# Patient Record
Sex: MALE | Race: WHITE | Employment: FULL TIME | URBAN - METROPOLITAN AREA
[De-identification: names, ages, dates, MRNs, and addresses within clinical notes are randomized per-mention and may not be internally consistent; named-entity substitution may affect disease eponyms.]

---

## 2017-01-03 ENCOUNTER — HOSPITAL ENCOUNTER (EMERGENCY)
Age: 57
Discharge: HOME | End: 2017-01-03
Attending: EMERGENCY MEDICINE

## 2017-01-03 VITALS
BODY MASS INDEX: 28.14 KG/M2 | DIASTOLIC BLOOD PRESSURE: 85 MMHG | WEIGHT: 201 LBS | RESPIRATION RATE: 18 BRPM | OXYGEN SATURATION: 99 % | TEMPERATURE: 98.3 F | HEART RATE: 87 BPM | SYSTOLIC BLOOD PRESSURE: 134 MMHG | HEIGHT: 71 IN

## 2017-01-03 DIAGNOSIS — J20.9 ACUTE BRONCHITIS, UNSPECIFIED ORGANISM: Primary | ICD-10-CM

## 2017-01-03 RX ORDER — AZITHROMYCIN 250 MG/1
TABLET, FILM COATED ORAL
Qty: 6 TAB | Refills: 0 | Status: SHIPPED | OUTPATIENT
Start: 2017-01-03 | End: 2017-02-06 | Stop reason: ALTCHOICE

## 2017-01-03 RX ORDER — CODEINE PHOSPHATE AND GUAIFENESIN 10; 100 MG/5ML; MG/5ML
5 SOLUTION ORAL
Qty: 118 ML | Refills: 0 | Status: ON HOLD | OUTPATIENT
Start: 2017-01-03 | End: 2017-07-21 | Stop reason: CLARIF

## 2017-01-03 RX ORDER — PREDNISONE 20 MG/1
60 TABLET ORAL DAILY
Qty: 15 TAB | Refills: 0 | Status: SHIPPED | OUTPATIENT
Start: 2017-01-03 | End: 2017-01-08

## 2017-01-03 NOTE — DISCHARGE INSTRUCTIONS
Bronchitis: Care Instructions  Your Care Instructions    Bronchitis is inflammation of the bronchial tubes, which carry air to the lungs. The tubes swell and produce mucus, or phlegm. The mucus and inflamed bronchial tubes make you cough. You may have trouble breathing. Most cases of bronchitis are caused by viruses like those that cause colds. Antibiotics usually do not help and they may be harmful. Bronchitis usually develops rapidly and lasts about 2 to 3 weeks in otherwise healthy people. Follow-up care is a key part of your treatment and safety. Be sure to make and go to all appointments, and call your doctor if you are having problems. It's also a good idea to know your test results and keep a list of the medicines you take. How can you care for yourself at home? · Take all medicines exactly as prescribed. Call your doctor if you think you are having a problem with your medicine. · Get some extra rest.  · Take an over-the-counter pain medicine, such as acetaminophen (Tylenol), ibuprofen (Advil, Motrin), or naproxen (Aleve) to reduce fever and relieve body aches. Read and follow all instructions on the label. · Do not take two or more pain medicines at the same time unless the doctor told you to. Many pain medicines have acetaminophen, which is Tylenol. Too much acetaminophen (Tylenol) can be harmful. · Take an over-the-counter cough medicine that contains dextromethorphan to help quiet a dry, hacking cough so that you can sleep. Avoid cough medicines that have more than one active ingredient. Read and follow all instructions on the label. · Breathe moist air from a humidifier, hot shower, or sink filled with hot water. The heat and moisture will thin mucus so you can cough it out. · Do not smoke. Smoking can make bronchitis worse. If you need help quitting, talk to your doctor about stop-smoking programs and medicines. These can increase your chances of quitting for good.   When should you call for help? Call 911 anytime you think you may need emergency care. For example, call if:  · You have severe trouble breathing. Call your doctor now or seek immediate medical care if:  · You have new or worse trouble breathing. · You cough up dark brown or bloody mucus (sputum). · You have a new or higher fever. · You have a new rash. Watch closely for changes in your health, and be sure to contact your doctor if:  · You cough more deeply or more often, especially if you notice more mucus or a change in the color of your mucus. · You are not getting better as expected. Where can you learn more? Go to http://paris-ivon.info/. Enter H333 in the search box to learn more about \"Bronchitis: Care Instructions. \"  Current as of: May 23, 2016  Content Version: 11.1  © 2429-0234 [employer]. Care instructions adapted under license by [employer] (which disclaims liability or warranty for this information). If you have questions about a medical condition or this instruction, always ask your healthcare professional. [place] [address] any warranty or liability for your use of this information. Bronchitis: Care Instructions  Your Care Instructions    Bronchitis is inflammation of the bronchial tubes, which carry air to the lungs. The tubes swell and produce mucus, or phlegm. The mucus and inflamed bronchial tubes make you cough. You may have trouble breathing. Most cases of bronchitis are caused by viruses like those that cause colds. Antibiotics usually do not help and they may be harmful. Bronchitis usually develops rapidly and lasts about 2 to 3 weeks in otherwise healthy people. Follow-up care is a key part of your treatment and safety. Be sure to make and go to all appointments, and call your doctor if you are having problems. It's also a good idea to know your test results and keep a list of the medicines you take.   How can you care for yourself at home? · Take all medicines exactly as prescribed. Call your doctor if you think you are having a problem with your medicine. · Get some extra rest.  · Take an over-the-counter pain medicine, such as acetaminophen (Tylenol), ibuprofen (Advil, Motrin), or naproxen (Aleve) to reduce fever and relieve body aches. Read and follow all instructions on the label. · Do not take two or more pain medicines at the same time unless the doctor told you to. Many pain medicines have acetaminophen, which is Tylenol. Too much acetaminophen (Tylenol) can be harmful. · Take an over-the-counter cough medicine that contains dextromethorphan to help quiet a dry, hacking cough so that you can sleep. Avoid cough medicines that have more than one active ingredient. Read and follow all instructions on the label. · Breathe moist air from a humidifier, hot shower, or sink filled with hot water. The heat and moisture will thin mucus so you can cough it out. · Do not smoke. Smoking can make bronchitis worse. If you need help quitting, talk to your doctor about stop-smoking programs and medicines. These can increase your chances of quitting for good. When should you call for help? Call 911 anytime you think you may need emergency care. For example, call if:  · You have severe trouble breathing. Call your doctor now or seek immediate medical care if:  · You have new or worse trouble breathing. · You cough up dark brown or bloody mucus (sputum). · You have a new or higher fever. · You have a new rash. Watch closely for changes in your health, and be sure to contact your doctor if:  · You cough more deeply or more often, especially if you notice more mucus or a change in the color of your mucus. · You are not getting better as expected. Where can you learn more? Go to http://paris-ivon.info/. Enter H333 in the search box to learn more about \"Bronchitis: Care Instructions. \"  Current as of: May 23, 2016  Content Version: 11.1  © 8176-4170 [employer]. Care instructions adapted under license by [employer] (which disclaims liability or warranty for this information). If you have questions about a medical condition or this instruction, always ask your healthcare professional. [address] any warranty or liability for your use of this information.

## 2017-02-06 ENCOUNTER — OFFICE VISIT (OUTPATIENT)
Dept: INTERNAL MEDICINE CLINIC | Age: 57
End: 2017-02-06

## 2017-02-06 VITALS
HEIGHT: 71 IN | DIASTOLIC BLOOD PRESSURE: 84 MMHG | OXYGEN SATURATION: 97 % | BODY MASS INDEX: 27.44 KG/M2 | SYSTOLIC BLOOD PRESSURE: 135 MMHG | HEART RATE: 79 BPM | WEIGHT: 196 LBS | RESPIRATION RATE: 18 BRPM | TEMPERATURE: 98 F

## 2017-02-06 DIAGNOSIS — J20.9 ACUTE BRONCHITIS, UNSPECIFIED ORGANISM: ICD-10-CM

## 2017-02-06 DIAGNOSIS — K51.90 ULCERATIVE COLITIS WITHOUT COMPLICATIONS, UNSPECIFIED LOCATION (HCC): ICD-10-CM

## 2017-02-06 DIAGNOSIS — E78.5 HYPERLIPIDEMIA, UNSPECIFIED HYPERLIPIDEMIA TYPE: ICD-10-CM

## 2017-02-06 DIAGNOSIS — J45.901 ASTHMA WITH ACUTE EXACERBATION, UNSPECIFIED ASTHMA SEVERITY: Primary | ICD-10-CM

## 2017-02-06 DIAGNOSIS — Z79.60 LONG-TERM USE OF IMMUNOSUPPRESSANT MEDICATION: ICD-10-CM

## 2017-02-06 DIAGNOSIS — R73.02 IGT (IMPAIRED GLUCOSE TOLERANCE): ICD-10-CM

## 2017-02-06 DIAGNOSIS — M06.9 RHEUMATOID ARTHRITIS, INVOLVING UNSPECIFIED SITE, UNSPECIFIED RHEUMATOID FACTOR PRESENCE: ICD-10-CM

## 2017-02-06 RX ORDER — ALBUTEROL SULFATE 90 UG/1
2 AEROSOL, METERED RESPIRATORY (INHALATION)
Qty: 1 INHALER | Refills: 1 | Status: CANCELLED | OUTPATIENT
Start: 2017-02-06

## 2017-02-06 RX ORDER — FLUTICASONE PROPIONATE AND SALMETEROL 250; 50 UG/1; UG/1
1 POWDER RESPIRATORY (INHALATION) 2 TIMES DAILY
Qty: 1 INHALER | Refills: 5 | Status: SHIPPED | OUTPATIENT
Start: 2017-02-06 | End: 2017-02-16 | Stop reason: SDUPTHER

## 2017-02-06 RX ORDER — AZITHROMYCIN 250 MG/1
TABLET, FILM COATED ORAL
Qty: 6 TAB | Refills: 0 | Status: SHIPPED | OUTPATIENT
Start: 2017-02-06 | End: 2019-11-18 | Stop reason: SDUPTHER

## 2017-02-06 RX ORDER — PREDNISONE 20 MG/1
40 TABLET ORAL
Qty: 10 TAB | Refills: 0 | Status: SHIPPED | OUTPATIENT
Start: 2017-02-06 | End: 2017-06-22 | Stop reason: SDUPTHER

## 2017-02-06 RX ORDER — BENZOCAINE .13; .15; .5; 2 G/100G; G/100G; G/100G; G/100G
2 GEL ORAL DAILY
COMMUNITY

## 2017-02-06 NOTE — MR AVS SNAPSHOT
Visit Information Date & Time Provider Department Dept. Phone Encounter #  
 2/6/2017  1:30 PM Jose Chambers, [address] [place] [address], [facility] [phone] [id number] Follow-up Instructions Return in about 2 months (around 4/6/2017), or if symptoms worsen or fail to improve, for asthma, pre-diabetes, cholesterol. Upcoming Health Maintenance Date Due Hepatitis C Screening 1960 Pneumococcal 19-64 Medium Risk (1 of 1 - PPSV23) 8/15/1980 INFLUENZA AGE 9 TO ADULT 8/1/2016 COLONOSCOPY 1/7/2018 DTaP/Tdap/Td series (2 - Td) 5/12/2024 Allergies as of 2/6/2017  Review Complete On: 2/6/2017 By: Jose Chambers MD  
 No Known Allergies Current Immunizations  Reviewed on 5/31/2016 Name Date Hep A Vaccine (Adult) 5/31/2016, 5/12/2014 Influenza Vaccine 11/10/2016, 11/15/2015, 11/1/2014 Influenza Vaccine Whole 11/1/2010 Tdap 5/12/2014 Typhoid Vi Capsular Polysaccharide Vaccine 5/31/2016, 5/12/2014 Zoster Vaccine, Live 3/18/2014 Not reviewed this visit You Were Diagnosed With   
  
 Codes Comments Asthma with acute exacerbation, unspecified asthma severity    -  Primary ICD-10-CM: J45.901 ICD-9-CM: 093.73 Rheumatoid arthritis, involving unspecified site, unspecified rheumatoid factor presence ([facility]     ICD-10-CM: M06.9 ICD-9-CM: 714.0 Ulcerative colitis without complications, unspecified location [facility])     ICD-10-CM: K51.90 ICD-9-CM: 556.9 Long-term use of immunosuppressant medication     ICD-10-CM: Z79.899 ICD-9-CM: V58.69 IGT (impaired glucose tolerance)     ICD-10-CM: R73.02 
ICD-9-CM: 790.22 Hyperlipidemia, unspecified hyperlipidemia type     ICD-10-CM: E78.5 ICD-9-CM: 272.4 Acute bronchitis, unspecified organism     ICD-10-CM: J20.9 ICD-9-CM: 466.0 Vitals BP Pulse Temp Resp Height(growth percentile) Weight(growth percentile) 135/84 (BP 1 Location: Left arm, BP Patient Position: Sitting) 79 98 °F (36.7 °C) (Oral) 18 5' 11\" (1.803 m) 196 lb (88.9 kg) SpO2 BMI Smoking Status 97% 27.34 kg/m2 Never Smoker Vitals History BMI and BSA Data Body Mass Index Body Surface Area  
 27.34 kg/m 2 2.11 m 2 Preferred Pharmacy Pharmacy Name Phone [facility] [phone] Your Updated Medication List  
  
   
This list is accurate as of: 2/6/17  2:03 PM.  Always use your most recent med list.  
  
  
  
  
 albuterol 90 mcg/actuation inhaler Commonly known as:  PROVENTIL HFA, VENTOLIN HFA, PROAIR HFA Take 2 Puffs by inhalation every four (4) hours as needed for Wheezing. * APRISO 0.375 gram capsule Generic drug:  mesalamine ER Take 1.5 g by mouth daily. * LIALDA 1.2 gram delayed release tablet Generic drug:  mesalamine Take  by mouth Daily (before breakfast). azithromycin 250 mg tablet Commonly known as:  Luci Ax Take 2 tablets today, then take 1 tablet daily  
  
 budesonide 32 mcg/actuation nasal spray Commonly known as:  RHINOCORT AQUA  
  
 cetirizine 10 mg tablet Commonly known as:  ZYRTEC Take 1 Tab by mouth daily. coenzyme Q-10 200 mg capsule Commonly known as:  CO Q-10 Take 1 Cap by mouth daily. fluticasone-salmeterol 250-50 mcg/dose diskus inhaler Commonly known as:  ADVAIR Take 1 Puff by inhalation two (2) times a day. guaiFENesin-codeine 100-10 mg/5 mL solution Commonly known as:  ROBITUSSIN AC Take 5 mL by mouth three (3) times daily as needed for Cough. Max Daily Amount: 15 mL.  
  
 ketoconazole 2 % topical cream  
Commonly known as:  NIZORAL Apply  to affected area two (2) times a day. leucovorin calcium 5 mg tablet Commonly known as:  Catherne Chencho Take 5 mg by mouth. 2x per week Sat and Sunday  
  
 methotrexate 2.5 mg tablet Commonly known as:  Virgen Mejia Take 10 mg by mouth. Every Saturday and Sunday  Indications: RHEUMATOID ARTHRITIS  
  
 mometasone 0.1 % topical cream  
Commonly known as:  Armen Pluck Apply  to affected area daily. montelukast 10 mg tablet Commonly known as:  SINGULAIR  
TAKE 1 TABLET DAILY PLETAL 50 mg tablet Generic drug:  cilostazol Take  by mouth Before breakfast and dinner. [employer]  
  
 predniSONE 20 mg tablet Commonly known as:  Glen Flora Esters Take 2 Tabs by mouth daily (with breakfast). PriLOSEC 20 mg capsule Generic drug:  omeprazole Take 20 mg by mouth daily. REMICADE 100 mg injection Generic drug:  inFLIXimab  
5 mg/kg by IntraVENous route. simvastatin 20 mg tablet Commonly known as:  ZOCOR  
TAKE 1 TABLET NIGHTLY * Notice: This list has 2 medication(s) that are the same as other medications prescribed for you. Read the directions carefully, and ask your doctor or other care provider to review them with you. Prescriptions Sent to Pharmacy Refills  
 fluticasone-salmeterol (ADVAIR) 250-50 mcg/dose diskus inhaler 5 Sig: Take 1 Puff by inhalation two (2) times a day. Class: Normal  
 [facility] [place] [address] [address] [place] [address] Ph #: [phone] Route: Inhalation  
 azithromycin (ZITHROMAX) 250 mg tablet 0 Sig: Take 2 tablets today, then take 1 tablet daily Class: Normal  
 [facility] [place] [address] Ph #: [phone]  
 predniSONE (DELTASONE) 20 mg tablet 0 Sig: Take 2 Tabs by mouth daily (with breakfast). Class: Normal  
 Pharmacy: [facility] [place] [address] [address], [address] [place] [address] Ph #: [phone] Route: Oral  
  
Follow-up Instructions Return in about 2 months (around 4/6/2017), or if symptoms worsen or fail to improve, for asthma, pre-diabetes, cholesterol. Introducing [facility]! Dear Shannan Chahal: Thank you for requesting a tamyca account. Our records indicate that you already have an active tamyca account. You can access your account anytime at [email or web address]. [email or web address] Did you know that you can access your hospital and ER discharge instructions at any time in tamyca? You can also review all of your test results from your hospital stay or ER visit. Additional Information If you have questions, please visit the Frequently Asked Questions section of the tamyca website at [email or web address] Remember, tamyca is NOT to be used for urgent needs. For medical emergencies, dial 911. Now available from your iPhone and Android! Please provide this summary of care documentation to your next provider. Your primary care clinician is listed as [address] If you have any questions after today's visit, please call [phone].

## 2017-02-06 NOTE — PROGRESS NOTES
HISTORY OF PRESENT ILLNESS  Marcus Conway is a 55 y.o. male. HPI  Presents for f/u persistent cough    Seen in 11/2016 and 1/2017 at [facility]  Given abx each time    Sputum production improves some, yet cough never resolved. Using albuterol daily in the past week or so    Now, again having thickening of secretions, increased congestion. Pt has always had trouble with mold allergy historically    Past medical, Social, and Family history reviewed  Medications reviewed and updated. ROS  Complete ROS reviewed and negative or stable except as noted in HPI. Physical Exam   Constitutional: He is oriented to person, place, and time. He appears well-nourished. No distress. HENT:   Head: Normocephalic and atraumatic. Mouth/Throat: Oropharynx is clear and moist. No oropharyngeal exudate. Eyes: EOM are normal. Pupils are equal, round, and reactive to light. No scleral icterus. Neck: Normal range of motion. Neck supple. No JVD present. No thyromegaly present. Cardiovascular: Normal rate, regular rhythm and normal heart sounds. Exam reveals no gallop and no friction rub. No murmur heard. Pulmonary/Chest: Effort normal. No respiratory distress. He has wheezes (with forced exp). He has rales (exp with forced exp only c/w mobilizing secretions). Abdominal: Soft. Bowel sounds are normal. He exhibits no distension. There is no tenderness. Genitourinary: Prostate normal.   Musculoskeletal: Normal range of motion. He exhibits no edema. Lymphadenopathy:     He has no cervical adenopathy. Neurological: He is alert and oriented to person, place, and time. He exhibits normal muscle tone. Coordination normal.   Skin: Skin is warm. No rash noted. Psychiatric: He has a normal mood and affect. Nursing note and vitals reviewed. Prior labs reviewed. ASSESSMENT and PLAN  Favor asthma is uncontrolled and     ICD-10-CM ICD-9-CM    1. Asthma with acute exacerbation, unspecified asthma severity J45. [address] 493.92 fluticasone-salmeterol (ADVAIR) 250-50 mcg/dose diskus inhaler      predniSONE (DELTASONE) 20 mg tablet   2. Rheumatoid arthritis, involving unspecified site, unspecified rheumatoid factor presence ([facility] M06.9 714.0    3. Ulcerative colitis without complications, unspecified location ([facility]) K51.90 556.9    4. Long-term use of immunosuppressant medication Z79.899 V58.69    5. IGT (impaired glucose tolerance) R73.02 790.22    6. Hyperlipidemia, unspecified hyperlipidemia type E78.5 272.4    7. Acute bronchitis, unspecified organism J20.9 466.0 azithromycin (ZITHROMAX) 250 mg tablet     Follow-up Disposition:  Return in about 2 months (around 4/6/2017), or if symptoms worsen or fail to improve, for asthma, pre-diabetes, cholesterol.   results and schedule of future studies reviewed with patient  reviewed diet, exercise and weight    cardiovascular risk and specific lipid/LDL goals reviewed  reviewed medications and side effects in detail   Add ICS/LABA  pred burst - low dose  azithro   Continue other current medications

## 2017-02-06 NOTE — PROGRESS NOTES
Rm 13    Chief Complaint   Patient presents with    Cough     Patient states he was seen in 11/2016 & 1/2017 and was dx with bronchitis both times. Patient states he finishes the meds and then it comes back again  Patient states he has a burning & chest discomfort when he coughs   Patient denies fever, sore throat or nasal congestion    Patient states the inhaler has helped needs a refill  Health Maintenance Due   Topic Date Due    Hepatitis C Screening  1960    Pneumococcal 19-64 Medium Risk (1 of 1 - PPSV23) 08/15/1980    INFLUENZA AGE 9 TO ADULT  08/01/2016     1. Have you been to the ER, urgent care clinic since your last visit? Hospitalized since your last visit? yes/cough UC 1/3/2017    2. Have you seen or consulted any other health care providers outside of the [facility] since your last visit? Include any pap smears or colon screening.  no       Learning Assessment 10/28/2015   PRIMARY LEARNER Patient   HIGHEST LEVEL OF EDUCATION - PRIMARY LEARNER  > 4 YEARS OF COLLEGE   BARRIERS PRIMARY LEARNER NONE   CO-LEARNER CAREGIVER No   PRIMARY LANGUAGE ENGLISH   LEARNER PREFERENCE PRIMARY READING   ANSWERED BY patient   RELATIONSHIP SELF     PHQ 2 / 9, over the last two weeks 10/28/2015   Little interest or pleasure in doing things Not at all   Feeling down, depressed or hopeless Not at all   Total Score PHQ 2 0     Living medical will information given at previous visit

## 2017-02-14 ENCOUNTER — TELEPHONE (OUTPATIENT)
Dept: INTERNAL MEDICINE CLINIC | Age: 57
End: 2017-02-14

## 2017-02-14 NOTE — TELEPHONE ENCOUNTER
Pt seen last week - 2/6/17  Still has lung congestion and cough, \"feels like stuff in lungs\", finished antibiotic Fri 2/10/17, also finished prednisone. What does Dr Max Forth recommend? Please call back.  Best call back # for 2/14/17 is [phone] (pt's wk)

## 2017-02-16 ENCOUNTER — HOSPITAL ENCOUNTER (OUTPATIENT)
Dept: GENERAL RADIOLOGY | Age: 57
Discharge: HOME | End: 2017-02-16
Payer: COMMERCIAL

## 2017-02-16 ENCOUNTER — OFFICE VISIT (OUTPATIENT)
Dept: INTERNAL MEDICINE CLINIC | Age: 57
End: 2017-02-16

## 2017-02-16 VITALS
TEMPERATURE: 98 F | SYSTOLIC BLOOD PRESSURE: 122 MMHG | WEIGHT: 194 LBS | RESPIRATION RATE: 15 BRPM | OXYGEN SATURATION: 97 % | BODY MASS INDEX: 27.16 KG/M2 | DIASTOLIC BLOOD PRESSURE: 85 MMHG | HEIGHT: 71 IN

## 2017-02-16 DIAGNOSIS — J45.901 ASTHMA WITH ACUTE EXACERBATION, UNSPECIFIED ASTHMA SEVERITY: ICD-10-CM

## 2017-02-16 DIAGNOSIS — R06.09 DOE (DYSPNEA ON EXERTION): ICD-10-CM

## 2017-02-16 DIAGNOSIS — J20.9 BRONCHITIS WITH BRONCHOSPASM: ICD-10-CM

## 2017-02-16 DIAGNOSIS — J20.9 BRONCHITIS WITH BRONCHOSPASM: Primary | ICD-10-CM

## 2017-02-16 DIAGNOSIS — E78.5 HYPERLIPIDEMIA, UNSPECIFIED HYPERLIPIDEMIA TYPE: ICD-10-CM

## 2017-02-16 DIAGNOSIS — M06.9 RHEUMATOID ARTHRITIS, INVOLVING UNSPECIFIED SITE, UNSPECIFIED RHEUMATOID FACTOR PRESENCE: ICD-10-CM

## 2017-02-16 DIAGNOSIS — K51.90 ULCERATIVE COLITIS WITHOUT COMPLICATIONS, UNSPECIFIED LOCATION (HCC): ICD-10-CM

## 2017-02-16 DIAGNOSIS — Z12.5 PROSTATE CANCER SCREENING: ICD-10-CM

## 2017-02-16 DIAGNOSIS — R73.02 IGT (IMPAIRED GLUCOSE TOLERANCE): ICD-10-CM

## 2017-02-16 DIAGNOSIS — K21.9 GASTROESOPHAGEAL REFLUX DISEASE WITHOUT ESOPHAGITIS: ICD-10-CM

## 2017-02-16 DIAGNOSIS — Z79.60 LONG-TERM USE OF IMMUNOSUPPRESSANT MEDICATION: ICD-10-CM

## 2017-02-16 PROCEDURE — 71020 XR CHEST PA LAT: CPT

## 2017-02-16 RX ORDER — PREDNISONE 10 MG/1
TABLET ORAL
Qty: 21 TAB | Refills: 0 | Status: ON HOLD | OUTPATIENT
Start: 2017-02-16 | End: 2017-07-21

## 2017-02-16 RX ORDER — FLUTICASONE PROPIONATE AND SALMETEROL 250; 50 UG/1; UG/1
1 POWDER RESPIRATORY (INHALATION) 2 TIMES DAILY
Qty: 3 INHALER | Refills: 3 | Status: SHIPPED | OUTPATIENT
Start: 2017-02-16 | End: 2018-10-16 | Stop reason: DRUGHIGH

## 2017-02-16 RX ORDER — LEVOFLOXACIN 750 MG/1
750 TABLET ORAL DAILY
Qty: 10 TAB | Refills: 0 | Status: SHIPPED | OUTPATIENT
Start: 2017-02-16 | End: 2017-02-26

## 2017-02-16 NOTE — PROGRESS NOTES
Chief Complaint   Patient presents with    Cough     Seen last week, completed antiboitics. Cough is productive, yellow to green mucus. Sob with exertion. No recent fevers.  Labs     would like order for labwork to take with him. ROOM 13    1. Have you been to the ER, urgent care clinic since your last visit? Hospitalized since your last visit? No    2. Have you seen or consulted any other health care providers outside of the [address] [place] [address] since your last visit? Include any pap smears or colon screening. No     Health Maintenance Due   Topic Date Due    Hepatitis C Screening  1960    Pneumococcal 19-64 Medium Risk (1 of 1 - PPSV23) 08/15/1980     Patient does have a living will, he will bring in next time to scan into records.

## 2017-02-16 NOTE — PROGRESS NOTES
HISTORY OF PRESENT ILLNESS  Saúl Milton is a 55 y.o. male. HPI  Presents for f/u resp illness    +chest congestion  Increased AMIN    Less tightness and less albuterol use  But, still colored , thickened secretions  Persistent low grade temps, chills - controlled with antipyretics    Past medical, Social, and Family history reviewed  Medications reviewed and updated. ROS  Complete ROS reviewed and negative or stable except as noted in HPI. Physical Exam   Constitutional: He is oriented to person, place, and time. He appears well-nourished. No distress. HENT:   Head: Normocephalic and atraumatic. Mouth/Throat: Oropharynx is clear and moist. No oropharyngeal exudate. Eyes: EOM are normal. Pupils are equal, round, and reactive to light. No scleral icterus. Neck: Normal range of motion. Neck supple. No JVD present. No thyromegaly present. Cardiovascular: Normal rate, regular rhythm and normal heart sounds. Exam reveals no gallop and no friction rub. No murmur heard. Pulmonary/Chest: Effort normal. No respiratory distress. He has wheezes (end exp). He has no rales. Abdominal: Soft. Bowel sounds are normal. He exhibits no distension. There is no tenderness. Genitourinary: Prostate normal.   Musculoskeletal: Normal range of motion. He exhibits no edema. Lymphadenopathy:     He has no cervical adenopathy. Neurological: He is alert and oriented to person, place, and time. He exhibits normal muscle tone. Coordination normal.   Skin: Skin is warm. No rash noted. Psychiatric: He has a normal mood and affect. Nursing note and vitals reviewed. Prior labs reviewed. ASSESSMENT and PLAN    ICD-10-CM ICD-9-CM    1. Bronchitis with bronchospasm J20.9 490 XR CHEST PA LAT      predniSONE (DELTASONE) 10 mg tablet      levoFLOXacin (LEVAQUIN) 750 mg tablet      CBC WITH AUTOMATED DIFF   2. AMIN (dyspnea on exertion) R06.09 786.09 XR CHEST PA LAT   3.  Rheumatoid arthritis, involving unspecified site, unspecified rheumatoid factor presence ([facility] M06.9 714.0    4. Ulcerative colitis without complications, unspecified location ([facility]) K51.90 556.9    5. Long-term use of immunosuppressant medication Z79.899 V58.69    6. Asthma with acute exacerbation, unspecified asthma severity J45.901 493.92 fluticasone-salmeterol (ADVAIR) 250-50 mcg/dose diskus inhaler   7. Hyperlipidemia, unspecified hyperlipidemia type E78.5 272.4 CK      LIPID PANEL      METABOLIC PANEL, COMPREHENSIVE   8. Gastroesophageal reflux disease without esophagitis K21.9 530.81    9. IGT (impaired glucose tolerance) R73.02 790.22 HEMOGLOBIN A1C WITH EAG   10. Prostate cancer screening Z12.5 V76.44 PROSTATE SPECIFIC AG (PSA)     Follow-up Disposition:  Return in about 2 months (around 4/16/2017), or if symptoms worsen or fail to improve, for asthma, cholesterol.    results and schedule of future studies reviewed with patient  reviewed diet, exercise and weight    cardiovascular risk and specific lipid/LDL goals reviewed  reviewed medications and side effects in detail   CXR  levaquin  pred taper  Labs at [facility] prior to next visit

## 2017-02-16 NOTE — MR AVS SNAPSHOT
Visit Information Date & Time Provider Department Dept. Phone Encounter #  
 2/16/2017 10:30 AM Arian Correa MD [facility] Pediatrics and Internal Medicine [phone] [id number] Follow-up Instructions Return in about 2 months (around 4/16/2017), or if symptoms worsen or fail to improve, for asthma, cholesterol. Upcoming Health Maintenance Date Due Hepatitis C Screening 1960 Pneumococcal 19-64 Medium Risk (1 of 1 - PPSV23) 8/15/1980 COLONOSCOPY 1/7/2018 DTaP/Tdap/Td series (2 - Td) 5/12/2024 Allergies as of 2/16/2017  Review Complete On: 2/16/2017 By: Arian Correa MD  
 No Known Allergies Current Immunizations  Reviewed on 5/31/2016 Name Date Hep A Vaccine (Adult) 5/31/2016, 5/12/2014 Influenza Vaccine 11/10/2016, 11/15/2015, 11/1/2014 Influenza Vaccine Whole 11/1/2010 Tdap 5/12/2014 Typhoid Vi Capsular Polysaccharide Vaccine 5/31/2016, 5/12/2014 Zoster Vaccine, Live 3/18/2014 Not reviewed this visit You Were Diagnosed With   
  
 Codes Comments Bronchitis with bronchospasm    -  Primary ICD-10-CM: J20.9 ICD-9-CM: 273 AMIN (dyspnea on exertion)     ICD-10-CM: R06.09 
ICD-9-CM: 786.09 Rheumatoid arthritis, involving unspecified site, unspecified rheumatoid factor presence ([facility]     ICD-10-CM: M06.9 ICD-9-CM: 714.0 Ulcerative colitis without complications, unspecified location [facility])     ICD-10-CM: K51.90 ICD-9-CM: 556.9 Long-term use of immunosuppressant medication     ICD-10-CM: Z79.899 ICD-9-CM: V58.69 Asthma with acute exacerbation, unspecified asthma severity     ICD-10-CM: J45.901 ICD-9-CM: 464.24 Hyperlipidemia, unspecified hyperlipidemia type     ICD-10-CM: E78.5 ICD-9-CM: 272.4 Gastroesophageal reflux disease without esophagitis     ICD-10-CM: K21.9 ICD-9-CM: 530.81  IGT (impaired glucose tolerance)     ICD-10-CM: R73.02 
ICD-9-CM: 790.22   
 Prostate cancer screening     ICD-10-CM: Z12.5 ICD-9-CM: V76.44 Vitals BP Temp Resp Height(growth percentile) Weight(growth percentile) SpO2  
 122/85 (BP 1 Location: Right arm, BP Patient Position: Sitting) 98 °F (36.7 °C) (Oral) 15 5' 11\" (1.803 m) 194 lb (88 kg) 97% BMI Smoking Status 27.06 kg/m2 Never Smoker BMI and BSA Data Body Mass Index Body Surface Area  
 27.06 kg/m 2 2.1 m 2 Preferred Pharmacy Pharmacy Name Phone [facility], [address] [phone] Your Updated Medication List  
  
   
This list is accurate as of: 2/16/17 11:43 AM.  Always use your most recent med list.  
  
  
  
  
 albuterol 90 mcg/actuation inhaler Commonly known as:  PROVENTIL HFA, VENTOLIN HFA, PROAIR HFA Take 2 Puffs by inhalation every four (4) hours as needed for Wheezing. * APRISO 0.375 gram capsule Generic drug:  mesalamine ER Take 1.5 g by mouth daily. * LIALDA 1.2 gram delayed release tablet Generic drug:  mesalamine Take  by mouth Daily (before breakfast). budesonide 32 mcg/actuation nasal spray Commonly known as:  RHINOCORT AQUA  
  
 cetirizine 10 mg tablet Commonly known as:  ZYRTEC Take 1 Tab by mouth daily. coenzyme Q-10 200 mg capsule Commonly known as:  CO Q-10 Take 1 Cap by mouth daily. fluticasone-salmeterol 250-50 mcg/dose diskus inhaler Commonly known as:  ADVAIR Take 1 Puff by inhalation two (2) times a day. guaiFENesin-codeine 100-10 mg/5 mL solution Commonly known as:  ROBITUSSIN AC Take 5 mL by mouth three (3) times daily as needed for Cough. Max Daily Amount: 15 mL. leucovorin calcium 5 mg tablet Commonly known as:  Jordi Deleoneille Take 5 mg by mouth. 2x per week Sat and Sunday  
  
 levoFLOXacin 750 mg tablet Commonly known as:  Sera Palm Take 1 Tab by mouth daily for 10 days. methotrexate 2.5 mg tablet Commonly known as:  Catracho Anisha Take 10 mg by mouth. Every Saturday and Sunday  Indications: RHEUMATOID ARTHRITIS  
  
 mometasone 0.1 % topical cream  
Commonly known as:  Marlyne Lapaz Apply  to affected area daily. montelukast 10 mg tablet Commonly known as:  SINGULAIR  
TAKE 1 TABLET DAILY PLETAL 50 mg tablet Generic drug:  cilostazol Take  by mouth Before breakfast and dinner. Raynaud's * predniSONE 20 mg tablet Commonly known as:  Betty Brian Take 2 Tabs by mouth daily (with breakfast). * predniSONE 10 mg tablet Commonly known as:  Betty Brian Taper daily. 60mg x1, 50mg x 1, 40mg x 1, 30mg x 1, 20mg x 1, 10mg x 1 PriLOSEC 20 mg capsule Generic drug:  omeprazole Take 20 mg by mouth daily. REMICADE 100 mg injection Generic drug:  inFLIXimab  
5 mg/kg by IntraVENous route. simvastatin 20 mg tablet Commonly known as:  ZOCOR  
TAKE 1 TABLET NIGHTLY * Notice: This list has 4 medication(s) that are the same as other medications prescribed for you. Read the directions carefully, and ask your doctor or other care provider to review them with you. Prescriptions Sent to Pharmacy Refills  
 fluticasone-salmeterol (ADVAIR) 250-50 mcg/dose diskus inhaler 3 Sig: Take 1 Puff by inhalation two (2) times a day. Class: Normal  
 Pharmacy: [address], [address] Ph #: [phone] Route: Inhalation  
 predniSONE (DELTASONE) 10 mg tablet 0 Sig: Taper daily. 60mg x1, 50mg x 1, 40mg x 1, 30mg x 1, 20mg x 1, 10mg x 1 Class: Normal  
 [facility] [address], [address] Ph #: [phone]  
 levoFLOXacin (LEVAQUIN) 750 mg tablet 0 Sig: Take 1 Tab by mouth daily for 10 days. Class: Normal  
 Pharmacy: [facility] [address], [address] Ph #: [phone] Route: Oral  
  
We Performed the Following CBC WITH AUTOMATED DIFF [12027 CPT(R)] CK [id number] CPT(R)] HEMOGLOBIN A1C WITH EAG [39022 CPT(R)] LIPID PANEL [21179 CPT(R)] METABOLIC PANEL, COMPREHENSIVE [24721 CPT(R)] PROSTATE SPECIFIC AG (PSA) [id number] CPT(R)] Follow-up Instructions Return in about 2 months (around 4/16/2017), or if symptoms worsen or fail to improve, for asthma, cholesterol. To-Do List   
 02/17/2017 Imaging:  XR CHEST PA LAT [facility] SERVICES! Dear Vannessa Carr: Thank you for requesting a [employer] account. Our records indicate that you already have an active Kinsights account. You can access your account anytime at [email or web address] Did you know that you can access your hospital and ER discharge instructions at any time in Kinsights? You can also review all of your test results from your hospital stay or ER visit. Additional Information If you have questions, please visit the Frequently Asked Questions section of the Kinsights website at [email or web address] Remember, Kinsights is NOT to be used for urgent needs. For medical emergencies, dial 911. Now available from your iPhone and Android! Please provide this summary of care documentation to your next provider. Your primary care clinician is listed as [address] If you have any questions after today's visit, please call [phone].

## 2017-05-17 LAB
ALBUMIN SERPL-MCNC: 4.1 G/DL (ref 3.5–5.5)
ALBUMIN/GLOB SERPL: 1.6 {RATIO} (ref 1.2–2.2)
ALP SERPL-CCNC: 76 IU/L (ref 39–117)
ALT SERPL-CCNC: 35 IU/L (ref 0–44)
AST SERPL-CCNC: 25 IU/L (ref 0–40)
BASOPHILS # BLD AUTO: 0 X10E3/UL (ref 0–0.2)
BASOPHILS NFR BLD AUTO: 0 %
BILIRUB SERPL-MCNC: 0.4 MG/DL (ref 0–1.2)
BUN SERPL-MCNC: 15 MG/DL (ref 6–24)
BUN/CREAT SERPL: 15 (ref 9–20)
CALCIUM SERPL-MCNC: 9.1 MG/DL (ref 8.7–10.2)
CHLORIDE SERPL-SCNC: 102 MMOL/L (ref 96–106)
CHOLEST SERPL-MCNC: 147 MG/DL (ref 100–199)
CK SERPL-CCNC: 179 U/L (ref 24–204)
CO2 SERPL-SCNC: 23 MMOL/L (ref 18–29)
CREAT SERPL-MCNC: 0.99 MG/DL (ref 0.76–1.27)
EOSINOPHIL # BLD AUTO: 0.1 X10E3/UL (ref 0–0.4)
EOSINOPHIL NFR BLD AUTO: 1 %
ERYTHROCYTE [DISTWIDTH] IN BLOOD BY AUTOMATED COUNT: 14.3 % (ref 12.3–15.4)
EST. AVERAGE GLUCOSE BLD GHB EST-MCNC: 128 MG/DL
GLOBULIN SER CALC-MCNC: 2.6 G/DL (ref 1.5–4.5)
GLUCOSE SERPL-MCNC: 97 MG/DL (ref 65–99)
HBA1C MFR BLD: 6.1 % (ref 4.8–5.6)
HCT VFR BLD AUTO: 42.4 % (ref 37.5–51)
HDLC SERPL-MCNC: 35 MG/DL
HGB BLD-MCNC: 14.4 G/DL (ref 12.6–17.7)
IMM GRANULOCYTES # BLD: 0 X10E3/UL (ref 0–0.1)
IMM GRANULOCYTES NFR BLD: 0 %
LDLC SERPL CALC-MCNC: 84 MG/DL (ref 0–99)
LYMPHOCYTES # BLD AUTO: 1.7 X10E3/UL (ref 0.7–3.1)
LYMPHOCYTES NFR BLD AUTO: 23 %
MCH RBC QN AUTO: 34 PG (ref 26.6–33)
MCHC RBC AUTO-ENTMCNC: 34 G/DL (ref 31.5–35.7)
MCV RBC AUTO: 100 FL (ref 79–97)
MONOCYTES # BLD AUTO: 0.7 X10E3/UL (ref 0.1–0.9)
MONOCYTES NFR BLD AUTO: 10 %
NEUTROPHILS # BLD AUTO: 4.9 X10E3/UL (ref 1.4–7)
NEUTROPHILS NFR BLD AUTO: 66 %
PLATELET # BLD AUTO: 281 X10E3/UL (ref 150–379)
POTASSIUM SERPL-SCNC: 4.2 MMOL/L (ref 3.5–5.2)
PROT SERPL-MCNC: 6.7 G/DL (ref 6–8.5)
PSA SERPL-MCNC: 4 NG/ML (ref 0–4)
RBC # BLD AUTO: 4.24 X10E6/UL (ref 4.14–5.8)
SODIUM SERPL-SCNC: 141 MMOL/L (ref 134–144)
TRIGL SERPL-MCNC: 140 MG/DL (ref 0–149)
VLDLC SERPL CALC-MCNC: 28 MG/DL (ref 5–40)
WBC # BLD AUTO: 7.5 X10E3/UL (ref 3.4–10.8)

## 2017-06-04 RX ORDER — SIMVASTATIN 20 MG/1
TABLET, FILM COATED ORAL
Qty: 90 TAB | Refills: 1 | Status: SHIPPED | OUTPATIENT
Start: 2017-06-04 | End: 2017-12-13 | Stop reason: SDUPTHER

## 2017-06-04 RX ORDER — MONTELUKAST SODIUM 10 MG/1
TABLET ORAL
Qty: 90 TAB | Refills: 1 | Status: SHIPPED | OUTPATIENT
Start: 2017-06-04 | End: 2017-11-26 | Stop reason: SDUPTHER

## 2017-06-22 ENCOUNTER — OFFICE VISIT (OUTPATIENT)
Dept: INTERNAL MEDICINE CLINIC | Age: 57
End: 2017-06-22

## 2017-06-22 VITALS
SYSTOLIC BLOOD PRESSURE: 122 MMHG | HEIGHT: 71 IN | BODY MASS INDEX: 26.82 KG/M2 | DIASTOLIC BLOOD PRESSURE: 73 MMHG | OXYGEN SATURATION: 93 % | WEIGHT: 191.6 LBS | TEMPERATURE: 99.6 F | HEART RATE: 112 BPM | RESPIRATION RATE: 20 BRPM

## 2017-06-22 DIAGNOSIS — J10.1 INFLUENZA A: ICD-10-CM

## 2017-06-22 DIAGNOSIS — Z79.60 LONG-TERM USE OF IMMUNOSUPPRESSANT MEDICATION: ICD-10-CM

## 2017-06-22 DIAGNOSIS — R97.20 ELEVATED PSA: ICD-10-CM

## 2017-06-22 DIAGNOSIS — J45.901 ASTHMA WITH ACUTE EXACERBATION, UNSPECIFIED ASTHMA SEVERITY: Primary | ICD-10-CM

## 2017-06-22 DIAGNOSIS — R73.02 IGT (IMPAIRED GLUCOSE TOLERANCE): ICD-10-CM

## 2017-06-22 DIAGNOSIS — M06.9 RHEUMATOID ARTHRITIS, INVOLVING UNSPECIFIED SITE, UNSPECIFIED RHEUMATOID FACTOR PRESENCE: ICD-10-CM

## 2017-06-22 DIAGNOSIS — K21.9 GASTROESOPHAGEAL REFLUX DISEASE WITHOUT ESOPHAGITIS: ICD-10-CM

## 2017-06-22 DIAGNOSIS — E78.5 HYPERLIPIDEMIA, UNSPECIFIED HYPERLIPIDEMIA TYPE: ICD-10-CM

## 2017-06-22 DIAGNOSIS — K51.90 ULCERATIVE COLITIS WITHOUT COMPLICATIONS, UNSPECIFIED LOCATION (HCC): ICD-10-CM

## 2017-06-22 LAB
FLUAV+FLUBV AG NOSE QL IA.RAPID: NEGATIVE POS/NEG
FLUAV+FLUBV AG NOSE QL IA.RAPID: POSITIVE POS/NEG
VALID INTERNAL CONTROL?: YES

## 2017-06-22 RX ORDER — OSELTAMIVIR PHOSPHATE 75 MG/1
75 CAPSULE ORAL 2 TIMES DAILY
Qty: 10 CAP | Refills: 0 | Status: SHIPPED | OUTPATIENT
Start: 2017-06-22 | End: 2017-06-27

## 2017-06-22 RX ORDER — PREDNISONE 20 MG/1
40 TABLET ORAL
Qty: 10 TAB | Refills: 0 | Status: ON HOLD | OUTPATIENT
Start: 2017-06-22 | End: 2017-07-21

## 2017-06-22 NOTE — MR AVS SNAPSHOT
Visit Information Date & Time Provider Department Dept. Phone Encounter #  
 6/22/2017  1:45 PM Radha Juárez, [address] [place] [address] and Internal Medicine [phone] [id number] Follow-up Instructions Return in about 5 months (around 11/22/2017), or if symptoms worsen or fail to improve, for cholesterol, pre-diabetes, asthma. Upcoming Health Maintenance Date Due Hepatitis C Screening 1960 Pneumococcal 19-64 Medium Risk (1 of 1 - PPSV23) 8/15/1980 INFLUENZA AGE 9 TO ADULT 8/1/2017 COLONOSCOPY 1/7/2018 DTaP/Tdap/Td series (2 - Td) 5/12/2024 Allergies as of 6/22/2017  Review Complete On: 6/22/2017 By: Radha Juárez MD  
 No Known Allergies Current Immunizations  Reviewed on 6/22/2017 Name Date Hep A Vaccine (Adult) 5/31/2016, 5/12/2014 Influenza Vaccine 11/10/2016, 11/15/2015, 11/1/2014 Influenza Vaccine Whole 11/1/2010 Tdap 5/12/2014 Typhoid Vi Capsular Polysaccharide Vaccine 5/31/2016, 5/12/2014 Zoster Vaccine, Live 3/18/2014 Reviewed by Radha Juárez MD on 6/22/2017 at  2:33 PM  
You Were Diagnosed With   
  
 Codes Comments Asthma with acute exacerbation, unspecified asthma severity    -  Primary ICD-10-CM: J45.901 ICD-9-CM: 740.62 Elevated PSA     ICD-10-CM: R97.20 ICD-9-CM: 790.93 Rheumatoid arthritis, involving unspecified site, unspecified rheumatoid factor presence ([facility]     ICD-10-CM: M06.9 ICD-9-CM: 714.0 Ulcerative colitis without complications, unspecified location [facility])     ICD-10-CM: K51.90 ICD-9-CM: 556.9 Hyperlipidemia, unspecified hyperlipidemia type     ICD-10-CM: E78.5 ICD-9-CM: 272.4 Gastroesophageal reflux disease without esophagitis     ICD-10-CM: K21.9 ICD-9-CM: 530.81 Long-term use of immunosuppressant medication     ICD-10-CM: Z79.899 ICD-9-CM: V58.69 IGT (impaired glucose tolerance)     ICD-10-CM: R73.02 
ICD-9-CM: 790.22 Vitals BP Pulse Temp Resp Height(growth percentile) Weight(growth percentile) 122/73 (BP 1 Location: Left arm, BP Patient Position: Sitting) (!) 112 99.6 °F (37.6 °C) (Oral) 20 5' 11\" (1.803 m) 191 lb 9.6 oz (86.9 kg) SpO2 BMI Smoking Status 93% 26.72 kg/m2 Never Smoker BMI and BSA Data Body Mass Index Body Surface Area  
 26.72 kg/m 2 2.09 m 2 Preferred Pharmacy Pharmacy Name Phone [facility], [address] [phone] Your Updated Medication List  
  
   
This list is accurate as of: 6/22/17  2:35 PM.  Always use your most recent med list.  
  
  
  
  
 albuterol 90 mcg/actuation inhaler Commonly known as:  PROVENTIL HFA, VENTOLIN HFA, PROAIR HFA Take 2 Puffs by inhalation every four (4) hours as needed for Wheezing. * APRISO 0.375 gram capsule Generic drug:  mesalamine ER Take 1.5 g by mouth daily. * LIALDA 1.2 gram delayed release tablet Generic drug:  mesalamine Take  by mouth Daily (before breakfast). budesonide 32 mcg/actuation nasal spray Commonly known as:  RHINOCORT AQUA  
  
 cetirizine 10 mg tablet Commonly known as:  ZYRTEC Take 1 Tab by mouth daily. coenzyme Q-10 200 mg capsule Commonly known as:  CO Q-10 Take 1 Cap by mouth daily. fluticasone-salmeterol 250-50 mcg/dose diskus inhaler Commonly known as:  ADVAIR Take 1 Puff by inhalation two (2) times a day. guaiFENesin-codeine 100-10 mg/5 mL solution Commonly known as:  ROBITUSSIN AC Take 5 mL by mouth three (3) times daily as needed for Cough. Max Daily Amount: 15 mL. leucovorin calcium 5 mg tablet Commonly known as:  Delos Mcclain Take 5 mg by mouth. 2x per week Sat and Sunday  
  
 methotrexate 2.5 mg tablet Commonly known as:  Ruba Outlaw Take 10 mg by mouth.  Every Saturday and Sunday  Indications: RHEUMATOID ARTHRITIS  
  
 mometasone 0.1 % topical cream  
 Commonly known as:  Kin Coyer Apply  to affected area daily. montelukast 10 mg tablet Commonly known as:  SINGULAIR  
TAKE 1 TABLET DAILY PLETAL 50 mg tablet Generic drug:  cilostazol Take  by mouth Before breakfast and dinner. Raynaud's * predniSONE 10 mg tablet Commonly known as:  Lowella Clunes Taper daily. 60mg x1, 50mg x 1, 40mg x 1, 30mg x 1, 20mg x 1, 10mg x 1 * predniSONE 20 mg tablet Commonly known as:  Lowella Clunes Take 2 Tabs by mouth daily (with breakfast). PriLOSEC 20 mg capsule Generic drug:  omeprazole Take 20 mg by mouth daily. REMICADE 100 mg injection Generic drug:  inFLIXimab  
5 mg/kg by IntraVENous route. simvastatin 20 mg tablet Commonly known as:  ZOCOR  
TAKE 1 TABLET NIGHTLY * Notice: This list has 4 medication(s) that are the same as other medications prescribed for you. Read the directions carefully, and ask your doctor or other care provider to review them with you. Prescriptions Sent to Pharmacy Refills  
 predniSONE (DELTASONE) 20 mg tablet 0 Sig: Take 2 Tabs by mouth daily (with breakfast). Class: Normal  
 [facility]: [facility] [place] [address], [address] [place] [address] #: [phone] Route: Oral  
  
We Performed the Following REFERRAL TO UROLOGY [HKS855 Custom] Comments:  
 Please evaluate patient for elevated PSA. Follow-up Instructions Return in about 5 months (around 11/22/2017), or if symptoms worsen or fail to improve, for cholesterol, pre-diabetes, asthma. Referral Information Referral ID Referred By Referred To  
  
 [id number] Grisel[facility] Duarte Barahona [address]   
   [place], [address] Visits Status Start Date End Date 1 New Request 6/22/17 6/22/18 If your referral has a status of pending review or denied, additional information will be sent to support the outcome of this decision. Introducing [facility]! Dear Sasha Pierce: Thank you for requesting a Raise account. Our records indicate that you already have an active Raise account. You can access your account anytime at [email or web address] Did you know that you can access your hospital and ER discharge instructions at any time in Raise? You can also review all of your test results from your hospital stay or ER visit. Additional Information If you have questions, please visit the Frequently Asked Questions section of the [employer]ise website at [email or web address] Remember, Raise is NOT to be used for urgent needs. For medical emergencies, dial 911. Now available from your iPhone and Android! Please provide this summary of care documentation to your next provider. Your primary care clinician is listed as [address] If you have any questions after today's visit, please call [phone].

## 2017-06-22 NOTE — PROGRESS NOTES
HISTORY OF PRESENT ILLNESS  Olayinka Chawla is a 55 y.o. male. HPI  Presents for acute care    1 week hx achiness, malaise  Mild cough    Saw Rheum - Dr Radha Keenan - dx viral illness  Given IM steroid and azithro for use if illness progresses    Increased chest congestion, sputum production and fevers to 101-102  +tight and wheezy  Took azithro 2 pills yest    Using advair daily chronically as well as singulair and nasal steroid  Increased to bid this week  But, despite this, pt has need for albuterol    Past medical, Social, and Family history reviewed  Medications reviewed and updated. ROS  Complete ROS reviewed and negative or stable except as noted in HPI. Physical Exam   Constitutional: He is oriented to person, place, and time. He appears well-nourished. No distress. HENT:   Head: Normocephalic and atraumatic. Mouth/Throat: Oropharynx is clear and moist. No oropharyngeal exudate. Eyes: EOM are normal. Pupils are equal, round, and reactive to light. No scleral icterus. Neck: Normal range of motion. Neck supple. No JVD present. No thyromegaly present. Cardiovascular: Normal rate, regular rhythm and normal heart sounds. Exam reveals no gallop and no friction rub. No murmur heard. Pulmonary/Chest: Effort normal. No respiratory distress. He has wheezes (end exp with forced exp). He has no rales. Abdominal: Soft. Bowel sounds are normal. He exhibits no distension. There is no tenderness. Genitourinary: Prostate normal.   Musculoskeletal: Normal range of motion. He exhibits no edema. Lymphadenopathy:     He has no cervical adenopathy. Neurological: He is alert and oriented to person, place, and time. He exhibits normal muscle tone. Coordination normal.   Skin: Skin is warm. No rash noted. Psychiatric: He has a normal mood and affect. Nursing note and vitals reviewed. Prior labs reviewed.     ASSESSMENT and PLAN  Favor viral illness evolving into bacterial bronchitis/sinusitis and mild asthma exac  Elevated PSA reviewed    ICD-10-CM ICD-9-CM    1. Asthma with acute exacerbation, unspecified asthma severity J45.901 493.92 predniSONE (DELTASONE) 20 mg tablet      AMB POC CHAR INFLUENZA A/B TEST   2. Elevated PSA R97.20 790.93 REFERRAL TO UROLOGY   3. Rheumatoid arthritis, involving unspecified site, unspecified rheumatoid factor presence (HCC) M06.9 714.0    4. Ulcerative colitis without complications, unspecified location (HCC) K51.90 556.9    5. Hyperlipidemia, unspecified hyperlipidemia type E78.5 272.4    6. Gastroesophageal reflux disease without esophagitis K21.9 530.81    7. Long-term use of immunosuppressant medication Z79.899 V58.69    8. IGT (impaired glucose tolerance) R73.02 790.22    9. Influenza A J10.1 487.1 oseltamivir (TAMIFLU) 75 mg capsule     Follow-up Disposition:  Return in about 5 months (around 11/22/2017), or if symptoms worsen or fail to improve, for cholesterol, pre-diabetes, asthma. results and schedule of future studies reviewed with patient  reviewed diet, exercise and weight   cardiovascular risk and specific lipid/LDL goals reviewed  reviewed medications and side effects in detail   pred low dose burst   Increase advair to bid regularly fo rnow  Continue singulair  Complete azithro  Consider pneumovax - but pt would need to hold/delay a Remicade infusion to allow for effective vaccination response. Pt to review with Rheum.

## 2017-06-22 NOTE — PROGRESS NOTES
RM # 13      Chief Complaint   Patient presents with    Fever     Fever started last evening , temp 101.0, patient took Tylenol and start a Zpack about 10:00 p.m.  Nasal Congestion     and chest without production cough    Sinus Pain     1. Have you been to the ER, urgent care clinic since your last visit? Hospitalized since your last visit? Notest    2. Have you seen or consulted any other health care providers outside of the [address] [place] [address] since your last visit? Include any pap smears or colon screening. Yes When: Dr. Adrián Arellano last seen 6-    Patient was called and informed that he tested positive flu A. Informed patient that Tamaflu was sent to his pharmacy.

## 2017-07-17 DIAGNOSIS — R05.9 COUGH: Primary | ICD-10-CM

## 2017-07-17 DIAGNOSIS — Z79.60 LONG-TERM USE OF IMMUNOSUPPRESSANT MEDICATION: ICD-10-CM

## 2017-07-17 DIAGNOSIS — J45.901 ASTHMA WITH ACUTE EXACERBATION, UNSPECIFIED ASTHMA SEVERITY: ICD-10-CM

## 2017-07-19 ENCOUNTER — TELEPHONE (OUTPATIENT)
Dept: INTERNAL MEDICINE CLINIC | Age: 57
End: 2017-07-19

## 2017-07-19 NOTE — TELEPHONE ENCOUNTER
I spoke with Dr Paulo Mccollum has fractured his tibia and needs surgical intervention. Reviewed his resp status and recent referral to pulmonary due to chronic cough. His resp status does not necessarily make GA an absolute contraindication, but we agreed that spinal anesthesia would be more appropriate. No need for urgent pulmonary eval prior to surgery, though.

## 2017-07-20 ENCOUNTER — HOSPITAL ENCOUNTER (OUTPATIENT)
Dept: GENERAL RADIOLOGY | Age: 57
Discharge: HOME | End: 2017-07-20
Payer: COMMERCIAL

## 2017-07-20 ENCOUNTER — ANESTHESIA EVENT (OUTPATIENT)
Dept: SURGERY | Age: 57
End: 2017-07-20
Payer: COMMERCIAL

## 2017-07-20 DIAGNOSIS — R05.9 COUGH: ICD-10-CM

## 2017-07-20 PROCEDURE — 71020 XR CHEST PA LAT: CPT

## 2017-07-21 ENCOUNTER — APPOINTMENT (OUTPATIENT)
Dept: GENERAL RADIOLOGY | Age: 57
End: 2017-07-21
Attending: PHYSICIAN ASSISTANT
Payer: COMMERCIAL

## 2017-07-21 ENCOUNTER — APPOINTMENT (OUTPATIENT)
Dept: GENERAL RADIOLOGY | Age: 57
End: 2017-07-21
Attending: ORTHOPAEDIC SURGERY
Payer: COMMERCIAL

## 2017-07-21 ENCOUNTER — ANESTHESIA (OUTPATIENT)
Dept: SURGERY | Age: 57
End: 2017-07-21
Payer: COMMERCIAL

## 2017-07-21 ENCOUNTER — HOSPITAL ENCOUNTER (OUTPATIENT)
Age: 57
Setting detail: OBSERVATION
Discharge: HOME | End: 2017-07-22
Attending: ORTHOPAEDIC SURGERY | Admitting: ORTHOPAEDIC SURGERY
Payer: COMMERCIAL

## 2017-07-21 PROBLEM — S82.142A TIBIAL PLATEAU FRACTURE, LEFT: Status: ACTIVE | Noted: 2017-07-21

## 2017-07-21 LAB
GLUCOSE BLD STRIP.AUTO-MCNC: 85 MG/DL (ref 65–100)
SERVICE CMNT-IMP: NORMAL

## 2017-07-21 PROCEDURE — 74011250636 HC RX REV CODE- 250/636

## 2017-07-21 PROCEDURE — 76001 XR FLUOROSCOPY OVER 60 MINUTES: CPT

## 2017-07-21 PROCEDURE — 77030020143 HC AIRWY LARYN INTUB CGAS -A: Performed by: ANESTHESIOLOGY

## 2017-07-21 PROCEDURE — 74011250636 HC RX REV CODE- 250/636: Performed by: ANESTHESIOLOGY

## 2017-07-21 PROCEDURE — 99218 HC RM OBSERVATION: CPT

## 2017-07-21 PROCEDURE — 73560 X-RAY EXAM OF KNEE 1 OR 2: CPT

## 2017-07-21 PROCEDURE — 82962 GLUCOSE BLOOD TEST: CPT

## 2017-07-21 PROCEDURE — 77030037731 HC PTTY BN HEMOSRB ABRY -H: Performed by: ORTHOPAEDIC SURGERY

## 2017-07-21 PROCEDURE — 77030002933 HC SUT MCRYL J&J -A: Performed by: ORTHOPAEDIC SURGERY

## 2017-07-21 PROCEDURE — 64447 NJX AA&/STRD FEMORAL NRV IMG: CPT

## 2017-07-21 PROCEDURE — 74011250637 HC RX REV CODE- 250/637: Performed by: PHYSICIAN ASSISTANT

## 2017-07-21 PROCEDURE — 76060000066 HC AMB SURG ANES 3 TO 3.5 HR: Performed by: ORTHOPAEDIC SURGERY

## 2017-07-21 PROCEDURE — 77030011640 HC PAD GRND REM COVD -A: Performed by: ORTHOPAEDIC SURGERY

## 2017-07-21 PROCEDURE — C1769 GUIDE WIRE: HCPCS | Performed by: ORTHOPAEDIC SURGERY

## 2017-07-21 PROCEDURE — 77030028907 HC WRP KNEE WO BGS SOLM -B

## 2017-07-21 PROCEDURE — 74011250636 HC RX REV CODE- 250/636: Performed by: PHYSICIAN ASSISTANT

## 2017-07-21 PROCEDURE — 77030035608: Performed by: ORTHOPAEDIC SURGERY

## 2017-07-21 PROCEDURE — 77030002916 HC SUT ETHLN J&J -A: Performed by: ORTHOPAEDIC SURGERY

## 2017-07-21 PROCEDURE — 74011250636 HC RX REV CODE- 250/636: Performed by: ORTHOPAEDIC SURGERY

## 2017-07-21 PROCEDURE — 77030012935 HC DRSG AQUACEL BMS -B: Performed by: ORTHOPAEDIC SURGERY

## 2017-07-21 PROCEDURE — 77030012890

## 2017-07-21 PROCEDURE — 76030000023 HC AMB SURG 3 TO 3.5 HR INTENSV-TIER 1: Performed by: ORTHOPAEDIC SURGERY

## 2017-07-21 PROCEDURE — 77030026188 HC BN CANC CHP CRSH PR LIFV -E: Performed by: ORTHOPAEDIC SURGERY

## 2017-07-21 PROCEDURE — 77030020782 HC GWN BAIR PAWS FLX 3M -B

## 2017-07-21 PROCEDURE — 77030003601 HC NDL NRV BLK BBMI -A

## 2017-07-21 PROCEDURE — C1713 ANCHOR/SCREW BN/BN,TIS/BN: HCPCS | Performed by: ORTHOPAEDIC SURGERY

## 2017-07-21 PROCEDURE — 76210000035 HC AMBSU PH I REC 1 TO 1.5 HR: Performed by: ORTHOPAEDIC SURGERY

## 2017-07-21 PROCEDURE — 77030035307: Performed by: ORTHOPAEDIC SURGERY

## 2017-07-21 PROCEDURE — 77030013789 HC KT REP BIO TEND ARTH -C: Performed by: ORTHOPAEDIC SURGERY

## 2017-07-21 PROCEDURE — 74011250637 HC RX REV CODE- 250/637

## 2017-07-21 PROCEDURE — 77030028224 HC PDNG CST BSNM -A: Performed by: ORTHOPAEDIC SURGERY

## 2017-07-21 PROCEDURE — 64445 NJX AA&/STRD SCIATIC NRV IMG: CPT

## 2017-07-21 PROCEDURE — 77030008467 HC STPLR SKN COVD -B: Performed by: ORTHOPAEDIC SURGERY

## 2017-07-21 PROCEDURE — 77030020274 HC MISC IMPL ORTHOPEDIC: Performed by: ORTHOPAEDIC SURGERY

## 2017-07-21 PROCEDURE — 77030018836 HC SOL IRR NACL ICUM -A: Performed by: ORTHOPAEDIC SURGERY

## 2017-07-21 PROCEDURE — 77030031139 HC SUT VCRL2 J&J -A: Performed by: ORTHOPAEDIC SURGERY

## 2017-07-21 PROCEDURE — 77030018822 HC SLV COMPR FT COVD -B

## 2017-07-21 DEVICE — GRAFT BNE 30CC 1 4MM CANC CRUSH CHIP READIGRFT: Type: IMPLANTABLE DEVICE | Site: TIBIA | Status: FUNCTIONAL

## 2017-07-21 DEVICE — ANCHOR SUT L19.1MM DIA4.75MM BIOCOMPOSITE FULL THRD: Type: IMPLANTABLE DEVICE | Site: TIBIA | Status: FUNCTIONAL

## 2017-07-21 DEVICE — ANCHOR SUTURE BIOCOMP 4.75X19.1 MM SWIVELOCK C: Type: IMPLANTABLE DEVICE | Site: TIBIA | Status: FUNCTIONAL

## 2017-07-21 RX ORDER — SODIUM CHLORIDE 0.9 % (FLUSH) 0.9 %
5-10 SYRINGE (ML) INJECTION AS NEEDED
Status: DISCONTINUED | OUTPATIENT
Start: 2017-07-21 | End: 2017-07-21 | Stop reason: HOSPADM

## 2017-07-21 RX ORDER — MESALAMINE 1.2 G/1
2.4 TABLET, DELAYED RELEASE ORAL
Status: DISCONTINUED | OUTPATIENT
Start: 2017-07-22 | End: 2017-07-22 | Stop reason: HOSPADM

## 2017-07-21 RX ORDER — OXYCODONE HYDROCHLORIDE 5 MG/1
5 TABLET ORAL
Status: DISCONTINUED | OUTPATIENT
Start: 2017-07-21 | End: 2017-07-22 | Stop reason: HOSPADM

## 2017-07-21 RX ORDER — DIPHENHYDRAMINE HYDROCHLORIDE 50 MG/ML
12.5 INJECTION, SOLUTION INTRAMUSCULAR; INTRAVENOUS
Status: ACTIVE | OUTPATIENT
Start: 2017-07-21 | End: 2017-07-22

## 2017-07-21 RX ORDER — MIDAZOLAM HYDROCHLORIDE 1 MG/ML
INJECTION, SOLUTION INTRAMUSCULAR; INTRAVENOUS AS NEEDED
Status: DISCONTINUED | OUTPATIENT
Start: 2017-07-21 | End: 2017-07-21 | Stop reason: HOSPADM

## 2017-07-21 RX ORDER — SODIUM CHLORIDE 9 MG/ML
125 INJECTION, SOLUTION INTRAVENOUS CONTINUOUS
Status: DISPENSED | OUTPATIENT
Start: 2017-07-21 | End: 2017-07-22

## 2017-07-21 RX ORDER — LEUCOVORIN CALCIUM 5 MG/1
5 TABLET ORAL ONCE
Status: DISCONTINUED | OUTPATIENT
Start: 2017-07-22 | End: 2017-07-21 | Stop reason: SDUPTHER

## 2017-07-21 RX ORDER — POLYETHYLENE GLYCOL 3350 17 G/17G
17 POWDER, FOR SOLUTION ORAL DAILY
Status: DISCONTINUED | OUTPATIENT
Start: 2017-07-22 | End: 2017-07-22 | Stop reason: HOSPADM

## 2017-07-21 RX ORDER — MONTELUKAST SODIUM 10 MG/1
10 TABLET ORAL
Status: DISCONTINUED | OUTPATIENT
Start: 2017-07-21 | End: 2017-07-22 | Stop reason: HOSPADM

## 2017-07-21 RX ORDER — ROPIVACAINE HYDROCHLORIDE 2 MG/ML
INJECTION, SOLUTION EPIDURAL; INFILTRATION; PERINEURAL AS NEEDED
Status: DISCONTINUED | OUTPATIENT
Start: 2017-07-21 | End: 2017-07-21 | Stop reason: HOSPADM

## 2017-07-21 RX ORDER — CEFAZOLIN SODIUM IN 0.9 % NACL 2 G/50 ML
2 INTRAVENOUS SOLUTION, PIGGYBACK (ML) INTRAVENOUS ONCE
Status: COMPLETED | OUTPATIENT
Start: 2017-07-21 | End: 2017-07-21

## 2017-07-21 RX ORDER — METHOTREXATE 2.5 MG/1
10 TABLET ORAL
Status: DISCONTINUED | OUTPATIENT
Start: 2017-07-22 | End: 2017-07-21 | Stop reason: SDUPTHER

## 2017-07-21 RX ORDER — SODIUM CHLORIDE, SODIUM LACTATE, POTASSIUM CHLORIDE, CALCIUM CHLORIDE 600; 310; 30; 20 MG/100ML; MG/100ML; MG/100ML; MG/100ML
100 INJECTION, SOLUTION INTRAVENOUS CONTINUOUS
Status: DISCONTINUED | OUTPATIENT
Start: 2017-07-21 | End: 2017-07-21 | Stop reason: HOSPADM

## 2017-07-21 RX ORDER — ALBUTEROL SULFATE 90 UG/1
2 AEROSOL, METERED RESPIRATORY (INHALATION)
Status: DISCONTINUED | OUTPATIENT
Start: 2017-07-21 | End: 2017-07-22 | Stop reason: HOSPADM

## 2017-07-21 RX ORDER — PANTOPRAZOLE SODIUM 40 MG/1
40 TABLET, DELAYED RELEASE ORAL
Status: DISCONTINUED | OUTPATIENT
Start: 2017-07-22 | End: 2017-07-22 | Stop reason: HOSPADM

## 2017-07-21 RX ORDER — NALOXONE HYDROCHLORIDE 0.4 MG/ML
0.4 INJECTION, SOLUTION INTRAMUSCULAR; INTRAVENOUS; SUBCUTANEOUS AS NEEDED
Status: DISCONTINUED | OUTPATIENT
Start: 2017-07-21 | End: 2017-07-22 | Stop reason: HOSPADM

## 2017-07-21 RX ORDER — FLUTICASONE PROPIONATE 50 MCG
2 SPRAY, SUSPENSION (ML) NASAL
Status: DISCONTINUED | OUTPATIENT
Start: 2017-07-21 | End: 2017-07-22 | Stop reason: HOSPADM

## 2017-07-21 RX ORDER — LIDOCAINE HYDROCHLORIDE 10 MG/ML
0.1 INJECTION, SOLUTION EPIDURAL; INFILTRATION; INTRACAUDAL; PERINEURAL AS NEEDED
Status: DISCONTINUED | OUTPATIENT
Start: 2017-07-21 | End: 2017-07-21 | Stop reason: HOSPADM

## 2017-07-21 RX ORDER — METHOTREXATE 2.5 MG/1
10 TABLET ORAL
Status: DISCONTINUED | OUTPATIENT
Start: 2017-07-22 | End: 2017-07-22 | Stop reason: HOSPADM

## 2017-07-21 RX ORDER — CHOLECALCIFEROL (VITAMIN D3) 125 MCG
200 CAPSULE ORAL DAILY
Status: DISCONTINUED | OUTPATIENT
Start: 2017-07-22 | End: 2017-07-21

## 2017-07-21 RX ORDER — CEFAZOLIN SODIUM IN 0.9 % NACL 2 G/50 ML
2 INTRAVENOUS SOLUTION, PIGGYBACK (ML) INTRAVENOUS EVERY 8 HOURS
Status: COMPLETED | OUTPATIENT
Start: 2017-07-21 | End: 2017-07-22

## 2017-07-21 RX ORDER — HYDROMORPHONE HYDROCHLORIDE 2 MG/ML
INJECTION, SOLUTION INTRAMUSCULAR; INTRAVENOUS; SUBCUTANEOUS AS NEEDED
Status: DISCONTINUED | OUTPATIENT
Start: 2017-07-21 | End: 2017-07-21 | Stop reason: HOSPADM

## 2017-07-21 RX ORDER — ASPIRIN 325 MG
325 TABLET, DELAYED RELEASE (ENTERIC COATED) ORAL 2 TIMES DAILY
Status: DISCONTINUED | OUTPATIENT
Start: 2017-07-21 | End: 2017-07-22 | Stop reason: HOSPADM

## 2017-07-21 RX ORDER — CILOSTAZOL 100 MG/1
50 TABLET ORAL
Status: DISCONTINUED | OUTPATIENT
Start: 2017-07-21 | End: 2017-07-22 | Stop reason: HOSPADM

## 2017-07-21 RX ORDER — ONDANSETRON 2 MG/ML
INJECTION INTRAMUSCULAR; INTRAVENOUS AS NEEDED
Status: DISCONTINUED | OUTPATIENT
Start: 2017-07-21 | End: 2017-07-21 | Stop reason: HOSPADM

## 2017-07-21 RX ORDER — MORPHINE SULFATE 2 MG/ML
2 INJECTION, SOLUTION INTRAMUSCULAR; INTRAVENOUS
Status: ACTIVE | OUTPATIENT
Start: 2017-07-21 | End: 2017-07-22

## 2017-07-21 RX ORDER — ONDANSETRON 2 MG/ML
4 INJECTION INTRAMUSCULAR; INTRAVENOUS
Status: ACTIVE | OUTPATIENT
Start: 2017-07-21 | End: 2017-07-22

## 2017-07-21 RX ORDER — OXYCODONE HYDROCHLORIDE 5 MG/1
10 TABLET ORAL
Status: DISCONTINUED | OUTPATIENT
Start: 2017-07-21 | End: 2017-07-22 | Stop reason: HOSPADM

## 2017-07-21 RX ORDER — LEUCOVORIN CALCIUM 5 MG/1
5 TABLET ORAL
Status: DISCONTINUED | OUTPATIENT
Start: 2017-07-22 | End: 2017-07-22 | Stop reason: HOSPADM

## 2017-07-21 RX ORDER — SODIUM CHLORIDE 0.9 % (FLUSH) 0.9 %
5-10 SYRINGE (ML) INJECTION EVERY 8 HOURS
Status: DISCONTINUED | OUTPATIENT
Start: 2017-07-21 | End: 2017-07-21 | Stop reason: HOSPADM

## 2017-07-21 RX ORDER — CETIRIZINE HCL 10 MG
10 TABLET ORAL DAILY
Status: DISCONTINUED | OUTPATIENT
Start: 2017-07-22 | End: 2017-07-22 | Stop reason: HOSPADM

## 2017-07-21 RX ORDER — FACIAL-BODY WIPES
10 EACH TOPICAL DAILY PRN
Status: DISCONTINUED | OUTPATIENT
Start: 2017-07-23 | End: 2017-07-22 | Stop reason: HOSPADM

## 2017-07-21 RX ORDER — FLUTICASONE FUROATE AND VILANTEROL 100; 25 UG/1; UG/1
1 POWDER RESPIRATORY (INHALATION) DAILY
Status: DISCONTINUED | OUTPATIENT
Start: 2017-07-22 | End: 2017-07-22 | Stop reason: HOSPADM

## 2017-07-21 RX ORDER — AMOXICILLIN 250 MG
1 CAPSULE ORAL 2 TIMES DAILY
Status: DISCONTINUED | OUTPATIENT
Start: 2017-07-21 | End: 2017-07-22 | Stop reason: HOSPADM

## 2017-07-21 RX ORDER — ACETAMINOPHEN 325 MG/1
650 TABLET ORAL
Status: DISCONTINUED | OUTPATIENT
Start: 2017-07-21 | End: 2017-07-22 | Stop reason: HOSPADM

## 2017-07-21 RX ORDER — ALBUTEROL SULFATE 90 UG/1
AEROSOL, METERED RESPIRATORY (INHALATION) AS NEEDED
Status: DISCONTINUED | OUTPATIENT
Start: 2017-07-21 | End: 2017-07-21 | Stop reason: HOSPADM

## 2017-07-21 RX ORDER — SODIUM CHLORIDE 0.9 % (FLUSH) 0.9 %
5-10 SYRINGE (ML) INJECTION AS NEEDED
Status: DISCONTINUED | OUTPATIENT
Start: 2017-07-21 | End: 2017-07-22 | Stop reason: HOSPADM

## 2017-07-21 RX ORDER — SODIUM CHLORIDE 0.9 % (FLUSH) 0.9 %
5-10 SYRINGE (ML) INJECTION EVERY 8 HOURS
Status: DISCONTINUED | OUTPATIENT
Start: 2017-07-22 | End: 2017-07-22 | Stop reason: HOSPADM

## 2017-07-21 RX ORDER — SIMVASTATIN 20 MG/1
20 TABLET, FILM COATED ORAL
Status: DISCONTINUED | OUTPATIENT
Start: 2017-07-21 | End: 2017-07-22 | Stop reason: HOSPADM

## 2017-07-21 RX ORDER — PROPOFOL 10 MG/ML
INJECTION, EMULSION INTRAVENOUS AS NEEDED
Status: DISCONTINUED | OUTPATIENT
Start: 2017-07-21 | End: 2017-07-21 | Stop reason: HOSPADM

## 2017-07-21 RX ORDER — HYDROMORPHONE HYDROCHLORIDE 1 MG/ML
.25-1 INJECTION, SOLUTION INTRAMUSCULAR; INTRAVENOUS; SUBCUTANEOUS
Status: DISCONTINUED | OUTPATIENT
Start: 2017-07-21 | End: 2017-07-21 | Stop reason: HOSPADM

## 2017-07-21 RX ORDER — ROPIVACAINE HYDROCHLORIDE 5 MG/ML
INJECTION, SOLUTION EPIDURAL; INFILTRATION; PERINEURAL AS NEEDED
Status: DISCONTINUED | OUTPATIENT
Start: 2017-07-21 | End: 2017-07-21 | Stop reason: HOSPADM

## 2017-07-21 RX ORDER — CODEINE PHOSPHATE AND GUAIFENESIN 10; 100 MG/5ML; MG/5ML
5 SOLUTION ORAL
Status: DISCONTINUED | OUTPATIENT
Start: 2017-07-21 | End: 2017-07-21

## 2017-07-21 RX ORDER — FENTANYL CITRATE 50 UG/ML
INJECTION, SOLUTION INTRAMUSCULAR; INTRAVENOUS AS NEEDED
Status: DISCONTINUED | OUTPATIENT
Start: 2017-07-21 | End: 2017-07-21 | Stop reason: HOSPADM

## 2017-07-21 RX ADMIN — ALBUTEROL SULFATE 3 PUFF: 90 AEROSOL, METERED RESPIRATORY (INHALATION) at 11:40

## 2017-07-21 RX ADMIN — MIDAZOLAM HYDROCHLORIDE 2 MG: 1 INJECTION, SOLUTION INTRAMUSCULAR; INTRAVENOUS at 09:17

## 2017-07-21 RX ADMIN — SODIUM CHLORIDE 125 ML/HR: 900 INJECTION, SOLUTION INTRAVENOUS at 18:04

## 2017-07-21 RX ADMIN — ROPIVACAINE HYDROCHLORIDE 20 ML: 2 INJECTION, SOLUTION EPIDURAL; INFILTRATION; PERINEURAL at 09:31

## 2017-07-21 RX ADMIN — SODIUM CHLORIDE 125 ML/HR: 900 INJECTION, SOLUTION INTRAVENOUS at 13:24

## 2017-07-21 RX ADMIN — FENTANYL CITRATE 50 MCG: 50 INJECTION, SOLUTION INTRAMUSCULAR; INTRAVENOUS at 09:17

## 2017-07-21 RX ADMIN — CEFAZOLIN 2 G: 1 INJECTION, POWDER, FOR SOLUTION INTRAMUSCULAR; INTRAVENOUS; PARENTERAL at 23:53

## 2017-07-21 RX ADMIN — SODIUM CHLORIDE, POTASSIUM CHLORIDE, SODIUM LACTATE AND CALCIUM CHLORIDE: 600; 310; 30; 20 INJECTION, SOLUTION INTRAVENOUS at 09:30

## 2017-07-21 RX ADMIN — MONTELUKAST SODIUM 10 MG: 10 TABLET, FILM COATED ORAL at 20:56

## 2017-07-21 RX ADMIN — CEFAZOLIN 2 G: 1 INJECTION, POWDER, FOR SOLUTION INTRAMUSCULAR; INTRAVENOUS; PARENTERAL at 16:42

## 2017-07-21 RX ADMIN — MIDAZOLAM HYDROCHLORIDE 1 MG: 1 INJECTION, SOLUTION INTRAMUSCULAR; INTRAVENOUS at 09:18

## 2017-07-21 RX ADMIN — HYDROMORPHONE HYDROCHLORIDE 0.5 MG: 2 INJECTION, SOLUTION INTRAMUSCULAR; INTRAVENOUS; SUBCUTANEOUS at 10:15

## 2017-07-21 RX ADMIN — ASPIRIN 325 MG: 325 TABLET, DELAYED RELEASE ORAL at 17:58

## 2017-07-21 RX ADMIN — MIDAZOLAM HYDROCHLORIDE 2 MG: 1 INJECTION, SOLUTION INTRAMUSCULAR; INTRAVENOUS at 09:19

## 2017-07-21 RX ADMIN — SODIUM CHLORIDE 500 ML: 900 INJECTION, SOLUTION INTRAVENOUS at 16:44

## 2017-07-21 RX ADMIN — ROPIVACAINE HYDROCHLORIDE 30 ML: 5 INJECTION, SOLUTION EPIDURAL; INFILTRATION; PERINEURAL at 09:22

## 2017-07-21 RX ADMIN — SIMVASTATIN 20 MG: 20 TABLET, FILM COATED ORAL at 20:56

## 2017-07-21 RX ADMIN — ONDANSETRON 4 MG: 2 INJECTION INTRAMUSCULAR; INTRAVENOUS at 11:45

## 2017-07-21 RX ADMIN — HYDROMORPHONE HYDROCHLORIDE 0.5 MG: 2 INJECTION, SOLUTION INTRAMUSCULAR; INTRAVENOUS; SUBCUTANEOUS at 11:09

## 2017-07-21 RX ADMIN — OXYCODONE HYDROCHLORIDE 10 MG: 5 TABLET ORAL at 23:53

## 2017-07-21 RX ADMIN — FENTANYL CITRATE 50 MCG: 50 INJECTION, SOLUTION INTRAMUSCULAR; INTRAVENOUS at 09:26

## 2017-07-21 RX ADMIN — FLUTICASONE PROPIONATE 2 SPRAY: 50 SPRAY, METERED NASAL at 21:30

## 2017-07-21 RX ADMIN — CEFAZOLIN 2 G: 1 INJECTION, POWDER, FOR SOLUTION INTRAMUSCULAR; INTRAVENOUS; PARENTERAL at 09:36

## 2017-07-21 RX ADMIN — HYDROMORPHONE HYDROCHLORIDE 0.5 MG: 2 INJECTION, SOLUTION INTRAMUSCULAR; INTRAVENOUS; SUBCUTANEOUS at 11:44

## 2017-07-21 RX ADMIN — OXYCODONE HYDROCHLORIDE 10 MG: 5 TABLET ORAL at 18:06

## 2017-07-21 RX ADMIN — PROPOFOL 200 MG: 10 INJECTION, EMULSION INTRAVENOUS at 09:41

## 2017-07-21 RX ADMIN — Medication 50 MG: at 17:58

## 2017-07-21 RX ADMIN — HYDROMORPHONE HYDROCHLORIDE 0.5 MG: 2 INJECTION, SOLUTION INTRAMUSCULAR; INTRAVENOUS; SUBCUTANEOUS at 10:00

## 2017-07-21 RX ADMIN — SODIUM CHLORIDE, POTASSIUM CHLORIDE, SODIUM LACTATE AND CALCIUM CHLORIDE: 600; 310; 30; 20 INJECTION, SOLUTION INTRAVENOUS at 08:15

## 2017-07-21 RX ADMIN — OXYCODONE HYDROCHLORIDE 10 MG: 5 TABLET ORAL at 20:55

## 2017-07-21 RX ADMIN — DOCUSATE SODIUM -SENNOSIDES 1 TABLET: 50; 8.6 TABLET, COATED ORAL at 17:58

## 2017-07-21 NOTE — PROGRESS NOTES
Bedside shift change report given to [address] (oncoming nurse) by Andre Cameron (offgoing nurse). Report included the following information SBAR, Kardex, Intake/Output and MAR.

## 2017-07-21 NOTE — IP AVS SNAPSHOT
303 [facility], [place], operated by [facility] 104 70 [facility] 
[phone] Patient: Yesenia Reyes. MRN: [id number] :1960 You are allergic to the following No active allergies Recent Documentation Height Weight BMI Smoking Status 1.803 m 82.1 kg 25.24 kg/m2 Never Smoker Emergency Contacts Name Discharge Info Relation Home Work Mobile [address] CAREGIVER [3] Spouse [3] [phone] About your hospitalization You were admitted on:  2017 You last received care in the:  SF 4M POST SURG ORT 2 You were discharged on:  2017 Unit phone number:  [phone] Why you were hospitalized Your primary diagnosis was:  Not on File Your diagnoses also included:  Tibial Plateau Fracture, Left Providers Seen During Your Hospitalizations Provider Role Specialty Primary office phone Sadie Fu MD Attending Provider Orthopedic Surgery [phone] Your Primary Care Physician (PCP) Primary Care Physician Office Phone Office Fax Bossman Julien [phone] Follow-up Information Follow up With Details Comments Contact Info [place]   [address] [address] 
[phone] Current Discharge Medication List  
  
START taking these medications Dose & Instructions Dispensing Information Comments Morning Noon Evening Bedtime * oxyCODONE ER 10 mg ER tablet Commonly known as:  OxyCONTIN Your last dose was: Your next dose is:    
   
   
 Dose:  10 mg Take 1 Tab by mouth every twelve (12) hours. Max Daily Amount: 20 mg.  
 Quantity:  14 Tab Refills:  0  
     
   
   
   
  
 * oxyCODONE IR 10 mg Tab immediate release tablet Commonly known as:  Lynda Eemka Your last dose was: Your next dose is: Dose:  10 mg Take 1 Tab by mouth every three (3) hours as needed. Max Daily Amount: 80 mg.  
 Quantity:  80 Tab Refills:  0  
     
   
   
   
  
 senna-docusate 8.6-50 mg per tablet Commonly known as:  Margoth Stover Your last dose was: Your next dose is:    
   
   
 Dose:  1 Tab Take 1 Tab by mouth two (2) times a day. Quantity:  30 Tab Refills:  1  
     
   
   
   
  
 * Notice: This list has 2 medication(s) that are the same as other medications prescribed for you. Read the directions carefully, and ask your doctor or other care provider to review them with you. CONTINUE these medications which have NOT CHANGED Dose & Instructions Dispensing Information Comments Morning Noon Evening Bedtime  
 albuterol 90 mcg/actuation inhaler Commonly known as:  PROVENTIL HFA, VENTOLIN HFA, PROAIR HFA Your last dose was: Your next dose is:    
   
   
 Dose:  2 Puff Take 2 Puffs by inhalation every four (4) hours as needed for Wheezing. Quantity:  1 Inhaler Refills:  1  
     
   
   
   
  
 budesonide 32 mcg/actuation nasal spray Commonly known as:  RHINOCORT AQUA Your last dose was: Your next dose is:    
   
   
  Refills:  0  
     
   
   
   
  
 cetirizine 10 mg tablet Commonly known as:  ZYRTEC Your last dose was: Your next dose is:    
   
   
 Dose:  10 mg Take 1 Tab by mouth daily. Quantity:  30 Tab Refills:  5  
     
   
   
   
  
 fluticasone-salmeterol 250-50 mcg/dose diskus inhaler Commonly known as:  ADVAIR Your last dose was: Your next dose is:    
   
   
 Dose:  1 Puff Take 1 Puff by inhalation two (2) times a day. Quantity:  3 Inhaler Refills:  3  
     
   
   
   
  
 leucovorin calcium 5 mg tablet Commonly known as:  Nikia Evelia Your last dose was: Your next dose is:    
   
   
 Dose:  5 mg Take 5 mg by mouth. 2x per week Sat and Sunday Refills:  0 LIALDA 1.2 gram delayed release tablet Generic drug:  mesalamine Your last dose was: Your next dose is: Take  by mouth Daily (before breakfast). Refills:  0  
     
   
   
   
  
 methotrexate 2.5 mg tablet Commonly known as:  Susi Pena Your last dose was: Your next dose is:    
   
   
 Dose:  10 mg Take 10 mg by mouth. Every Saturday and Sunday  Indications: RHEUMATOID ARTHRITIS Refills:  0  
     
   
   
   
  
 montelukast 10 mg tablet Commonly known as:  SINGULAIR Your last dose was: Your next dose is: TAKE 1 TABLET DAILY Quantity:  90 Tab Refills:  1 PLETAL 50 mg tablet Generic drug:  cilostazol Your last dose was: Your next dose is: Take  by mouth Before breakfast and dinner. Raynaud's Refills:  0 PriLOSEC 20 mg capsule Generic drug:  omeprazole Your last dose was: Your next dose is:    
   
   
 Dose:  20 mg Take 20 mg by mouth daily. Refills:  0 REMICADE 100 mg injection Generic drug:  inFLIXimab Your last dose was: Your next dose is:    
   
   
 Dose:  5 mg/kg 5 mg/kg by IntraVENous route. Refills:  0  
     
   
   
   
  
 simvastatin 20 mg tablet Commonly known as:  ZOCOR Your last dose was: Your next dose is: TAKE 1 TABLET NIGHTLY Quantity:  90 Tab Refills:  1 Where to Get Your Medications These medications were sent to [facility], [address] [address] Phone:  [phone]  
  senna-docusate 8.6-50 mg per tablet Information on where to get these meds will be given to you by the nurse or doctor. ! Ask your nurse or doctor about these medications  
  oxyCODONE ER 10 mg ER tablet oxyCODONE IR 10 mg Tab immediate release tablet Discharge Instructions None Discharge Orders None Introducing [facility]! Dear Malik Luna: Thank you for requesting a [employer] account. Our records indicate that you already have an active [employer] account. You can access your account anytime at [email or web address] Did you know that you can access your hospital and ER discharge instructions at any time in [employer]? You can also review all of your test results from your hospital stay or ER visit. Additional Information If you have questions, please visit the Frequently Asked Questions section of the [employer] website at [email or web address] Remember, [employer] is NOT to be used for urgent needs. For medical emergencies, dial 911. Now available from your iPhone and Android! General Information Please provide this summary of care documentation to your next provider. Patient Signature:  ____________________________________________________________ Date:  ____________________________________________________________  
  
Pratik Cart Provider Signature:  ____________________________________________________________ Date:  ____________________________________________________________

## 2017-07-21 NOTE — ANESTHESIA POSTPROCEDURE EVALUATION
Post-Anesthesia Evaluation and Assessment    Patient: Cat Dominguez MRN: [id number]  SSN: [id number]    YOB: 1960  Age: 55 y.o. Sex: male       Cardiovascular Function/Vital Signs  Visit Vitals    /81    Pulse 89    Temp 36.6 °C (97.9 °F)    Resp 12    Ht 5' 11\" (1.803 m)    Wt 82.1 kg (181 lb)    SpO2 98%    BMI 25.24 kg/m2       Patient is status post general anesthesia for Procedure(s):  OPEN REDUCTION INTERNAL FIXATION LEFT TIBIAL PLATEAU FRACTURE . Nausea/Vomiting: None    Postoperative hydration reviewed and adequate. Pain:  Pain Scale 1: Numeric (0 - 10) (07/21/17 1342)  Pain Intensity 1: 0 (07/21/17 1342)   Managed    Neurological Status:   Neuro (WDL): Exceptions to WDL (07/21/17 1342)  Neuro  Neurologic State: Drowsy; Eyes open to stimulus (07/21/17 1342)  LUE Motor Response: Purposeful (07/21/17 1342)  LLE Motor Response: Pharmacologically paralyzed (07/21/17 1342)  RUE Motor Response: Purposeful (07/21/17 1342)  RLE Motor Response: Purposeful (07/21/17 1342)   At baseline    Mental Status and Level of Consciousness: Arousable    Pulmonary Status:   O2 Device: Nasal cannula (07/21/17 1244)   Adequate oxygenation and airway patent    Complications related to anesthesia: None    Post-anesthesia assessment completed.  No concerns    Signed By: Mildred Levine MD     July 21, 2017

## 2017-07-21 NOTE — IP AVS SNAPSHOT
[address] 
[phone] Patient: Talia Archer. MRN: [id number] :1960 Current Discharge Medication List  
  
START taking these medications Dose & Instructions Dispensing Information Comments Morning Noon Evening Bedtime * oxyCODONE ER 10 mg ER tablet Commonly known as:  OxyCONTIN Your last dose was: Your next dose is:    
   
   
 Dose:  10 mg Take 1 Tab by mouth every twelve (12) hours. Max Daily Amount: 20 mg.  
 Quantity:  14 Tab Refills:  0  
     
   
   
   
  
 * oxyCODONE IR 10 mg Tab immediate release tablet Commonly known as:  Jovanigideon Craft Your last dose was: Your next dose is:    
   
   
 Dose:  10 mg Take 1 Tab by mouth every three (3) hours as needed. Max Daily Amount: 80 mg.  
 Quantity:  80 Tab Refills:  0  
     
   
   
   
  
 senna-docusate 8.6-50 mg per tablet Commonly known as:  Sav Blander Your last dose was: Your next dose is:    
   
   
 Dose:  1 Tab Take 1 Tab by mouth two (2) times a day. Quantity:  30 Tab Refills:  1  
     
   
   
   
  
 * Notice: This list has 2 medication(s) that are the same as other medications prescribed for you. Read the directions carefully, and ask your doctor or other care provider to review them with you. CONTINUE these medications which have NOT CHANGED Dose & Instructions Dispensing Information Comments Morning Noon Evening Bedtime  
 albuterol 90 mcg/actuation inhaler Commonly known as:  PROVENTIL HFA, VENTOLIN HFA, PROAIR HFA Your last dose was: Your next dose is:    
   
   
 Dose:  2 Puff Take 2 Puffs by inhalation every four (4) hours as needed for Wheezing. Quantity:  1 Inhaler Refills:  1  
     
   
   
   
  
 budesonide 32 mcg/actuation nasal spray Commonly known as:  RHINOCORT AQUA Your last dose was: Your next dose is:    
   
   
  Refills:  0  
     
   
   
   
  
 cetirizine 10 mg tablet Commonly known as:  ZYRTEC Your last dose was: Your next dose is:    
   
   
 Dose:  10 mg Take 1 Tab by mouth daily. Quantity:  30 Tab Refills:  5  
     
   
   
   
  
 fluticasone-salmeterol 250-50 mcg/dose diskus inhaler Commonly known as:  ADVAIR Your last dose was: Your next dose is:    
   
   
 Dose:  1 Puff Take 1 Puff by inhalation two (2) times a day. Quantity:  3 Inhaler Refills:  3  
     
   
   
   
  
 leucovorin calcium 5 mg tablet Commonly known as:  Kimmy Escalante Your last dose was: Your next dose is:    
   
   
 Dose:  5 mg Take 5 mg by mouth. 2x per week Sat and Sunday Refills:  0 LIALDA 1.2 gram delayed release tablet Generic drug:  mesalamine Your last dose was: Your next dose is: Take  by mouth Daily (before breakfast). Refills:  0  
     
   
   
   
  
 methotrexate 2.5 mg tablet Commonly known as:  Wyatt Weaver Your last dose was: Your next dose is:    
   
   
 Dose:  10 mg Take 10 mg by mouth. Every Saturday and Sunday  Indications: RHEUMATOID ARTHRITIS Refills:  0  
     
   
   
   
  
 montelukast 10 mg tablet Commonly known as:  SINGULAIR Your last dose was: Your next dose is: TAKE 1 TABLET DAILY Quantity:  90 Tab Refills:  1 PLETAL 50 mg tablet Generic drug:  cilostazol Your last dose was: Your next dose is: Take  by mouth Before breakfast and dinner. Raynaud's Refills:  0 PriLOSEC 20 mg capsule Generic drug:  omeprazole Your last dose was: Your next dose is:    
   
   
 Dose:  20 mg Take 20 mg by mouth daily. Refills:  0 REMICADE 100 mg injection Generic drug:  inFLIXimab Your last dose was: Your next dose is:    
   
   
 Dose:  5 mg/kg 5 mg/kg by IntraVENous route. Refills:  0  
     
   
   
   
  
 simvastatin 20 mg tablet Commonly known as:  ZOCOR Your last dose was: Your next dose is: TAKE 1 TABLET NIGHTLY Quantity:  90 Tab Refills:  1 Where to Get Your Medications These medications were sent to [facility], [address] [address] Phone:  [phone]  
  senna-docusate 8.6-50 mg per tablet Information on where to get these meds will be given to you by the nurse or doctor. ! Ask your nurse or doctor about these medications  
  oxyCODONE ER 10 mg ER tablet  
 oxyCODONE IR 10 mg Tab immediate release tablet

## 2017-07-21 NOTE — BRIEF OP NOTE
BRIEF OPERATIVE NOTE    Date of Procedure: 7/21/2017   Preoperative Diagnosis: LEFT TIBIAL PLATEAU FRACTURE  Postoperative Diagnosis: LEFT TIBIAL PLATEAU FRACTURE AND MCL TEAR   Procedure(s):  OPEN REDUCTION INTERNAL FIXATION LEFT TIBIAL PLATEAU FRACTURE   Surgeon(s) and Role:     * Mady Villaseñor MD - Primary         Assistant Staff:       Surgical Staff:  Circ-1: Silvio Ramirez RN  Scrub Tech-1: Cathryn Oshea  Scrub RN-1: Varinder Smith RN  Event Time In   Incision Start 1011   Incision Close      Anesthesia: Spinal   Estimated Blood Loss: minimal  Specimens: * No specimens in log *   Findings: LEFT TIBIAL PLATEAU FRACTURE AND MCL TEAR   Complications: none  Implants:   Implant Name Type Inv.  Item Serial No.  Lot No. LRB No. Used Action   abyrx montage hemostatic bone putty Other  NA  [id number] Left 1 Implanted   abyrx montage hemostatic bone putty Other  NA  [id number] Left 1 Implanted   BONE CHIP CANC CRSH 1-4MM 30ML -- PRESERVON - SNA  BONE CHIP CANC CRSH 1-4MM 30ML -- PRESERVON NA [facility] TISS[employer] NA Left 1 Implanted   ANCHOR BIOCOMP 4.75X19.1MM -- SWIVELOCK C-VENT - SNA  ANCHOR BIOCOMP 4.75X19.1MM -- SWIVELOCK C-VENT NA [employer] [id number] Left 1 Implanted   ANCHOR BIOC SL FIBERTAPE 4.75 --  - SNA  ANCHOR BIOC SL FIBERTAPE 4.75 --  NA [employer] [id number] Left 1 Implanted   4 hold proximal tibial plate left Plate  NA  NA Left 1 Implanted   nixon [id number] cortex screw Screw  NA  NA Left 1 Implanted   nixon 3.5bzl14cg cortex screw Screw  NA  NA Left 1 Implanted   nixon 3.5mm36 cortex screw Screw  NA  NA Left 1 Implanted   nixon 3.4ucs54xm cortex screw Screw  NA  NA Left 1 Implanted   nixon [id number] locking screw Screw   NA   NA Left 3 Implanted

## 2017-07-21 NOTE — PROGRESS NOTES
Primary Nurse Zen Briseno and [facility], RN performed a dual skin assessment on this patient No impairment noted  Milton score is 23

## 2017-07-21 NOTE — PROGRESS NOTES
physical Therapy  PATIENT: Geovanny Atkinson (55 y.o. male)  DATE: 7/21/2017  LEFT TIBIAL PLATEAU FRACTURE  Tibial plateau fracture, left <principal problem not specified>  Procedure(s) (LRB):  OPEN REDUCTION INTERNAL FIXATION LEFT TIBIAL PLATEAU FRACTURE  (Left) Day of Surgery       CPM was applied to the Left knee. Range: 0° -  40°  Start Time:4:00 pm till over night per MD instructions. Patient was educated on the benefits of and the schedule for CPM application. Notified nurse who agreed to monitor patient. We will continue to follow up patient for eval tomorrow. Thank you.

## 2017-07-21 NOTE — ANESTHESIA PREPROCEDURE EVALUATION
Anesthetic History   No history of anesthetic complications            Review of Systems / Medical History  Patient summary reviewed, nursing notes reviewed and pertinent labs reviewed    Pulmonary            Asthma        Neuro/Psych   Within defined limits           Cardiovascular  Within defined limits                Exercise tolerance: >4 METS     GI/Hepatic/Renal  Within defined limits             Comments: Ulcerative colitis Endo/Other        Arthritis (rheumatoid)     Other Findings   Comments: History of lumbar HNP         Physical Exam    Airway  Mallampati: I  TM Distance: 4 - 6 cm  Neck ROM: normal range of motion   Mouth opening: Normal     Cardiovascular    Rhythm: regular  Rate: normal         Dental    Dentition: Lower dentition intact and Upper dentition intact     Pulmonary  Breath sounds clear to auscultation               Abdominal         Other Findings            Anesthetic Plan    ASA: 2  Anesthesia type: general      Post-op pain plan if not by surgeon: peripheral nerve block single    Induction: Intravenous  Anesthetic plan and risks discussed with: Patient      Patient prefers to avoid SAB due to previous low back problems.

## 2017-07-21 NOTE — ANESTHESIA PROCEDURE NOTES
Peripheral Block    Start time: 7/21/2017 9:16 AM  Performed by: Boyd Morales  Authorized by: Kelly Grant       Pre-procedure: Indications: at surgeon's request and post-op pain management    Preanesthetic Checklist: patient identified, risks and benefits discussed, site marked, timeout performed, anesthesia consent given and patient being monitored    Timeout Time: 09:16          Block Type:   Block Type:  Femoral single shot and sciatic single shot  Laterality:  Left  Monitoring:  Continuous pulse ox, frequent vital sign checks, heart rate, responsive to questions and oxygen  Injection Technique:  Single shot  Procedures: ultrasound guided and nerve stimulator    Patient Position: supine  Prep: chlorhexidine    Needle Type:  Stimuplex  Needle Gauge:  22 G  Needle Localization:  Nerve stimulator and ultrasound guidance  Medication Injected:  0.5%  ropivacaine  Volume (mL):  30    Assessment:  Number of attempts:  1  Injection Assessment:  Incremental injection every 5 mL, local visualized surrounding nerve on ultrasound, negative aspiration for blood, no paresthesia and no intravascular symptoms  Patient tolerance:  Patient tolerated the procedure well with no immediate complications  Sciatic block performed with nerve stimulation and landmark identification. Needle used was 4\" stimuplex 21g. 20cc 0.2% ropivacaine injected intermittently with negative aspiration.

## 2017-07-21 NOTE — PERIOP NOTES
1420  Pt eyes closed, resting comfortably. Easily awakens to name. Deniespain or nausea. Pt of fmonitors for transport to room 422. Spouse informed. All belongings sent. TRANSFER - OUT REPORT:    Verbal report given to Alfredo Zaldivar RN on [facility].  being transferred to [address] [place] [address] for routine post - op       Report consisted of patients Situation, Background, Assessment and   Recommendations(SBAR). Information from the following report(s) SBAR, OR Summary, Intake/Output and MAR was reviewed with the receiving nurse. Lines:   Peripheral IV 07/21/17 Left Hand (Active)   Site Assessment Clean, dry, & intact 7/21/2017  2:10 PM   Phlebitis Assessment 0 7/21/2017  2:10 PM   Infiltration Assessment 0 7/21/2017  2:10 PM   Dressing Status Clean, dry, & intact 7/21/2017  2:10 PM   Dressing Type Transparent 7/21/2017  2:10 PM   Hub Color/Line Status Pink; Infusing 7/21/2017  2:10 PM   Alcohol Cap Used Yes 7/21/2017  8:44 AM        Opportunity for questions and clarification was provided.       Patient transported with:   Registered Nurse

## 2017-07-22 VITALS
TEMPERATURE: 98.3 F | RESPIRATION RATE: 18 BRPM | HEIGHT: 71 IN | HEART RATE: 101 BPM | OXYGEN SATURATION: 94 % | DIASTOLIC BLOOD PRESSURE: 73 MMHG | SYSTOLIC BLOOD PRESSURE: 109 MMHG | WEIGHT: 181 LBS | BODY MASS INDEX: 25.34 KG/M2

## 2017-07-22 LAB
ANION GAP BLD CALC-SCNC: 9 MMOL/L (ref 5–15)
BUN SERPL-MCNC: 8 MG/DL (ref 6–20)
BUN/CREAT SERPL: 8 (ref 12–20)
CALCIUM SERPL-MCNC: 8.5 MG/DL (ref 8.5–10.1)
CHLORIDE SERPL-SCNC: 100 MMOL/L (ref 97–108)
CO2 SERPL-SCNC: 25 MMOL/L (ref 21–32)
CREAT SERPL-MCNC: 0.97 MG/DL (ref 0.7–1.3)
GLUCOSE SERPL-MCNC: 102 MG/DL (ref 65–100)
HGB BLD-MCNC: 12 G/DL (ref 12.1–17)
POTASSIUM SERPL-SCNC: 3.7 MMOL/L (ref 3.5–5.1)
SODIUM SERPL-SCNC: 134 MMOL/L (ref 136–145)

## 2017-07-22 PROCEDURE — 85018 HEMOGLOBIN: CPT | Performed by: PHYSICIAN ASSISTANT

## 2017-07-22 PROCEDURE — 99218 HC RM OBSERVATION: CPT

## 2017-07-22 PROCEDURE — G8978 MOBILITY CURRENT STATUS: HCPCS

## 2017-07-22 PROCEDURE — 97116 GAIT TRAINING THERAPY: CPT

## 2017-07-22 PROCEDURE — 74011250636 HC RX REV CODE- 250/636: Performed by: PHYSICIAN ASSISTANT

## 2017-07-22 PROCEDURE — 36415 COLL VENOUS BLD VENIPUNCTURE: CPT | Performed by: PHYSICIAN ASSISTANT

## 2017-07-22 PROCEDURE — 97161 PT EVAL LOW COMPLEX 20 MIN: CPT

## 2017-07-22 PROCEDURE — 74011250637 HC RX REV CODE- 250/637: Performed by: ORTHOPAEDIC SURGERY

## 2017-07-22 PROCEDURE — 74011250637 HC RX REV CODE- 250/637: Performed by: PHYSICIAN ASSISTANT

## 2017-07-22 PROCEDURE — G8979 MOBILITY GOAL STATUS: HCPCS

## 2017-07-22 PROCEDURE — 97530 THERAPEUTIC ACTIVITIES: CPT

## 2017-07-22 PROCEDURE — 80048 BASIC METABOLIC PNL TOTAL CA: CPT | Performed by: PHYSICIAN ASSISTANT

## 2017-07-22 RX ORDER — AMOXICILLIN 250 MG
1 CAPSULE ORAL 2 TIMES DAILY
Qty: 30 TAB | Refills: 1 | Status: ON HOLD | OUTPATIENT
Start: 2017-07-22 | End: 2017-11-07

## 2017-07-22 RX ORDER — OXYCODONE HCL 10 MG/1
10 TABLET, FILM COATED, EXTENDED RELEASE ORAL EVERY 12 HOURS
Status: DISCONTINUED | OUTPATIENT
Start: 2017-07-22 | End: 2017-07-22 | Stop reason: HOSPADM

## 2017-07-22 RX ORDER — OXYCODONE HYDROCHLORIDE 10 MG/1
10 TABLET ORAL
Qty: 80 TAB | Refills: 0 | Status: ON HOLD | OUTPATIENT
Start: 2017-07-22 | End: 2017-11-07

## 2017-07-22 RX ORDER — OXYCODONE HCL 10 MG/1
10 TABLET, FILM COATED, EXTENDED RELEASE ORAL EVERY 12 HOURS
Qty: 14 TAB | Refills: 0 | Status: ON HOLD | OUTPATIENT
Start: 2017-07-22 | End: 2017-11-07

## 2017-07-22 RX ADMIN — PANTOPRAZOLE SODIUM 40 MG: 40 TABLET, DELAYED RELEASE ORAL at 06:53

## 2017-07-22 RX ADMIN — OXYCODONE HYDROCHLORIDE 10 MG: 5 TABLET ORAL at 14:58

## 2017-07-22 RX ADMIN — LEUCOVORIN CALCIUM 5 MG: 5 TABLET ORAL at 08:16

## 2017-07-22 RX ADMIN — METHOTREXATE 10 MG: 2.5 TABLET ORAL at 08:12

## 2017-07-22 RX ADMIN — Medication 50 MG: at 06:53

## 2017-07-22 RX ADMIN — SODIUM CHLORIDE 125 ML/HR: 900 INJECTION, SOLUTION INTRAVENOUS at 10:16

## 2017-07-22 RX ADMIN — CEFAZOLIN 2 G: 1 INJECTION, POWDER, FOR SOLUTION INTRAMUSCULAR; INTRAVENOUS; PARENTERAL at 08:11

## 2017-07-22 RX ADMIN — OXYCODONE HYDROCHLORIDE 10 MG: 5 TABLET ORAL at 06:53

## 2017-07-22 RX ADMIN — POLYETHYLENE GLYCOL 3350 17 G: 17 POWDER, FOR SOLUTION ORAL at 08:11

## 2017-07-22 RX ADMIN — OXYCODONE HYDROCHLORIDE 10 MG: 5 TABLET ORAL at 03:45

## 2017-07-22 RX ADMIN — CETIRIZINE HYDROCHLORIDE 10 MG: 10 TABLET, FILM COATED ORAL at 08:11

## 2017-07-22 RX ADMIN — OXYCODONE HYDROCHLORIDE 10 MG: 10 TABLET, FILM COATED, EXTENDED RELEASE ORAL at 10:17

## 2017-07-22 RX ADMIN — SODIUM CHLORIDE 125 ML/HR: 900 INJECTION, SOLUTION INTRAVENOUS at 01:51

## 2017-07-22 RX ADMIN — ASPIRIN 325 MG: 325 TABLET, DELAYED RELEASE ORAL at 08:11

## 2017-07-22 RX ADMIN — FLUTICASONE FUROATE AND VILANTEROL TRIFENATATE 1 PUFF: 100; 25 POWDER RESPIRATORY (INHALATION) at 08:12

## 2017-07-22 RX ADMIN — DOCUSATE SODIUM -SENNOSIDES 1 TABLET: 50; 8.6 TABLET, COATED ORAL at 08:11

## 2017-07-22 NOTE — PROGRESS NOTES
POD #1    Better pain control this AM. C/O brace tight    Exam:  NCI LLE. Compartments soft    A./P:  S/p ORIF left tibial plateau and MCL repair doing well.      Brace adjusted and ACE removed  Showed him how to lock brace  PT today for NWB  Pain control  Complete ABX  D/c later if good pain control and ok with PT

## 2017-07-22 NOTE — PROGRESS NOTES
Bedside and Verbal shift change report given to Kacey Horvath RN (oncoming nurse) by Rao Hayden RN (offgoing nurse). Report included the following information SBAR, Kardex, Procedure Summary, Intake/Output and Recent Results.

## 2017-07-22 NOTE — PROGRESS NOTES
physical Therapy EVALUATION/DISCHARGE  Patient: Jitendra Anne. (55 y.o. male)  Date: 7/22/2017  Primary Diagnosis: LEFT TIBIAL PLATEAU FRACTURE  Tibial plateau fracture, left  Procedure(s) (LRB):  OPEN REDUCTION INTERNAL FIXATION LEFT TIBIAL PLATEAU FRACTURE  (Left) 1 Day Post-Op   Precautions: NWB L LE, fall     ASSESSMENT :  Based on the objective data described below, the patient presents with L knee pain 3/10 at rest, 6/10 after activity. Pt has hinged knee brace 0-40 deg flexion in supine, locked in full extension for ambulation. Pt ambulated NWB L LE about 40 feet with crutches and minimal assist initially and then contact guard assist. Discussed strategy to enter home with wife ( chair for rest, gait belt, watch for dizziness). Discussed fall prevention, ankle pumps and static quads. Pt is cleared for discharge home at this time. Skilled physical therapy is not indicated at this time. PLAN :  Discharge Recommendations: None  Further Equipment Recommendations for Discharge: none     SUBJECTIVE:   Patient stated I haven't done much for a bit.     OBJECTIVE DATA SUMMARY:   HISTORY:    Past Medical History:   Diagnosis Date    Allergic rhinitis     Anemia     Asthma     allergy/URI related    Colon polyp 11/13/13    Diverticulosis     Esophagitis, reflux     GERD (gastroesophageal reflux disease)     Hiatal hernia     Hypercholesterolemia     IGT (impaired glucose tolerance)     Kidney stone     Rheumatoid arthritis ([address]     RHEUMATOID ARTHRITIS    Spondylitis ([address]     UC (ulcerative colitis) ([address]     ULCERATIVE COLITIS IN REMISSION     Past Surgical History:   Procedure Laterality Date    ABDOMEN SURGERY PROC UNLISTED  2007    BILAT ING HERNIA REPAIR    ABDOMEN SURGERY PROC UNLISTED  1971    INGUINAL HERNIA REPAIR LEFT    HX COLONOSCOPY  2016    has one every 2 years    HX GI  2010, 2 OTHERS    COLONOSCOPY    HX LUMBAR DISKECTOMY  2012    \"herniated disc on lumbar area was repaire\"     Prior Level of Function/Home Situation: Pt was an independent community distance ambulator, was independent in ADL's, and has not fallen in the past year. Works as an assistant .  Personal factors and/or comorbidities impacting plan of care:     [address] [place] [address] Environment: Private residence (going to his mom's house initially)  # Steps to Enter: 0  One/Two Story Residence: One story  Living Alone: No  Support Systems: Parent, Spouse/Significant Other/Partner, Family member(s)  Patient Expects to be Discharged to[de-identified] Private residence  Current DME Used/Available at Home: Crutches  Tub or Shower Type: Shower    EXAMINATION/PRESENTATION/DECISION MAKING:   Critical Behavior:  Neurologic State: Alert  Orientation Level: Oriented X4  Cognition: Appropriate decision making, Appropriate for age attention/concentration, Appropriate safety awareness, Follows commands     Hearing:     Skin:  Dressing in place  Edema: mild, L knee, L LE  Range Of Motion:  AROM: Within functional limits           PROM: Within functional limits           Strength:    Strength: Within functional limits                    Tone & Sensation:   Tone: Normal              Sensation: Impaired (L LE dulled)               Coordination:  Coordination: Within functional limits  Vision:      Functional Mobility:  Bed Mobility:     Supine to Sit: Additional time;Assist x1;Minimum assistance  Sit to Supine: Assist x1;Additional time;Minimum assistance  Scooting: Supervision  Transfers:  Sit to Stand: Assist x1;Additional time;Minimum assistance  Stand to Sit: Additional time;Stand-by asssistance                       Balance:   Sitting: Intact  Standing: Intact; With support  Ambulation/Gait Training:  Distance (ft): 30 Feet (ft)  Assistive Device: Crutches;Gait belt  Ambulation - Level of Assistance: Contact guard assistance     Gait Description (WDL): Exceptions to WDL        Left Side Weight Bearing: Non-weight bearing Speed/Jocy: Pace decreased (<100 feet/min)                            Stairs: Therapeutic Exercises:   Instructed in ankle pumps and quad sets  and encouraged to exercise hourly    Functional Measure:  Barthel Index:    Bathin  Bladder: 10  Bowels: 10  Groomin  Dressin  Feeding: 10  Mobility: 0  Stairs: 0  Toilet Use: 5  Transfer (Bed to Chair and Back): 10  Total: 55       Barthel and G-code impairment scale:  Percentage of impairment CH  0% CI  1-19% CJ  20-39% CK  40-59% CL  60-79% CM  80-99% CN  100%   Barthel Score 0-100 100 99-80 79-60 59-40 20-39 1-19   0   Barthel Score 0-20 20 17-19 13-16 9-12 5-8 1-4 0      The Barthel ADL Index: Guidelines  1. The index should be used as a record of what a patient does, not as a record of what a patient could do. 2. The main aim is to establish degree of independence from any help, physical or verbal, however minor and for whatever reason. 3. The need for supervision renders the patient not independent. 4. A patient's performance should be established using the best available evidence. Asking the patient, friends/relatives and nurses are the usual sources, but direct observation and common sense are also important. However direct testing is not needed. 5. Usually the patient's performance over the preceding 24-48 hours is important, but occasionally longer periods will be relevant. 6. Middle categories imply that the patient supplies over 50 per cent of the effort. 7. Use of aids to be independent is allowed. Laurie Sánchez., Barthel, D.W. (7086). Functional evaluation: the Barthel Index. [facility] (14)2. Ambika Reaves angela DANIELLE Palomino, Jojo Mckeon., Jey Weber., Meaghan Smith, [address] [place], [place] [address]manny (). Measuring the change indisability after inpatient rehabilitation; comparison of the responsiveness of the Barthel Index and Functional McMullen Measure. Journal of Neurology, Neurosurgery, and Psychiatry, 66(4), 010-990.   ELIESER Kim Scholte op Nhi Ruiz M.A. (2004.) Assessment of post-stroke quality of life in cost-effectiveness studies: The usefulness of the Barthel Index and the EuroQoL-5D. Quality of Life Research, 13, 744-33       G codes: In compliance with CMSs Claims Based Outcome Reporting, the following G-code set was chosen for this patient based on their primary functional limitation being treated: The outcome measure chosen to determine the severity of the functional limitation was the Barthel Index with a score of 55/100 which was correlated with the impairment scale. ? Mobility - Walking and Moving Around:     - CURRENT STATUS: CK - 40%-59% impaired, limited or restricted    - GOAL STATUS: CJ - 20%-39% impaired, limited or restricted    - D/C STATUS:  CJ - 20%-39% impaired, limited or restricted          Physical Therapy Evaluation Charge Determination   History Examination Presentation Decision-Making   LOW Complexity : Zero comorbidities / personal factors that will impact the outcome / POC LOW Complexity : 1-2 Standardized tests and measures addressing body structure, function, activity limitation and / or participation in recreation  LOW Complexity : Stable, uncomplicated  Other outcome measures Barthel Index  MEDIUM      Based on the above components, the patient evaluation is determined to be of the following complexity level: LOW     Pain:Pain Scale 1: Numeric (0 - 10)  Pain Intensity 1: 6  Pain Location 1: Leg  Activity Tolerance:   fair  Please refer to the flowsheet for vital signs taken during this treatment.   After treatment:   []   Patient left in no apparent distress sitting up in chair  [x]   Patient left in no apparent distress in bed  [x]   Call bell left within reach  [x]   Nursing notified  [x]   Caregiver present  [x]   Bed alarm activated    COMMUNICATION/EDUCATION:   Communication/Collaboration:  [x]   Fall prevention education was provided and the patient/caregiver indicated understanding. [x]   Patient/family have participated as able and agree with findings and recommendations. []   Patient is unable to participate in plan of care at this time.   Findings and recommendations were discussed with: Registered Nurse    Thank you for this referral.  Ml Watters, PT   Time Calculation: 23 mins

## 2017-07-24 NOTE — OP NOTES
[facility]   201 [facility], [address]   OP NOTE       Name:  Primitivo Mcguire   MR#:  [id number]   :  1960   Account #:  [de-identified]    Surgery Date:  2017   Date of Adm:  2017       PREOPERATIVE DIAGNOSIS: Left tibial plateau depression fracture. POSTOPERATIVE DIAGNOSES:   1. Left tibial plateau depression fracture. 2. Left knee medial collateral ligament tear. PROCEDURES PERFORMED:   1. Left knee arthroscopic-assisted fixation of left lateral tibial plateau   fracture with grafting. 2. Left knee medial collateral ligament repair. SURGEON: Lorelee Dakins, MD.    MARY Peck.    ANESTHESIA: General plus regional block. COMPLICATIONS: None. ESTIMATED BLOOD LOSS: None. SPECIMENS: None. DRAINS: None. DESCRIPTION: The patient was brought to the operating room and   given general anesthesia in a supine position. Soft roll and tourniquet   were applied to the left thigh. He was prepped and draped in a sterile   fashion with ChloraPrep. The extremity was exsanguinated, tourniquet   inflated to 300 mmHg. The knee was examined. Lachman's was   negative. Posterior drawer was negative. There was 3+ opening with   valgus stress at 0 and 30 degrees of knee extension and no varus   instability. Negative dial test.      Under fluoroscopic guidance, the area of the fracture on the tibial   plateau was identified. A longitudinal incision was created over the   fracture site, carried down through the subcutaneous tissue sharply. The anterior muscular from the tibia was elevated off of the tibia with a   knife and an elevator. A window was created in the tibia with an   osteotome. A tamp was used to elevate the articular fragments. Standard anteromedial and anterolateral portals were created. Arthroscope was also inserted into the joint. The joint was thoroughly   irrigated.  The fracture was identified. It was elevated substantially with   minimal step-off. Next, the montage graft was mixed and inserted with   some bone chips. Cancellous allograft bone chips were used. After the   graft hardened, a Viola lateral tibial plateau splint was applied. Guide   pin for temporary fixation under fluoroscopic guidance, 3 proximal   screws were placed just under the chondral bone. These were   Locking screws. Three screws were then placed in the shaft. These were   bicortical nonlocking screws. There was satisfactory position of the   fracture bone graft and hardware under biplanar fluoroscopy. Next, a medial incision was created over the MCL both proximally and   distally. The MCL was torn off of the femur. It was mobilized. An   Arthrex 4.75 mm SwiveLock anchor was inserted in the origin of the   MCL with 2 utility sutures that were passed through the MCL in   horizontal mattress fashion and tied down. The FiberTape from the   anchor was used as an internal brace and was secured distally at the   insertion of the deep layer of the MCL using a second 4.75 mm Arthrex   biocomposite SwiveLock anchor. All fixation of the MCL was done with   the knee at 30 degrees and varus stress applied. The knee was taken   through a range of motion. There was excellent motion and stability of   the MCL. The wound was thoroughly irrigated. The lateral incision was   closed with subcutaneous 2-0 Vicryl and skin staples. The medial layer   was closed with subcutaneous 2-0 Vicryl and skin staples. Aquaphor   dressings were applied. The arthroscopy portals were closed with   nylon. A sterile dressing was applied. The tourniquet was deflated. The   patient was awakened and returned to recovery room in stable   condition. His family was notified of his condition.         MD CYNDIE Márquez / ZAC   D:  07/22/2017   10:19   T:  07/24/2017   10:22   Job #:  [id number]

## 2017-08-07 ENCOUNTER — HOSPITAL ENCOUNTER (OUTPATIENT)
Dept: CT IMAGING | Age: 57
Discharge: HOME | End: 2017-08-07
Attending: INTERNAL MEDICINE
Payer: COMMERCIAL

## 2017-08-07 DIAGNOSIS — R93.89 ABNORMAL CXR: ICD-10-CM

## 2017-08-07 PROCEDURE — 74011000258 HC RX REV CODE- 258: Performed by: INTERNAL MEDICINE

## 2017-08-07 PROCEDURE — 71275 CT ANGIOGRAPHY CHEST: CPT

## 2017-08-07 PROCEDURE — 74011636320 HC RX REV CODE- 636/320: Performed by: INTERNAL MEDICINE

## 2017-08-07 RX ORDER — SODIUM CHLORIDE 9 MG/ML
50 INJECTION, SOLUTION INTRAVENOUS
Status: COMPLETED | OUTPATIENT
Start: 2017-08-07 | End: 2017-08-07

## 2017-08-07 RX ADMIN — SODIUM CHLORIDE 50 ML/HR: 900 INJECTION, SOLUTION INTRAVENOUS at 13:42

## 2017-08-07 RX ADMIN — IOPAMIDOL 100 ML: 755 INJECTION, SOLUTION INTRAVENOUS at 13:42

## 2017-11-07 ENCOUNTER — ANESTHESIA EVENT (OUTPATIENT)
Dept: SURGERY | Age: 57
DRG: 486 | End: 2017-11-07
Payer: COMMERCIAL

## 2017-11-07 ENCOUNTER — HOSPITAL ENCOUNTER (INPATIENT)
Age: 57
LOS: 3 days | Discharge: HOME HEALTH | DRG: 486 | End: 2017-11-10
Attending: ORTHOPAEDIC SURGERY | Admitting: ORTHOPAEDIC SURGERY
Payer: COMMERCIAL

## 2017-11-07 ENCOUNTER — HOME HEALTH ADMISSION (OUTPATIENT)
Dept: HOME HEALTH SERVICES | Facility: HOME HEALTH | Age: 57
End: 2017-11-07
Payer: COMMERCIAL

## 2017-11-07 ENCOUNTER — ANESTHESIA (OUTPATIENT)
Dept: SURGERY | Age: 57
DRG: 486 | End: 2017-11-07
Payer: COMMERCIAL

## 2017-11-07 ENCOUNTER — APPOINTMENT (OUTPATIENT)
Dept: GENERAL RADIOLOGY | Age: 57
DRG: 486 | End: 2017-11-07
Attending: ORTHOPAEDIC SURGERY
Payer: COMMERCIAL

## 2017-11-07 PROBLEM — S89.92XA INJURY OF LEFT KNEE: Status: ACTIVE | Noted: 2017-11-07

## 2017-11-07 LAB — CREAT SERPL-MCNC: 1.21 MG/DL (ref 0.7–1.3)

## 2017-11-07 PROCEDURE — 87205 SMEAR GRAM STAIN: CPT | Performed by: ORTHOPAEDIC SURGERY

## 2017-11-07 PROCEDURE — 74011250636 HC RX REV CODE- 250/636: Performed by: ORTHOPAEDIC SURGERY

## 2017-11-07 PROCEDURE — 76210000036 HC AMBSU PH I REC 1.5 TO 2 HR: Performed by: ORTHOPAEDIC SURGERY

## 2017-11-07 PROCEDURE — 82565 ASSAY OF CREATININE: CPT | Performed by: ORTHOPAEDIC SURGERY

## 2017-11-07 PROCEDURE — 77030002986 HC SUT PROL J&J -A: Performed by: ORTHOPAEDIC SURGERY

## 2017-11-07 PROCEDURE — 77030008495 HC TBNG ARTHSC IRR CNMD -B: Performed by: ORTHOPAEDIC SURGERY

## 2017-11-07 PROCEDURE — 74011000250 HC RX REV CODE- 250: Performed by: ORTHOPAEDIC SURGERY

## 2017-11-07 PROCEDURE — 97530 THERAPEUTIC ACTIVITIES: CPT

## 2017-11-07 PROCEDURE — 74011250636 HC RX REV CODE- 250/636: Performed by: ANESTHESIOLOGY

## 2017-11-07 PROCEDURE — 74011000272 HC RX REV CODE- 272: Performed by: ORTHOPAEDIC SURGERY

## 2017-11-07 PROCEDURE — 77030002916 HC SUT ETHLN J&J -A: Performed by: ORTHOPAEDIC SURGERY

## 2017-11-07 PROCEDURE — 77030011640 HC PAD GRND REM COVD -A: Performed by: ORTHOPAEDIC SURGERY

## 2017-11-07 PROCEDURE — 65270000029 HC RM PRIVATE

## 2017-11-07 PROCEDURE — 76060000063 HC AMB SURG ANES 1.5 TO 2 HR: Performed by: ORTHOPAEDIC SURGERY

## 2017-11-07 PROCEDURE — 77030000032 HC CUF TRNQT ZIMM -B: Performed by: ORTHOPAEDIC SURGERY

## 2017-11-07 PROCEDURE — 77030004451 HC BUR SHV S&N -B: Performed by: ORTHOPAEDIC SURGERY

## 2017-11-07 PROCEDURE — 0SBD4ZZ EXCISION OF LEFT KNEE JOINT, PERCUTANEOUS ENDOSCOPIC APPROACH: ICD-10-PCS | Performed by: ORTHOPAEDIC SURGERY

## 2017-11-07 PROCEDURE — 74011250636 HC RX REV CODE- 250/636

## 2017-11-07 PROCEDURE — 87076 CULTURE ANAEROBE IDENT EACH: CPT | Performed by: ORTHOPAEDIC SURGERY

## 2017-11-07 PROCEDURE — 77030012406 HC DRN WND PENRS BARD -A: Performed by: ORTHOPAEDIC SURGERY

## 2017-11-07 PROCEDURE — 77030018836 HC SOL IRR NACL ICUM -A: Performed by: ORTHOPAEDIC SURGERY

## 2017-11-07 PROCEDURE — 77030012935 HC DRSG AQUACEL BMS -B: Performed by: ORTHOPAEDIC SURGERY

## 2017-11-07 PROCEDURE — 77030018835 HC SOL IRR LR ICUM -A: Performed by: ORTHOPAEDIC SURGERY

## 2017-11-07 PROCEDURE — 77030028907 HC WRP KNEE WO BGS SOLM -B

## 2017-11-07 PROCEDURE — 76030000003 HC AMB SURG OR TIME 1.5 TO 2: Performed by: ORTHOPAEDIC SURGERY

## 2017-11-07 PROCEDURE — 0QPH44Z REMOVAL OF INTERNAL FIXATION DEVICE FROM LEFT TIBIA, PERCUTANEOUS ENDOSCOPIC APPROACH: ICD-10-PCS | Performed by: ORTHOPAEDIC SURGERY

## 2017-11-07 PROCEDURE — 97116 GAIT TRAINING THERAPY: CPT

## 2017-11-07 PROCEDURE — 77030008467 HC STPLR SKN COVD -B: Performed by: ORTHOPAEDIC SURGERY

## 2017-11-07 PROCEDURE — 97161 PT EVAL LOW COMPLEX 20 MIN: CPT

## 2017-11-07 PROCEDURE — 36415 COLL VENOUS BLD VENIPUNCTURE: CPT | Performed by: ORTHOPAEDIC SURGERY

## 2017-11-07 PROCEDURE — 76000 FLUOROSCOPY <1 HR PHYS/QHP: CPT

## 2017-11-07 PROCEDURE — 89050 BODY FLUID CELL COUNT: CPT | Performed by: ORTHOPAEDIC SURGERY

## 2017-11-07 PROCEDURE — 77030020143 HC AIRWY LARYN INTUB CGAS -A: Performed by: NURSE ANESTHETIST, CERTIFIED REGISTERED

## 2017-11-07 PROCEDURE — 77030002966 HC SUT PDS J&J -A: Performed by: ORTHOPAEDIC SURGERY

## 2017-11-07 PROCEDURE — 77030031139 HC SUT VCRL2 J&J -A: Performed by: ORTHOPAEDIC SURGERY

## 2017-11-07 PROCEDURE — 74011250637 HC RX REV CODE- 250/637: Performed by: ORTHOPAEDIC SURGERY

## 2017-11-07 PROCEDURE — 77030013708 HC HNDPC SUC IRR PULS STRY –B: Performed by: ORTHOPAEDIC SURGERY

## 2017-11-07 PROCEDURE — 87075 CULTR BACTERIA EXCEPT BLOOD: CPT | Performed by: ORTHOPAEDIC SURGERY

## 2017-11-07 PROCEDURE — 77030027138 HC INCENT SPIROMETER -A

## 2017-11-07 PROCEDURE — 77030020782 HC GWN BAIR PAWS FLX 3M -B

## 2017-11-07 PROCEDURE — 74011000250 HC RX REV CODE- 250

## 2017-11-07 RX ORDER — ONDANSETRON 2 MG/ML
4 INJECTION INTRAMUSCULAR; INTRAVENOUS AS NEEDED
Status: DISCONTINUED | OUTPATIENT
Start: 2017-11-07 | End: 2017-11-07 | Stop reason: HOSPADM

## 2017-11-07 RX ORDER — FENTANYL CITRATE 50 UG/ML
INJECTION, SOLUTION INTRAMUSCULAR; INTRAVENOUS AS NEEDED
Status: DISCONTINUED | OUTPATIENT
Start: 2017-11-07 | End: 2017-11-07 | Stop reason: HOSPADM

## 2017-11-07 RX ORDER — ASPIRIN 325 MG
325 TABLET ORAL 2 TIMES DAILY
Status: DISCONTINUED | OUTPATIENT
Start: 2017-11-07 | End: 2017-11-10 | Stop reason: HOSPADM

## 2017-11-07 RX ORDER — LIDOCAINE HYDROCHLORIDE 10 MG/ML
0.1 INJECTION, SOLUTION EPIDURAL; INFILTRATION; INTRACAUDAL; PERINEURAL AS NEEDED
Status: DISCONTINUED | OUTPATIENT
Start: 2017-11-07 | End: 2017-11-07 | Stop reason: HOSPADM

## 2017-11-07 RX ORDER — SODIUM CHLORIDE 0.9 % (FLUSH) 0.9 %
5-10 SYRINGE (ML) INJECTION AS NEEDED
Status: DISCONTINUED | OUTPATIENT
Start: 2017-11-07 | End: 2017-11-10 | Stop reason: HOSPADM

## 2017-11-07 RX ORDER — ONDANSETRON 2 MG/ML
4 INJECTION INTRAMUSCULAR; INTRAVENOUS
Status: DISCONTINUED | OUTPATIENT
Start: 2017-11-07 | End: 2017-11-10 | Stop reason: HOSPADM

## 2017-11-07 RX ORDER — SODIUM CHLORIDE 0.9 % (FLUSH) 0.9 %
5-10 SYRINGE (ML) INJECTION AS NEEDED
Status: DISCONTINUED | OUTPATIENT
Start: 2017-11-07 | End: 2017-11-07 | Stop reason: HOSPADM

## 2017-11-07 RX ORDER — SODIUM CHLORIDE 0.9 % (FLUSH) 0.9 %
5-10 SYRINGE (ML) INJECTION EVERY 8 HOURS
Status: DISCONTINUED | OUTPATIENT
Start: 2017-11-07 | End: 2017-11-07 | Stop reason: HOSPADM

## 2017-11-07 RX ORDER — DEXAMETHASONE SODIUM PHOSPHATE 4 MG/ML
INJECTION, SOLUTION INTRA-ARTICULAR; INTRALESIONAL; INTRAMUSCULAR; INTRAVENOUS; SOFT TISSUE AS NEEDED
Status: DISCONTINUED | OUTPATIENT
Start: 2017-11-07 | End: 2017-11-07 | Stop reason: HOSPADM

## 2017-11-07 RX ORDER — LIDOCAINE HYDROCHLORIDE 20 MG/ML
INJECTION, SOLUTION EPIDURAL; INFILTRATION; INTRACAUDAL; PERINEURAL AS NEEDED
Status: DISCONTINUED | OUTPATIENT
Start: 2017-11-07 | End: 2017-11-07 | Stop reason: HOSPADM

## 2017-11-07 RX ORDER — SODIUM CHLORIDE, SODIUM LACTATE, POTASSIUM CHLORIDE, CALCIUM CHLORIDE 600; 310; 30; 20 MG/100ML; MG/100ML; MG/100ML; MG/100ML
125 INJECTION, SOLUTION INTRAVENOUS CONTINUOUS
Status: DISCONTINUED | OUTPATIENT
Start: 2017-11-07 | End: 2017-11-07 | Stop reason: HOSPADM

## 2017-11-07 RX ORDER — PROPOFOL 10 MG/ML
INJECTION, EMULSION INTRAVENOUS AS NEEDED
Status: DISCONTINUED | OUTPATIENT
Start: 2017-11-07 | End: 2017-11-07 | Stop reason: HOSPADM

## 2017-11-07 RX ORDER — SODIUM CHLORIDE 9 MG/ML
25 INJECTION, SOLUTION INTRAVENOUS CONTINUOUS
Status: DISCONTINUED | OUTPATIENT
Start: 2017-11-07 | End: 2017-11-10 | Stop reason: HOSPADM

## 2017-11-07 RX ORDER — ONDANSETRON 2 MG/ML
INJECTION INTRAMUSCULAR; INTRAVENOUS AS NEEDED
Status: DISCONTINUED | OUTPATIENT
Start: 2017-11-07 | End: 2017-11-07 | Stop reason: HOSPADM

## 2017-11-07 RX ORDER — SODIUM CHLORIDE 0.9 % (FLUSH) 0.9 %
5-10 SYRINGE (ML) INJECTION EVERY 8 HOURS
Status: DISCONTINUED | OUTPATIENT
Start: 2017-11-07 | End: 2017-11-10 | Stop reason: HOSPADM

## 2017-11-07 RX ORDER — HYDROMORPHONE HYDROCHLORIDE 2 MG/ML
INJECTION, SOLUTION INTRAMUSCULAR; INTRAVENOUS; SUBCUTANEOUS AS NEEDED
Status: DISCONTINUED | OUTPATIENT
Start: 2017-11-07 | End: 2017-11-07 | Stop reason: HOSPADM

## 2017-11-07 RX ORDER — OXYCODONE AND ACETAMINOPHEN 5; 325 MG/1; MG/1
1 TABLET ORAL
Status: DISCONTINUED | OUTPATIENT
Start: 2017-11-07 | End: 2017-11-10 | Stop reason: HOSPADM

## 2017-11-07 RX ORDER — HYDROMORPHONE HYDROCHLORIDE 1 MG/ML
.5-1 INJECTION, SOLUTION INTRAMUSCULAR; INTRAVENOUS; SUBCUTANEOUS
Status: DISCONTINUED | OUTPATIENT
Start: 2017-11-07 | End: 2017-11-07 | Stop reason: HOSPADM

## 2017-11-07 RX ORDER — NALOXONE HYDROCHLORIDE 0.4 MG/ML
0.4 INJECTION, SOLUTION INTRAMUSCULAR; INTRAVENOUS; SUBCUTANEOUS AS NEEDED
Status: DISCONTINUED | OUTPATIENT
Start: 2017-11-07 | End: 2017-11-10 | Stop reason: HOSPADM

## 2017-11-07 RX ORDER — ACETAMINOPHEN 325 MG/1
650 TABLET ORAL
Status: DISCONTINUED | OUTPATIENT
Start: 2017-11-07 | End: 2017-11-10 | Stop reason: HOSPADM

## 2017-11-07 RX ORDER — CEFAZOLIN SODIUM IN 0.9 % NACL 2 G/50 ML
2 INTRAVENOUS SOLUTION, PIGGYBACK (ML) INTRAVENOUS ONCE
Status: COMPLETED | OUTPATIENT
Start: 2017-11-07 | End: 2017-11-07

## 2017-11-07 RX ORDER — HYDROMORPHONE HYDROCHLORIDE 1 MG/ML
1 INJECTION, SOLUTION INTRAMUSCULAR; INTRAVENOUS; SUBCUTANEOUS
Status: DISCONTINUED | OUTPATIENT
Start: 2017-11-07 | End: 2017-11-10 | Stop reason: HOSPADM

## 2017-11-07 RX ORDER — MIDAZOLAM HYDROCHLORIDE 1 MG/ML
INJECTION, SOLUTION INTRAMUSCULAR; INTRAVENOUS AS NEEDED
Status: DISCONTINUED | OUTPATIENT
Start: 2017-11-07 | End: 2017-11-07 | Stop reason: HOSPADM

## 2017-11-07 RX ORDER — KETOROLAC TROMETHAMINE 30 MG/ML
INJECTION, SOLUTION INTRAMUSCULAR; INTRAVENOUS AS NEEDED
Status: DISCONTINUED | OUTPATIENT
Start: 2017-11-07 | End: 2017-11-07 | Stop reason: HOSPADM

## 2017-11-07 RX ADMIN — LIDOCAINE HYDROCHLORIDE 50 MG: 20 INJECTION, SOLUTION EPIDURAL; INFILTRATION; INTRACAUDAL; PERINEURAL at 07:34

## 2017-11-07 RX ADMIN — MIDAZOLAM HYDROCHLORIDE 3 MG: 1 INJECTION, SOLUTION INTRAMUSCULAR; INTRAVENOUS at 07:30

## 2017-11-07 RX ADMIN — ASPIRIN 325 MG: 325 TABLET, COATED ORAL at 17:52

## 2017-11-07 RX ADMIN — ONDANSETRON 4 MG: 2 INJECTION INTRAMUSCULAR; INTRAVENOUS at 07:41

## 2017-11-07 RX ADMIN — KETOROLAC TROMETHAMINE 30 MG: 30 INJECTION, SOLUTION INTRAMUSCULAR; INTRAVENOUS at 08:54

## 2017-11-07 RX ADMIN — HYDROMORPHONE HYDROCHLORIDE 0.5 MG: 1 INJECTION, SOLUTION INTRAMUSCULAR; INTRAVENOUS; SUBCUTANEOUS at 09:29

## 2017-11-07 RX ADMIN — FENTANYL CITRATE 50 MCG: 50 INJECTION, SOLUTION INTRAMUSCULAR; INTRAVENOUS at 08:26

## 2017-11-07 RX ADMIN — DEXAMETHASONE SODIUM PHOSPHATE 4 MG: 4 INJECTION, SOLUTION INTRA-ARTICULAR; INTRALESIONAL; INTRAMUSCULAR; INTRAVENOUS; SOFT TISSUE at 07:42

## 2017-11-07 RX ADMIN — SODIUM CHLORIDE, POTASSIUM CHLORIDE, SODIUM LACTATE AND CALCIUM CHLORIDE: 600; 310; 30; 20 INJECTION, SOLUTION INTRAVENOUS at 08:26

## 2017-11-07 RX ADMIN — FENTANYL CITRATE 50 MCG: 50 INJECTION, SOLUTION INTRAMUSCULAR; INTRAVENOUS at 07:34

## 2017-11-07 RX ADMIN — SODIUM CHLORIDE, POTASSIUM CHLORIDE, SODIUM LACTATE AND CALCIUM CHLORIDE 125 ML/HR: 600; 310; 30; 20 INJECTION, SOLUTION INTRAVENOUS at 07:09

## 2017-11-07 RX ADMIN — OXYCODONE HYDROCHLORIDE AND ACETAMINOPHEN 1 TABLET: 5; 325 TABLET ORAL at 15:25

## 2017-11-07 RX ADMIN — ASPIRIN 325 MG: 325 TABLET, COATED ORAL at 12:27

## 2017-11-07 RX ADMIN — VANCOMYCIN HYDROCHLORIDE 2000 MG: 10 INJECTION, POWDER, LYOPHILIZED, FOR SOLUTION INTRAVENOUS at 12:27

## 2017-11-07 RX ADMIN — HYDROMORPHONE HYDROCHLORIDE 1 MG: 2 INJECTION, SOLUTION INTRAMUSCULAR; INTRAVENOUS; SUBCUTANEOUS at 09:09

## 2017-11-07 RX ADMIN — SODIUM CHLORIDE 25 ML/HR: 900 INJECTION, SOLUTION INTRAVENOUS at 20:02

## 2017-11-07 RX ADMIN — CEFAZOLIN 2 G: 1 INJECTION, POWDER, FOR SOLUTION INTRAMUSCULAR; INTRAVENOUS; PARENTERAL at 07:39

## 2017-11-07 RX ADMIN — PROPOFOL 200 MG: 10 INJECTION, EMULSION INTRAVENOUS at 07:34

## 2017-11-07 RX ADMIN — FENTANYL CITRATE 50 MCG: 50 INJECTION, SOLUTION INTRAMUSCULAR; INTRAVENOUS at 07:47

## 2017-11-07 RX ADMIN — HYDROMORPHONE HYDROCHLORIDE 1 MG: 2 INJECTION, SOLUTION INTRAMUSCULAR; INTRAVENOUS; SUBCUTANEOUS at 08:10

## 2017-11-07 RX ADMIN — SODIUM CHLORIDE 25 ML/HR: 900 INJECTION, SOLUTION INTRAVENOUS at 10:00

## 2017-11-07 RX ADMIN — SODIUM CHLORIDE, POTASSIUM CHLORIDE, SODIUM LACTATE AND CALCIUM CHLORIDE 125 ML/HR: 600; 310; 30; 20 INJECTION, SOLUTION INTRAVENOUS at 10:15

## 2017-11-07 RX ADMIN — FENTANYL CITRATE 50 MCG: 50 INJECTION, SOLUTION INTRAMUSCULAR; INTRAVENOUS at 07:30

## 2017-11-07 NOTE — PROGRESS NOTES
4th floor notified    Onur Henao RN) of admission and to anticipate arrival in   30   minutes to room 401. Will call to alert floor we are transporting just prior to leaving PACU.

## 2017-11-07 NOTE — PROGRESS NOTES
Primary Nurse Thamas Blizzard, RN and Shay Carbajal RN performed a dual skin assessment on this patient. No impairment noted beyond surgical incision. Very slightly pink, blanchable area noted ton left heel. Heel elevated/floated on pillows. Milton score is 21.

## 2017-11-07 NOTE — CONSULTS
[facility]   [facility], [address] [place] [address]   [address] [place] [address]       Name:  Vitor Edge   MR#:  [id number]   :  1960   Account #:  [de-identified]    Date of Consultation:  2017   Date of Adm:  2017       REQUESTING PHYSICIAN: Dr. Max Wang: Left knee pain. HISTORY OF PRESENT ILLNESS: The patient is a 50-year-old male   with a history of rheumatoid arthritis and ulcerative colitis, who was   admitted to [facility] on 2017 with the   aforementioned complaint. The patient suffered a tibial plateau fracture   and a torn ligament in July of this year. He underwent arthroscopic-  assisted fixation of the fracture with hardware placement and repair of   the left knee MCL on 2017. The patient states he did well   postoperatively, but never regained full range of motion of the knee. He   was taken back to the OR on 10/10/2017 where he underwent another   arthroscopy. The patient states he immediately felt better following the   procedure; however, approximately a week later, he developed   swelling and pain involving the knee. He took an anti-inflammatory,   with complete resolution of symptoms. The knee flared up again,   however, approximately a week ago. He was seen by Orthopedic   Surgery who aspirated the knee in the clinic. This raises concern for   infection. He was admitted to [address] [place] [address] as above. Surgical cultures   are pending. He is currently on vancomycin. The infectious diseases   service has been asked to assist with antibiotic management. PAST MEDICAL HISTORY:   1. Rheumatoid arthritis. He is on Remicade and methotrexate for this in   the outpatient setting. 2. Ulcerative colitis. PAST SURGICAL HISTORY: Hernia repair, unspecified spinal surgery. SOCIAL HISTORY: Social alcohol use. No tobacco or illicit drug use. FAMILY HISTORY: Diabetes mellitus, hypertension.     ALLERGIES: NO KNOWN DRUG ALLERGIES. OUTPATIENT MEDICATIONS: Please see body of chart for details. He was not on antibiotics prior to admission. REVIEW OF SYSTEMS: As per the HPI. Remainder of 12 system   review of systems is unremarkable. LABORATORY DATA: Creatinine is 1.20. Synovial fluid was turbid,   greater than 100 red blood cells, 32,000 white cells with 97%   neutrophils. The surgical cultures are pending. PHYSICAL EXAMINATION   VITAL SIGNS: Temperature 98 degrees Fahrenheit, maximum   temperature is less than 100, heart rate 85 beats per minute, blood   pressure 120/70, oxygen saturation 99% on room air. GENERAL: Alert, in no acute distress. HEENT: Normocephalic, atraumatic. Pupils equal and reactive to light. No oropharyngeal lesions noted. CARDIOVASCULAR: Heart regular rate and rhythm. No murmurs,   gallops, or rubs. PULMONARY: Clear to auscultation bilaterally. No rales, rhonchi, or   wheezes. ABDOMEN: Soft, nontender, nondistended. EXTREMITIES: No clubbing, cyanosis or edema. The left knee is   dressed postoperatively. SKIN: No rashes. ASSESSMENT AND PLAN:   1. Septic in native left knee with history of hardware placement due to   fracture in July of this year. The patient is status post washout with   hardware removal on 11/07/2017. Surgical cultures are pending. Agree   with vancomycin pending further culture data. The patient will need a   6-week course of intravenous antibiotics and PIC line at the time of   discharge. This was discussed at length with the patient at bedside. 2. Immunosuppressive host. The patient is on Remicade and   methotrexate for rheumatoid arthritis. 3. History of rheumatoid arthritis. The patient is on treatment as above. 4. History of ulcerative colitis. Thank you for allowing me to participate in the care of this patient.         DO EUNICE Bruno / ASHLEY   D:  11/07/2017   14:46   T:  11/07/2017   15:08   Job #:  [id number]

## 2017-11-07 NOTE — PROGRESS NOTES
11/7/2017 2:24 PM Case management consult for long term abx received. Met with pt to discuss. Pt will have his wife at home who can assist if needed after discharge. Pt was independent with adls and driving prior to admission. Pt has rx coverage and fills his scripts at [facility]. Home health and infusion agency choice given, [facility] and [facility] selected, referrals sent via [facility] and [employer]. CM will follow for final abx orders. Pama Prader, BSW   Care Management Interventions  PCP Verified by CM: Yes Katie Daniel- nurse navigator notified of pt's admission. )  Mode of Transport at Discharge:  Other (see comment) (Pt's wife )  Transition of Care Consult (CM Consult): [address]: Yes  MyChart Signup: No  Discharge Durable Medical Equipment: No (Pt owns a cane )  Physical Therapy Consult: Yes  Occupational Therapy Consult: Yes  Speech Therapy Consult: No  Current Support Network: Lives with Spouse, Own Home

## 2017-11-07 NOTE — BRIEF OP NOTE
BRIEF OPERATIVE NOTE    Date of Procedure: 11/7/2017   Preoperative Diagnosis: LEFT KNEE WASHOUT  Postoperative Diagnosis: LEFT KNEE WASHOUT    Procedure(s):  ASPIRATION OF LEFT KNEE, INCISION AND DRAINAGE LEFT KNEE WITH TIBIAL PLATE REMOVAL, ARTHROSCOPY OF LEFT KNEE  Surgeon(s) and Role:     * Nancy Padgett DO - Primary         Assistant Staff:       Surgical Staff:  Circ-1: Susan Mulligan, RN  Scrub RN-1: Elba Earl RN  Surg Asst-1: Lionel iRvas RN  Float Staff: Ellen Elizalde RN  Radiology Technologist: Kacey Hernandez RT, R  Event Time In   Incision Start 6247   Incision Close 0906     Anesthesia: General   Estimated Blood Loss: 50  Specimens:   ID Type Source Tests Collected by Time Destination   1 : left knee fluid Joint Fluid Knee  CELL COUNT, BODY FLUID, CULTURE, ANAEROBIC, AEROBIC BACTERIAL [address] [place] [address] 11/7/2017 0740 Microbiology      Findings: infectious knee joint, retained hardware, post traumatic arthritis   Complications: none  Implants: * No implants in log *

## 2017-11-07 NOTE — PROGRESS NOTES
[facility] Dosing Services: Antimicrobial Stewardship Progress Note    Consult for antibiotic dosing of Vancomycin by Dr. Akira Joaquin. Pharmacist reviewed antibiotic appropriateness for 65year old , 80 kg, male  for indication of bone and joint infection. Day of Therapy 1    Plan:  Vancomycin therapy:  Start Vancomycin therapy, with loading dose of 2000 (mg). Maintenance dose to be determined. Creatinine daily per protocol. Trough goal 15-20 mcg/mL. ADD:CrCl ~70 ml/min  Ordered maintenance of 1250 mg every 12 hours  Plan level after third dose  Pharmacy to follow daily and will make changes to dose and/or frequency based on clinical status. Cultures     11/7 Fluid in process   Serum Creatinine     Lab Results   Component Value Date/Time    Creatinine 0.97 07/22/2017 06:52 AM       Creatinine Clearance Estimated Creatinine Clearance: 90.6 mL/min (based on Cr of 0.97).      Temp   97.8 °F (36.6 °C)    WBC   Lab Results   Component Value Date/Time    WBC 7.5 05/16/2017 08:10 AM       H/H   Lab Results   Component Value Date/Time    HGB 12.0 07/22/2017 06:52 AM        Platelets   Lab Results   Component Value Date/Time    PLATELET 543 35/52/7192 08:10 AM          Pharmacist: Immanuel Boyer Contact information: [phone]

## 2017-11-07 NOTE — ANESTHESIA POSTPROCEDURE EVALUATION
Post-Anesthesia Evaluation and Assessment    Patient: Smita Cummings MRN: [id number]  SSN: [id number]    YOB: 1960  Age: 65 y.o. Sex: male       Cardiovascular Function/Vital Signs  Visit Vitals    /72    Pulse 87    Temp 36.7 °C (98 °F)    Resp 11    Ht 5' 11\" (1.803 m)    Wt 83 kg (182 lb 15.7 oz)    SpO2 97%    BMI 25.52 kg/m2       Patient is status post general anesthesia for Procedure(s):  ASPIRATION OF LEFT KNEE, INCISION AND DRAINAGE LEFT KNEE WITH TIBIAL PLATE REMOVAL, ARTHROSCOPY OF LEFT KNEE. Nausea/Vomiting: None    Postoperative hydration reviewed and adequate. Pain:  Pain Scale 1: Numeric (0 - 10) (11/07/17 1013)  Pain Intensity 1: 2 (11/07/17 1013)   Managed    Neurological Status:   Neuro (WDL): Within Defined Limits (11/07/17 0941)  Neuro  Neurologic State: Drowsy (11/07/17 0941)   At baseline    Mental Status and Level of Consciousness: Arousable    Pulmonary Status:   O2 Device: Nasal cannula (11/07/17 0912)   Adequate oxygenation and airway patent    Complications related to anesthesia: None    Post-anesthesia assessment completed.  No concerns    Signed By: Renée Oconnor MD     November 7, 2017

## 2017-11-07 NOTE — IP AVS SNAPSHOT
[address] [phone] Patient: Barrett Paul. MRN: [id number] :[id number] About your hospitalization You were admitted on:  2017 You last received care in the:  [facility] 4M POST SURG ORT 1 You were discharged on:  November 10, 2017 Why you were hospitalized Your primary diagnosis was:  Not on File Your diagnoses also included:  Injury Of Left Knee Things You Need To Do (next 8 weeks) Follow up with [address] [facility] Phone:  [phone] Where:  [address] [facility], [address] [facility] [address] Follow up with HOME MEDICATION Where:  Please give bacn to patient his own Lialda. Thank you. Follow up with Jackeline Sanders MD  
  
Phone:  [phone] Where:  [address] [facility], [address], [address] [facility] [address] Discharge Orders None A check mariella indicates which time of day the medication should be taken. My Medications ASK your physician about these medications Instructions Each Dose to Equal  
 Morning Noon Evening Bedtime  
 albuterol 90 mcg/actuation inhaler Commonly known as:  PROVENTIL HFA, VENTOLIN HFA, PROAIR HFA Your last dose was: Your next dose is: Take 2 Puffs by inhalation every four (4) hours as needed for Wheezing. 2 Puff  
    
   
   
   
  
 budesonide 32 mcg/actuation nasal spray Commonly known as:  RHINOCORT AQUA Your last dose was: Your next dose is:    
   
   
      
   
   
   
  
 cetirizine 10 mg tablet Commonly known as:  ZYRTEC Your last dose was: Your next dose is: Take 1 Tab by mouth daily. 10 mg  
    
   
   
   
  
 fluticasone-salmeterol 250-50 mcg/dose diskus inhaler Commonly known as:  ADVAIR Your last dose was: Your next dose is: Take 1 Puff by inhalation two (2) times a day. 1 Puff  
    
   
   
   
  
 leucovorin calcium 5 mg tablet Commonly known as:  Salvador Mom Your last dose was: Your next dose is: Take 5 mg by mouth. 2x per week Sat and Sunday  
 5 mg LIALDA 1.2 gram delayed release tablet Generic drug:  mesalamine Your last dose was: Your next dose is: Take 2.4 g by mouth Daily (before breakfast). 2.4 g  
    
   
   
   
  
 methotrexate 2.5 mg tablet Commonly known as:  Tao Husbands Your last dose was: Your next dose is: Take 10 mg by mouth. Every Saturday and Sunday  Indications: RHEUMATOID ARTHRITIS 10 mg  
    
   
   
   
  
 montelukast 10 mg tablet Commonly known as:  SINGULAIR Your last dose was: Your next dose is: TAKE 1 TABLET DAILY PLETAL 50 mg tablet Generic drug:  cilostazol Your last dose was: Your next dose is: Take  by mouth Before breakfast and dinner. [facility]na[facility]  
     
   
   
   
  
 PriLOSEC 20 mg capsule Generic drug:  omeprazole Your last dose was: Your next dose is: Take 20 mg by mouth daily. 20 mg  
    
   
   
   
  
 REMICADE 100 mg injection Generic drug:  inFLIXimab Your last dose was: Your next dose is:    
   
   
 5 mg/kg by IntraVENous route. 5 mg/kg  
    
   
   
   
  
 simvastatin 20 mg tablet Commonly known as:  ZOCOR Your last dose was: Your next dose is: TAKE 1 TABLET NIGHTLY Discharge Instructions None Introducing [facility]! Dear Anisha Isaacs: Thank you for requesting a [employer] account. Our records indicate that you already have an active See[employer] account. You can access your account anytime at [email or web address] Did you know that you can access your hospital and ER discharge instructions at any time in [employer] ([employer])? You can also review all of your test results from your hospital stay or ER visit. Additional Information If you have questions, please visit the Frequently Asked Questions section of the [employer] ([employer]) website at [email or web address] Remember, [employer] is NOT to be used for urgent needs. For medical emergencies, dial 911. Now available from your iPhone and Android! Unresulted Labs-Please follow up with your PCP about these lab tests Order Current Status CULTURE, ANAEROBIC Preliminary result CULTURE, BODY FLUID W GRAM STAIN Preliminary result Providers Seen During Your Hospitalization Provider Specialty Primary office phone Aniya Garza DO Orthopedic Surgery [phone] Your Primary Care Physician (PCP) Primary Care Physician Office Phone Office Fax Jyothi Mcpherson [phone] You are allergic to the following No active allergies Recent Documentation Height Weight BMI Smoking Status 1.803 m 83 kg 25.52 kg/m2 Never Smoker Emergency Contacts Name Discharge Info Relation Home Work Mobile [address] CAREGIVER [3] Spouse [3] [phone] Patient Belongings The following personal items are in your possession at time of discharge: 
  Dental Appliances: None  Visual Aid: Glasses, With patient      Home Medications: None   Jewelry: None  Clothing: Shirt, Undergarments, Pants, Footwear    Other Valuables: None Please provide this summary of care documentation to your next provider. Signatures-by signing, you are acknowledging that this After Visit Summary has been reviewed with you and you have received a copy. Patient Signature:  ____________________________________________________________ Date:  ____________________________________________________________  
  
Ben Verdigre Provider Signature:  ____________________________________________________________ Date:  ____________________________________________________________

## 2017-11-07 NOTE — PROGRESS NOTES
Bedside and Verbal shift change report given to Bruce Daniel RN (oncoming nurse) by Nesha Velazco RN (offgoing nurse). Report included the following information SBAR, Kardex, Procedure Summary, Intake/Output, MAR and Recent Results.

## 2017-11-07 NOTE — PROGRESS NOTES
50% weight bearing orders noted for left lower extremity. No orders for PT/OT noted. Verbal report from PACU RN indicated patient would be weigh-bearing as tolerated. Message left with Cyndi Antony in Dr. Rossana العلي office requesting order clarification. Return call received - orders for PT/OT eval and treat and WBAT.

## 2017-11-07 NOTE — PROGRESS NOTES
POST ANESTHESIA CARE    DISCHARGE / TRANSFER NOTE    Bethany Llanes was   transfered   via   Bed   to    hospital room 401  . Patient was escorted by      nurse . Patient verbalized   appreciation and was very pleased with care received   throughout their stay. Patient was discharged in     pleasant mood      Pain at discharge/transfer was     2/10. Discharge, medication and follow-up instructions were verbalized as understood prior to discharge  (if applicable for same-day procedures being discharged.)    All personal belongings have been returned to patient, and patient/family verbally confirm receiving belongings as all present. TRANSFER - OUT REPORT:    Verbal report given to   [address]  receiving   Bethany Llanes   and being transferred   to      [facility]        for routine post - op  Following General for Procedure(s) (LRB):  ASPIRATION OF LEFT KNEE, INCISION AND DRAINAGE LEFT KNEE WITH TIBIAL PLATE REMOVAL, ARTHROSCOPY OF LEFT KNEE (Left) by  Zabrina Casey DO. Patient Situation, Background, Assessment and Recommendations (SBAR) was provided and included information from the following SBAR report(s) (SBAR, OR Summary and MAR) which were reviewed with the receiving nurse. Lines:   Peripheral IV 11/07/17 Right Arm (Active)   Site Assessment Clean, dry, & intact 11/7/2017  9:26 AM   Phlebitis Assessment 0 11/7/2017  9:26 AM   Infiltration Assessment 0 11/7/2017  9:26 AM   Dressing Status Clean, dry, & intact; Intact 11/7/2017  9:26 AM   Dressing Type Transparent;Tape 11/7/2017  9:26 AM   Hub Color/Line Status Pink; Infusing 11/7/2017  9:26 AM      Also Reviewed (checked if applicable):   [] NO ADDITIONAL REVIEWS  [] PCA Drug and Settings     [x] Cold Therapy with extra gels     [] On-Q @  ml/hr   [] Drains x       [] Significant Wound care/devices (e.g. Wound vac, etc.)     [] Holding pt in PACU awaiting bed availability - additional care provided and eMAR review  [] Vent Settings [] Critical Care Drips  Contact Precautions: There are currently no Active Isolations  Patient transported with:  Registered Nurse    Additional Information: Initial need for O2 resolved - pt NAD. Family at [facility]. After report, opportunity for questions and clarification was provided.     Signed: Grey VALLEN RN-BC

## 2017-11-07 NOTE — PROGRESS NOTES
Problem: Mobility Impaired (Adult and Pediatric)  Goal: *Acute Goals and Plan of Care (Insert Text)  Physical Therapy Goals  Initiated 11/7/2017    1. Patient will move from supine to sit and sit to supine  in bed with independence within 4 days. 2. Patient will perform sit to stand with modified independence within 4 days. 3. Patient will ambulate with modified independence for 250 feet with the least restrictive device within 4 days. 4. Patient will ascend/descend 12 stairs with single handrail(s) with supervision/set-up within 4 days. 5. Patient will perform LE therex home exercise program with independence within 4 days. 6. Patient will demonstrate AROM 0-90 degrees in operative joint within 4 days. physical Therapy EVALUATION  Patient: Jim Lawrence (61 y.o. male)  Date: 11/7/2017  Primary Diagnosis: LEFT KNEE WASHOUT  Injury of left knee, sequela  Procedure(s) (LRB):  ASPIRATION OF LEFT KNEE, INCISION AND DRAINAGE LEFT KNEE WITH TIBIAL PLATE REMOVAL, ARTHROSCOPY OF LEFT KNEE (Left) Day of Surgery   Precautions:   Fall, WBAT    ASSESSMENT :  Based on the objective data described below, the patient presents with decreased L knee ROM and strength, decreased functional mobility, impaired balance and gait s/p admission for L knee aspiration, I & D and tibial plate removal. Patient evaluated POD 0. Patient received supine in bed and agreeable to therapy. Patient reported being independent prior to admission up until a few days prior to surgery requiring use of a cane. 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 Patient able to complete supine<>sit x 2 trials modified independent, sit<>stand with RW with standby assist. During initial gait, pt reported \"stabbing pain\" within the knee joint.  Requested RN to come to room to assess knee and drain; drain was intact and RN cleared to continue to mobilize and would provide pain medicine since patient had not had any after surgery. Patient ambulated with RW with CGA via step through gait pattern, decreased weight bearing through LLE during stance phase during pain. Patient returned to supine position with all needs met. Patient will continue to benefit from PT to progress mobility, improve ROM and strength and reach highest level of independence. Patient owns crutches and would prefer to ambulate with those session going forward. Patient will benefit from HHPT versus outpatient PT pending progress. Patient will benefit from skilled intervention to address the above impairments. Patients rehabilitation potential is considered to be Good  Factors which may influence rehabilitation potential include:   [x]         None noted  []         Mental ability/status  []         Medical condition  []         Home/family situation and support systems  []         Safety awareness  []         Pain tolerance/management  []         Other:      PLAN :  Recommendations and Planned Interventions:  [x]           Bed Mobility Training             [x]    Neuromuscular Re-Education  [x]           Transfer Training                   []    Orthotic/Prosthetic Training  [x]           Gait Training                         []    Modalities  [x]           Therapeutic Exercises           []    Edema Management/Control  [x]           Therapeutic Activities            [x]    Patient and Family Training/Education  []           Other (comment):    Frequency/Duration: Patient will be followed by physical therapy  twice daily to address goals. Discharge Recommendations: Home Health versus Outpatient pending progress  Further Equipment Recommendations for Discharge: pt owns crutches     SUBJECTIVE:   Patient stated I am feeling great. My pain is gone.     OBJECTIVE DATA SUMMARY:   HISTORY:    Past Medical History:   Diagnosis Date    Allergic rhinitis     Anemia     Asthma     allergy/URI related    Colon polyp 11/13/13    Diverticulosis     Esophagitis, reflux     GERD (gastroesophageal reflux disease)     Hiatal hernia     Hypercholesterolemia     IGT (impaired glucose tolerance)     Kidney stone     Rheumatoid arthritis(714.0)     RHEUMATOID ARTHRITIS    Sp[facility]yl[facility] [address]     UC (ulcerative colitis) ([facility] [address]     ULCERATIVE COLITIS IN REMISSION     Past Surgical History:   Procedure Laterality Date    ABDOMEN SURGERY PROC UNLISTED  2007    BILAT ING HERNIA REPAIR    ABDOMEN SURGERY PROC UNLISTED  1971    INGUINAL HERNIA REPAIR LEFT    HX COLONOSCOPY  2016    has one every 2 years    HX GI  2010, 2 OTHERS    COLONOSCOPY    HX LUMBAR DISKECTOMY  2012    \"herniated disc on lumbar area was repaire\"    HX OPEN REDUCTION INTERNAL FIXATION Left 07/2017    Tibia fx     Prior Level of Function/Home Situation: independent, ambulatory. Employed as an assistant principle, drives. Spouse can assist upon discharge  Personal factors and/or comorbidities impacting plan of care:     Home Situation  Home Environment: Private residence  # Steps to Enter: 5  Rails to Enter: Yes  Hand Rails : Bilateral  Wheelchair Ramp: No  One/Two Story Residence: Two story  # of Interior Steps: 13  Living Alone: No  Support Systems: Spouse/Significant Other/Partner  Patient Expects to be Discharged to[de-identified] Private residence  Current DME Used/Available at Home: Cane, straight, Crutches    EXAMINATION/PRESENTATION/DECISION MAKING:   Critical Behavior:  Neurologic State: Alert  Orientation Level: Oriented X4  Cognition: Appropriate safety awareness, Follows commands     Hearing:     Skin:  Ace wrap to LLE intact; hemovac drain in place  Edema:   Range Of Motion:  AROM: Within functional limits (except L knee s/p sx)                       Strength:    Strength:  Within functional limits (except L knee s/p sx)                    Tone & Sensation:                  Sensation: Intact               Coordination:     Vision:      Functional Mobility:  Bed Mobility:     Supine to Sit: Modified independent  Sit to Supine: Modified independent     Transfers:  Sit to Stand: Stand-by asssistance  Stand to Sit: Stand-by asssistance                       Balance:   Sitting: Intact  Standing: Intact; With support  Ambulation/Gait Training:  Distance (ft): 80 Feet (ft)  Assistive Device: Gait belt;Walker, rolling  Ambulation - Level of Assistance: Contact guard assistance        Gait Abnormalities: Decreased step clearance; Step to gait (progressed to step through)     Left Side Weight Bearing: As tolerated  Base of Support: Narrowed  Stance: Left decreased  Speed/Jocy: Pace decreased (<100 feet/min)  Step Length: Right shortened;Left shortened                     Stairs: Therapeutic Exercises:       Functional Measure:  Tinetti test:    Sitting Balance: 1  Arises: 1  Attempts to Rise: 2  Immediate Standing Balance: 1  Standing Balance: 2  Nudged: 1  Eyes Closed: 0  Turn 360 Degrees - Continuous/Discontinuous: 0  Turn 360 Degrees - Steady/Unsteady: 1  Sitting Down: 1  Balance Score: 10  Indication of Gait: 1  R Step Length/Height: 1  L Step Length/Height: 1  R Foot Clearance: 1  L Foot Clearance: 1  Step Symmetry: 1  Step Continuity: 1  Path: 1  Trunk: 0  Walking Time: 1  Gait Score: 9  Total Score: 19       Tinetti Test and G-code impairment scale:  Percentage of Impairment CH    0%   CI    1-19% CJ    20-39% CK    40-59% CL    60-79% CM    80-99% CN     100%   Tinetti  Score 0-28 28 23-27 17-22 12-16 6-11 1-5 0       Tinetti Tool Score Risk of Falls  <19 = High Fall Risk  19-24 = Moderate Fall Risk  25-28 = Low Fall Risk  Tinetti ME. Performance-Oriented Assessment of Mobility Problems in Elderly Patients. Cohen 66; [id number].  (Scoring Description: PT Bulletin Feb. 10, 1993)    Older adults: Kacey Longo et al, 2009; n = [address] elderly evaluated with ABC, NHAN, ADL, and IADL)  · Mean NHAN score for males aged 69-68 years = 26.21(3.40)  · Mean NHAN score for females age 69-68 years = 25.16(4.30)  · Mean NHAN score for males over 80 years = 23.29(6.02)  · Mean NHAN score for females over 80 years = 17.20(8.32)         G codes: In compliance with CMSs Claims Based Outcome Reporting, the following G-code set was chosen for this patient based on their primary functional limitation being treated: The outcome measure chosen to determine the severity of the functional limitation was the Tinetti with a score of 19/28 which was correlated with the impairment scale. ? Mobility - Walking and Moving Around:     - CURRENT STATUS: CJ - 20%-39% impaired, limited or restricted    - GOAL STATUS: CI - 1%-19% impaired, limited or restricted    - D/C STATUS:  ---------------To be determined---------------       Based on the above components, the patient evaluation is determined to be of the following complexity level: LOW     Pain:  Pain Scale 1: Numeric (0 - 10)  Pain Intensity 1: 2  Pain Location 1: Knee  Pain Orientation 1: Left  Pain Description 1: Aching  Pain Intervention(s) 1: Declines  Activity Tolerance:   Good. VSS  Please refer to the flowsheet for vital signs taken during this treatment. After treatment:   []         Patient left in no apparent distress sitting up in chair  [x]         Patient left in no apparent distress in bed  [x]         Call bell left within reach  [x]         Nursing notified  []         Caregiver present  []         Bed alarm activated    COMMUNICATION/EDUCATION:   The patients plan of care was discussed with: Registered Nurse. [x]         Fall prevention education was provided and the patient/caregiver indicated understanding. [x]         Patient/family have participated as able in goal setting and plan of care. [x]         Patient/family agree to work toward stated goals and plan of care. []         Patient understands intent and goals of therapy, but is neutral about his/her participation.   []         Patient is unable to participate in goal setting and plan of care.    Thank you for this referral.  Giuseppe Alcaraz, PT , DPT   Time Calculation: 37 mins

## 2017-11-07 NOTE — PERIOP NOTES
PACU IN REPORT FROM ANESTHESIA    Verbal report received from   [address]  following General for Procedure(s) (LRB):  ASPIRATION OF LEFT KNEE, INCISION AND DRAINAGE LEFT KNEE WITH TIBIAL PLATE REMOVAL, ARTHROSCOPY OF LEFT KNEE (Left) by  Arya Plascencia DO. Note the anesthesia record for medications given intraoperatively. Brief Initial Visual Assessment:    Patient Age: [] Infant(1-12mo)   [] Toddler(1-3yr)     [] (3-5yr)                     [] School-Age(5-13yr) [] Adolescent(13-18yr)  [x] VPOVT(62-69HC)                         [] Geriatric Adult(>65yr). Patient    [x] Alert [x] Drowsy [] Sedated  Appearance: [] Unresponsive [] Anxious [] Calm &       Oriented x 2. Cooperative     Airway:     [x] Patent                          [] Near-obstructed                          [] Obstructed          [] Improved with head/airway repositioning. Airway Adjuncts Present: [] Oral Airway       [] Nasal Trumpet       [] ETT     Respiratory  [x] Even      [] Labored      [] Shallow  Pattern:    [x] Non-Labored  [] VENT and/or respiratory assistance         being provided. Skin:     [x] Pink [] Pale       [x] Warm [] Cool [] Cold   [x] Dry [] Moist [] Diaphoretic     Membranes:  [x] Pink [] Pale       [x] Moist [] Dry [] Crusty     Pain:   [] No Acute Discomfort. 7 /10 Scale [x] Verbal Numeric   [x] Moderate Discomfort.      [] V.A.S. [] Acute Discomfort.                [] A.N.V.    [] Chronic-Issue Related Discomfort.   [] F.L.A.C.C. Note E-MAR for medications administered. [] Scott Herrera/Lucita    Note assessments documented in flowsheets; any assessment variants to be found in comments or narrative perioperative nurse notes.        Post-anesthesia care now assumed by [facility] BSN, RN-BC

## 2017-11-08 ENCOUNTER — APPOINTMENT (OUTPATIENT)
Dept: GENERAL RADIOLOGY | Age: 57
DRG: 486 | End: 2017-11-08
Attending: ORTHOPAEDIC SURGERY
Payer: COMMERCIAL

## 2017-11-08 LAB — CREAT SERPL-MCNC: 0.88 MG/DL (ref 0.7–1.3)

## 2017-11-08 PROCEDURE — 97116 GAIT TRAINING THERAPY: CPT

## 2017-11-08 PROCEDURE — 36569 INSJ PICC 5 YR+ W/O IMAGING: CPT | Performed by: ORTHOPAEDIC SURGERY

## 2017-11-08 PROCEDURE — 97165 OT EVAL LOW COMPLEX 30 MIN: CPT

## 2017-11-08 PROCEDURE — 73560 X-RAY EXAM OF KNEE 1 OR 2: CPT

## 2017-11-08 PROCEDURE — 36415 COLL VENOUS BLD VENIPUNCTURE: CPT | Performed by: ORTHOPAEDIC SURGERY

## 2017-11-08 PROCEDURE — 65270000029 HC RM PRIVATE

## 2017-11-08 PROCEDURE — 74011250637 HC RX REV CODE- 250/637: Performed by: ORTHOPAEDIC SURGERY

## 2017-11-08 PROCEDURE — 80202 ASSAY OF VANCOMYCIN: CPT | Performed by: ORTHOPAEDIC SURGERY

## 2017-11-08 PROCEDURE — 82565 ASSAY OF CREATININE: CPT | Performed by: ORTHOPAEDIC SURGERY

## 2017-11-08 PROCEDURE — 74011250636 HC RX REV CODE- 250/636: Performed by: ORTHOPAEDIC SURGERY

## 2017-11-08 RX ADMIN — OXYCODONE HYDROCHLORIDE AND ACETAMINOPHEN 1 TABLET: 5; 325 TABLET ORAL at 06:10

## 2017-11-08 RX ADMIN — Medication 10 ML: at 13:08

## 2017-11-08 RX ADMIN — VANCOMYCIN HYDROCHLORIDE 1250 MG: 10 INJECTION, POWDER, LYOPHILIZED, FOR SOLUTION INTRAVENOUS at 13:08

## 2017-11-08 RX ADMIN — ASPIRIN 325 MG: 325 TABLET, COATED ORAL at 09:07

## 2017-11-08 RX ADMIN — VANCOMYCIN HYDROCHLORIDE 1250 MG: 10 INJECTION, POWDER, LYOPHILIZED, FOR SOLUTION INTRAVENOUS at 01:08

## 2017-11-08 RX ADMIN — ASPIRIN 325 MG: 325 TABLET, COATED ORAL at 17:06

## 2017-11-08 NOTE — PROGRESS NOTES
Bedside shift change report given to Lyric Frank  (oncoming nurse) by Rosina Duenas RN  (offgoing nurse). Report included the following information SBAR, Kardex, Intake/Output, MAR and Recent Results.    \

## 2017-11-08 NOTE — OP NOTES
Date of procedure: 11/7/17  Preoperative diagnosis: Left knee septic arthritis with retained  Hardware  Postoperative diagnosis: Left knee septic arthritis with retained hardware  Procedure performed: Arthroscopically assisted irrigation and debridement of left knee, hardware removal left knee, aspiration left knee joint, manipulation under anesthesia, left knee  Surgeon: Sri Crooks  Assistant: None  Estimated blood loss: 50 cc  Complications: None  Specimens: Synovial fluid and culture  Drains: Hemovac ×1  Implants: None    Operative indications: This is an otherwise healthy 16-year-old male who underwent an ORIF of a left tibial plateau fracture, this was complicated by arthrofibrosis which required an arthroscopic debridement. He did have increasing pain postoperatively, with restricted range of motion as well as pain and effusions. An outpatient workup did revealthe parameters concerning for a septic joint. Based on his clinical scenario, and irrigation and debridement with removal of all retained hardware was indicated. I did discuss with the patient the risks of surgery which include, but are not limited to, persistent infection, nerve or blood vessel damage, need for reoperation, instability of the knee, need for repeat fixation, DVT, PE, CVA, wound healing complications, persistent pain, swelling, stiffness, disability, prolonged time to recovery, prolonged antibiotic use, medical competitions and wound healing issues. The patient did consent for surgery. Description of procedure in detail: After the correct site and side were identified by myself in the holding area, the patient was transported to the surgical suite where after successful induction of LMA anesthesia, the left leg was prepped and draped. After an appropriate timeout, I did use the superolateral portal and a spinal needle to aspirate approximately 10 cc of bloody, clotted off, purulent appearing synovial fluid.   This was sent to the lab.  We then performed a formal prepping and draping. Once this was performed, I allowed antibiotics to infuse in the form of Ancef. Once this was performed, we are repeated the timeout, and the leg was allowed to gravity exsanguinated, and tourniquet taken to 250 mmHg. with this, I used the previous incision on the lateral aspect of the tibia to gain access to the deep fascia, all bleeders were coagulated with electrocautery. The deep fascia was incised, the plate was easily visible, I extended the fascial incision, and again maintained strict hemostasis. Once this was performed, a periosteal elevator was used to visualize the entirety of the plate, this was performed quite easily and without significant soft tissue dissection. I then used the Unii screwdrivers to remove the locking and nonlocking screws, these came out easily and in their entirety. The plate was then mobilized and removed from the surface of the bone. I used a rongeur to clear off the fibrinous tissue from the screw holes. Once this was performed, the wound was copiously irrigated with saline mixed with bacitracin. I did manually tested the stability of the fracture site, there was no gross mobility, as would be expected after over 3 months of healing. Once this was performed, the deep fascia was closed with an 0 PDS suture. Undyed 2-0 PDS was used to close skin, and a running 3-0 nylon was used to close the skin layer itself. A fluff and a sterile dressing was applied, to occlude off the hardware removal site from the remaining surgical field. With this, I used a standard anterolateral portal, using the previous scar, to gain access to the joint itself. Once this was performed, I used the blunt trocar to enter the superior patellar pouch. A camera was inserted and infusion began, immediately it was visible that there was clot and debris as well as fibrinous and exudative-appearing tissues.   Visualization waslimited in this regard, I therefore moved to the medial compartment with a camera, the knee was taken at the flexion and a medial portal was performed and the previous scar. I then inserted a shaver, and with extensive debridement was able to visualize the joint, the superior pouch, medial and lateral gutters, medial and lateral compartments as well as patellofemoral compartment and fat pad area were debrided. Once I had good visualization, pictures were taken which demonstrated moderate degenerative changes in the patellofemoral as well as medial compartment, however acceptable. The bone graft substitute was visualized in the lateral compartment, and a significant amount of chondrosis was identified. This was debrided and inspected, no other abdomens were noted except for a significant amount of synovitis. 15 total liters of normal saline with bacitracin were run through the joint itself. An antegrade fashion and under direct visualization, a superior lateral drain was placed atraumatically in the joint itself. Once this was performed, the knee was evacuated of all fluid, the tourniquet was released, camera was removed and skin was closed with 3-0 nylon sutures. Then, I used fluoroscopywith varus and valgus stress views to ensure that there was no gross motion at the fracture site, although the views did change somewhat, I was satisfied with the amount of stability, I will limit his weightbearing on these images, however 3 months or more should be enough for stability especially given the robust bone graft that was in place. Sterile dressing was applied. The patient was unsuccessfully awoken and taken to PACU in stable condition with no obvious intraoperative Complications. Postoperative plan: Patient will be weight bearing at 50%, I will get stress views in the radiology department that her weightbearing to ensure that there is good stability of the fracture itself, he will work with physical therapy.   He will obtain a PICC line, I will obtain an infectious disease consultation for recommendations on long-term antibiotics. He will have aspirin 325 twice a day for DVT prophylaxis, standard pain protocol, Colace for bowel maintenance. He is aware as is his wife that he will be in the hospital likely for 2-3 days, and hopefully we can discharge him for the weekend with home health care and long-term anabolic usage.

## 2017-11-08 NOTE — PROGRESS NOTES
[facility] Note:   Chart reviewed for PICC placement evaluation. Areas reviewed include, but are not limited to, patient's current and past H&P, Lab and Procedure Results, all notes, media records and medications. PICC order acknowledged and awaiting placement closer to discharge and final antibiotic orders entered per ID. #20 in right forearm PIV infiltrated and removed and #20 placed in left A/C with positive blood return. Twyla Montes notified of above and patient placed back in chair.

## 2017-11-08 NOTE — PROGRESS NOTES
Occupational Therapy Note:    Chart reviewed. Cleared by RN to see pt for therapy session, but pt off floor for xray at this time. Will continue to follow and attempt again today for OT evaluation. Thank you.     Lacy Wilhelm OTR/L

## 2017-11-08 NOTE — PROGRESS NOTES
Doing well, pain controlled, ID has seen. No complaints today. VSS  Dressing C/D/I  Sensorimotor exam unchanged  Scant fluid in drain    Cultures: Pending    A/P  POD 1 I+D left knee with hardware removal    Plan to obtain weight bearing films of knee  Awaiting final cultures and sensitivities  Pain control  PT - 50% weight bearing  PICC today  Plan for discharge once final home care can be established.

## 2017-11-08 NOTE — PROGRESS NOTES
Problem: Mobility Impaired (Adult and Pediatric)  Goal: *Acute Goals and Plan of Care (Insert Text)  Physical Therapy Goals  Initiated 11/7/2017    1. Patient will move from supine to sit and sit to supine  in bed with independence within 4 days. 2. Patient will perform sit to stand with modified independence within 4 days. 3. Patient will ambulate with modified independence for 250 feet with the least restrictive device within 4 days. 4. Patient will ascend/descend 12 stairs with single handrail(s) with supervision/set-up within 4 days. 5. Patient will perform LE therex home exercise program with independence within 4 days. 6. Patient will demonstrate AROM 0-90 degrees in operative joint within 4 days. physical Therapy TREATMENT  Patient: Erin Reynoso (61 y.o. male)  Date: 11/8/2017  Diagnosis: LEFT KNEE WASHOUT  Injury of left knee, sequela <principal problem not specified>  Procedure(s) (LRB):  ASPIRATION OF LEFT KNEE, INCISION AND DRAINAGE LEFT KNEE WITH TIBIAL PLATE REMOVAL, ARTHROSCOPY OF LEFT KNEE (Left) 1 Day Post-Op  Precautions: Fall, WBAT    ASSESSMENT:  Pt reports 0/10 pain at rest. Able to maintain PWB on L LE with SBA for functional transfers using RW for steadying. Performed  Supported standing L hip abd, extension to maintain strength. Will attempt gait training with axillary crutches this afternoon. Progression toward goals:  [x]    Improving appropriately and progressing toward goals  []    Improving slowly and progressing toward goals  []    Not making progress toward goals and plan of care will be adjusted     PLAN:  Patient continues to benefit from skilled intervention to address the above impairments. Continue treatment per established plan of care. Discharge Recommendations:  Home Health vs Outpatient  Further Equipment Recommendations for Discharge:  none     SUBJECTIVE:   Patient stated I am great.     OBJECTIVE DATA SUMMARY:   Critical Behavior:  Neurologic State: Alert  Orientation Level: Oriented X4  Cognition: Appropriate decision making, Appropriate for age attention/concentration, Appropriate safety awareness, Follows commands     Functional Mobility Training:  Bed Mobility:                    Transfers:  Sit to Stand: Stand-by asssistance  Stand to Sit: Stand-by asssistance                             Balance:  Sitting: Intact  Standing: Intact; With support  Ambulation/Gait Training:  Distance (ft): 100 Feet (ft)  Assistive Device: Walker, rolling;Gait belt  Ambulation - Level of Assistance: Contact guard assistance                 Base of Support: Narrowed                             Stairs:         Rail Use:  (SPCon L)  Neuro Re-Education:    Therapeutic Exercises:     Pain:  Pain Scale 1: Numeric (0 - 10)  Pain Intensity 1: 0              Activity Tolerance:   Good  Please refer to the flowsheet for vital signs taken during this treatment.   After treatment:   [x]    Patient left in no apparent distress sitting up in chair  []    Patient left in no apparent distress in bed  [x]    Call bell left within reach  [x]    Nursing notified  []    Caregiver present  []    Bed alarm activated    COMMUNICATION/COLLABORATION:   The patients plan of care was discussed with: Registered Nurse    Dat Pinzon   Time Calculation: 17 mins

## 2017-11-08 NOTE — PROGRESS NOTES
11/8/2017 2:43 PM Home health order requested and received. CM notified [facility] of order in chart. Awaiting final ID recs for iv abx orders. CM will follow.  TORI DorantesW

## 2017-11-08 NOTE — PROGRESS NOTES
Problem: Self Care Deficits Care Plan (Adult)  Goal: *Acute Goals and Plan of Care (Insert Text)  Occupational Therapy Goals  Initiated 11/8/2017  1. Patient will perform lower body ADLs with modified independence within 7 day(s). 2.  Patient will perform upper body ADLs standing 5 mins without fatigue or LOB with modified independence within 7 day(s). 3.  Patient will perform all aspects of toileting with modified independence within 7day(s). 4.  Patient will perform toilet transfer with modified independence within 7 days. 5.  Patient will utilize energy conservation techniques during functional activities without cues within 7 day(s). Occupational Therapy EVALUATION  Patient: Tony Tracy (61 y.o. male)  Date: 11/8/2017  Primary Diagnosis: LEFT KNEE WASHOUT  Injury of left knee, sequela  Procedure(s) (LRB):  ASPIRATION OF LEFT KNEE, INCISION AND DRAINAGE LEFT KNEE WITH TIBIAL PLATE REMOVAL, ARTHROSCOPY OF LEFT KNEE (Left) 1 Day Post-Op   Precautions:  Fall, PWB (50% on LLE)    ASSESSMENT :  Cleared by RN to see pt for therapy session. Based on the objective data described below, the patient presents with decreased balance, decreased endurance and decreased LLE ROM and strength following admission for I and D of L knee with tibial plate removal. 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 Pt received supine in bed, pleasant and agreeable to participate in session. Performed bed mobility with modified independence, demonstrated good sitting balance at EOB. Reviewed pt's partial WB status and he verbalized understanding. Pt performed functional transfers with CGA using RW, minimal cues for safety during transfers and for proper technique with RW to maintain PWB.   Stood to perform brief grooming ADL at sink, then pt returned to supine in bed as he was fatigued from test this AM. Pt required min A for LB dressing ADL, anticipate increased independence with removal of ace bandaging. Cold pack replaced on pt's left knee at end of session, call bell within reach and needs met. Pt reported he was NWB after original surgery following tibial fracture and that he managed well at home. He will benefit from continued OT to address the above deficits. Recommend HHOT vs. None upon discharge. Patient will benefit from skilled intervention to address the above impairments. Patients rehabilitation potential is considered to be Good  Factors which may influence rehabilitation potential include:   [x]             None noted  []             Mental ability/status  []             Medical condition  []             Home/family situation and support systems  []             Safety awareness  []             Pain tolerance/management  []             Other:      PLAN :  Recommendations and Planned Interventions:  [x]               Self Care Training                  []        Therapeutic Activities  [x]               Functional Mobility Training    []        Cognitive Retraining  [x]               Therapeutic Exercises           [x]        Endurance Activities  [x]               Balance Training                   []        Neuromuscular Re-Education  []               Visual/Perceptual Training     [x]   Home Safety Training  [x]               Patient Education                 [x]        Family Training/Education  []               Other (comment):    Frequency/Duration: Patient will be followed by occupational therapy 5 times a week to address goals. Discharge Recommendations: Home Health vs. none  Further Equipment Recommendations for Discharge: long handle sponge     SUBJECTIVE:   Patient stated I feel so much better now that I've had that done ( I & D).     OBJECTIVE DATA SUMMARY:   HISTORY:   Past Medical History:   Diagnosis Date    Allergic rhinitis     Anemia     Asthma     allergy/URI related    Colon polyp 11/13/13    Diverticulosis     Esophagitis, reflux     GERD (gastroesophageal reflux disease)     Hiatal hernia     Hypercholesterolemia     IGT (impaired glucose tolerance)     Kidney stone     Rheumatoid arthritis(714.0)     RHEUMATOID ARTHRITIS    Spondylitis (HCC)     UC (ulcerative colitis) ([facility] [address]     ULCERATIVE COLITIS IN REMISSION     Past Surgical History:   Procedure Laterality Date    ABDOMEN SURGERY PROC UNLISTED  2007    BILAT ING HERNIA REPAIR    ABDOMEN SURGERY PROC UNLISTED  1971    INGUINAL HERNIA REPAIR LEFT    HX COLONOSCOPY  2016    has one every 2 years    HX GI  2010, 2 OTHERS    COLONOSCOPY    HX LUMBAR DISKECTOMY  2012    \"herniated disc on lumbar area was repaire\"    HX OPEN REDUCTION INTERNAL FIXATION Left 07/2017    Tibia fx       Prior Level of Function/Environment/Context: Pt previously I with ADLs and functional mobility, 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  Home Situation  Home Environment: Private residence  # Steps to Enter: 5  Rails to Enter: Yes  Hand Rails : Bilateral  Wheelchair Ramp: No  One/Two Story Residence: Two story  # of Interior Steps: 15  Interior Rails: Left  Living Alone: No (lives with wife and child)  Support Systems: Spouse/Significant Other/Partner  Patient Expects to be Discharged to[de-identified] Private residence  Current DME Used/Available at Home: bekah Hines, [address] [facility], [address] [place] chair  Tub or Shower Type: Shower  [x]  Right hand dominant   []  Left hand dominant    EXAMINATION OF PERFORMANCE DEFICITS:  Cognitive/Behavioral Status:  Neurologic State: Alert  Orientation Level: Oriented X4  Cognition: Follows commands  Perception: Appears intact  Perseveration: No perseveration noted  Safety/Judgement: Awareness of environment;Good awareness of safety precautions; Fall prevention;Home safety    Hearing:   Auditory  Auditory Impairment: None    Vision/Perceptual:        Acuity: Able to read clock/calendar on wall without difficulty    Corrective Lenses: Glasses    Range of Motion:  AROM: Within functional limits  PROM: Within functional limits     Strength:  Strength: Within functional limits        Coordination:  Coordination: Within functional limits  Fine Motor Skills-Upper: Left Intact; Right Intact    Gross Motor Skills-Upper: Left Intact; Right Intact    Tone & Sensation:  Tone: Normal  Sensation: Intact        Balance:  Sitting: Intact  Standing: Intact; With support (RW)    Functional Mobility and Transfers for ADLs:  Bed Mobility:  Supine to Sit: Modified independent  Sit to Supine: Modified independent    Transfers:  Sit to Stand: Contact guard assistance  Stand to Sit: Contact guard assistance  Toilet Transfer : Contact guard assistance    ADL Assessment:  Feeding: Independent    Oral Facial Hygiene/Grooming: Independent    Bathing: Supervision;Minimum assistance    Upper Body Dressing: Independent    Lower Body Dressing: Minimum assistance    Toileting: Supervision        ADL Intervention and task modifications:   Verbal cues required to have pt push through UEs when taking step with R foot to avoid greater than 50% WB on LLE. Educated pt on importance of not twisting L knee during ADLs, and he verbalized understanding. Grooming  Grooming Assistance: Supervision/set up (in standing)  Washing Hands: Supervision/set-up         Lower Body Dressing Assistance  Socks: Minimum assistance         Cognitive Retraining  Safety/Judgement: Awareness of environment;Good awareness of safety precautions; Fall prevention;Home safety    Functional Measure:  Barthel Index:    Bathin  Bladder: 10  Bowels: 10  Groomin  Dressin  Feeding: 10  Mobility: 10  Stairs: 0  Toilet Use: 5  Transfer (Bed to Chair and Back): 10  Total: 65       Barthel and G-code impairment scale:  Percentage of impairment CH  0% CI  1-19% CJ  20-39% CK  40-59% CL  60-79% CM  80-99% CN  100%   Barthel Score 0-100 100 99-80 79-60 59-40 20-39 1-19   0   Barthel Score 0-20 20 17-19 13-16 9-12 5-8 1-4 0      The Barthel ADL Index: Guidelines  1. The index should be used as a record of what a patient does, not as a record of what a patient could do. 2. The main aim is to establish degree of independence from any help, physical or verbal, however minor and for whatever reason. 3. The need for supervision renders the patient not independent. 4. A patient's performance should be established using the best available evidence. Asking the patient, friends/relatives and nurses are the usual sources, but direct observation and common sense are also important. However direct testing is not needed. 5. Usually the patient's performance over the preceding 24-48 hours is important, but occasionally longer periods will be relevant. 6. Middle categories imply that the patient supplies over 50 per cent of the effort. 7. Use of aids to be independent is allowed. Magi Serrato., Barthel, D.W. (6557). Functional evaluation: the Barthel Index. [address] [facility] (14)2. DANIELLE Haines, Santo Carnes., Trey Mckay., [place], [address] [place] [address] (1999). Measuring the change indisability after inpatient rehabilitation; comparison of the responsiveness of the Barthel Index and Functional [place] Measure. Journal of Neurology, Neurosurgery, and Psychiatry, 66(4), 627-349. Porfirio Renteria, N.J.A, RANDY Moses, & Criselda Young, MEtseeA. (2004.) Assessment of post-stroke quality of life in cost-effectiveness studies: The usefulness of the Barthel Index and the EuroQoL-5D. Quality of Life Research, 13, 293-41       G codes: In compliance with CMSs Claims Based Outcome Reporting, the following G-code set was chosen for this patient based on their primary functional limitation being treated: The outcome measure chosen to determine the severity of the functional limitation was the Barthel Index with a score of 65/100 which was correlated with the impairment scale. ?  Self Care:     - CURRENT STATUS: CJ - 20%-39% impaired, limited or restricted    - GOAL STATUS: CH - 0% impaired, limited or restricted    - D/C STATUS:  ---------------To be determined---------------     Occupational Therapy Evaluation Charge Determination   History Examination Decision-Making   LOW Complexity : Brief history review  LOW Complexity : 1-3 performance deficits relating to physical, cognitive , or psychosocial skils that result in activity limitations and / or participation restrictions  LOW Complexity : No comorbidities that affect functional and no verbal or physical assistance needed to complete eval tasks       Based on the above components, the patient evaluation is determined to be of the following complexity level: LOW   Pain:  Pain Scale 1: Numeric (0 - 10)  Pain Intensity 1: 0        Activity Tolerance:   Good  Please refer to the flowsheet for vital signs taken during this treatment. After treatment:   [] Patient left in no apparent distress sitting up in chair  [x] Patient left in no apparent distress in bed  [x] Call bell left within reach  [x] Nursing notified  [] Caregiver present  [] Bed alarm activated    COMMUNICATION/EDUCATION:   The patients plan of care was discussed with: Registered Nurse. [x] Home safety education was provided and the patient/caregiver indicated understanding. [x] Patient/family have participated as able in goal setting and plan of care. [x] Patient/family agree to work toward stated goals and plan of care. [] Patient understands intent and goals of therapy, but is neutral about his/her participation. [] Patient is unable to participate in goal setting and plan of care. This patients plan of care is appropriate for delegation to [facility].     Thank you for this referral.  Millicent Barajas OT  Time Calculation: 17 mins

## 2017-11-09 ENCOUNTER — APPOINTMENT (OUTPATIENT)
Dept: GENERAL RADIOLOGY | Age: 57
DRG: 486 | End: 2017-11-09
Attending: ORTHOPAEDIC SURGERY
Payer: COMMERCIAL

## 2017-11-09 LAB
APPEARANCE FLD: ABNORMAL
COLOR FLD: ABNORMAL
CREAT SERPL-MCNC: 0.88 MG/DL (ref 0.7–1.3)
DATE LAST DOSE: ABNORMAL
LYMPHOCYTES NFR FLD: 1 %
MONOS+MACROS NFR FLD: 2 %
NEUTROPHILS NFR FLD: 97 %
NUC CELL # FLD: ABNORMAL /CU MM (ref 0–5)
RBC # FLD: >100 /CU MM
REPORTED DOSE,DOSE: ABNORMAL UNITS
REPORTED DOSE/TIME,TMG: 1300
SPECIMEN SOURCE FLD: ABNORMAL
VANCOMYCIN TROUGH SERPL-MCNC: 13.6 UG/ML (ref 5–10)

## 2017-11-09 PROCEDURE — 74011250637 HC RX REV CODE- 250/637: Performed by: ORTHOPAEDIC SURGERY

## 2017-11-09 PROCEDURE — 77030013140 HC MSK NEB VYRM -A

## 2017-11-09 PROCEDURE — 94640 AIRWAY INHALATION TREATMENT: CPT

## 2017-11-09 PROCEDURE — 76937 US GUIDE VASCULAR ACCESS: CPT

## 2017-11-09 PROCEDURE — 77030018786 HC NDL GD F/USND BARD -B

## 2017-11-09 PROCEDURE — 02HV33Z INSERTION OF INFUSION DEVICE INTO SUPERIOR VENA CAVA, PERCUTANEOUS APPROACH: ICD-10-PCS | Performed by: ORTHOPAEDIC SURGERY

## 2017-11-09 PROCEDURE — 74011250637 HC RX REV CODE- 250/637: Performed by: NURSE PRACTITIONER

## 2017-11-09 PROCEDURE — 74011000250 HC RX REV CODE- 250: Performed by: NURSE PRACTITIONER

## 2017-11-09 PROCEDURE — 97116 GAIT TRAINING THERAPY: CPT

## 2017-11-09 PROCEDURE — 97535 SELF CARE MNGMENT TRAINING: CPT

## 2017-11-09 PROCEDURE — C1751 CATH, INF, PER/CENT/MIDLINE: HCPCS

## 2017-11-09 PROCEDURE — 74011250636 HC RX REV CODE- 250/636: Performed by: ORTHOPAEDIC SURGERY

## 2017-11-09 PROCEDURE — 65270000029 HC RM PRIVATE

## 2017-11-09 PROCEDURE — 77030018719 HC DRSG PTCH ANTIMIC J&J -A

## 2017-11-09 RX ORDER — SODIUM CHLORIDE 0.9 % (FLUSH) 0.9 %
10-30 SYRINGE (ML) INJECTION AS NEEDED
Status: DISCONTINUED | OUTPATIENT
Start: 2017-11-09 | End: 2017-11-10 | Stop reason: HOSPADM

## 2017-11-09 RX ORDER — MESALAMINE 1.2 G/1
2.4 TABLET, DELAYED RELEASE ORAL
COMMUNITY
End: 2019-02-24

## 2017-11-09 RX ORDER — CETIRIZINE HCL 10 MG
10 TABLET ORAL DAILY
Status: DISCONTINUED | OUTPATIENT
Start: 2017-11-10 | End: 2017-11-10 | Stop reason: HOSPADM

## 2017-11-09 RX ORDER — FLUTICASONE PROPIONATE 50 MCG
2 SPRAY, SUSPENSION (ML) NASAL
Status: DISCONTINUED | OUTPATIENT
Start: 2017-11-09 | End: 2017-11-10 | Stop reason: HOSPADM

## 2017-11-09 RX ORDER — PANTOPRAZOLE SODIUM 40 MG/1
40 TABLET, DELAYED RELEASE ORAL
Status: DISCONTINUED | OUTPATIENT
Start: 2017-11-10 | End: 2017-11-10 | Stop reason: HOSPADM

## 2017-11-09 RX ORDER — MESALAMINE 1.2 G/1
1.2 TABLET, DELAYED RELEASE ORAL
Status: DISCONTINUED | OUTPATIENT
Start: 2017-11-09 | End: 2017-11-09

## 2017-11-09 RX ORDER — SODIUM CHLORIDE 0.9 % (FLUSH) 0.9 %
10-40 SYRINGE (ML) INJECTION EVERY 8 HOURS
Status: DISCONTINUED | OUTPATIENT
Start: 2017-11-09 | End: 2017-11-10 | Stop reason: HOSPADM

## 2017-11-09 RX ORDER — SODIUM CHLORIDE 0.9 % (FLUSH) 0.9 %
10 SYRINGE (ML) INJECTION AS NEEDED
Status: DISCONTINUED | OUTPATIENT
Start: 2017-11-09 | End: 2017-11-10 | Stop reason: HOSPADM

## 2017-11-09 RX ORDER — CILOSTAZOL 100 MG/1
50 TABLET ORAL
Status: DISCONTINUED | OUTPATIENT
Start: 2017-11-09 | End: 2017-11-10 | Stop reason: HOSPADM

## 2017-11-09 RX ORDER — VANCOMYCIN/0.9 % SOD CHLORIDE 1.5G/250ML
1500 PLASTIC BAG, INJECTION (ML) INTRAVENOUS EVERY 12 HOURS
Status: DISCONTINUED | OUTPATIENT
Start: 2017-11-09 | End: 2017-11-10

## 2017-11-09 RX ORDER — SODIUM CHLORIDE 0.9 % (FLUSH) 0.9 %
10 SYRINGE (ML) INJECTION EVERY 24 HOURS
Status: DISCONTINUED | OUTPATIENT
Start: 2017-11-09 | End: 2017-11-10 | Stop reason: HOSPADM

## 2017-11-09 RX ORDER — MONTELUKAST SODIUM 10 MG/1
10 TABLET ORAL
Status: DISCONTINUED | OUTPATIENT
Start: 2017-11-09 | End: 2017-11-10 | Stop reason: HOSPADM

## 2017-11-09 RX ORDER — SODIUM CHLORIDE 0.9 % (FLUSH) 0.9 %
20 SYRINGE (ML) INJECTION EVERY 24 HOURS
Status: DISCONTINUED | OUTPATIENT
Start: 2017-11-09 | End: 2017-11-10 | Stop reason: HOSPADM

## 2017-11-09 RX ORDER — ALBUTEROL SULFATE 0.83 MG/ML
2.5 SOLUTION RESPIRATORY (INHALATION)
Status: DISCONTINUED | OUTPATIENT
Start: 2017-11-09 | End: 2017-11-10 | Stop reason: HOSPADM

## 2017-11-09 RX ORDER — FLUTICASONE PROPIONATE AND SALMETEROL 250; 50 UG/1; UG/1
1 POWDER RESPIRATORY (INHALATION) 2 TIMES DAILY
Status: DISCONTINUED | OUTPATIENT
Start: 2017-11-09 | End: 2017-11-09 | Stop reason: CLARIF

## 2017-11-09 RX ORDER — BUDESONIDE 0.5 MG/2ML
500 INHALANT ORAL
Status: DISCONTINUED | OUTPATIENT
Start: 2017-11-09 | End: 2017-11-10 | Stop reason: HOSPADM

## 2017-11-09 RX ORDER — MESALAMINE 1.2 G/1
2.4 TABLET, DELAYED RELEASE ORAL
Status: DISCONTINUED | OUTPATIENT
Start: 2017-11-09 | End: 2017-11-10 | Stop reason: HOSPADM

## 2017-11-09 RX ORDER — ARFORMOTEROL TARTRATE 15 UG/2ML
15 SOLUTION RESPIRATORY (INHALATION)
Status: DISCONTINUED | OUTPATIENT
Start: 2017-11-09 | End: 2017-11-10 | Stop reason: HOSPADM

## 2017-11-09 RX ADMIN — Medication 10 ML: at 16:56

## 2017-11-09 RX ADMIN — Medication 10 ML: at 23:11

## 2017-11-09 RX ADMIN — FLUTICASONE PROPIONATE 2 SPRAY: 50 SPRAY, METERED NASAL at 21:29

## 2017-11-09 RX ADMIN — CILOSTAZOL 50 MG: 100 TABLET ORAL at 16:55

## 2017-11-09 RX ADMIN — ARFORMOTEROL TARTRATE 15 MCG: 15 SOLUTION RESPIRATORY (INHALATION) at 19:48

## 2017-11-09 RX ADMIN — BUDESONIDE 500 MCG: 0.5 INHALANT RESPIRATORY (INHALATION) at 19:48

## 2017-11-09 RX ADMIN — MESALAMINE 2400 MG: 1.2 TABLET, DELAYED RELEASE ORAL at 16:54

## 2017-11-09 RX ADMIN — MONTELUKAST SODIUM 10 MG: 10 TABLET, FILM COATED ORAL at 21:29

## 2017-11-09 RX ADMIN — ASPIRIN 325 MG: 325 TABLET, COATED ORAL at 16:55

## 2017-11-09 RX ADMIN — ASPIRIN 325 MG: 325 TABLET, COATED ORAL at 08:26

## 2017-11-09 RX ADMIN — VANCOMYCIN HYDROCHLORIDE 1500 MG: 10 INJECTION, POWDER, LYOPHILIZED, FOR SOLUTION INTRAVENOUS at 11:57

## 2017-11-09 RX ADMIN — Medication 10 ML: at 06:00

## 2017-11-09 RX ADMIN — VANCOMYCIN HYDROCHLORIDE 1500 MG: 10 INJECTION, POWDER, LYOPHILIZED, FOR SOLUTION INTRAVENOUS at 23:11

## 2017-11-09 RX ADMIN — SODIUM CHLORIDE 25 ML/HR: 900 INJECTION, SOLUTION INTRAVENOUS at 23:11

## 2017-11-09 RX ADMIN — VANCOMYCIN HYDROCHLORIDE 1250 MG: 10 INJECTION, POWDER, LYOPHILIZED, FOR SOLUTION INTRAVENOUS at 01:27

## 2017-11-09 NOTE — PROGRESS NOTES
Doing well, no significant complaints. Exam unchanged  Wound C/D/I    Cultures: negative so far  Xray: No definitive loss of reduction      A/P  50% weight bearing  Awaiting cultures, have sample at office, will communicate with ID any results as they become available  Continue vancomycin empirically  Pain control  Plan for final recs from ID, PICC, home health, and discharge once established. Plan is for Today or Tomorrow discharge.

## 2017-11-09 NOTE — PROGRESS NOTES
Bedside shift change report given to [employer], RN  (oncoming nurse) by Howard Polanco RN  (offgoing nurse). Report included the following information SBAR, Kardex, Intake/Output and MAR.

## 2017-11-09 NOTE — PROGRESS NOTES
Problem: Falls - Risk of  Goal: *Absence of Falls  Document Marnie Fall Risk and appropriate interventions in the flowsheet.    Outcome: Progressing Towards Goal  Fall Risk Interventions:  Mobility Interventions: Bed/chair exit alarm, Patient to call before getting OOB         Medication Interventions: Bed/chair exit alarm, Patient to call before getting OOB    Elimination Interventions: Call light in reach, Bed/chair exit alarm, Patient to call for help with toileting needs, Toilet paper/wipes in reach, Toileting schedule/hourly rounds, Urinal in reach

## 2017-11-09 NOTE — PROGRESS NOTES
11/9/2017 11:25 AM ID note reviewed, planning for final abx orders to be written tomorrow. Updates sent to [facility] via [employer]. Idalmis Petty and [facility] were notified to plan for discharge tomorrow. CM will follow.  Pama Prader, BSW

## 2017-11-09 NOTE — PROGRESS NOTES
Problem: Self Care Deficits Care Plan (Adult)  Goal: *Acute Goals and Plan of Care (Insert Text)  Occupational Therapy Goals  Initiated 11/8/2017  1. Patient will perform lower body ADLs with modified independence within 7 day(s). 2.  Patient will perform upper body ADLs standing 5 mins without fatigue or LOB with modified independence within 7 day(s). 3.  Patient will perform all aspects of toileting with modified independence within 7day(s). 4.  Patient will perform toilet transfer with modified independence within 7 days. 5.  Patient will utilize energy conservation techniques during functional activities without cues within 7 day(s). Occupational Therapy TREATMENT/discharge  Patient: Jatin Hooks (61 y.o. male)  Date: 11/9/2017  Diagnosis: LEFT KNEE WASHOUT  Injury of left knee, sequela <principal problem not specified>  Procedure(s) (LRB):  ASPIRATION OF LEFT KNEE, INCISION AND DRAINAGE LEFT KNEE WITH TIBIAL PLATE REMOVAL, ARTHROSCOPY OF LEFT KNEE (Left) 2 Days Post-Op  Precautions: Fall, PWB (50% on LLE)  Chart, occupational therapy assessment, plan of care, and goals were reviewed. ASSESSMENT:  Pt agreeable to OT. After ambulation into the bathroom pt transferred on/off commode with mod I, stood at the sink to wash his hands without LOB all with use of crutches. Once pt returned to bed he demonstrated doffing/donning socks long sitting in bed. Pt has achieved his goals for OT and is clear from an OT standpoint for home. He plans on resuming out patient PT once home. Progression toward goals:  [x]          Improving appropriately and progressing toward goals  []          Improving slowly and progressing toward goals  []          Not making progress toward goals and plan of care will be adjusted     PLAN:  Patient will be discharged from occupational therapy at this time.   Rationale for discharge:  [x] Goals Achieved  [x] Plateau Reached  [] Patient not participating in therapy  [] Other:  Discharge Recommendations: No further OT needed in acute care setting. Further Equipment Recommendations for Discharge: None for OT     SUBJECTIVE:   Patient stated I did not know I could do that .  and referring to doffing/donning socks. OBJECTIVE DATA SUMMARY:   Cognitive/Behavioral Status:  Neurologic State: Alert, Appropriate for age  Orientation Level: Oriented X4  Cognition: Appropriate decision making, Follows commands  Safety/Judgement: Awareness of environment, Good awareness of safety precautions, Fall prevention, Home safety  Functional Mobility and Transfers for ADLs:   Bed Mobility:     Supine to Sit: Modified independent  Sit to Supine: Modified independent        Transfers:  Sit to Stand: Stand-by asssistance   Functional Transfers  Toilet Transfer : Modified independent  Adaptive Equipment: Other (comment) (crutches)    Balance:  Sitting: Intact  Standing: Intact; With support  ADL Intervention:       Grooming  Grooming Assistance: Modified independent  Washing Hands: Modified independent                   Lower Body Dressing Assistance  Socks: Modified independent  Leg Crossed Method Used: Yes  Position Performed: Long sitting on bed            Pain:  Pain Scale 1: Numeric (0 - 10)  Pain Intensity 1: 0                Activity Tolerance:    No signs/symptoms of distress or discomfort during OT  Please refer to the flowsheet for vital signs taken during this treatment.   After treatment:   []  Patient left in no apparent distress sitting up in chair  [x]  Patient left in no apparent distress in bed  [x]  Call bell left within reach  [x]  Nursing notified  []  Caregiver present  []  Bed alarm activated    COMMUNICATION/COLLABORATION:   The patients plan of care was discussed with: Physical Therapy Assistant, Occupational Therapist and Registered Nurse    DEBORAH Sood  Time Calculation: 10 mins

## 2017-11-09 NOTE — PROGRESS NOTES
PICC Placement Note   5 FR Dual Luem POWER PICC    PRE-PROCEDURE VERIFICATION  Correct Procedure: yes  Correct Site:  yes  Temperature: Temp: 98.7 °F (37.1 °C), Temperature Source: Temp Source: Oral  Recent Labs      11/08/17   2350   CREA  0.88     Allergies: Review of patient's allergies indicates no known allergies. Education materials for PICC Care given: yes. See Patient Education activity for further details. PICC Booklet placed at bedside: yes    Closed Ended PICC Catheters:  Flush Lumens as Follows:  Intermittent Medication:   Flush before and after each medication with 10 ml NS. Unused Ports:  Flush every 8 hours with 10 ml NS.  TPN Ports:  Flush every 24 hours with 20 ml NS prior to hanging new bag. Blood Draws: Stop infusion, draw off and waste 10 ml of blood. Draw sample with 10cc syringe or greater. DO NOT USE VACUTAINER . Transfer with appropriate device to lab  tubes. Flush with 20 ml NS. Dressing Change:  Every 7 days, and PRN using sterile technique if integrity of dressing is compromised. Initial dressing change for central line 24-48 hours post insertion if gauze is used. Apply new dressing per policy. PROCEDURE DETAIL  Consent was obtained and all questions were answered related to risks and benefits. A double lumen PICC line was inserted, as a sterile procedure using ultrasound and modified Seldinger technique for antibiotic therapy. The following documentation is in addition to the PICC properties in the lines/airways flowsheet :  Lot #: [id number]  Lidocaine 1% administered intradermally :yes  Internal Catheter Total Length: 43 (cm)  44cm total/ 1 cm out  Vein Selection for PICC:right basilic  Central Line Bundle followed yes  Complication Related to Insertion:no    The placement was verified by EKG, MAX P WAVE @ 43 (cm). PER EKG PICC TIP @ C/A junction.       X-ray: no.     Line is okay to use: yes    Zoë West, RN

## 2017-11-09 NOTE — INTERDISCIPLINARY ROUNDS
[address]    Patient Information    Name: Tenisha Green. Age: 65 y.o. Admission Date: 11/7/2017  Surgery/Procedure: Procedure(s):  ASPIRATION OF LEFT KNEE, INCISION AND DRAINAGE LEFT KNEE WITH TIBIAL PLATE REMOVAL, ARTHROSCOPY OF LEFT KNEE  Attending Provider: Aniya Garza DO  Surgeon: Yamel Escobedo   Problem List: Active Problems:    Injury of left knee (11/7/2017)      During rounds the following quality care indicators and evidence based practices were addressed :       PT/OT: Patient mobility - Patient has been cleared for discharge by physical therapy. Bed Mobility Training  Supine to Sit: Modified independent  Sit to Supine: Modified independent  Transfer Training  Sit to Stand: Stand-by asssistance  Stand to Sit: Stand-by asssistance      Gait Training  Assistive Device: Crutches  Ambulation - Level of Assistance: Stand-by asssistance  Distance (ft): 120 Feet (ft)  Stairs - Level of Assistance: Stand-by asssistance  Number of Stairs Trained: 4  Rail Use: Right  (crtuches on L)   Weight Bearing Status  Left Side Weight Bearing: As tolerated      Pain Assessment  Pain Intensity 1: 0 (11/09/17 0831)  Pain Location 1: Knee  Pain Intervention(s) 1: Medication (see MAR)  Patient Stated Pain Goal: 0    Pain meds: percocet  Antibiotics: Vanc currently. Dr. Vasyl Navarro will finalize orders tomorrow  Anticoagulation:  ASA    SCDs: in place  Bowels:     RRAT Score:      Readmit Risk Tool  Support Systems: Spouse/Significant Other/Partner  Relationship with Primary Physician Group: Seen at least one time within the past 6 months    Discharge Management/Planning:    Readmit Risk Assessment Information:   Low Risk            11       Total Score        3 Has Seen PCP in Last 6 Months (Yes=3, No=0)    2 . Living with Significant Other. Assisted Living. LTAC. SNF.  or   Rehab    4 IP Visits Last 12 Months (1-3=4, 4=9, >4=11)    2 Charlson Comorbidity Score (Age + Comorbid Conditions) Criteria that do not apply:    Patient Length of Stay (>5 days = 3)    Pt. Coverage (Medicare=5 , Medicaid, or Self-Pay=4)         Readmit Risk Tool  Support Systems: Spouse/Significant Other/Partner  Relationship with Primary Physician Group: Seen at least one time within the past 6 months    [address]: [address]:     Anticipated Date of Discharge: tomorrow    Interdisciplinary team rounds were held with the following team members: Nurse Practitioner, Case Management, Nursing, Pharmacy, and Rehab. See clinical pathway and/or care plan for interventions and desired outcomes.

## 2017-11-09 NOTE — PROGRESS NOTES
Bedside and Verbal shift change report given to Aliya Longoria RN (oncoming nurse) by Shireen Zhang RN (offgoing nurse). Report included the following information SBAR, Kardex, Intake/Output, MAR, Accordion and Recent Results.

## 2017-11-09 NOTE — PROGRESS NOTES
[facility] Pharmacy Dosing Services: Antimicrobial Stewardship Progress Note  Consult for antibiotic dosing of Vancomycin by Dr. Wilman Gould. Pharmacist reviewed antibiotic appropriateness for 65year old male for indication of Septic left knee. Day of Therapy: 3 (6 weeks per ID)    Ht Readings from Last 1 Encounters:   11/07/17 180.3 cm (71\")        Wt Readings from Last 1 Encounters:   11/07/17 83 kg (182 lb 15.7 oz)      Vancomycin therapy:  Current maintenance dose: 1250(mg) every 12 hours (frequency). Dose calculated to approximate a therapeutic trough of 15-20 mcg/mL. Last trough level: 13.6 mcg/ml drawn 10.7 hrs post dose, extrapolates to a trough of ~12mcg/ml  Plan for level / Adjustment in Therapy: Will change Vancomycin to 1500 mg IV q12hr. Dose administration notes:   Doses given appropriately as scheduled    Other Antimicrobial   (not dosed by pharmacist) none   Cultures 11/7/2017: Left knee: pending  Anaerobes: no growth so far pending   Serum Creatinine Lab Results   Component Value Date/Time    Creatinine 0.88 11/08/2017 11:50 PM         Creatinine Clearance Estimated Creatinine Clearance: 99.8 mL/min (based on Cr of 0.88).      Temp Temp: 98.7 °F (37.1 °C)       WBC Lab Results   Component Value Date/Time    WBC 7.5 05/16/2017 08:10 AM        H/H Lab Results   Component Value Date/Time    HGB 12.0 07/22/2017 06:52 AM        Platelets    Lab Results   Component Value Date/Time    PLATELET 658 49/37/3679 08:10 AM          Pharmacist Sravani Castaneda Contact information: [phone]

## 2017-11-09 NOTE — PROGRESS NOTES
[facility] Infectious Diseases Progress Note  Steve Simmons MD,             Libia Morgan MD,          Heide Stratton DO     [address] [place] [address]    [address], [address]  [phone]  Fax    2017      Assessment & Plan:   1. Septic in native left knee with history of hardware placement due to fracture in July of this year. The patient is status post washout with hardware removal on 2017. Surgical cultures NGSF. Plan d/c tomorrow. If cultures remain negative plan vanco/ertapenam X 6 weeks. Will write orders tomorrow AM  2. Immunosuppressive host. The patient is on Remicade and methotrexate for rheumatoid arthritis. 3. History of rheumatoid arthritis. The patient is on treatment as above. 4. History of ulcerative colitis. Subjective:     No complaints    Objective:     Vitals:   Visit Vitals    /78 (BP 1 Location: Left arm, BP Patient Position: Head of bed elevated (Comment degrees))    Pulse 74    Temp 98.1 °F (36.7 °C)    Resp 18    Ht 5' 11\" (1.803 m)    Wt 83 kg (182 lb 15.7 oz)    SpO2 96%    BMI 25.52 kg/m2        Tmax:  Temp (24hrs), Av.4 °F (36.9 °C), Min:98.1 °F (36.7 °C), Max:98.7 °F (37.1 °C)      Exam:   Patient is intubated:  no    Physical Examination:   GENERAL ASSESSMENT: NAD  HEENT:Nontraumatic NCAT  CHEST:   HEART: no distress  ABDOMEN:   :Ferris: no  EXTREMITY: leg dressed  NEURO:Grossly non focal    Labs:        No lab exists for component: ITNL   No results for input(s): CPK, CKMB, TROIQ in the last 72 hours. Recent Labs      17   2350  17   0208  17   1210   CREA  0.88  0.88  1.21     No results for input(s): INR, PTP, APTT in the last 72 hours.     No lab exists for component: INREXT  Needs: urine analysis, urine sodium, protein and creatinine  No results found for: MILIND, CREAU      Cultures:     No results found for: SDES  Lab Results   Component Value Date/Time    Culture result: Culture performed on Fluid swab specimen 11/07/2017 07:38 AM    Culture result: No growth thus far, holding 14 days. 11/07/2017 07:38 AM    Culture result: No growth thus far, holding 14 days.  11/07/2017 07:38 AM       Radiology:     Medications       Current Facility-Administered Medications   Medication Dose Route Frequency Last Dose    sodium chloride (NS) flush 5-10 mL  5-10 mL IntraVENous Q8H 10 mL at 11/09/17 0600    sodium chloride (NS) flush 5-10 mL  5-10 mL IntraVENous PRN      acetaminophen (TYLENOL) tablet 650 mg  650 mg Oral Q4H PRN      oxyCODONE-acetaminophen (PERCOCET) 5-325 mg per tablet 1 Tab  1 Tab Oral Q4H PRN 1 Tab at 11/08/17 0610    HYDROmorphone (PF) (DILAUDID) injection 1 mg  1 mg IntraVENous Q4H PRN      naloxone (NARCAN) injection 0.4 mg  0.4 mg IntraVENous PRN      ondansetron (ZOFRAN) injection 4 mg  4 mg IntraVENous Q4H PRN      aspirin (ASPIRIN) tablet 325 mg  325 mg Oral  mg at 11/09/17 0826    0.9% sodium chloride infusion  25 mL/hr IntraVENous CONTINUOUS 25 mL/hr at 11/07/17 2002    vancomycin (VANCOCIN) 1,250 mg in 0.9% sodium chloride 250 mL IVPB  1,250 mg IntraVENous Q12H 1,250 mg at 11/09/17 0127           Case discussed with:      Lawrence Dunlap DO

## 2017-11-09 NOTE — PROGRESS NOTES
Problem: Mobility Impaired (Adult and Pediatric)  Goal: *Acute Goals and Plan of Care (Insert Text)  Physical Therapy Goals  Initiated 11/7/2017    1. Patient will move from supine to sit and sit to supine  in bed with independence within 4 days. 2. Patient will perform sit to stand with modified independence within 4 days. 3. Patient will ambulate with modified independence for 250 feet with the least restrictive device within 4 days. 4. Patient will ascend/descend 12 stairs with single handrail(s) with supervision/set-up within 4 days. 5. Patient will perform LE therex home exercise program with independence within 4 days. 6. Patient will demonstrate AROM 0-90 degrees in operative joint within 4 days. physical Therapy TREATMENT  Patient: Erin Reynoso (61 y.o. male)  Date: 11/9/2017  Diagnosis: LEFT KNEE WASHOUT  Injury of left knee, sequela <principal problem not specified>  Procedure(s) (LRB):  ASPIRATION OF LEFT KNEE, INCISION AND DRAINAGE LEFT KNEE WITH TIBIAL PLATE REMOVAL, ARTHROSCOPY OF LEFT KNEE (Left) 2 Days Post-Op  Precautions: Fall, PWB (50% on LLE)    ASSESSMENT:  Able to maintain PWB using axillary crutches for gait and stair training. Demonstrates good balance with sit<>stand transfers. Verbal cues for safe technique with use of B crutches. Pt reports 0/10 pain in LLE with activity. Pt is cleared for discharge from hospital from PT perspective once medically stable. Progression toward goals:  []    Improving appropriately and progressing toward goals  [x]    Improving slowly and progressing toward goals  []    Not making progress toward goals and plan of care will be adjusted     PLAN:  Patient continues to benefit from skilled intervention to address the above impairments. Continue treatment per established plan of care.   Discharge Recommendations:  Outpatient  Further Equipment Recommendations for Discharge:  none     SUBJECTIVE:   Patient stated I feel good, leaving tomorrow.     OBJECTIVE DATA SUMMARY:   Critical Behavior:  Neurologic State: Alert, Appropriate for age  Orientation Level: Oriented X4  Cognition: Appropriate for age attention/concentration  Safety/Judgement: Awareness of environment, Good awareness of safety precautions, Fall prevention, Home safety  Functional Mobility Training:  Bed Mobility:     Supine to Sit: Modified independent  Sit to Supine: Modified independent           Transfers:  Sit to Stand: Stand-by asssistance  Stand to Sit: Stand-by asssistance                             Balance:  Sitting: Intact  Standing: Intact; With support  Ambulation/Gait Training:  Distance (ft): 120 Feet (ft)  Assistive Device: Crutches  Ambulation - Level of Assistance: Stand-by asssistance        Gait Abnormalities: Decreased step clearance        Base of Support: Narrowed                             Stairs:  Number of Stairs Trained: 4  Stairs - Level of Assistance: Stand-by asssistance   Rail Use: Right  (crtuches on L)  Neuro Re-Education:    Therapeutic Exercises:     Pain:  Pain Scale 1: Numeric (0 - 10)  Pain Intensity 1: 0              Activity Tolerance:   Good  Please refer to the flowsheet for vital signs taken during this treatment.   After treatment:   [x]    Patient left in no apparent distress sitting up in chair  []    Patient left in no apparent distress in bed  [x]    Call bell left within reach  [x]    Nursing notified  []    Caregiver present  []    Bed alarm activated    COMMUNICATION/COLLABORATION:   The patients plan of care was discussed with: Registered Nurse    Neelima Rosa   Time Calculation: 13 mins

## 2017-11-09 NOTE — PROGRESS NOTES
Bedside and Verbal shift change report given to [facility] (oncoming nurse) by Ritika Arroyo RN (offgoing nurse). Report included the following information SBAR and Kardex.

## 2017-11-10 VITALS
BODY MASS INDEX: 25.62 KG/M2 | OXYGEN SATURATION: 96 % | HEIGHT: 71 IN | WEIGHT: 182.98 LBS | HEART RATE: 95 BPM | DIASTOLIC BLOOD PRESSURE: 76 MMHG | SYSTOLIC BLOOD PRESSURE: 127 MMHG | TEMPERATURE: 98 F | RESPIRATION RATE: 16 BRPM

## 2017-11-10 LAB — CREAT SERPL-MCNC: 1.02 MG/DL (ref 0.7–1.3)

## 2017-11-10 PROCEDURE — 94640 AIRWAY INHALATION TREATMENT: CPT

## 2017-11-10 PROCEDURE — 36415 COLL VENOUS BLD VENIPUNCTURE: CPT | Performed by: ORTHOPAEDIC SURGERY

## 2017-11-10 PROCEDURE — 74011000250 HC RX REV CODE- 250: Performed by: NURSE PRACTITIONER

## 2017-11-10 PROCEDURE — 74011250636 HC RX REV CODE- 250/636: Performed by: NURSE PRACTITIONER

## 2017-11-10 PROCEDURE — 74011000258 HC RX REV CODE- 258: Performed by: NURSE PRACTITIONER

## 2017-11-10 PROCEDURE — 74011000250 HC RX REV CODE- 250: Performed by: INTERNAL MEDICINE

## 2017-11-10 PROCEDURE — 74011250637 HC RX REV CODE- 250/637: Performed by: NURSE PRACTITIONER

## 2017-11-10 PROCEDURE — 74011250637 HC RX REV CODE- 250/637: Performed by: ORTHOPAEDIC SURGERY

## 2017-11-10 PROCEDURE — 74011250636 HC RX REV CODE- 250/636: Performed by: INTERNAL MEDICINE

## 2017-11-10 PROCEDURE — 36592 COLLECT BLOOD FROM PICC: CPT

## 2017-11-10 PROCEDURE — 82565 ASSAY OF CREATININE: CPT | Performed by: ORTHOPAEDIC SURGERY

## 2017-11-10 RX ADMIN — DAPTOMYCIN 500 MG: 500 INJECTION, POWDER, LYOPHILIZED, FOR SOLUTION INTRAVENOUS at 11:37

## 2017-11-10 RX ADMIN — MESALAMINE 2400 MG: 1.2 TABLET, DELAYED RELEASE ORAL at 09:10

## 2017-11-10 RX ADMIN — SODIUM CHLORIDE 1 G: 900 INJECTION, SOLUTION INTRAVENOUS at 15:36

## 2017-11-10 RX ADMIN — Medication 10 ML: at 11:37

## 2017-11-10 RX ADMIN — CILOSTAZOL 50 MG: 100 TABLET ORAL at 06:14

## 2017-11-10 RX ADMIN — ASPIRIN 325 MG: 325 TABLET, COATED ORAL at 09:11

## 2017-11-10 RX ADMIN — Medication 10 ML: at 06:15

## 2017-11-10 RX ADMIN — BUDESONIDE 500 MCG: 0.5 INHALANT RESPIRATORY (INHALATION) at 08:14

## 2017-11-10 RX ADMIN — ARFORMOTEROL TARTRATE 15 MCG: 15 SOLUTION RESPIRATORY (INHALATION) at 08:14

## 2017-11-10 RX ADMIN — PANTOPRAZOLE SODIUM 40 MG: 40 TABLET, DELAYED RELEASE ORAL at 06:14

## 2017-11-10 RX ADMIN — CETIRIZINE HYDROCHLORIDE 10 MG: 10 TABLET, FILM COATED ORAL at 09:11

## 2017-11-10 NOTE — PROGRESS NOTES
Physical therapy    855 Chart reviewed, spoke with RN. Pt reports 0/10 in L knee. PT understands and is able to maintain PWB with functional mobility and tranfers using B axillary crutches. Pt successfully performed stair training using crutches on 11/9/17. Pt had no other mobility questions for therapist. Pt is cleared for discharge from mobility perspective when medically stable. Chantell Ureña

## 2017-11-10 NOTE — PROGRESS NOTES
[facility] Infectious Diseases Outpatient  IV Antibiotic Orders    1. Diagnosis:  Septic knee  2. Routine PICC/ Konrad/ Portacath Care including PRN cath-flow/activase to declot   3. Antibiotic:  daptomycin 500mg IV daily through 12/19/17                           ertapenam 1gm IV daily through 12/19/17  4. Last labs  Lab Results   Component Value Date/Time    WBC 7.5 05/16/2017 08:10 AM    HGB 12.0 07/22/2017 06:52 AM    HCT 42.4 05/16/2017 08:10 AM    PLATELET 572 54/99/8795 08:10 AM     05/16/2017 08:10 AM     Lab Results   Component Value Date/Time    Sodium 134 07/22/2017 06:52 AM    Potassium 3.7 07/22/2017 06:52 AM    Chloride 100 07/22/2017 06:52 AM    CO2 25 07/22/2017 06:52 AM    Anion gap 9 07/22/2017 06:52 AM    Glucose 102 07/22/2017 06:52 AM    BUN 8 07/22/2017 06:52 AM    Creatinine 1.02 11/10/2017 03:34 AM    BUN/Creatinine ratio 8 07/22/2017 06:52 AM    GFR est AA >60 11/10/2017 03:34 AM    GFR est non-AA >60 11/10/2017 03:34 AM    Calcium 8.5 07/22/2017 06:52 AM    Bilirubin, total 0.4 05/16/2017 08:10 AM    AST (SGOT) 25 05/16/2017 08:10 AM    Alk. phosphatase 76 05/16/2017 08:10 AM    Protein, total 6.7 05/16/2017 08:10 AM    Albumin 4.1 05/16/2017 08:10 AM    Globulin 3.4 01/03/2011 09:17 AM    A-G Ratio 1.6 05/16/2017 08:10 AM    ALT (SGPT) 35 05/16/2017 08:10 AM       Last Vanc trough:     5. _x__ Weekly Labs:      ____ Bi Weekly Labs:  __________     Start date:  _____     _x__CBC/diff/platelets   _x__AST/ALT/Alk phos/   _x__CPK   _x__BUN/CRT   ___ESR   _x__CRP   ___Gentamicin level  ___gentamicin peak/trough   ___Trough Vancomycin level    ___Goal 15-20 ___Goal 10-15    6. Fax Final lab results and critical values to Helen @[phone]  7. Call urgent lab results to [phone]. Allergies:  No Known Allergies  9.  Pharmacy: [facility]          Home Health Nursing:  10. Pharmacy Consult for Vancomycin/Aminoglycoside Dosing  11. First Dose protocol as needed.    12. Please pull PIC line at end of therapy or send to IR for [facility] removal     Home Health: Call Angio at [address] [facility] to schedule Konrad Removal :  P:  [phone]    Fax: [phone] or [phone], [place]

## 2017-11-10 NOTE — ROUTINE PROCESS
Bedside and Verbal shift change report given to Alaina Alfred (oncoming nurse) by Anel MENDIOLA (offgoing nurse). Report included the following information SBAR, Kardex, Intake/Output and Recent Results.

## 2017-11-10 NOTE — PROGRESS NOTES
[phone]: I have reviewed discharge instructions with the patient and spouse. The patient and spouse verbalized understanding. Discharge medications reviewed with patient and spouse and appropriate educational materials and side effects teaching were provided- pt does not have d/c RX per MD Vyas. Pt provided w/ extra honeycomb dressing + tape. Pt also provided w/ d/c CareNote on knee arthroscopy. Signed copy of d/c instructions placed in pt's chart.

## 2017-11-10 NOTE — PROGRESS NOTES
Visit charted on 11/10/17  Spiritual Care Partner Volunteer visited patient in 4th floor on 11/9/17.   Documented by:  Gerald Solorio [phone] ([id number])

## 2017-11-10 NOTE — PROGRESS NOTES
Doing well, no complaints    Exam unchanged    Cultures still negative  PICC placed yesterday    Plan for D/C today on empiric antibiotics per ID  PT on discharge  No scripts necessary  Plan to follow up with me in 1 week  Dressing changes only PRN    Discussed with patient.

## 2017-11-10 NOTE — PROGRESS NOTES
Bedside shift change report given to [employer] RN (oncoming nurse) by Chun Delvalle RN (offgoing nurse). Report included the following information SBAR, Kardex, Procedure Summary, Intake/Output, MAR and Recent Results.

## 2017-11-10 NOTE — PROGRESS NOTES
[facility] Progress Note  Lesia Alfaro MD,             Donnell Hazel MD,          [facility]     [address] [place] [address]    [address], [address]  [phone]  Fax    11/10/2017      Assessment & Plan:   1. Septic native left knee with history of hardware placement due to fracture in July of this year. The patient is status post washout with hardware removal on 2017. Surgical cultures NGSF. Plan d/c on empiric dapto/ceftriaxone X 6 weeks. IV abx orders in chart. 2. Immunosuppressed host. The patient is on Remicade and methotrexate for rheumatoid arthritis. 3. History of rheumatoid arthritis. The patient is on treatment as above. 4. History of ulcerative colitis. Subjective:     No complaints    Objective:     Vitals:   Visit Vitals    /76 (BP 1 Location: Left arm, BP Patient Position: At rest)    Pulse 95    Temp 98 °F (36.7 °C)    Resp 16    Ht 5' 11\" (1.803 m)    Wt 83 kg (182 lb 15.7 oz)    SpO2 96%    BMI 25.52 kg/m2        Tmax:  Temp (24hrs), Av.2 °F (36.8 °C), Min:97.9 °F (36.6 °C), Max:98.7 °F (37.1 °C)      Exam:   Patient is intubated:  no    Physical Examination:   GENERAL ASSESSMENT: NAD  HEENT:Nontraumatic NCAT  CHEST:   HEART: no distress  ABDOMEN:   :Ferris: no  EXTREMITY: leg dressed  NEURO:Grossly non focal    Labs:        No lab exists for component: ITNL   No results for input(s): CPK, CKMB, TROIQ in the last 72 hours. Recent Labs      11/10/17   0334  17   2350  17   0208   CREA  1.02  0.88  0.88     No results for input(s): INR, PTP, APTT in the last 72 hours.     No lab exists for component: INREXT, INREXT  Needs: urine analysis, urine sodium, protein and creatinine  No results found for: MILIND, CREAU      Cultures:     No results found for: SDES  Lab Results   Component Value Date/Time    Culture result: Culture performed on Fluid swab specimen 2017 07:38 AM    Culture result: No growth thus far, holding 14 days. 11/07/2017 07:38 AM    Culture result: No growth thus far, holding 14 days.  11/07/2017 07:38 AM       Radiology:     Medications       Current Facility-Administered Medications   Medication Dose Route Frequency Last Dose    DAPTOmycin (CUBICIN) 500 mg in sterile water (preservative free) 10 mL IV syringe RF formulation  500 mg IntraVENous ONCE      fluticasone (FLONASE) 50 mcg/actuation nasal spray 2 Spray  2 Spray Both Nostrils QHS 2 [place] at 11/09/17 2129    cetirizine (ZYRTEC) tablet 10 mg  10 mg Oral DAILY 10 mg at 11/10/17 0911    albuterol (PROVENTIL VENTOLIN) nebulizer solution 2.5 mg  2.5 mg Nebulization Q4H PRN      cilostazol (PLETAL) tablet 50 mg  50 mg Oral ACB&D 50 mg at 11/10/17 0614    pantoprazole (PROTONIX) tablet 40 mg  40 mg Oral ACB 40 mg at 11/10/17 0614    montelukast (SINGULAIR) tablet 10 mg  10 mg Oral QHS 10 mg at 11/09/17 2129    arformoterol (BROVANA) neb solution 15 mcg  15 mcg Nebulization BID RT 15 mcg at 11/10/17 0814    budesonide (PULMICORT) 500 mcg/2 ml nebulizer suspension  500 mcg Nebulization BID  mcg at 11/10/17 0814    sodium chloride (NS) flush 10-30 mL  10-30 mL InterCATHeter PRN      sodium chloride (NS) flush 10 mL  10 mL InterCATHeter Q24H 10 mL at 11/09/17 1656    sodium chloride (NS) flush 10 mL  10 mL InterCATHeter PRN      sodium chloride (NS) flush 10-40 mL  10-40 mL InterCATHeter Q8H 10 mL at 11/10/17 0615    sodium chloride (NS) flush 20 mL  20 mL InterCATHeter Q24H      alteplase (CATHFLO) 1 mg in sterile water (preservative free) 1 mL injection  1 mg InterCATHeter PRN      mesalamine (LIALDA) delayed release tablet 2,400 mg (Patient Supplied)  2.4 g Oral DAILY WITH BREAKFAST 2,400 mg at 11/10/17 0910    sodium chloride (NS) flush 5-10 mL  5-10 mL IntraVENous Q8H 10 mL at 11/10/17 0615    sodium chloride (NS) flush 5-10 mL  5-10 mL IntraVENous PRN      acetaminophen (TYLENOL) tablet 650 mg  650 mg Oral Q4H PRN      oxyCODONE-acetaminophen (PERCOCET) 5-325 mg per tablet 1 Tab  1 Tab Oral Q4H PRN 1 Tab at 11/08/17 0610    HYDROmorphone (PF) (DILAUDID) injection 1 mg  1 mg IntraVENous Q4H PRN      naloxone (NARCAN) injection 0.4 mg  0.4 mg IntraVENous PRN      ondansetron (ZOFRAN) injection 4 mg  4 mg IntraVENous Q4H PRN      aspirin (ASPIRIN) tablet 325 mg  325 mg Oral  mg at 11/10/17 0911    0.9% sodium chloride infusion  25 mL/hr IntraVENous CONTINUOUS 25 mL/hr at 11/09/17 2311           Case discussed with:  [facility], patient      Krystal Waite DO

## 2017-11-10 NOTE — PROGRESS NOTES
11/10/2017 11:50 AM Octaviano Hoffman  has been notified of pt's discharge home today. 11/10/2017 11:06 AM Pt's iv abx order and picc report were sent to [facility] via [employer]. Spoke with [facility] liaison who will come to teach pt on his iv abx at 3PM today. Pt was notified. Pt will need to receive his daily doses of abx prior to discharge today. CM will follow.  ZAY Damian

## 2017-11-11 ENCOUNTER — HOME CARE VISIT (OUTPATIENT)
Dept: SCHEDULING | Facility: HOME HEALTH | Age: 57
End: 2017-11-11
Payer: COMMERCIAL

## 2017-11-11 PROCEDURE — G0299 HHS/HOSPICE OF RN EA 15 MIN: HCPCS

## 2017-11-13 VITALS
HEART RATE: 100 BPM | OXYGEN SATURATION: 97 % | HEIGHT: 71 IN | BODY MASS INDEX: 25.48 KG/M2 | DIASTOLIC BLOOD PRESSURE: 78 MMHG | RESPIRATION RATE: 20 BRPM | SYSTOLIC BLOOD PRESSURE: 122 MMHG | TEMPERATURE: 98.9 F | WEIGHT: 182 LBS

## 2017-11-15 LAB
BACTERIA SPEC CULT: ABNORMAL
BACTERIA SPEC CULT: ABNORMAL
GRAM STN SPEC: ABNORMAL
GRAM STN SPEC: ABNORMAL
SERVICE CMNT-IMP: ABNORMAL

## 2017-11-16 ENCOUNTER — HOME CARE VISIT (OUTPATIENT)
Dept: SCHEDULING | Facility: HOME HEALTH | Age: 57
End: 2017-11-16
Payer: COMMERCIAL

## 2017-11-16 VITALS
TEMPERATURE: 99.2 F | HEART RATE: 100 BPM | DIASTOLIC BLOOD PRESSURE: 68 MMHG | OXYGEN SATURATION: 95 % | RESPIRATION RATE: 18 BRPM | SYSTOLIC BLOOD PRESSURE: 130 MMHG

## 2017-11-16 PROCEDURE — G0299 HHS/HOSPICE OF RN EA 15 MIN: HCPCS

## 2017-11-20 NOTE — DISCHARGE SUMMARY
Date of Admission: 11/7/17  Date of Discharge:11/10/17  Attending on admission and discharge: Dr. Nik Ching    Admitting Diagnosis: Left knee septic arthritis  Discharge Diagnosis: Left knee septic arthritis  Procedure Performed: Left knee hardware removal, irrigation and debridement    Hospital Course and Treatment:     The patient was admitted through the operating room, with a same day admssion after an irrigation and debridement and hardware removal.  The patient tolerated the procedure well and was taken to the surgical floor for further observation and treatment. The patient was maintained on IV fluids until tolerating a PO diet. They were also maintained on sequential compression devices and Xarelto for DVT prophylaxis. The diet was advanced as tolerated. A hemoglobin checked on post operative day one was unremarkable, and the patient was mobilized with physical therapy. There were no complications during the course of the hospital stay, and the patient was deemed medically stable for discharge to home. ID consult and PICC were obtained. After consultation with physical therapy, the patient was cleared for discharge. Discharge instructions:  Patient is to be discharged to home, they will be 50% weight bearing. Showering is allowed while dressing is intact. The dressing should remain in place for two weeks, then can be removed. The patient should not be in a pool or tub, or otherwise immerse the wound in water. The patient has been counseled on the importance of mobilizing and moving at least every two hours to help prevent blood clots. The patient should call Dr. Tami Kumar office or go to an emergency department if they note a fever over 101.1 degrees fahrenheit, more or unrelieved pain, more or new swelling at the operative site, or any drainage from the wound.     Condition at Discharge: Stable    Discharge medications:  Resume regular home medications  Additional medications to be prescribed on discharge  Enterically coated aspirin 325 mg po BID for 1 month  Vancomycin 1g IV q 12 h    Follow up instructions: The patient should follow up in 2 weeks for a wound check and xrays with Dr. Eamon Bonds.

## 2017-11-22 ENCOUNTER — HOME CARE VISIT (OUTPATIENT)
Dept: SCHEDULING | Facility: HOME HEALTH | Age: 57
End: 2017-11-22
Payer: COMMERCIAL

## 2017-11-22 VITALS
SYSTOLIC BLOOD PRESSURE: 118 MMHG | OXYGEN SATURATION: 98 % | HEART RATE: 80 BPM | TEMPERATURE: 98.8 F | RESPIRATION RATE: 16 BRPM | DIASTOLIC BLOOD PRESSURE: 86 MMHG

## 2017-11-22 LAB
BACTERIA SPEC CULT: ABNORMAL
SERVICE CMNT-IMP: ABNORMAL

## 2017-11-22 PROCEDURE — G0300 HHS/HOSPICE OF LPN EA 15 MIN: HCPCS

## 2017-11-26 RX ORDER — MONTELUKAST SODIUM 10 MG/1
TABLET ORAL
Qty: 90 TAB | Refills: 1 | Status: SHIPPED | OUTPATIENT
Start: 2017-11-26 | End: 2019-06-04 | Stop reason: SDUPTHER

## 2017-11-26 RX ORDER — LEUCOVORIN CALCIUM 5 MG/1
TABLET ORAL
Qty: 24 TAB | Refills: 0 | Status: SHIPPED | OUTPATIENT
Start: 2017-11-26 | End: 2018-02-26 | Stop reason: SDUPTHER

## 2017-11-29 ENCOUNTER — HOME CARE VISIT (OUTPATIENT)
Dept: SCHEDULING | Facility: HOME HEALTH | Age: 57
End: 2017-11-29
Payer: COMMERCIAL

## 2017-11-29 VITALS
DIASTOLIC BLOOD PRESSURE: 80 MMHG | HEART RATE: 95 BPM | RESPIRATION RATE: 16 BRPM | SYSTOLIC BLOOD PRESSURE: 120 MMHG | OXYGEN SATURATION: 98 % | TEMPERATURE: 98.3 F

## 2017-11-29 PROCEDURE — G0300 HHS/HOSPICE OF LPN EA 15 MIN: HCPCS

## 2017-11-30 NOTE — PERIOP NOTES
[address] [facility]-Anesthesia Services Chart Audit - Patient Encounter Reviewed

## 2017-12-06 ENCOUNTER — HOME CARE VISIT (OUTPATIENT)
Dept: SCHEDULING | Facility: HOME HEALTH | Age: 57
End: 2017-12-06
Payer: COMMERCIAL

## 2017-12-06 VITALS
HEART RATE: 71 BPM | TEMPERATURE: 98.2 F | OXYGEN SATURATION: 98 % | SYSTOLIC BLOOD PRESSURE: 138 MMHG | RESPIRATION RATE: 16 BRPM | DIASTOLIC BLOOD PRESSURE: 90 MMHG

## 2017-12-06 LAB
ANAEROBE ID RESULT 1, RAND1T: ABNORMAL
ANAEROBE ID W/SUSCEPT., ANIDLT: ABNORMAL
SPECIMEN SOURCE: ABNORMAL

## 2017-12-06 PROCEDURE — G0300 HHS/HOSPICE OF LPN EA 15 MIN: HCPCS

## 2017-12-07 LAB — BACTERIA ISLT ANAEROBE CULT: ABNORMAL

## 2017-12-13 ENCOUNTER — HOME CARE VISIT (OUTPATIENT)
Dept: SCHEDULING | Facility: HOME HEALTH | Age: 57
End: 2017-12-13
Payer: COMMERCIAL

## 2017-12-13 VITALS
OXYGEN SATURATION: 98 % | HEART RATE: 88 BPM | SYSTOLIC BLOOD PRESSURE: 112 MMHG | TEMPERATURE: 98.2 F | RESPIRATION RATE: 16 BRPM | DIASTOLIC BLOOD PRESSURE: 84 MMHG

## 2017-12-13 PROCEDURE — G0300 HHS/HOSPICE OF LPN EA 15 MIN: HCPCS

## 2017-12-21 ENCOUNTER — HOME CARE VISIT (OUTPATIENT)
Dept: SCHEDULING | Facility: HOME HEALTH | Age: 57
End: 2017-12-21
Payer: COMMERCIAL

## 2017-12-21 VITALS — RESPIRATION RATE: 16 BRPM | TEMPERATURE: 98.7 F | SYSTOLIC BLOOD PRESSURE: 118 MMHG | DIASTOLIC BLOOD PRESSURE: 69 MMHG

## 2017-12-21 PROCEDURE — G0299 HHS/HOSPICE OF RN EA 15 MIN: HCPCS

## 2017-12-26 LAB
Lab: NORMAL
REFERENCE LAB,REFLB: NORMAL
TEST DESCRIPTION:,ATST: NORMAL

## 2017-12-27 ENCOUNTER — HOME CARE VISIT (OUTPATIENT)
Dept: SCHEDULING | Facility: HOME HEALTH | Age: 57
End: 2017-12-27
Payer: COMMERCIAL

## 2017-12-27 ENCOUNTER — HOSPITAL ENCOUNTER (OUTPATIENT)
Dept: GENERAL RADIOLOGY | Age: 57
Discharge: HOME | End: 2017-12-27
Payer: COMMERCIAL

## 2017-12-27 VITALS
OXYGEN SATURATION: 97 % | RESPIRATION RATE: 16 BRPM | SYSTOLIC BLOOD PRESSURE: 110 MMHG | DIASTOLIC BLOOD PRESSURE: 80 MMHG | HEART RATE: 82 BPM | TEMPERATURE: 98.2 F

## 2017-12-27 DIAGNOSIS — R93.89 ABNORMAL CXR: ICD-10-CM

## 2017-12-27 PROCEDURE — 71020 XR CHEST PA LAT: CPT

## 2017-12-27 PROCEDURE — G0300 HHS/HOSPICE OF LPN EA 15 MIN: HCPCS

## 2018-01-03 ENCOUNTER — HOME CARE VISIT (OUTPATIENT)
Dept: SCHEDULING | Facility: HOME HEALTH | Age: 58
End: 2018-01-03
Payer: COMMERCIAL

## 2018-01-03 VITALS
SYSTOLIC BLOOD PRESSURE: 118 MMHG | TEMPERATURE: 98.5 F | RESPIRATION RATE: 16 BRPM | HEART RATE: 76 BPM | DIASTOLIC BLOOD PRESSURE: 77 MMHG | OXYGEN SATURATION: 98 %

## 2018-01-03 PROCEDURE — G0299 HHS/HOSPICE OF RN EA 15 MIN: HCPCS

## 2018-02-05 ENCOUNTER — OFFICE VISIT (OUTPATIENT)
Dept: INTERNAL MEDICINE CLINIC | Age: 58
End: 2018-02-05

## 2018-02-05 VITALS
OXYGEN SATURATION: 97 % | HEART RATE: 84 BPM | WEIGHT: 193.2 LBS | RESPIRATION RATE: 16 BRPM | SYSTOLIC BLOOD PRESSURE: 117 MMHG | HEIGHT: 71 IN | TEMPERATURE: 98.5 F | BODY MASS INDEX: 27.05 KG/M2 | DIASTOLIC BLOOD PRESSURE: 88 MMHG

## 2018-02-05 DIAGNOSIS — E78.5 HYPERLIPIDEMIA, UNSPECIFIED HYPERLIPIDEMIA TYPE: ICD-10-CM

## 2018-02-05 DIAGNOSIS — Z79.60 LONG-TERM USE OF IMMUNOSUPPRESSANT MEDICATION: ICD-10-CM

## 2018-02-05 DIAGNOSIS — J20.9 ACUTE BRONCHITIS, UNSPECIFIED ORGANISM: Primary | ICD-10-CM

## 2018-02-05 DIAGNOSIS — K51.90 ULCERATIVE COLITIS WITHOUT COMPLICATIONS, UNSPECIFIED LOCATION (HCC): ICD-10-CM

## 2018-02-05 DIAGNOSIS — R73.02 IGT (IMPAIRED GLUCOSE TOLERANCE): ICD-10-CM

## 2018-02-05 DIAGNOSIS — M06.9 RHEUMATOID ARTHRITIS, INVOLVING UNSPECIFIED SITE, UNSPECIFIED RHEUMATOID FACTOR PRESENCE: ICD-10-CM

## 2018-02-05 RX ORDER — AMOXICILLIN 875 MG/1
TABLET, FILM COATED ORAL
Refills: 0 | COMMUNITY
Start: 2018-01-02 | End: 2018-09-20 | Stop reason: ALTCHOICE

## 2018-02-05 RX ORDER — AZITHROMYCIN 250 MG/1
TABLET, FILM COATED ORAL
Qty: 6 TAB | Refills: 0 | Status: SHIPPED | OUTPATIENT
Start: 2018-02-05 | End: 2018-02-10

## 2018-02-05 NOTE — PROGRESS NOTES
Rm 14    Chief Complaint   Patient presents with    Cough     productive, ongoing since thursday, lungs feel inflammed per pt     1. Have you been to the ER, urgent care clinic since your last visit? Hospitalized since your last visit? No    2. Have you seen or consulted any other health care providers outside of the [address] [place] [address] since your last visit? Include any pap smears or colon screening.  No    Health Maintenance Due   Topic Date Due    Hepatitis C Screening  1960    Pneumococcal 19-64 Medium Risk (1 of 1 - PPSV23) 08/15/1980    COLONOSCOPY  01/07/2018   flu vaccine done at publ, 10/01/17  PCV 13 given 12/28/17 at pulmonary [facility]    PHQ over the last two weeks 2/5/2018   Little interest or pleasure in doing things Not at all   Feeling down, depressed or hopeless Not at all   Total Score PHQ 2 0

## 2018-02-05 NOTE — MR AVS SNAPSHOT
[address] [place] [address] [address] [facility] [address] 
[phone] Patient: Italia Birmingham. MRN:  [id number] Visit Information Date & Time Provider Department Dept. Phone Encounter #  
 2018  1:45 PM Kye Mancilla, [address] [place] [address] and Internal Medicine [phone] [id number] Follow-up Instructions Return in about 2 months (around 2018), or if symptoms worsen or fail to improve, for cholesterol. Upcoming Health Maintenance Date Due Hepatitis C Screening 1960 Pneumococcal 19-64 Medium Risk (1 of 1 - PPSV23) 8/15/1980 COLONOSCOPY 2019 DTaP/Tdap/Td series (2 - Td) 2024 Allergies as of 2018  Review Complete On: 2018 By: Kye Mancilla MD  
 No Known Allergies Current Immunizations  Reviewed on 2018 Name Date Hep A Vaccine (Adult) 2016, 2014 Influenza Vaccine 10/1/2017, 11/10/2016, 11/15/2015, 2014 Influenza Vaccine Whole 2010 Pneumococcal Conjugate (PCV-13) 2017, 2017 Tdap 2014 Typhoid Vi Capsular Polysaccharide Vaccine 2016, 2014 Zoster Vaccine, Live 3/18/2014 Reviewed by Kye Mancilla MD on 2018 at  2:22 PM  
You Were Diagnosed With   
  
 Codes Comments Acute bronchitis, unspecified organism    -  Primary ICD-10-CM: J20.9 ICD-9-CM: 466.0 Rheumatoid arthritis, involving unspecified site, unspecified rheumatoid factor presence ([facility]     ICD-10-CM: M06.9 ICD-9-CM: 714.0 Long-term use of immunosuppressant medication     ICD-10-CM: Z79.899 ICD-9-CM: V58.69 IGT (impaired glucose tolerance)     ICD-10-CM: R73.02 
ICD-9-CM: 790.22 Hyperlipidemia, unspecified hyperlipidemia type     ICD-10-CM: E78.5 ICD-9-CM: 272.4 Ulcerative colitis without complications, unspecified location [facility])     ICD-10-CM: K51.90 ICD-9-CM: 223. 9 Vitals BP Pulse Temp Resp Height(growth percentile) Weight(growth percentile) 117/88 (BP 1 Location: Left arm, BP Patient Position: Sitting) 84 98.5 °F (36.9 °C) (Oral) 16 5' 11\" (1.803 m) 193 lb 3.2 oz (87.6 kg) SpO2 BMI Smoking Status 97% 26.95 kg/m2 Never Smoker Vitals History BMI and BSA Data Body Mass Index Body Surface Area  
 26.95 kg/m 2 2.09 m 2 Preferred Pharmacy Pharmacy Name Phone [facility], [address] [phone] Your Updated Medication List  
  
   
This list is accurate as of: 2/5/18  2:41 PM.  Always use your most recent med list.  
  
  
  
  
 albuterol 90 mcg/actuation inhaler Commonly known as:  PROVENTIL HFA, VENTOLIN HFA, PROAIR HFA Take 2 Puffs by inhalation every four (4) hours as needed for Wheezing. amoxicillin 875 mg tablet Commonly known as:  AMOXIL  
take 1 tablet by mouth every 12 hours  
  
 azithromycin 250 mg tablet Commonly known as:  Casandra Esteban Take 2 tablets today, then take 1 tablet daily  
  
 budesonide 32 mcg/actuation nasal spray Commonly known as:  RHINOCORT AQUA  
2 Sprays by Both Nostrils route daily. cetirizine 10 mg tablet Commonly known as:  ZYRTEC Take 1 Tab by mouth daily. DAPTOmycin 500 mg injection Commonly known as:  CUBICIN  
10 mL by IntraVENous route daily. Slow push over 2 mins refriderate 1-2 hrs before use  
  
 fluticasone-salmeterol 250-50 mcg/dose diskus inhaler Commonly known as:  ADVAIR Take 1 Puff by inhalation two (2) times a day. HEPARIN FLUSH IV  
3-5 mL by IntraVENous route daily as needed (Per [facility] protocol ). INVanz 1 gram injection Generic drug:  ertapenem 1 g by IntraVENous route daily. Via home pump[ med ball,  Invanz in 100ml NS, infuse over 30 min  refridgerate until 8  hrs before uses  
  
 leucovorin calcium 5 mg tablet Commonly known as:  Edgard Aguilar  
 TAKE 1 TABLET BY MOUTH TWICE A WEEK  
  
 LIALDA 1.2 gram delayed release tablet Generic drug:  mesalamine Take 2.4 g by mouth Daily (before breakfast). methotrexate 2.5 mg tablet Commonly known as:  Renzo Lame Take 10 mg by mouth. Every Saturday and Sunday  Indications: RHEUMATOID ARTHRITIS  
  
 montelukast 10 mg tablet Commonly known as:  SINGULAIR  
TAKE 1 TABLET DAILY NORMAL SALINE FLUSH INJECTION  
5-10 mL by IntraVENous route daily as needed (per [facility] protocol). PLETAL 50 mg tablet Generic drug:  cilostazol Take 50 mg by mouth Before breakfast and dinner. Raynaud&#39;s  
  
 PriLOSEC 20 mg capsule Generic drug:  omeprazole Take 20 mg by mouth daily. REMICADE 100 mg injection Generic drug:  inFLIXimab  
5 mg/kg by IntraVENous route. Given in Md office  
  
 simvastatin 20 mg tablet Commonly known as:  ZOCOR  
TAKE 1 TABLET NIGHTLY Prescriptions Sent to Pharmacy Refills  
 azithromycin (ZITHROMAX) 250 mg tablet 0 Sig: Take 2 tablets today, then take 1 tablet daily Class: Normal  
 Pharmacy: [address], [address] [place] [address] #: [phone] Follow-up Instructions Return in about 2 months (around 4/5/2018), or if symptoms worsen or fail to improve, for cholesterol. Introducing [facility]! Dear Jessie Spears: Thank you for requesting a [employer] account. Our records indicate that you already have an active [employer] account. You can access your account anytime at [email or web address]. [email or web address] Did you know that you can access your hospital and ER discharge instructions at any time in [employer]? You can also review all of your test results from your hospital stay or ER visit. Additional Information If you have questions, please visit the Frequently Asked Questions section of the [employer] website at [email or web address] [email or web address]/. Remember, MyChart is NOT to be used for urgent needs. For medical emergencies, dial 911. Now available from your iPhone and Android! Please provide this summary of care documentation to your next provider. Your primary care clinician is listed as [address] If you have any questions after today's visit, please call [phone].

## 2018-02-05 NOTE — PROGRESS NOTES
HISTORY OF PRESENT ILLNESS  Tiffany Daniel is a 65 y.o. male. HPI  Presents for acute care    Cough - productive of thickened and colored sputum x several days  Getting worse rather than better    Cough worse at night  Some burning sensation in chest, danish with cough     No fevers    advair seems to help when used in the AM  Has been dosing daily    Has hx knee infection - PICC line dc'd 3 weeks  Amoxicillin PO currently  Seeing ID - Dr. Frantz Back for 3 months of PO abx    Had been off Remicade until 2 weeks ago. Past medical, Social, and Family history reviewed  Medications reviewed and updated. ROS  Complete ROS reviewed and negative or stable except as noted in HPI. Physical Exam   Constitutional: He is oriented to person, place, and time. He appears well-nourished. No distress. HENT:   Head: Normocephalic and atraumatic. Mouth/Throat: Oropharynx is clear and moist. No oropharyngeal exudate. Eyes: EOM are normal. Pupils are equal, round, and reactive to light. No scleral icterus. Neck: Normal range of motion. Neck supple. No JVD present. No thyromegaly present. Cardiovascular: Normal rate, regular rhythm and normal heart sounds. Exam reveals no gallop and no friction rub. No murmur heard. Pulmonary/Chest: Effort normal and breath sounds normal. No respiratory distress. He has no wheezes. He has no rales. Abdominal: Soft. Bowel sounds are normal. He exhibits no distension. There is no tenderness. Genitourinary: Prostate normal.   Musculoskeletal: Normal range of motion. He exhibits no edema. Lymphadenopathy:     He has no cervical adenopathy. Neurological: He is alert and oriented to person, place, and time. He exhibits normal muscle tone. Coordination normal.   Skin: Skin is warm. No rash noted. Psychiatric: He has a normal mood and affect. Nursing note and vitals reviewed. Prior labs reviewed. ASSESSMENT and PLAN    ICD-10-CM ICD-9-CM    1.  Acute bronchitis, unspecified organism J20.9 466.0 azithromycin (ZITHROMAX) 250 mg tablet   2. Rheumatoid arthritis, involving unspecified site, unspecified rheumatoid factor presence ([facility] M06.9 714.0    3. Long-term use of immunosuppressant medication Z79.899 V58.69    4. IGT (impaired glucose tolerance) R73.02 790.22    5. Hyperlipidemia, unspecified hyperlipidemia type E78.5 272.4    6. Ulcerative colitis without complications, unspecified location [facility]) K51.90 556.9      Follow-up Disposition:  Return in about 2 months (around 4/5/2018), or if symptoms worsen or fail to improve, for cholesterol.   results and schedule of future studies reviewed with patient  reviewed diet, exercise and weight   cardiovascular risk and specific lipid/LDL goals reviewed  reviewed medications and side effects in detail   Add azithro  Defer pred for now  mucinex   Pt to review Remicade use with rheumatology

## 2018-02-27 RX ORDER — LEUCOVORIN CALCIUM 5 MG/1
TABLET ORAL
Qty: 24 TAB | Refills: 1 | Status: SHIPPED | OUTPATIENT
Start: 2018-02-27 | End: 2018-08-02 | Stop reason: SDUPTHER

## 2018-08-02 RX ORDER — LEUCOVORIN CALCIUM 5 MG/1
TABLET ORAL
Qty: 24 TAB | Refills: 1 | Status: ON HOLD | OUTPATIENT
Start: 2018-08-02 | End: 2018-11-19 | Stop reason: ALTCHOICE

## 2018-08-22 ENCOUNTER — OFFICE VISIT (OUTPATIENT)
Dept: FAMILY MEDICINE CLINIC | Age: 58
End: 2018-08-22

## 2018-08-22 VITALS
RESPIRATION RATE: 18 BRPM | TEMPERATURE: 97.9 F | WEIGHT: 205 LBS | SYSTOLIC BLOOD PRESSURE: 129 MMHG | HEIGHT: 71 IN | HEART RATE: 98 BPM | DIASTOLIC BLOOD PRESSURE: 89 MMHG | BODY MASS INDEX: 28.7 KG/M2

## 2018-08-22 DIAGNOSIS — T84.59XD INFECTION OF PROSTHETIC KNEE JOINT, SUBSEQUENT ENCOUNTER: Primary | ICD-10-CM

## 2018-08-22 DIAGNOSIS — Z96.659 INFECTION OF PROSTHETIC KNEE JOINT, SUBSEQUENT ENCOUNTER: Primary | ICD-10-CM

## 2018-08-22 NOTE — PROGRESS NOTES
[facility] Progress Note           Yas Huff DO    [phone] Office  [phone]  Fax    8/22/2018      Assessment & Plan:   1. Septic native left knee with history of hardware placement due to fracture in July of this year. The patient is status post washout with hardware removal on 11/07/2017. Surgical cultures growing propriobacterium acnes. Completed 6 weeks IV abx and has been on PO amoxicillin since. CRP and sed rate not reliable due to rheumatoid arthritis. As TKR planned okay to stop abx and follow in anticipation of future surgery. Patient to see ortho today. 2. Immunosuppressed host. The patient is on Remicade for rheumatoid arthritis. 3. History of ulcerative colitis. Subjective:     No complaints  Tolerating abx. Anxious to stop abx    Objective:     Vitals:   Visit Vitals    /89    Pulse 98    Temp 97.9 °F (36.6 °C) (Oral)    Resp 18    Ht 5' 11\" (1.803 m)    Wt 93 kg (205 lb)    BMI 28.59 kg/m2          Exam:     Physical Examination:   General:  Alert, cooperative, no distress   Head:  Normocephalic, atraumatic. Eyes:  Conjunctivae clear   Neck:        Lungs:   No distress. Chest wall:     Heart:     Abdomen:      Extremities: Moves all. Left knee edema   Skin:  No rash   Neurologic: CNII-XII intact. Normal strength     Labs:        No lab exists for component: ITNL   No results for input(s): CPK, CKMB, TROIQ in the last 72 hours. No results for input(s): NA, K, CL, CO2, BUN, CREA, GLU, PHOS, MG, ALB, WBC, HGB, HCT, PLT, HGBEXT, HCTEXT, PLTEXT in the last 72 hours. No lab exists for component:  CA  No results for input(s): INR, PTP, APTT in the last 72 hours.     No lab exists for component: INREXT  Needs: urine analysis, urine sodium, protein and creatinine  No results found for: MILIND, CREAU      Cultures:     No results found for: SDES  Lab Results   Component Value Date/Time    Culture result: Culture performed on Fluid swab specimen 11/07/2017 07:38 AM    Culture result: RARE PROPIONIBACTERIUM ACNES (A) 11/07/2017 07:38 AM    Culture result: RARE PROPIONIBACTERIUM ACNES (A) 11/07/2017 07:38 AM    Culture result: DR. Orin Aranda REQUESTING SENSITIVITIES 11.21.17 LAS 11/07/2017 07:38 AM    Culture result:  11/07/2017 07:38 AM     ...ISOLATE BEING SENT TO REFERENCE LAB FOR SUSCEPTIBILITIES PER DR. GUTIERREZ REQUEST  . .. PLEASE REFER TO [id number] FOR FINAL RESULTS         Radiology:     Medications       Current Outpatient Prescriptions   Medication Sig Dispense    leucovorin calcium (WELLCOVORIN) 5 mg tablet TAKE 1 TABLET TWICE WEEKLY 24 Tab    amoxicillin (AMOXIL) 875 mg tablet take 1 tablet by mouth every 12 hours     simvastatin (ZOCOR) 20 mg tablet TAKE 1 TABLET NIGHTLY 90 Tab    montelukast (SINGULAIR) 10 mg tablet TAKE 1 TABLET DAILY 90 Tab    mesalamine (LIALDA) 1.2 gram delayed release tablet Take 2.4 g by mouth Daily (before breakfast).  fluticasone-salmeterol (ADVAIR) 250-50 mcg/dose diskus inhaler Take 1 Puff by inhalation two (2) times a day. 3 Inhaler    budesonide (RHINOCORT AQUA) 32 mcg/actuation nasal spray 2 Sprays by Both Nostrils route daily.  cetirizine (ZYRTEC) 10 mg tablet Take 1 Tab by mouth daily. 30 Tab    infliximab (REMICADE) 100 mg injection 5 mg/kg by IntraVENous route. Given in Md office     methotrexate (RHEUMATREX) 2.5 mg tablet Take 10 mg by mouth. Every Saturday and Sunday  Indications: RHEUMATOID ARTHRITIS     omeprazole (PRILOSEC) 20 mg capsule Take 20 mg by mouth daily.  cilostazol (PLETAL) 50 mg tablet Take 50 mg by mouth Before breakfast and dinner. Raynaud&#39;s     HEPARIN SOD,PORCINE/0.9 % NACL (HEPARIN FLUSH IV) 3-5 mL by IntraVENous route daily as needed (Per \[facility]\"" protocol ).     0.9 % SODIUM CHLORIDE (NORMAL SALINE FLUSH INJECTION) 5-10 mL by IntraVENous route daily as needed (per \[facility]\"" protocol).  DAPTOmycin (CUBICIN) 500 mg injection 10 mL by IntraVENous route daily.  Slow push over 2 mins  refriderate 1-2 hrs before use     ertapenem (INVANZ) 1 gram injection 1 g by IntraVENous route daily. Via home pump[ med ball,  Invanz in 100ml NS, infuse over 30 min   refridgerate until 8  hrs before uses     albuterol (PROVENTIL HFA, VENTOLIN HFA, PROAIR HFA) 90 mcg/actuation inhaler Take 2 Puffs by inhalation every four (4) hours as needed for Wheezing. (Patient not taking: Reported on 11/12/2017) 1 Inhaler     No current facility-administered medications for this visit.             Case discussed with:      Farhan Christie DO

## 2018-08-22 NOTE — MR AVS SNAPSHOT
[address] [id number] Patient: Raquel Draper. MRN:  :[id number] Visit Information Date & Time Provider Department Dept. Phone Encounter #  
 2018  2:00 PM CHANDLER Simmons [address] [phone] [id number] Upcoming Health Maintenance Date Due Hepatitis C Screening 1960 Pneumococcal 19-64 Medium Risk (1 of 1 - PPSV23) 8/15/1980 Influenza Age 5 to Adult 2018 COLONOSCOPY 2019 DTaP/Tdap/Td series (2 - Td) 2024 Allergies as of 2018  Review Complete On: 2018 By: Romulo Torres LPN No Known Allergies Current Immunizations  Reviewed on 2018 Name Date Hep A Vaccine (Adult) 2016, 2014 Influenza Vaccine 10/1/2017, 11/10/2016, 11/15/2015, 2014 Influenza Vaccine Whole 2010 Pneumococcal Conjugate (PCV-13) 2017, 2017 Tdap 2014 Typhoid Vi Capsular Polysaccharide Vaccine 2016, 2014 Zoster Vaccine, Live 3/18/2014 Not reviewed this visit Vitals BP Pulse Temp Resp Height(growth percentile) Weight(growth percentile) 129/89 98 97.9 °F (36.6 °C) (Oral) 18 5' 11\" (1.803 m) 205 lb (93 kg) BMI Smoking Status 28.59 kg/m2 Never Smoker Vitals History BMI and BSA Data Body Mass Index Body Surface Area 28.59 kg/m 2 2.16 m 2 Preferred Pharmacy Pharmacy Name Phone [facility]am[facility], [facility] [phone] Your Updated Medication List  
  
   
This list is accurate as of 18  2:13 PM.  Always use your most recent med list.  
  
  
  
  
 albuterol 90 mcg/actuation inhaler Commonly known as:  PROVENTIL HFA, VENTOLIN HFA, PROAIR HFA Take 2 Puffs by inhalation every four (4) hours as needed for Wheezing. amoxicillin 875 mg tablet Commonly known as:  AMOXIL  
take 1 tablet by mouth every 12 hours  
  
 budesonide 32 mcg/actuation nasal spray Commonly known as:  RHINOCORT AQUA  
2 Sprays by Both Nostrils route daily. cetirizine 10 mg tablet Commonly known as:  ZYRTEC Take 1 Tab by mouth daily. DAPTOmycin 500 mg injection Commonly known as:  CUBICIN  
10 mL by IntraVENous route daily. Slow push over 2 mins refriderate 1-2 hrs before use  
  
 fluticasone-salmeterol 250-50 mcg/dose diskus inhaler Commonly known as:  ADVAIR Take 1 Puff by inhalation two (2) times a day. HEPARIN FLUSH IV  
3-5 mL by IntraVENous route daily as needed (Per [facility] protocol ). INVanz 1 gram injection Generic drug:  ertapenem 1 g by IntraVENous route daily. Via home pump[ med ball,  Invanz in 100ml NS, infuse over 30 min  refridgerate until 8  hrs before uses  
  
 leucovorin calcium 5 mg tablet Commonly known as:  WELLCOVORIN  
TAKE 1 TABLET TWICE WEEKLY  
  
 LIALDA 1.2 gram delayed release tablet Generic drug:  mesalamine Take 2.4 g by mouth Daily (before breakfast). methotrexate 2.5 mg tablet Commonly known as:  Willma Bellis Take 10 mg by mouth. Every Saturday and Sunday  Indications: RHEUMATOID ARTHRITIS  
  
 montelukast 10 mg tablet Commonly known as:  SINGULAIR  
TAKE 1 TABLET DAILY NORMAL SALINE FLUSH INJECTION  
5-10 mL by IntraVENous route daily as needed (per [facility] protocol). PLETAL 50 mg tablet Generic drug:  cilostazol Take 50 mg by mouth Before breakfast and dinner. Raynaud&#39;s  
  
 PriLOSEC 20 mg capsule Generic drug:  omeprazole Take 20 mg by mouth daily. REMICADE 100 mg injection Generic drug:  inFLIXimab  
5 mg/kg by IntraVENous route. Given in Md office  
  
 simvastatin 20 mg tablet Commonly known as:  ZOCOR  
TAKE 1 TABLET NIGHTLY Introducing [facility]! Dear Macarena Mojica: Thank you for requesting a CSRt account.   Our records indicate that you already have an active Sandbox account. You can access your account anytime at [email or web address] Did you know that you can access your hospital and ER discharge instructions at any time in Sandbox? You can also review all of your test results from your hospital stay or ER visit. Additional Information If you have questions, please visit the Frequently Asked Questions section of the Sandbox website at [email or web address] Remember, Sandbox is NOT to be used for urgent needs. For medical emergencies, dial 911. Now available from your iPhone and Android! Please provide this summary of care documentation to your next provider. Your primary care clinician is listed as [address] If you have any questions after today's visit, please call [phone].

## 2018-08-22 NOTE — PROGRESS NOTES
Chief Complaint   Patient presents with    Wound Infection     left knee 6 mo f/u     1. Have you been to the ER, urgent care clinic since your last visit? No Hospitalized since your last visit? No     2. Have you seen or consulted any other health care providers outside of the [facility] since your last visit? Include any pap smears or colon screening.  No

## 2018-09-20 ENCOUNTER — OFFICE VISIT (OUTPATIENT)
Dept: INTERNAL MEDICINE CLINIC | Age: 58
End: 2018-09-20

## 2018-09-20 VITALS — BODY MASS INDEX: 27.8 KG/M2 | HEIGHT: 71 IN | RESPIRATION RATE: 15 BRPM | WEIGHT: 198.6 LBS | TEMPERATURE: 98.3 F

## 2018-09-20 DIAGNOSIS — Z23 ENCOUNTER FOR IMMUNIZATION: ICD-10-CM

## 2018-09-20 DIAGNOSIS — J45.41 MODERATE PERSISTENT ASTHMA WITH ACUTE EXACERBATION: Primary | ICD-10-CM

## 2018-09-20 RX ORDER — PREDNISONE 10 MG/1
TABLET ORAL
Qty: 7 TAB | Refills: 0 | Status: SHIPPED | OUTPATIENT
Start: 2018-09-20 | End: 2018-10-01 | Stop reason: ALTCHOICE

## 2018-09-20 NOTE — PROGRESS NOTES
ACUTE VISIT     HPI:   Negrito Borja is a 63 y.o. male, he presents today for:     63year old patient with history of RA and ulcerative colitis. Cough ongoing for 2-3 weeks ago. Was coughing up green colored phlegym. Started with allergies then moved into bronchitis. - started taking mucinex and after 2-3 days felt like phlegm cleared up.    - still coughing and still using inhalers, continues to have a feeling of restriction in airway. - using advair continuously. Following with pulmonologist    - taking rhinocort every night. ROS: no fever, no chills, no rash, no N/V/D. No persistent chest pain (noted mild chest discomfort with deep breaths during choir practice. Medications used for acute illness: as noted above. Current Outpatient Prescriptions on File Prior to Visit   Medication Sig    leucovorin calcium (WELLCOVORIN) 5 mg tablet TAKE 1 TABLET TWICE WEEKLY    simvastatin (ZOCOR) 20 mg tablet TAKE 1 TABLET NIGHTLY    montelukast (SINGULAIR) 10 mg tablet TAKE 1 TABLET DAILY    ertapenem (INVANZ) 1 gram injection 1 g by IntraVENous route daily. Via home pump[ med ball,  Invanz in 100ml NS, infuse over 30 min   refridgerate until 8  hrs before uses    mesalamine (LIALDA) 1.2 gram delayed release tablet Take 2.4 g by mouth Daily (before breakfast).  fluticasone-salmeterol (ADVAIR) 250-50 mcg/dose diskus inhaler Take 1 Puff by inhalation two (2) times a day.  budesonide (RHINOCORT AQUA) 32 mcg/actuation nasal spray 2 Sprays by Both Nostrils route daily.  cetirizine (ZYRTEC) 10 mg tablet Take 1 Tab by mouth daily.  albuterol (PROVENTIL HFA, VENTOLIN HFA, PROAIR HFA) 90 mcg/actuation inhaler Take 2 Puffs by inhalation every four (4) hours as needed for Wheezing.  infliximab (REMICADE) 100 mg injection 5 mg/kg by IntraVENous route. Given in Md office    methotrexate (RHEUMATREX) 2.5 mg tablet Take 10 mg by mouth.  Every Saturday and Sunday  Indications: RHEUMATOID ARTHRITIS    omeprazole (PRILOSEC) 20 mg capsule Take 20 mg by mouth daily.  cilostazol (PLETAL) 50 mg tablet Take 50 mg by mouth Before breakfast and dinner. [facility]naud&#39;s     No current facility-administered medications on file prior to visit. No Known Allergies    PMH/PSH/FH: reviewed and updated    Sochx:   reports that he has never smoked. He has never used smokeless tobacco. He reports that he drinks about 0.6 oz of alcohol per week  He reports that he does not use illicit drugs. PE:  Temperature 98.3 °F (36.8 °C), temperature source Oral, resp. rate 15, height 5' 11\" (1.803 m), weight 198 lb 9.6 oz (90.1 kg). Body mass index is 27.7 kg/(m^2). Physical Exam   Constitutional: He is oriented to person, place, and time. He appears well-developed and well-nourished. No distress. HENT:   Head: Normocephalic. Mouth/Throat: Oropharynx is clear and moist. No oropharyngeal exudate. Eyes: Conjunctivae and EOM are normal. Pupils are equal, round, and reactive to light. Neck: Neck supple. Cardiovascular: Normal rate, regular rhythm, normal heart sounds and intact distal pulses. Pulmonary/Chest: Effort normal and breath sounds normal. No respiratory distress. He has no wheezes. He has no rales. Abdominal: Soft. Musculoskeletal: He exhibits no edema. Neurological: He is alert and oriented to person, place, and time. Nursing note and vitals reviewed. Labs:  No results found for any visits on 09/20/18. A/P  Jayy Saucedo. was seen today for had concerns including Immunization/Injection and Cough. .  The diagnosis and plan was discussed including:        ICD-10-CM ICD-9-CM    1. Moderate persistent asthma with acute exacerbation J45.41 493.92 DISCONTINUED: predniSONE (DELTASONE) 10 mg tablet   2.  Encounter for immunization Z23 V03.89 INFLUENZA VIRUS VAC QUAD,SPLIT,PRESV FREE SYRINGE IM     Moderate persistent asthma: patient seems to have recovered well from a bronchitis and continues to experience a mild dyspnea. Moderated dose steroid to help reduce inflammation, though no wheezing on exam today. Follow-up if not improving.       - I advised him to call back or return to office if symptoms worsen/change/persist.  - He was given AVS and expressed understanding with the diagnosis and plan as discussed. Follow-up Disposition:  Return if symptoms worsen or fail to improve.

## 2018-09-20 NOTE — MR AVS SNAPSHOT
[address] [facility] [place] [address] 
[phone] Patient: Jimmy Salas. MRN:  :[id number] Visit Information Date & Time Provider Department Dept. Phone Encounter #  
 2018  2:15 PM Elizabeth Cagle MD [address] [facility] [phone] [id number] Follow-up Instructions Return if symptoms worsen or fail to improve. Upcoming Health Maintenance Date Due Hepatitis C Screening 1960 Pneumococcal 19-64 Medium Risk (1 of 1 - PPSV23) 8/15/1980 Influenza Age 5 to Adult 2018 COLONOSCOPY 2019 DTaP/Tdap/Td series (2 - Td) 2024 Allergies as of 2018  Review Complete On: 2018 By: Elizabeth Cagle MD  
 No Known Allergies Current Immunizations  Reviewed on 2018 Name Date Hep A Vaccine (Adult) 2016, 2014 Influenza Vaccine 10/1/2017, 11/10/2016, 11/15/2015, 2014 Influenza Vaccine (Quad) PF  Incomplete Influenza Vaccine Whole 2010 Pneumococcal Conjugate (PCV-13) 2017, 2017 Tdap 2014 Typhoid Vi Capsular Polysaccharide Vaccine 2016, 2014 Zoster Vaccine, Live 3/18/2014 Not reviewed this visit You Were Diagnosed With   
  
 Codes Comments Moderate persistent asthma with acute exacerbation    -  Primary ICD-10-CM: J45.41 
ICD-9-CM: 493.92 Encounter for immunization     ICD-10-CM: Q70 ICD-9-CM: V03.89 Vitals Temp Resp Height(growth percentile) Weight(growth percentile) BMI Smoking Status 98.3 °F (36.8 °C) (Oral) 15 5' 11\" (1.803 m) 198 lb 9.6 oz (90.1 kg) 27.7 kg/m2 Never Smoker BMI and BSA Data Body Mass Index Body Surface Area  
 27.7 kg/m 2 2.12 m 2 Preferred Pharmacy Pharmacy Name Phone [facility] [phone] Your Updated Medication List  
 This list is accurate as of 9/20/18  2:56 PM.  Always use your most recent med list.  
  
  
  
  
 albuterol 90 mcg/actuation inhaler Commonly known as:  PROVENTIL HFA, VENTOLIN HFA, PROAIR HFA Take 2 Puffs by inhalation every four (4) hours as needed for Wheezing. budesonide 32 mcg/actuation nasal spray Commonly known as:  RHINOCORT AQUA  
2 Sprays by Both Nostrils route daily. cetirizine 10 mg tablet Commonly known as:  ZYRTEC Take 1 Tab by mouth daily. fluticasone-salmeterol 250-50 mcg/dose diskus inhaler Commonly known as:  ADVAIR Take 1 Puff by inhalation two (2) times a day. INVanz 1 gram injection Generic drug:  ertapenem 1 g by IntraVENous route daily. Via home pump[ med ball,  Invanz in 100ml NS, infuse over 30 min  refridgerate until 8  hrs before uses  
  
 leucovorin calcium 5 mg tablet Commonly known as:  WELLCOVORIN  
TAKE 1 TABLET TWICE WEEKLY  
  
 LIALDA 1.2 gram delayed release tablet Generic drug:  mesalamine Take 2.4 g by mouth Daily (before breakfast). methotrexate 2.5 mg tablet Commonly known as:  Ashley Gomez Take 10 mg by mouth. Every Saturday and Sunday  Indications: RHEUMATOID ARTHRITIS  
  
 montelukast 10 mg tablet Commonly known as:  SINGULAIR  
TAKE 1 TABLET DAILY PLETAL 50 mg tablet Generic drug:  cilostazol Take 50 mg by mouth Before breakfast and dinner. [facility]naud&#39;s  
  
 predniSONE 10 mg tablet Commonly known as:  Latrelle Talon Take 2 tabs by mouth daily for 2 days. Take 1 tab by mouth daily for 3 days. PriLOSEC 20 mg capsule Generic drug:  omeprazole Take 20 mg by mouth daily. REMICADE 100 mg injection Generic drug:  inFLIXimab  
5 mg/kg by IntraVENous route. Given in Md office  
  
 simvastatin 20 mg tablet Commonly known as:  ZOCOR  
TAKE 1 TABLET NIGHTLY Prescriptions Sent to Pharmacy  Refills  
 predniSONE (DELTASONE) 10 mg tablet 0  
 Sig: Take 2 tabs by mouth daily for 2 days. Take 1 tab by mouth daily for 3 days. Class: Normal  
 Pharmacy: [id number] [address] [place] [address] [address], [address] #: [phone] We Performed the Following INFLUENZA VIRUS VAC QUAD,SPLIT,PRESV FREE SYRINGE IM [id number] CPT(R)] Follow-up Instructions Return if symptoms worsen or fail to improve. Patient Instructions Continue current inhalers and medications. Learning About Asthma Triggers What are asthma triggers? When you have asthma, certain things can make your symptoms worse. These are called triggers. Learn what triggers an asthma attack for you, and avoid the triggers when you can. Common triggers include colds, smoke, air pollution, dust, pollen, pets, stress, and cold air. How do asthma triggers affect you? Triggers can make it harder for your lungs to work as they should. They can lead to sudden breathing problems and other symptoms. When you are around a trigger, an asthma attack is more likely. If your symptoms are severe, you may need emergency treatment or have to go to the hospital for treatment. What can you do to avoid triggers? The first thing is to know your triggers. When you are having symptoms, note the things around you that might be causing them. Then look for patterns that may be triggering your symptoms. Record your triggers on a piece of paper or in an asthma diary. When you have your list of possible triggers, work with your doctor to find ways to avoid them. Avoid colds and flu. Get a pneumococcal vaccine shot. If you have had one before, ask your doctor whether you need a second dose. Get a flu vaccine every year, as soon as it's available. If you must be around people with colds or the flu, wash your hands often. Here are some ways to avoid a few common triggers. · Do not smoke or allow others to smoke around you.  If you need help quitting, talk to your doctor about stop-smoking programs and medicines. These can increase your chances of quitting for good. · If there is a lot of pollution, pollen, or dust outside, stay at home and keep your windows closed. Use an air conditioner or air filter in your home. Check your local weather report or newspaper for air quality and pollen reports. What else should you know? · Take your controller medicine every day, not just when you have symptoms. It helps prevent problems before they occur. · Your doctor may suggest that you check how well your lungs are working by measuring your peak expiratory flow (PEF) throughout the day. Your PEF may drop when you are near things that trigger symptoms. Where can you learn more? Go to http://paris-ivon.info/. Enter I569 in the search box to learn more about \"Learning About Asthma Triggers. \" Current as of: December 6, 2017 Content Version: 11.7 © 0567-5835 [employer]. Care instructions adapted under license by [employer] (which disclaims liability or warranty for this information). If you have questions about a medical condition or this instruction, always ask your healthcare professional. [address] [address] any warranty or liability for your use of this information. Introducing [facility]! Dear Rex Doing: Thank you for requesting a [employer] account. Our records indicate that you already have an active [employer] account. You can access your account anytime at [email or web address]. [employer]/Ticket Surf International Did you know that you can access your hospital and ER discharge instructions at any time in [employer]? You can also review all of your test results from your hospital stay or ER visit. Additional Information If you have questions, please visit the Frequently Asked Questions section of the [employer] website at [email or web address] [employer]/T[employer]/. Remember, MyChart is NOT to be used for urgent needs. For medical emergencies, dial 911. Now available from your iPhone and Android! Please provide this summary of care documentation to your next provider. Your primary care clinician is listed as [address] If you have any questions after today's visit, please call [phone].

## 2018-09-20 NOTE — PROGRESS NOTES
Exam room [id number] [address] Radha Lindquist. is a 63 y.o. male  Visit Vitals    Temp 98.3 °F (36.8 °C) (Oral)    Resp 15    Ht 5' 11\" (1.803 m)    Wt 198 lb 9.6 oz (90.1 kg)    BMI 27.7 kg/m2       Chief Complaint   Patient presents with    Immunization/Injection    Cough     pt says two weeks ago, had productive cough with green/yellow. was taking musinex and felt better but is left with SOB and a feeling of tightiness in chest.       1. Have you been to the ER, urgent care clinic since your last visit? Hospitalized since your last visit? No    2. Have you seen or consulted any other health care providers outside of the [address] [place] [address] since your last visit? Include any pap smears or colon screening.  No    Health Maintenance Due   Topic Date Due    Hepatitis C Screening  1960    Pneumococcal 19-64 Medium Risk (1 of 1 - PPSV23) 08/15/1980    Influenza Age 9 to Adult  08/01/2018

## 2018-09-20 NOTE — PATIENT INSTRUCTIONS
Continue current inhalers and medications. Learning About Asthma Triggers  What are asthma triggers? When you have asthma, certain things can make your symptoms worse. These are called triggers. Learn what triggers an asthma attack for you, and avoid the triggers when you can. Common triggers include colds, smoke, air pollution, dust, pollen, pets, stress, and cold air. How do asthma triggers affect you? Triggers can make it harder for your lungs to work as they should. They can lead to sudden breathing problems and other symptoms. When you are around a trigger, an asthma attack is more likely. If your symptoms are severe, you may need emergency treatment or have to go to the hospital for treatment. What can you do to avoid triggers? The first thing is to know your triggers. When you are having symptoms, note the things around you that might be causing them. Then look for patterns that may be triggering your symptoms. Record your triggers on a piece of paper or in an asthma diary. When you have your list of possible triggers, work with your doctor to find ways to avoid them. Avoid colds and flu. Get a pneumococcal vaccine shot. If you have had one before, ask your doctor whether you need a second dose. Get a flu vaccine every year, as soon as it's available. If you must be around people with colds or the flu, wash your hands often. Here are some ways to avoid a few common triggers. · Do not smoke or allow others to smoke around you. If you need help quitting, talk to your doctor about stop-smoking programs and medicines. These can increase your chances of quitting for good. · If there is a lot of pollution, pollen, or dust outside, stay at home and keep your windows closed. Use an air conditioner or air filter in your home. Check your local weather report or newspaper for air quality and pollen reports. What else should you know?   · Take your controller medicine every day, not just when you have symptoms. It helps prevent problems before they occur. · Your doctor may suggest that you check how well your lungs are working by measuring your peak expiratory flow (PEF) throughout the day. Your PEF may drop when you are near things that trigger symptoms. Where can you learn more? Go to http://paris-ivon.info/. Enter N021 in the search box to learn more about \"Learning About Asthma Triggers. \"  Current as of: December 6, 2017  Content Version: 11.7  © 0739-3359 [employer]. Care instructions adapted under license by [employer] (which disclaims liability or warranty for this information). If you have questions about a medical condition or this instruction, always ask your healthcare professional. [address] any warranty or liability for your use of this information.

## 2018-09-28 ENCOUNTER — TELEPHONE (OUTPATIENT)
Dept: INTERNAL MEDICINE CLINIC | Age: 58
End: 2018-09-28

## 2018-09-28 NOTE — TELEPHONE ENCOUNTER
Pt seen 9/20/18, given prednisone. Pt took meds for 5 days, finished Mon 9/24/18. By Wed 9/26/18, pt started coughing again, has been getting worse since Wed. Pt feels the same as he did last Thursday when he was seen. What does Dr Merissa Sims or other provider recommend? Please call pt back at [phone]. Please call pt before the weekend.   Pt uses [employer] on file

## 2018-09-28 NOTE — TELEPHONE ENCOUNTER
Spoke with pt, after verifying pt . Let pt know he would need a follow up appt. to be reevaluated . Went over signs and symptoms that would warrant a trip to Urgent Care or Ed over the weekend . Answered any and all questions pt had . Pt verbalized understanding. Pt scheduled for a follow up Monday.

## 2018-10-01 ENCOUNTER — OFFICE VISIT (OUTPATIENT)
Dept: INTERNAL MEDICINE CLINIC | Age: 58
End: 2018-10-01

## 2018-10-01 ENCOUNTER — HOSPITAL ENCOUNTER (OUTPATIENT)
Dept: VASCULAR SURGERY | Age: 58
Discharge: HOME | End: 2018-10-01
Attending: INTERNAL MEDICINE
Payer: COMMERCIAL

## 2018-10-01 ENCOUNTER — TELEPHONE (OUTPATIENT)
Dept: INTERNAL MEDICINE CLINIC | Age: 58
End: 2018-10-01

## 2018-10-01 VITALS
OXYGEN SATURATION: 96 % | RESPIRATION RATE: 17 BRPM | DIASTOLIC BLOOD PRESSURE: 81 MMHG | BODY MASS INDEX: 27.75 KG/M2 | SYSTOLIC BLOOD PRESSURE: 136 MMHG | HEIGHT: 71 IN | HEART RATE: 90 BPM | TEMPERATURE: 98.5 F | WEIGHT: 198.2 LBS

## 2018-10-01 DIAGNOSIS — J45.41 MODERATE PERSISTENT ASTHMA WITH ACUTE EXACERBATION: ICD-10-CM

## 2018-10-01 DIAGNOSIS — M79.662 PAIN OF LEFT CALF: ICD-10-CM

## 2018-10-01 DIAGNOSIS — R60.0 LEG EDEMA, LEFT: ICD-10-CM

## 2018-10-01 DIAGNOSIS — M06.9 RHEUMATOID ARTHRITIS, INVOLVING UNSPECIFIED SITE, UNSPECIFIED RHEUMATOID FACTOR PRESENCE: ICD-10-CM

## 2018-10-01 DIAGNOSIS — M79.662 PAIN OF LEFT CALF: Primary | ICD-10-CM

## 2018-10-01 PROCEDURE — 93971 EXTREMITY STUDY: CPT

## 2018-10-01 RX ORDER — PREDNISONE 10 MG/1
TABLET ORAL
Qty: 15 TAB | Refills: 0 | Status: ON HOLD | OUTPATIENT
Start: 2018-10-01 | End: 2018-11-19

## 2018-10-01 RX ORDER — SIMVASTATIN 20 MG/1
TABLET, FILM COATED ORAL
Qty: 90 TAB | Refills: 1 | Status: SHIPPED | OUTPATIENT
Start: 2018-10-01 | End: 2018-11-13 | Stop reason: ALTCHOICE

## 2018-10-01 NOTE — PROGRESS NOTES
ACUTE VISIT     HPI:   Georgie Hollingsworth. is a 63 y.o. male, he presents today for:     Spasmotic cough: on rhinocort, ppi, cetrizine, adviar.      - reports that he was feeling a lot better after steroid   - stopped steroid on Monday, was feeling well. Now back to heavy cough. - minimal to no production. Following with pulmonologist every 6 months.     - Not around smoker nor smokers cough. Since Thursday. Notes that when he takes a deep breath or get winded has a hard time with cough. 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 Has a sensation of wanted to cough. Notes a similar feeling when up in front of a group talking. Similar feeling as when exercising. ROS: no N/V/D, no chest pain. Medications used for acute illness: prednisone course    Current Outpatient Prescriptions on File Prior to Visit   Medication Sig    leucovorin calcium (WELLCOVORIN) 5 mg tablet TAKE 1 TABLET TWICE WEEKLY    simvastatin (ZOCOR) 20 mg tablet TAKE 1 TABLET NIGHTLY    montelukast (SINGULAIR) 10 mg tablet TAKE 1 TABLET DAILY    ertapenem (INVANZ) 1 gram injection 1 g by IntraVENous route daily. Via home pump[ med ball,  Invanz in 100ml NS, infuse over 30 min   refridgerate until 8  hrs before uses    mesalamine (LIALDA) 1.2 gram delayed release tablet Take 2.4 g by mouth Daily (before breakfast).  fluticasone-salmeterol (ADVAIR) 250-50 mcg/dose diskus inhaler Take 1 Puff by inhalation two (2) times a day.  budesonide (RHINOCORT AQUA) 32 mcg/actuation nasal spray 2 Sprays by Both Nostrils route daily.  cetirizine (ZYRTEC) 10 mg tablet Take 1 Tab by mouth daily.  albuterol (PROVENTIL HFA, VENTOLIN HFA, PROAIR HFA) 90 mcg/actuation inhaler Take 2 Puffs by inhalation every four (4) hours as needed for Wheezing.  infliximab (REMICADE) 100 mg injection 5 mg/kg by IntraVENous route. Given in Md office    methotrexate (RHEUMATREX) 2.5 mg tablet Take 10 mg by mouth.  Every Saturday and Sunday Indications: RHEUMATOID ARTHRITIS    omeprazole (PRILOSEC) 20 mg capsule Take 20 mg by mouth daily.  cilostazol (PLETAL) 50 mg tablet Take 50 mg by mouth Before breakfast and dinner. Raynaud&#39;s     No current facility-administered medications on file prior to visit. No Known Allergies    PMH/PSH/FH: reviewed and updated    Sochx:   reports that he has never smoked. He has never used smokeless tobacco. He reports that he drinks about 0.6 oz of alcohol per week  He reports that he does not use illicit drugs. PE:  Blood pressure 136/81, pulse 90, temperature 98.5 °F (36.9 °C), temperature source Oral, resp. rate 17, height 5' 11\" (1.803 m), weight 198 lb 3.2 oz (89.9 kg), SpO2 96 %. Body mass index is 27.64 kg/(m^2). Physical Exam   Constitutional: He is oriented to person, place, and time. He appears well-developed and well-nourished. No distress. HENT:   Head: Normocephalic. Mouth/Throat: Oropharynx is clear and moist.   Eyes: Conjunctivae and EOM are normal. Pupils are equal, round, and reactive to light. Neck: Neck supple. Cardiovascular: Normal rate, regular rhythm, normal heart sounds and intact distal pulses. Pulmonary/Chest: Effort normal and breath sounds normal. No respiratory distress. He has no wheezes. Abdominal: Soft. Musculoskeletal: He exhibits edema (2+ pittin edema in left leg). Left leg calf circ ~ 10 cm below patella : 38 cm  Right leg calf circ ~ 1 cm below patella 36 cm. Neurological: He is alert and oriented to person, place, and time. Nursing note and vitals reviewed. Labs:  No results found for any visits on 10/01/18. A/P  Jayy Strange. was seen today for had concerns including Cough. .  The diagnosis and plan was discussed including:        ICD-10-CM ICD-9-CM    1. Pain of left calf M79.662 729.5 DUPLEX LOWER EXT VEIN MAPPING RIGHT   2. Leg edema, left R60.0 782.3 DUPLEX LOWER EXT VEIN MAPPING RIGHT   3.  Rheumatoid arthritis, involving unspecified site, unspecified rheumatoid factor presence ([facility] M06.9 714.0    4. Moderate persistent asthma with acute exacerbation J45.41 493.92 predniSONE (DELTASONE) 10 mg tablet     Left leg pain: gradual onset, pain centers to calf. With chronic left knee inflammation, but not normally edeam in claf. - ddx: calf muscle strain vs dvt. - with hisotyr of inflammatory diesease, will forego d-dimer and move forward with doppler ultrasound. Recurrent cough. Similar to last visit on 9/20. Had improved and now returned. Lung exam continues to be normal. Overall similar to prior episodes. Will follow-up with pulmonologist.   Onnie Fall to be PE given normal HR, improvement with steroid and occurrence prior to leg swelling. However if Doppler positive will need to get CTA of chest.     - I advised him to call back or return to office if symptoms worsen/change/persist.  - He was given AVS and expressed understanding with the diagnosis and plan as discussed. Follow-up Disposition:  Return if symptoms worsen or fail to improve.

## 2018-10-01 NOTE — PROCEDURES
[address]  *** FINAL REPORT ***    Name: Lavern Graham  MRN: [id number]    Outpatient  : 15 Aug 1960  HIS Order #: [id number]  [address] [facility] Visit #: [id number]  Date: 01 Oct 2018    TYPE OF TEST: Peripheral Venous Testing    REASON FOR TEST  Pain in limb, Limb swelling    Left Leg:-  Deep venous thrombosis:           No  Superficial venous thrombosis:    No  Deep venous insufficiency:        Not examined  Superficial venous insufficiency: Not examined      INTERPRETATION/FINDINGS  PROCEDURE:  Color duplex ultrasound imaging of lower extremity veins. FINDINGS:       Right: The common femoral vein is patent and without evidence of  thrombus. Phasic flow is observed. This extremity was not otherwise  evaluated. Left:   The common femoral, deep femoral, femoral, popliteal,  posterior tibial, peroneal, and great saphenous are patent and without   evidence of thrombus;  each is fully compressible and there is no  narrowing of the flow channel on color Doppler imaging. Phasic flow  is observed in the common femoral vein. IMPRESSION:  No evidence of left lower extremity vein thrombosis. ADDITIONAL COMMENTS    I have personally reviewed the data relevant to the interpretation of  this  study. TECHNOLOGIST: Pippa London.  Tushar Flight  Signed: 10/01/2018 03:43 PM    PHYSICIAN: Isa Arredondo MD  Signed: 10/02/2018 07:38 AM

## 2018-10-01 NOTE — TELEPHONE ENCOUNTER
Reviewed preliminary results of left LE doppler. Noted normal compressibility. Called patient . He had been told exam was consistent with a ruptured bakers cyst. This is consistent with prior left knee surgery/arhtritis and chronic rheumatoid arthritis. Continue with plan to ice and elevated. May have some benefit to prednisone restarted for bronchitis. Will call back if any change on final report.    Fanta Cooper MD

## 2018-10-01 NOTE — MR AVS SNAPSHOT
[address] [facility] [address] [address] 
[phone] Patient: Esperanza Jackson. MRN:  :[id number] Visit Information Date & Time Provider Department Dept. Phone Encounter #  
 10/1/2018  9:15 AM Edy Gordon MD [address] [facility] [phone] [id number] Follow-up Instructions Return if symptoms worsen or fail to improve. Upcoming Health Maintenance Date Due Hepatitis C Screening 1960 Pneumococcal 19-64 Medium Risk (1 of 1 - PPSV23) 8/15/1980 Shingrix Vaccine Age 50> (1 of 2) 8/15/2011 COLONOSCOPY 2019 DTaP/Tdap/Td series (2 - Td) 2024 Allergies as of 10/1/2018  Review Complete On: 10/1/2018 By: Edy Gordon MD  
 No Known Allergies Current Immunizations  Reviewed on 2018 Name Date Hep A Vaccine (Adult) 2016, 2014 Influenza Vaccine 10/1/2017, 11/10/2016, 11/15/2015, 2014 Influenza Vaccine (Quad) PF 2018 Influenza Vaccine Whole 2010 Pneumococcal Conjugate (PCV-13) 2017, 2017 Tdap 2014 Typhoid Vi Capsular Polysaccharide Vaccine 2016, 2014 Zoster Vaccine, Live 3/18/2014 Not reviewed this visit You Were Diagnosed With   
  
 Codes Comments Pain of left calf    -  Primary ICD-10-CM: D30.959 ICD-9-CM: 729.5 Leg edema, left     ICD-10-CM: R60.0 ICD-9-CM: 260. 3 Rheumatoid arthritis, involving unspecified site, unspecified rheumatoid factor presence ([facility]     ICD-10-CM: M06.9 ICD-9-CM: 714.0 Moderate persistent asthma with acute exacerbation     ICD-10-CM: J45.41 
ICD-9-CM: 493.92 Vitals BP Pulse Temp Resp Height(growth percentile) Weight(growth percentile) 136/81 (BP 1 Location: Left arm, BP Patient Position: Sitting) 90 98.5 °F (36.9 °C) (Oral) 17 5' 11\" (1.803 m) 198 lb 3.2 oz (89.9 kg) SpO2 BMI Smoking Status 96% 27.64 kg/m2 Never Smoker Vitals History BMI and BSA Data Body Mass Index Body Surface Area  
 27.64 kg/m 2 2.12 m 2 Preferred Pharmacy Pharmacy Name Phone [facility] [address] [phone] Your Updated Medication List  
  
   
This list is accurate as of 10/1/18 10:14 AM.  Always use your most recent med list.  
  
  
  
  
 albuterol 90 mcg/actuation inhaler Commonly known as:  PROVENTIL HFA, VENTOLIN HFA, PROAIR HFA Take 2 Puffs by inhalation every four (4) hours as needed for Wheezing. budesonide 32 mcg/actuation nasal spray Commonly known as:  RHINOCORT AQUA  
2 Sprays by Both Nostrils route daily. cetirizine 10 mg tablet Commonly known as:  ZYRTEC Take 1 Tab by mouth daily. fluticasone-salmeterol 250-50 mcg/dose diskus inhaler Commonly known as:  ADVAIR Take 1 Puff by inhalation two (2) times a day. INVanz 1 gram injection Generic drug:  ertapenem 1 g by IntraVENous route daily. Via home pump[ med ball,  Invanz in 100ml NS, infuse over 30 min  refridgerate until 8  hrs before uses  
  
 leucovorin calcium 5 mg tablet Commonly known as:  WELLCOVORIN  
TAKE 1 TABLET TWICE WEEKLY  
  
 LIALDA 1.2 gram delayed release tablet Generic drug:  mesalamine Take 2.4 g by mouth Daily (before breakfast). methotrexate 2.5 mg tablet Commonly known as:  Marty Peper Take 10 mg by mouth. Every Saturday and Sunday  Indications: RHEUMATOID ARTHRITIS  
  
 montelukast 10 mg tablet Commonly known as:  SINGULAIR  
TAKE 1 TABLET DAILY PLETAL 50 mg tablet Generic drug:  cilostazol Take 50 mg by mouth Before breakfast and dinner. [facility]na[facility]&#39;s  
  
 predniSONE 10 mg tablet Commonly known as:  Raghav Macleod Take 3 tabs daily for 2 days, then 2 tabs daily for 3 days, then 1 tab daily for 3 days then stop. PriLOSEC 20 mg capsule Generic drug:  omeprazole Take 20 mg by mouth daily. REMICADE 100 mg injection Generic drug:  inFLIXimab  
5 mg/kg by IntraVENous route. Given in Md office  
  
 simvastatin 20 mg tablet Commonly known as:  ZOCOR  
TAKE 1 TABLET NIGHTLY Prescriptions Sent to Pharmacy Refills  
 predniSONE (DELTASONE) 10 mg tablet 0 Sig: Take 3 tabs daily for 2 days, then 2 tabs daily for 3 days, then 1 tab daily for 3 days then stop. Class: Normal  
 Pharmacy: [id number] [address] [place] [address] [address], [address] [place] [address] #: [phone] Follow-up Instructions Return if symptoms worsen or fail to improve. To-Do List   
 Around 10/01/2018 Imaging:  DUPLEX LOWER EXT VEIN MAPPING RIGHT Patient Instructions Learning About Deep Vein Thrombosis What is deep vein thrombosis? A deep vein thrombosis (DVT) is a blood clot in certain veins of the legs, pelvis, or arms. The clot is usually in the legs. DVT may damage the vein and cause the area to ache, swell, and change color. DVT also can lead to sores. DVT in these veins needs to be treated because the clots can get bigger, break loose, and travel through the bloodstream to the lungs. A blood clot in a lung can cause death. Blood clots can form in the veins when you are not active for a long period of time. For example, they can form if you need to stay in bed because of a health problem or must sit for a long time on an airplane or in a car. Surgery or an injury can damage your blood vessels and cause a clot to form. Cancer also can cause DVT. And some people have blood that clots too easily, which is a problem that may run in families. A risk factor is something that makes you more likely to develop a disease. Here are some major risk factors for DVT: 
· You have surgery. · You have to stay in bed for more than 3 days (such as in the hospital).  
· Your blood is likely to clot because of an injury, cancer, or inherited condition. Here are some minor risk factors for DVT: 
· You take birth control hormones. · You are pregnant. · You are in a car or airplane for a long trip. What are the symptoms? Symptoms of DVT may include: · Swelling in the affected area. · Redness and warmth in the affected area. · Pain or tenderness. You may have pain only when you touch the affected area or when you stand or walk. If your doctor thinks you may have DVT, you will probably have an ultrasound test. You may have other tests as well. How can you prevent DVT? · Exercise your lower leg muscles to help blood flow in your legs. Point your toes up toward your head so the calves of your legs are stretched, then relax and repeat. This is a good exercise to do when you are sitting for long periods of time. · Get out of bed as soon as you can after an illness or surgery. If you need to stay in bed, do the leg exercise noted above every hour when you are awake. · Use special stockings called compression stockings. These stockings are tight at the feet with a gradually looser fit on the leg. Many doctors recommend that you wear compression stockings during a journey longer than 8 hours. · Take breaks when you are on long trips. Stop the car and walk around. On long airplane flights, walk up and down the aisle hourly, and flex and point your feet every 20 minutes while sitting. · Take blood-thinning medicines after some types of surgery if your doctor recommends it. Blood thinners also may be used if you are likely to develop clots. How is DVT treated? Treatment for DVT usually involves taking blood thinners. These medicines are given through a vein (intravenously, or IV) or as a pill. Talk with your doctor about which medicine is right for you. Your doctor also may suggest that you prop up or elevate your leg when possible, take walks, and wear compression stockings.  These measures may help reduce the pain and swelling that can happen with DVT. Follow-up care is a key part of your treatment and safety. Be sure to make and go to all appointments, and call your doctor if you are having problems. It's also a good idea to know your test results and keep a list of the medicines you take. Where can you learn more? Go to http://paris-ivon.info/. Enter J566 in the search box to learn more about \"Learning About Deep Vein Thrombosis. \" Current as of: November 21, 2017 Content Version: 11.7 © 4993-5598 [employer]. Care instructions adapted under license by [employer] (which disclaims liability or warranty for this information). If you have questions about a medical condition or this instruction, always ask your healthcare professional. [address] any warranty or liability for your use of this information. Introducing [facility]! Dear Kathi Boyd: Thank you for requesting a myTomorrows account. Our records indicate that you already have an active myTomorrows account. You can access your account anytime at [email or web address] Did you know that you can access your hospital and ER discharge instructions at any time in myTomorrows? You can also review all of your test results from your hospital stay or ER visit. Additional Information If you have questions, please visit the Frequently Asked Questions section of the myTomorrows website at [email or web address] Remember, myTomorrows is NOT to be used for urgent needs. For medical emergencies, dial 911. Now available from your iPhone and Android! Please provide this summary of care documentation to your next provider. Your primary care clinician is listed as [address] If you have any questions after today's visit, please call [phone].

## 2018-10-01 NOTE — PATIENT INSTRUCTIONS
Learning About Deep Vein Thrombosis  What is deep vein thrombosis? A deep vein thrombosis (DVT) is a blood clot in certain veins of the legs, pelvis, or arms. The clot is usually in the legs. DVT may damage the vein and cause the area to ache, swell, and change color. DVT also can lead to sores. DVT in these veins needs to be treated because the clots can get bigger, break loose, and travel through the bloodstream to the lungs. A blood clot in a lung can cause death. Blood clots can form in the veins when you are not active for a long period of time. For example, they can form if you need to stay in bed because of a health problem or must sit for a long time on an airplane or in a car. Surgery or an injury can damage your blood vessels and cause a clot to form. Cancer also can cause DVT. And some people have blood that clots too easily, which is a problem that may run in families. A risk factor is something that makes you more likely to develop a disease. Here are some major risk factors for DVT:  · You have surgery. · You have to stay in bed for more than 3 days (such as in the hospital). · Your blood is likely to clot because of an injury, cancer, or inherited condition. Here are some minor risk factors for DVT:  · You take birth control hormones. · You are pregnant. · You are in a car or airplane for a long trip. What are the symptoms? Symptoms of DVT may include:  · Swelling in the affected area. · Redness and warmth in the affected area. · Pain or tenderness. You may have pain only when you touch the affected area or when you stand or walk. If your doctor thinks you may have DVT, you will probably have an ultrasound test. You may have other tests as well. How can you prevent DVT? · Exercise your lower leg muscles to help blood flow in your legs. Point your toes up toward your head so the calves of your legs are stretched, then relax and repeat.  This is a good exercise to do when you are sitting for long periods of time. · Get out of bed as soon as you can after an illness or surgery. If you need to stay in bed, do the leg exercise noted above every hour when you are awake. · Use special stockings called compression stockings. These stockings are tight at the feet with a gradually looser fit on the leg. Many doctors recommend that you wear compression stockings during a journey longer than 8 hours. · Take breaks when you are on long trips. Stop the car and walk around. On long airplane flights, walk up and down the aisle hourly, and flex and point your feet every 20 minutes while sitting. · Take blood-thinning medicines after some types of surgery if your doctor recommends it. Blood thinners also may be used if you are likely to develop clots. How is DVT treated? Treatment for DVT usually involves taking blood thinners. These medicines are given through a vein (intravenously, or IV) or as a pill. Talk with your doctor about which medicine is right for you. Your doctor also may suggest that you prop up or elevate your leg when possible, take walks, and wear compression stockings. These measures may help reduce the pain and swelling that can happen with DVT. Follow-up care is a key part of your treatment and safety. Be sure to make and go to all appointments, and call your doctor if you are having problems. It's also a good idea to know your test results and keep a list of the medicines you take. Where can you learn more? Go to http://paris-ivon.info/. Enter C524 in the search box to learn more about \"Learning About Deep Vein Thrombosis. \"  Current as of: November 21, 2017  Content Version: 11.7  © 2117-0675 Whatever. Care instructions adapted under license by marshallindex (which disclaims liability or warranty for this information).  If you have questions about a medical condition or this instruction, always ask your healthcare professional. [employer] disclaims any warranty or liability for your use of this information.

## 2018-10-01 NOTE — PROGRESS NOTES
Exam room [id number] [facility] Karen Garcia is a 63 y.o. male    Visit Vitals    /81 (BP 1 Location: Left arm, BP Patient Position: Sitting)    Pulse 90    Temp 98.5 °F (36.9 °C) (Oral)    Resp 17    Ht 5' 11\" (1.803 m)    Wt 198 lb 3.2 oz (89.9 kg)    SpO2 96%    BMI 27.64 kg/m2       Chief Complaint   Patient presents with    Cough     pt states that it isnt that productive, was feeling better than it came back thursday of last week     Pt states BP might be slightly elevated due to lack of sleep, MIL passed away at midnight yesterday  1. Have you been to the ER, urgent care clinic since your last visit? Hospitalized since your last visit? No    2. Have you seen or consulted any other health care providers outside of the [address] [place] [address] since your last visit? Include any pap smears or colon screening.  No    Health Maintenance Due   Topic Date Due    Hepatitis C Screening  1960    Pneumococcal 19-64 Medium Risk (1 of 1 - PPSV23) 08/15/1980    Shingrix Vaccine Age 50> (1 of 2) 08/15/2011     Learning Assessment 10/28/2015   PRIMARY LEARNER Patient   HIGHEST LEVEL OF EDUCATION - PRIMARY LEARNER  > 4 YEARS OF COLLEGE   BARRIERS PRIMARY LEARNER NONE   CO-LEARNER CAREGIVER No   PRIMARY LANGUAGE ENGLISH   LEARNER PREFERENCE PRIMARY READING   ANSWERED BY patient   RELATIONSHIP SELF

## 2018-10-16 ENCOUNTER — OFFICE VISIT (OUTPATIENT)
Dept: INTERNAL MEDICINE CLINIC | Age: 58
End: 2018-10-16

## 2018-10-16 VITALS
RESPIRATION RATE: 18 BRPM | SYSTOLIC BLOOD PRESSURE: 124 MMHG | DIASTOLIC BLOOD PRESSURE: 86 MMHG | HEIGHT: 71 IN | WEIGHT: 202.2 LBS | TEMPERATURE: 98.3 F | OXYGEN SATURATION: 98 % | BODY MASS INDEX: 28.31 KG/M2 | HEART RATE: 93 BPM

## 2018-10-16 DIAGNOSIS — R73.02 IGT (IMPAIRED GLUCOSE TOLERANCE): ICD-10-CM

## 2018-10-16 DIAGNOSIS — R97.20 ELEVATED PSA: ICD-10-CM

## 2018-10-16 DIAGNOSIS — J45.41 MODERATE PERSISTENT ASTHMA WITH ACUTE EXACERBATION: ICD-10-CM

## 2018-10-16 DIAGNOSIS — K51.90 ULCERATIVE COLITIS WITHOUT COMPLICATIONS, UNSPECIFIED LOCATION (HCC): ICD-10-CM

## 2018-10-16 DIAGNOSIS — Z11.59 NEED FOR HEPATITIS C SCREENING TEST: ICD-10-CM

## 2018-10-16 DIAGNOSIS — Z79.60 LONG-TERM USE OF IMMUNOSUPPRESSANT MEDICATION: ICD-10-CM

## 2018-10-16 DIAGNOSIS — M06.9 RHEUMATOID ARTHRITIS, INVOLVING UNSPECIFIED SITE, UNSPECIFIED RHEUMATOID FACTOR PRESENCE: ICD-10-CM

## 2018-10-16 DIAGNOSIS — E78.5 HYPERLIPIDEMIA, UNSPECIFIED HYPERLIPIDEMIA TYPE: Primary | ICD-10-CM

## 2018-10-16 RX ORDER — MELOXICAM 7.5 MG/1
TABLET ORAL
Refills: 0 | Status: ON HOLD | COMMUNITY
Start: 2018-08-29 | End: 2018-11-19 | Stop reason: ALTCHOICE

## 2018-10-16 RX ORDER — FLUTICASONE PROPIONATE AND SALMETEROL 500; 50 UG/1; UG/1
1 POWDER RESPIRATORY (INHALATION) 2 TIMES DAILY
Qty: 3 INHALER | Refills: 3 | Status: SHIPPED | OUTPATIENT
Start: 2018-10-16 | End: 2019-10-29 | Stop reason: SDUPTHER

## 2018-10-16 NOTE — MR AVS SNAPSHOT
[address] [facility] [address] 
[phone] Patient: Dereck Mo. MRN:  :[id number] Visit Information Date & Time Provider Department Dept. Phone Encounter #  
 10/16/2018  8:45 AM Berry Fernando MD [facility] [place] and [facility] [phone] [id number] Follow-up Instructions Return in about 2 months (around 2018), or if symptoms worsen or fail to improve, for asthma. Upcoming Health Maintenance Date Due Hepatitis C Screening 1960 Pneumococcal 19-64 Medium Risk (1 of 1 - PPSV23) 8/15/1980 Shingrix Vaccine Age 50> (1 of 2) 8/15/2011 COLONOSCOPY 2019 DTaP/Tdap/Td series (2 - Td) 2024 Allergies as of 10/16/2018  Review Complete On: 10/16/2018 By: Berry Fernando MD  
 No Known Allergies Current Immunizations  Reviewed on 10/16/2018 Name Date Hep A Vaccine (Adult) 2016, 2014 Influenza Vaccine 10/1/2017, 11/10/2016, 11/15/2015, 2014 Influenza Vaccine (Quad) PF 2018 Influenza Vaccine Whole 2010 Pneumococcal Conjugate (PCV-13) 2017, 2017 Tdap 2014 Typhoid Vi Capsular Polysaccharide Vaccine 2016, 2014 Zoster Vaccine, Live 3/18/2014 Reviewed by Berry Fernando MD on 10/16/2018 at  9:57 AM  
You Were Diagnosed With   
  
 Codes Comments Hyperlipidemia, unspecified hyperlipidemia type    -  Primary ICD-10-CM: E78.5 ICD-9-CM: 272.4 Rheumatoid arthritis, involving unspecified site, unspecified rheumatoid factor presence ([facility]     ICD-10-CM: M06.9 ICD-9-CM: 714.0 IGT (impaired glucose tolerance)     ICD-10-CM: R73.02 
ICD-9-CM: 790.22 Moderate persistent asthma with acute exacerbation     ICD-10-CM: J45.41 
ICD-9-CM: 493.92 Ulcerative colitis without complications, unspecified location [facility])     ICD-10-CM: K51.90 ICD-9-CM: 556.9 Long-term use of immunosuppressant medication     ICD-10-CM: Z79.899 ICD-9-CM: V58.69 Elevated PSA     ICD-10-CM: R97.20 ICD-9-CM: 790.93 Need for hepatitis C screening test     ICD-10-CM: Z11.59 
ICD-9-CM: V73.89 Vitals BP Pulse Temp Resp Height(growth percentile) Weight(growth percentile) 124/86 93 98.3 °F (36.8 °C) (Oral) 18 5' 11\" (1.803 m) 202 lb 3.2 oz (91.7 kg) SpO2 BMI Smoking Status 98% 28.2 kg/m2 Never Smoker Vitals History BMI and BSA Data Body Mass Index Body Surface Area  
 28.2 kg/m 2 2.14 m 2 Preferred Pharmacy Pharmacy Name Phone [facility] [phone] Your Updated Medication List  
  
   
This list is accurate as of 10/16/18 10:11 AM.  Always use your most recent med list.  
  
  
  
  
 albuterol 90 mcg/actuation inhaler Commonly known as:  PROVENTIL HFA, VENTOLIN HFA, PROAIR HFA Take 2 Puffs by inhalation every four (4) hours as needed for Wheezing. budesonide 32 mcg/actuation nasal spray Commonly known as:  RHINOCORT AQUA  
2 Sprays by Both Nostrils route daily. cetirizine 10 mg tablet Commonly known as:  ZYRTEC Take 1 Tab by mouth daily. fluticasone-salmeterol 500-50 mcg/dose diskus inhaler Commonly known as:  ADVAIR Take 1 Puff by inhalation two (2) times a day. INVanz 1 gram injection Generic drug:  ertapenem 1 g by IntraVENous route daily. Via home pump[ med ball,  Invanz in 100ml NS, infuse over 30 min  refridgerate until 8  hrs before uses    Indications: Not taking  
  
 leucovorin calcium 5 mg tablet Commonly known as:  WELLCOVORIN  
TAKE 1 TABLET TWICE WEEKLY  
  
 LIALDA 1.2 gram delayed release tablet Generic drug:  mesalamine Take 2.4 g by mouth Daily (before breakfast). meloxicam 7.5 mg tablet Commonly known as:  MOBIC  
take 1 tablet by mouth twice a day  
  
 methotrexate 2.5 mg tablet Commonly known as:  Jesenia Zeinab Take 10 mg by mouth. Every Saturday and Sunday  Indications: RHEUMATOID ARTHRITIS  
  
 montelukast 10 mg tablet Commonly known as:  SINGULAIR  
TAKE 1 TABLET DAILY PLETAL 50 mg tablet Generic drug:  cilostazol Take 50 mg by mouth Before breakfast and dinner. Raynaud&#39;s  
  
 predniSONE 10 mg tablet Commonly known as:  Kayla Fisher Take 3 tabs daily for 2 days, then 2 tabs daily for 3 days, then 1 tab daily for 3 days then stop. PriLOSEC 20 mg capsule Generic drug:  omeprazole Take 20 mg by mouth daily. REMICADE 100 mg injection Generic drug:  inFLIXimab  
5 mg/kg by IntraVENous route. Given in Md office  
  
 simvastatin 20 mg tablet Commonly known as:  ZOCOR  
TAKE 1 TABLET NIGHTLY Prescriptions Sent to Pharmacy Refills  
 fluticasone-salmeterol (ADVAIR) 500-50 mcg/dose diskus inhaler 3 Sig: Take 1 Puff by inhalation two (2) times a day. Class: Normal  
 Pharmacy: [address] [place] [address], [address] [place] [address] #: [phone] Route: Inhalation We Performed the Following CBC WITH AUTOMATED DIFF [55962 CPT(R)] CK [id number] CPT(R)] HCV AB W/RFLX TO JUSTA [06480 CPT(R)] HEMOGLOBIN A1C WITH EAG [98358 CPT(R)] LIPID PANEL [35873 CPT(R)] METABOLIC PANEL, COMPREHENSIVE [29562 CPT(R)] PSA, DIAGNOSTIC (PROSTATE SPECIFIC AG) [id number] CPT(R)] Follow-up Instructions Return in about 2 months (around 12/16/2018), or if symptoms worsen or fail to improve, for asthma. Introducing [facility]! Dear Herminio Stephenson: Thank you for requesting a [employer] account. Our records indicate that you already have an active [employer] account. You can access your account anytime at [email or web address] Did you know that you can access your hospital and ER discharge instructions at any time in [employer]?   You can also review all of your test results from your hospital stay or ER visit. Additional Information If you have questions, please visit the Frequently Asked Questions section of the [employer] website at [email or web address] Remember, [employer] is NOT to be used for urgent needs. For medical emergencies, dial 911. Now available from your iPhone and Android! Please provide this summary of care documentation to your next provider. Your primary care clinician is listed as [address] If you have any questions after today's visit, please call [phone].

## 2018-10-16 NOTE — PROGRESS NOTES
HPI: 
Presents for f/u asthma, lipids, etc 
 
resp sx improved But, some mild sx have resumed Using Advair 250 and singulair at this point To see ortho re: left knee today Recent ruptured Baker's cyst 
 
 
Past medical, Social, and Family history reviewed Prior to Admission medications Medication Sig Start Date End Date Taking? Authorizing Provider  
meloxicam (MOBIC) 7.5 mg tablet take 1 tablet by mouth twice a day 8/29/18  Yes Historical Provider  
fluticasone-salmeterol (ADVAIR) 500-50 mcg/dose diskus inhaler Take 1 Puff by inhalation two (2) times a day. 10/16/18  Yes Claudia Peacock MD  
simvastatin (ZOCOR) 20 mg tablet TAKE 1 TABLET NIGHTLY 10/1/18  Yes Claudia Peacock MD  
leucovorin calcium (WELLCOVORIN) 5 mg tablet TAKE 1 TABLET TWICE WEEKLY 8/2/18  Yes Claudia Peacock MD  
montelukast (SINGULAIR) 10 mg tablet TAKE 1 TABLET DAILY 11/26/17  Yes Claudia Peacock MD  
mesalamine (LIALDA) 1.2 gram delayed release tablet Take 2.4 g by mouth Daily (before breakfast). Yes Historical Provider  
budesonide (RHINOCORT AQUA) 32 mcg/actuation nasal spray 2 Sprays by Both Nostrils route daily. Yes Historical Provider  
cetirizine (ZYRTEC) 10 mg tablet Take 1 Tab by mouth daily. 5/13/16  Yes Claudia Peacock MD  
albuterol (PROVENTIL HFA, VENTOLIN HFA, PROAIR HFA) 90 mcg/actuation inhaler Take 2 Puffs by inhalation every four (4) hours as needed for Wheezing. 10/28/15  Yes Claudia Peacock MD  
infliximab (REMICADE) 100 mg injection 5 mg/kg by IntraVENous route. Given in Md office   Yes Historical Provider  
methotrexate (RHEUMATREX) 2.5 mg tablet Take 10 mg by mouth. Every Saturday and Sunday  Indications: RHEUMATOID ARTHRITIS   Yes Historical Provider  
omeprazole (PRILOSEC) 20 mg capsule Take 20 mg by mouth daily. Yes Historical Provider  
cilostazol (PLETAL) 50 mg tablet Take 50 mg by mouth Before breakfast and dinner. Raynaud&#39;s   Yes Historical Provider predniSONE (DELTASONE) 10 mg tablet Take 3 tabs daily for 2 days, then 2 tabs daily for 3 days, then 1 tab daily for 3 days then stop. Patient not taking: Reported on 10/16/2018 10/1/18   Jordon Cuadra MD  
ertapenem [facility]) 1 gram injection 1 g by IntraVENous route daily. Via home pump[ med ball,  Invanz in 100ml NS, infuse over 30 min  
refridgerate until 8  hrs before uses Indications: Not taking    Historical Provider ROS Complete ROS reviewed and negative or stable except as noted in HPI. Physical Exam  
Constitutional: He is oriented to person, place, and time. He appears well-nourished. No distress. HENT:  
Head: Normocephalic and atraumatic. Mouth/Throat: Oropharynx is clear and moist. No oropharyngeal exudate. Eyes: EOM are normal. Pupils are equal, round, and reactive to light. No scleral icterus. Neck: Normal range of motion. Neck supple. No JVD present. No thyromegaly present. Cardiovascular: Normal rate, regular rhythm and normal heart sounds. Exam reveals no gallop and no friction rub. No murmur heard. Pulmonary/Chest: Effort normal and breath sounds normal. No respiratory distress. He has no wheezes. He has no rales. Prolonged exp phase Abdominal: Soft. Bowel sounds are normal. He exhibits no distension. There is no tenderness. Genitourinary: Prostate normal.  
Musculoskeletal: Normal range of motion. He exhibits no edema. Lymphadenopathy:  
  He has no cervical adenopathy. Neurological: He is alert and oriented to person, place, and time. He exhibits normal muscle tone. Coordination normal.  
Skin: Skin is warm. No rash noted. Psychiatric: He has a normal mood and affect. Nursing note and vitals reviewed. Prior labs reviewed. Reviewed prior imaging reports Assessment/Plan: ICD-10-CM ICD-9-CM 1.  Hyperlipidemia, unspecified hyperlipidemia type E78.5 272.4 CK  
   LIPID PANEL  
   METABOLIC PANEL, COMPREHENSIVE  
 2. Rheumatoid arthritis, involving unspecified site, unspecified rheumatoid factor presence (HCC) M06.9 714.0 CBC WITH AUTOMATED DIFF  
3. IGT (impaired glucose tolerance) R73.02 790.22 HEMOGLOBIN A1C WITH EAG 4. Moderate persistent asthma with acute exacerbation J45.41 493.92 CBC WITH AUTOMATED DIFF  
   fluticasone-salmeterol (ADVAIR) 500-50 mcg/dose diskus inhaler 5. Ulcerative colitis without complications, unspecified location (HCC) K51.90 556.9 6. Long-term use of immunosuppressant medication Z79.899 V58.69   
7. Elevated PSA R97.20 790.93 PSA, DIAGNOSTIC (PROSTATE SPECIFIC AG) 8. Need for hepatitis C screening test Z11.59 V73.89 HCV AB W/RFLX TO JUSTA Follow-up Disposition: 
Return in about 2 months (around 12/16/2018), or if symptoms worsen or fail to improve, for asthma. results and schedule of future studies reviewed with patient 
reviewed diet, exercise and weight  
cardiovascular risk and specific lipid/LDL goals reviewed 
reviewed medications and side effects in detail Pt to address the timing of shingrix with rheum and remicade dosing schedule Increase to advair at 500 dose Continue singulair See ortho

## 2018-10-16 NOTE — PROGRESS NOTES
Exam Room 13 Vincent Frias is a 63 y.o. male Non fasting Chief Complaint Patient presents with  Cholesterol Problem  
  follow up 1. Have you been to the ER, urgent care clinic since your last visit? Hospitalized since your last visit? No 
 
2. Have you seen or consulted any other health care providers outside of the [address] [place] [address] since your last visit? Include any pap smears or colon screening. Yes, 10/12/2018, Dr. Fab Alvarez, [facility] Due Topic Date Due  
 Hepatitis C Screening  1960  Pneumococcal 19-64 Medium Risk (1 of 1 - PPSV23) 08/15/1980  Shingrix Vaccine Age 50> (1 of 2) 08/15/2011

## 2018-11-02 LAB
ALBUMIN SERPL-MCNC: 4.1 G/DL (ref 3.5–5.5)
ALBUMIN/GLOB SERPL: 1.3 {RATIO} (ref 1.2–2.2)
ALP SERPL-CCNC: 93 IU/L (ref 39–117)
ALT SERPL-CCNC: 24 IU/L (ref 0–44)
AST SERPL-CCNC: 22 IU/L (ref 0–40)
BASOPHILS # BLD AUTO: 0.1 X10E3/UL (ref 0–0.2)
BASOPHILS NFR BLD AUTO: 1 %
BILIRUB SERPL-MCNC: 0.3 MG/DL (ref 0–1.2)
BUN SERPL-MCNC: 15 MG/DL (ref 6–24)
BUN/CREAT SERPL: 13 (ref 9–20)
CALCIUM SERPL-MCNC: 9.6 MG/DL (ref 8.7–10.2)
CHLORIDE SERPL-SCNC: 104 MMOL/L (ref 96–106)
CHOLEST SERPL-MCNC: 165 MG/DL (ref 100–199)
CK SERPL-CCNC: 159 U/L (ref 24–204)
CO2 SERPL-SCNC: 26 MMOL/L (ref 20–29)
CREAT SERPL-MCNC: 1.12 MG/DL (ref 0.76–1.27)
EOSINOPHIL # BLD AUTO: 0.1 X10E3/UL (ref 0–0.4)
EOSINOPHIL NFR BLD AUTO: 2 %
ERYTHROCYTE [DISTWIDTH] IN BLOOD BY AUTOMATED COUNT: 14.1 % (ref 12.3–15.4)
EST. AVERAGE GLUCOSE BLD GHB EST-MCNC: 126 MG/DL
GLOBULIN SER CALC-MCNC: 3.2 G/DL (ref 1.5–4.5)
GLUCOSE SERPL-MCNC: 93 MG/DL (ref 65–99)
HBA1C MFR BLD: 6 % (ref 4.8–5.6)
HCT VFR BLD AUTO: 39.2 % (ref 37.5–51)
HCV AB S/CO SERPL IA: <0.1 S/CO RATIO (ref 0–0.9)
HCV AB SERPL QL IA: NORMAL
HDLC SERPL-MCNC: 40 MG/DL
HGB BLD-MCNC: 13.2 G/DL (ref 13–17.7)
IMM GRANULOCYTES # BLD: 0 X10E3/UL (ref 0–0.1)
IMM GRANULOCYTES NFR BLD: 1 %
LDLC SERPL CALC-MCNC: 104 MG/DL (ref 0–99)
LYMPHOCYTES # BLD AUTO: 1.9 X10E3/UL (ref 0.7–3.1)
LYMPHOCYTES NFR BLD AUTO: 29 %
MCH RBC QN AUTO: 33 PG (ref 26.6–33)
MCHC RBC AUTO-ENTMCNC: 33.7 G/DL (ref 31.5–35.7)
MCV RBC AUTO: 98 FL (ref 79–97)
MONOCYTES # BLD AUTO: 0.7 X10E3/UL (ref 0.1–0.9)
MONOCYTES NFR BLD AUTO: 11 %
NEUTROPHILS # BLD AUTO: 3.6 X10E3/UL (ref 1.4–7)
NEUTROPHILS NFR BLD AUTO: 56 %
PLATELET # BLD AUTO: 422 X10E3/UL (ref 150–379)
POTASSIUM SERPL-SCNC: 5 MMOL/L (ref 3.5–5.2)
PROT SERPL-MCNC: 7.3 G/DL (ref 6–8.5)
PSA SERPL-MCNC: 3.4 NG/ML (ref 0–4)
RBC # BLD AUTO: 4 X10E6/UL (ref 4.14–5.8)
SODIUM SERPL-SCNC: 141 MMOL/L (ref 134–144)
TRIGL SERPL-MCNC: 107 MG/DL (ref 0–149)
VLDLC SERPL CALC-MCNC: 21 MG/DL (ref 5–40)
WBC # BLD AUTO: 6.4 X10E3/UL (ref 3.4–10.8)

## 2018-11-02 NOTE — PROGRESS NOTES
HgbA1C (average blood sugar) is stable at 6.0. But, LDL cholesterol has increased to 104 which is above the goal of at least < 100. We can either improve diet by reducing saturated fats or consider changing to generic crestor instead of zocor for your cholesterol medication. It works better and is generally better tolerated. Has been generic for a year or 2 now. Let me know what you think. Other labs are OK Continue your other current medications

## 2018-11-12 ENCOUNTER — HOSPITAL ENCOUNTER (OUTPATIENT)
Dept: PREADMISSION TESTING | Age: 58
Discharge: HOME | End: 2018-11-12
Payer: COMMERCIAL

## 2018-11-12 VITALS
HEIGHT: 71 IN | SYSTOLIC BLOOD PRESSURE: 157 MMHG | OXYGEN SATURATION: 95 % | RESPIRATION RATE: 20 BRPM | WEIGHT: 200.25 LBS | HEART RATE: 83 BPM | DIASTOLIC BLOOD PRESSURE: 80 MMHG | TEMPERATURE: 97.8 F | BODY MASS INDEX: 28.03 KG/M2

## 2018-11-12 LAB
ABO + RH BLD: NORMAL
ALBUMIN SERPL-MCNC: 3.7 G/DL (ref 3.5–5)
ALBUMIN/GLOB SERPL: 0.9 {RATIO} (ref 1.1–2.2)
ALP SERPL-CCNC: 100 U/L (ref 45–117)
ALT SERPL-CCNC: 41 U/L (ref 12–78)
ANION GAP SERPL CALC-SCNC: 8 MMOL/L (ref 5–15)
APPEARANCE UR: CLEAR
APTT PPP: 31 SEC (ref 22.1–32)
AST SERPL-CCNC: 32 U/L (ref 15–37)
ATRIAL RATE: 69 BPM
BACTERIA URNS QL MICRO: NEGATIVE /HPF
BASOPHILS # BLD: 0.1 K/UL (ref 0–0.1)
BASOPHILS NFR BLD: 1 % (ref 0–1)
BILIRUB SERPL-MCNC: 0.4 MG/DL (ref 0.2–1)
BILIRUB UR QL: NEGATIVE
BLOOD GROUP ANTIBODIES SERPL: NORMAL
BUN SERPL-MCNC: 17 MG/DL (ref 6–20)
BUN/CREAT SERPL: 15 (ref 12–20)
CALCIUM SERPL-MCNC: 9.1 MG/DL (ref 8.5–10.1)
CALCULATED P AXIS, ECG09: 33 DEGREES
CALCULATED R AXIS, ECG10: 17 DEGREES
CALCULATED T AXIS, ECG11: 33 DEGREES
CHLORIDE SERPL-SCNC: 104 MMOL/L (ref 97–108)
CO2 SERPL-SCNC: 31 MMOL/L (ref 21–32)
COLOR UR: NORMAL
CREAT SERPL-MCNC: 1.17 MG/DL (ref 0.7–1.3)
DIAGNOSIS, 93000: NORMAL
DIFFERENTIAL METHOD BLD: ABNORMAL
EOSINOPHIL # BLD: 0.1 K/UL (ref 0–0.4)
EOSINOPHIL NFR BLD: 2 % (ref 0–7)
EPITH CASTS URNS QL MICRO: NORMAL /LPF
ERYTHROCYTE [DISTWIDTH] IN BLOOD BY AUTOMATED COUNT: 13.4 % (ref 11.5–14.5)
EST. AVERAGE GLUCOSE BLD GHB EST-MCNC: 114 MG/DL
GLOBULIN SER CALC-MCNC: 4.1 G/DL (ref 2–4)
GLUCOSE SERPL-MCNC: 110 MG/DL (ref 65–100)
GLUCOSE UR STRIP.AUTO-MCNC: NEGATIVE MG/DL
HBA1C MFR BLD: 5.6 % (ref 4.2–6.3)
HCT VFR BLD AUTO: 39.6 % (ref 36.6–50.3)
HGB BLD-MCNC: 13 G/DL (ref 12.1–17)
HGB UR QL STRIP: NEGATIVE
HYALINE CASTS URNS QL MICRO: NORMAL /LPF (ref 0–5)
IMM GRANULOCYTES # BLD: 0 K/UL (ref 0–0.04)
IMM GRANULOCYTES NFR BLD AUTO: 0 % (ref 0–0.5)
INR PPP: 1 (ref 0.9–1.1)
KETONES UR QL STRIP.AUTO: NEGATIVE MG/DL
LEUKOCYTE ESTERASE UR QL STRIP.AUTO: NEGATIVE
LYMPHOCYTES # BLD: 1.9 K/UL (ref 0.8–3.5)
LYMPHOCYTES NFR BLD: 25 % (ref 12–49)
MCH RBC QN AUTO: 33.4 PG (ref 26–34)
MCHC RBC AUTO-ENTMCNC: 32.8 G/DL (ref 30–36.5)
MCV RBC AUTO: 101.8 FL (ref 80–99)
MONOCYTES # BLD: 0.7 K/UL (ref 0–1)
MONOCYTES NFR BLD: 8 % (ref 5–13)
NEUTS SEG # BLD: 5.1 K/UL (ref 1.8–8)
NEUTS SEG NFR BLD: 65 % (ref 32–75)
NITRITE UR QL STRIP.AUTO: NEGATIVE
NRBC # BLD: 0 K/UL (ref 0–0.01)
NRBC BLD-RTO: 0 PER 100 WBC
P-R INTERVAL, ECG05: 146 MS
PH UR STRIP: 6 [PH] (ref 5–8)
PLATELET # BLD AUTO: 334 K/UL (ref 150–400)
PMV BLD AUTO: 9 FL (ref 8.9–12.9)
POTASSIUM SERPL-SCNC: 4.6 MMOL/L (ref 3.5–5.1)
PROT SERPL-MCNC: 7.8 G/DL (ref 6.4–8.2)
PROT UR STRIP-MCNC: NEGATIVE MG/DL
PROTHROMBIN TIME: 10.4 SEC (ref 9–11.1)
Q-T INTERVAL, ECG07: 396 MS
QRS DURATION, ECG06: 86 MS
QTC CALCULATION (BEZET), ECG08: 424 MS
RBC # BLD AUTO: 3.89 M/UL (ref 4.1–5.7)
RBC #/AREA URNS HPF: NORMAL /HPF (ref 0–5)
SODIUM SERPL-SCNC: 143 MMOL/L (ref 136–145)
SP GR UR REFRACTOMETRY: 1.02 (ref 1–1.03)
SPECIMEN EXP DATE BLD: NORMAL
THERAPEUTIC RANGE,PTTT: NORMAL SECS (ref 58–77)
UA: UC IF INDICATED,UAUC: NORMAL
UROBILINOGEN UR QL STRIP.AUTO: 1 EU/DL (ref 0.2–1)
VENTRICULAR RATE, ECG03: 69 BPM
WBC # BLD AUTO: 7.9 K/UL (ref 4.1–11.1)
WBC URNS QL MICRO: NORMAL /HPF (ref 0–4)

## 2018-11-12 PROCEDURE — 86901 BLOOD TYPING SEROLOGIC RH(D): CPT

## 2018-11-12 PROCEDURE — 81001 URINALYSIS AUTO W/SCOPE: CPT

## 2018-11-12 PROCEDURE — 85025 COMPLETE CBC W/AUTO DIFF WBC: CPT

## 2018-11-12 PROCEDURE — 85610 PROTHROMBIN TIME: CPT

## 2018-11-12 PROCEDURE — 36415 COLL VENOUS BLD VENIPUNCTURE: CPT

## 2018-11-12 PROCEDURE — 80053 COMPREHEN METABOLIC PANEL: CPT

## 2018-11-12 PROCEDURE — 85730 THROMBOPLASTIN TIME PARTIAL: CPT

## 2018-11-12 PROCEDURE — 93005 ELECTROCARDIOGRAM TRACING: CPT

## 2018-11-12 PROCEDURE — 83036 HEMOGLOBIN GLYCOSYLATED A1C: CPT

## 2018-11-12 NOTE — H&P
PAT Pre-Op History & Physical 
 
Patient: Dereck Mo. MRN: [id number]          SSN: [id number] YOB: 1960          Age: 63 y.o. Sex: male Subjective:  
 
Patient is a 63 y.o.  male who presents with history of sustaining a left tibial fracture July 2017. He had an ORIF to repair the fracture. In October of 2017 he was having pain and a scope was performed. In November it was determined he had an infection and an IND was performed with removal of hardware. He was given a PICC line with IV ABX. In August he finished his ABX then intense pain came back in his left leg. He recently had an aspiration in Oct 2018 which determined he still has infection. 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 He states he has no pain when sitting still but movement of any kind will increase pain to 6/10. Any weight bearing of left leg will increase pain. He denies buckling. Notes his knee will get very swollen at times. IHe has failed NSAID, IV ABX, ice application, previous surgery. The patient was evaluated in the surgeon's office and it was determined that the most appropriate plan of care is to proceed with surgical intervention. Patient's PCP Sanjana Elizalde MD 
 
 
Past Medical History:  
Diagnosis Date  Allergic rhinitis  Asthma   
 allergy/URI related  Colon polyp 11/13/13  Diverticulosis  GERD (gastroesophageal reflux disease)  Hiatal hernia  Hypercholesterolemia  IGT (impaired glucose tolerance)  Kidney stone 2016  
 x2  Overweight (BMI 25.0-29.9) 11/12/2018  Rheumatoid arthritis(714.0)  S/P PICC central line placement 11/09/2017  
 has been removed  UC (ulcerative colitis) ([facility] In remission Past Surgical History:  
Procedure Laterality Date  ABDOMEN SURGERY PROC UNLISTED Bilateral 2007 Hernia Repair [address] UNLISTED Left 1971 Inquinal hernia  HX GI  2010 & 2016 COLONOSCOPY  
 HX KNEE ARTHROSCOPY Left 10/2017  HX LUMBAR DISKECTOMY N/A 2012  
 herniated disk  HX OPEN REDUCTION INTERNAL FIXATION Left 07/2017 Tibia fx  REMOVAL OF IMPLANT MATERIAL Left 11/2017 IND with plate removal of tibia Prior to Admission medications Medication Sig Start Date End Date Taking? Authorizing Provider  
meloxicam (MOBIC) 7.5 mg tablet take 1 tablet by mouth twice a day 8/29/18  Yes Provider, Historical  
fluticasone-salmeterol (ADVAIR) 500-50 mcg/dose diskus inhaler Take 1 Puff by inhalation two (2) times a day. 10/16/18  Yes Inés Quinones MD  
simvastatin (ZOCOR) 20 mg tablet TAKE 1 TABLET NIGHTLY 10/1/18  Yes Inés Quinones MD  
leucovorin calcium (WELLCOVORIN) 5 mg tablet TAKE 1 TABLET TWICE WEEKLY 8/2/18  Yes Inés Quinones MD  
montelukast (SINGULAIR) 10 mg tablet TAKE 1 TABLET DAILY 11/26/17  Yes Inés Quinones MD  
mesalamine (LIALDA) 1.2 gram delayed release tablet Take 2.4 g by mouth Daily (before breakfast). Yes Provider, Historical  
budesonide (RHINOCORT AQUA) 32 mcg/actuation nasal spray 2 Sprays by Both Nostrils route daily. Yes Provider, Historical  
albuterol (PROVENTIL HFA, VENTOLIN HFA, PROAIR HFA) 90 mcg/actuation inhaler Take 2 Puffs by inhalation every four (4) hours as needed for Wheezing. 10/28/15  Yes Inés Quinones MD  
infliximab (REMICADE) 100 mg injection 5 mg/kg by IntraVENous route. Given in Md office   Yes Provider, Historical  
methotrexate (RHEUMATREX) 2.5 mg tablet Take 10 mg by mouth. Every Saturday and Sunday  Indications: RHEUMATOID ARTHRITIS   Yes Provider, Historical  
omeprazole (PRILOSEC) 20 mg capsule Take 20 mg by mouth daily. Yes Provider, Historical  
cilostazol (PLETAL) 50 mg tablet Take 50 mg by mouth Before breakfast and dinner.  Raynaud's   Yes Provider, Historical  
predniSONE (DELTASONE) 10 mg tablet Take 3 tabs daily for 2 days, then 2 tabs daily for 3 days, then 1 tab daily for 3 days then stop. Patient not taking: Reported on 10/16/2018 10/1/18   Mimi Sprague MD  
[facility]) 1 gram injection 1 g by IntraVENous route daily. Via home pump[ med ball,  Invanz in 100ml NS, infuse over 30 min  
refridgerate until 8  hrs before uses Indications: Not taking    Provider, Historical  
cetirizine (ZYRTEC) 10 mg tablet Take 1 Tab by mouth daily. 5/13/16   Blanca Villeda MD  
 
Current Outpatient Medications Medication Sig  
 meloxicam (MOBIC) 7.5 mg tablet take 1 tablet by mouth twice a day  fluticasone-salmeterol (ADVAIR) 500-50 mcg/dose diskus inhaler Take 1 Puff by inhalation two (2) times a day.  simvastatin (ZOCOR) 20 mg tablet TAKE 1 TABLET NIGHTLY  leucovorin calcium (WELLCOVORIN) 5 mg tablet TAKE 1 TABLET TWICE WEEKLY  montelukast (SINGULAIR) 10 mg tablet TAKE 1 TABLET DAILY  mesalamine (LIALDA) 1.2 gram delayed release tablet Take 2.4 g by mouth Daily (before breakfast).  budesonide (RHINOCORT AQUA) 32 mcg/actuation nasal spray 2 Sprays by Both Nostrils route daily.  albuterol (PROVENTIL HFA, VENTOLIN HFA, PROAIR HFA) 90 mcg/actuation inhaler Take 2 Puffs by inhalation every four (4) hours as needed for Wheezing.  infliximab (REMICADE) 100 mg injection 5 mg/kg by IntraVENous route. Given in Md office  methotrexate (RHEUMATREX) 2.5 mg tablet Take 10 mg by mouth. Every Saturday and Sunday  Indications: RHEUMATOID ARTHRITIS  omeprazole (PRILOSEC) 20 mg capsule Take 20 mg by mouth daily.  cilostazol (PLETAL) 50 mg tablet Take 50 mg by mouth Before breakfast and dinner. Raynaud's  predniSONE (DELTASONE) 10 mg tablet Take 3 tabs daily for 2 days, then 2 tabs daily for 3 days, then 1 tab daily for 3 days then stop. (Patient not taking: Reported on 10/16/2018)  ertapenem [facility]) 1 gram injection 1 g by IntraVENous route daily.  Via home pump[ med ball,  Invanz in 100ml NS, infuse over 30 min  
refridgerate until 8  hrs before uses Indications: Not taking  cetirizine (ZYRTEC) 10 mg tablet Take 1 Tab by mouth daily. No current facility-administered medications for this encounter. No Known Allergies Social History Tobacco Use  Smoking status: Never Smoker  Smokeless tobacco: Never Used Substance Use Topics  Alcohol use: Yes Alcohol/week: 1.2 oz Types: 2 Shots of liquor per week Comment: occasional   
  
Social History Substance and Sexual Activity Drug Use No  
 
Family History Problem Relation Age of Onset  Diabetes Mother  Heart Attack Father SEVERAL MIs  Arrhythmia Father HAD PACEMAKER  Stroke Father  Other Father  OF MRSA  No Known Problems Sister Review of Systems Patient denies difficulty swallowing, mouth sores, or loose teeth. Patient denies any recent dental procedures or any planned prior to surgery. Patient denies chest pain, tightness, pain radiating down left arm, palpitations. Denies dizziness, visual disturbances, or lightheadedness. Patient denies shortness of breath, wheezing, cough, fever, or chills. Patient denies diarrhea, constipation, or abdominal pain. Patient denies urinary problems including dysuria, hesitancy, urgency, or incontinence. Denies skin breakdown, rashes, insect bites or open area. Objective:  
 
Patient Vitals for the past 24 hrs: 
 Temp Pulse Resp BP SpO2  
18 1438 97.8 °F (36.6 °C) 83 20 157/80 95 % Temp (24hrs), Av.8 °F (36.6 °C), Min:97.8 °F (36.6 °C), Max:97.8 °F (36.6 °C) Body mass index is 27.93 kg/m². Wt Readings from Last 1 Encounters:  
18 90.8 kg (200 lb 4 oz) Physical Exam: 
 
 General: Pleasant,  cooperative, no apparent distress, appears stated age. Eyes: Conjunctivae/corneas clear. EOMs intact.  
 Nose: Nares normal. 
 Mouth/Throat: Lips, mucosa, and tongue normal. Teeth and gums normal. 
 Lungs: Clear to auscultation bilaterally. Heart: Regular rate and rhythm, S1, S2 normal. No murmur, click, rub or gallop. Abdomen: Soft, non-tender. Bowel sounds normal. No distention. Musculoskeletal:  Gait antalgic. Extremities:  Extremities normal, atraumatic, no cyanosis or edema. Calves 
                               supple, non tender to palpation. Pulses: 2+ and symmetric bilateral upper extremities. Cap. refill <2 seconds Skin: Skin color, texture, turgor normal. No visible open areas, examined fully clothed Neurologic: CN II-XII grossly intact. Alert and oriented x3. Labs:  
Recent Results (from the past 72 hour(s)) CBC WITH AUTOMATED DIFF Collection Time: 11/12/18  3:42 PM  
Result Value Ref Range WBC 7.9 4.1 - 11.1 K/uL  
 RBC 3.89 (L) 4.10 - 5.70 M/uL  
 HGB 13.0 12.1 - 17.0 g/dL HCT 39.6 36.6 - 50.3 % .8 (H) 80.0 - 99.0 FL  
 MCH 33.4 26.0 - 34.0 PG  
 MCHC 32.8 30.0 - 36.5 g/dL  
 RDW 13.4 11.5 - 14.5 % PLATELET 788 646 - 026 K/uL MPV 9.0 8.9 - 12.9 FL  
 NRBC 0.0 0  WBC ABSOLUTE NRBC 0.00 0.00 - 0.01 K/uL NEUTROPHILS 65 32 - 75 % LYMPHOCYTES 25 12 - 49 % MONOCYTES 8 5 - 13 % EOSINOPHILS 2 0 - 7 % BASOPHILS 1 0 - 1 % IMMATURE GRANULOCYTES 0 0.0 - 0.5 % ABS. NEUTROPHILS 5.1 1.8 - 8.0 K/UL  
 ABS. LYMPHOCYTES 1.9 0.8 - 3.5 K/UL  
 ABS. MONOCYTES 0.7 0.0 - 1.0 K/UL  
 ABS. EOSINOPHILS 0.1 0.0 - 0.4 K/UL  
 ABS. BASOPHILS 0.1 0.0 - 0.1 K/UL  
 ABS. IMM. GRANS. 0.0 0.00 - 0.04 K/UL  
 DF AUTOMATED METABOLIC PANEL, COMPREHENSIVE Collection Time: 11/12/18  3:42 PM  
Result Value Ref Range Sodium 143 136 - 145 mmol/L Potassium 4.6 3.5 - 5.1 mmol/L Chloride 104 97 - 108 mmol/L  
 CO2 31 21 - 32 mmol/L Anion gap 8 5 - 15 mmol/L Glucose 110 (H) 65 - 100 mg/dL BUN 17 6 - 20 MG/DL  Creatinine 1.17 0.70 - 1.30 MG/DL  
 BUN/Creatinine ratio 15 12 - 20 GFR est AA >60 >60 ml/min/1.73m2 GFR est non-AA >60 >60 ml/min/1.73m2 Calcium 9.1 8.5 - 10.1 MG/DL Bilirubin, total 0.4 0.2 - 1.0 MG/DL  
 ALT (SGPT) 41 12 - 78 U/L  
 AST (SGOT) 32 15 - 37 U/L Alk. phosphatase 100 45 - 117 U/L Protein, total 7.8 6.4 - 8.2 g/dL Albumin 3.7 3.5 - 5.0 g/dL Globulin 4.1 (H) 2.0 - 4.0 g/dL A-G Ratio 0.9 (L) 1.1 - 2.2 HEMOGLOBIN A1C WITH EAG Collection Time: 11/12/18  3:42 PM  
Result Value Ref Range Hemoglobin A1c 5.6 4.2 - 6.3 % Est. average glucose 114 mg/dL PROTHROMBIN TIME + INR Collection Time: 11/12/18  3:42 PM  
Result Value Ref Range INR 1.0 0.9 - 1.1 Prothrombin time 10.4 9.0 - 11.1 sec PTT Collection Time: 11/12/18  3:42 PM  
Result Value Ref Range aPTT 31.0 22.1 - 32.0 sec  
 aPTT, therapeutic range     58.0 - 77.0 SECS  
URINALYSIS W/ REFLEX CULTURE Collection Time: 11/12/18  3:42 PM  
Result Value Ref Range Color YELLOW/STRAW Appearance CLEAR CLEAR Specific gravity 1.024 1.003 - 1.030    
 pH (UA) 6.0 5.0 - 8.0 Protein NEGATIVE  NEG mg/dL Glucose NEGATIVE  NEG mg/dL Ketone NEGATIVE  NEG mg/dL Bilirubin NEGATIVE  NEG Blood NEGATIVE  NEG Urobilinogen 1.0 0.2 - 1.0 EU/dL Nitrites NEGATIVE  NEG Leukocyte Esterase NEGATIVE  NEG    
 WBC 0-4 0 - 4 /hpf  
 RBC 0-5 0 - 5 /hpf Epithelial cells FEW FEW /lpf Bacteria NEGATIVE  NEG /hpf  
 UA:UC IF INDICATED CULTURE NOT INDICATED BY UA RESULT CNI Hyaline cast 0-2 0 - 5 /lpf  
TYPE & SCREEN Collection Time: 11/12/18  3:42 PM  
Result Value Ref Range Crossmatch Expiration 11/22/2018 ABO/Rh(D) A POSITIVE Antibody screen NEG   
EKG, 12 LEAD, INITIAL Collection Time: 11/12/18  4:03 PM  
Result Value Ref Range Ventricular Rate 69 BPM  
 Atrial Rate 69 BPM  
 P-R Interval 146 ms  
 QRS Duration 86 ms  
 Q-T Interval 396 ms QTC Calculation (Bezet) 424 ms Calculated P Axis 33 degrees Calculated R Axis 17 degrees Calculated T Axis 33 degrees Diagnosis Normal sinus rhythm Normal ECG Confirmed by Robert Velazquez M.D., Nunu Fernandez ([id number]) on 11/12/2018 4:52:56 PM 
  
 
 
Assessment: ARTHRITIS DUE TO OTHER BACTERIA Plan:  
 
Scheduled for IRRIGATION AND DEBRIDEMENT LEFT KNEE WITH ANTIBIOTIC SPACER PLACEMENT  (BLOCK AND SPINAL) All labs reviewed and unremarkable. EKG reviewed. MRSA pending Patient will be going to his PCP for medical clearance on 11-13-18.  Will obtain note once final.  
 
Dimitry Sheppard, YOHAN

## 2018-11-12 NOTE — PERIOP NOTES
[address]                  
[address] [facility], [id number] [place] [address] MAIN OR                                  [phone] MAIN PRE OP                          [phone]                                                                                AMBULATORY PRE OP          [phone] PRE-ADMISSION TESTING    21  Surgery Date:   11/19/18 Is surgery arrival time given by surgeon? YES If Dee Riser staff will call you between 3 and 7pm the day before your surgery with your arrival time. (If your surgery is on a Monday, we will call you the Friday before.) Call [phone] after 7pm Monday-Friday if you did not receive your arrival time. INSTRUCTIONS BEFORE YOUR SURGERY When You 
Arrive Arrive at the 2nd [address] [facility] on the day of your surgery Have your insurance card, photo ID, and any copayment (if needed) Food 
 and  
Drink NO food or drink after midnight the night before surgery This means NO water, gum, mints, coffee, juice, etc. 
No alcohol (beer, wine, liquor) 24 hours before and after surgery Medications to TAKE Morning of Surgery MEDICATIONS TO TAKE THE MORNING OF SURGERY WITH A SIP OF WATER:  
? Bring Albuterol inhaler,Advair inhaler ,Lialda,Singular,Omperazole Medications To 
STOP      7 days before surgery ? Non-Steroidal anti-inflammatory Drugs (NSAID's): for example, Ibuprofen (Advil, Motrin), Naproxen (Aleve) ? Aspirin, if taking for pain ? Herbal supplements, vitamins, and fish oil 
? Other:Ask  when to stop Pletal and Meloxicam) (Pain medications not listed above, including Tylenol may be taken) Blood Thinners ? If you take  Aspirin, Plavix, Coumadin, or any blood-thinning or anti-blood clot medicine, talk to the doctor who prescribed the medications for pre-operative instructions. Bathing Clothing Jewelry Valuables ?  If you shower the morning of surgery, please do not apply anything to your skin (lotions, powders, deodorant, or makeup, especially mascara) ? Follow all special bath instructions (for total joint replacement, spine and bowel surgeries) ? Do not shave or trim anywhere 24 hours before surgery ? Wear your hair loose or down; no pony-tails, buns, or metal hair clips ? Wear loose, comfortable, clean clothes ? Wear glasses instead of contacts ? Leave money, valuables, and jewelry, including body piercings, at home Going Home       or Spending the Night ? SAME-DAY SURGERY: You must have a responsible adult drive you home and stay with you 24 hours after surgery ? ADMITS: If your doctor is keeping you into the hospital after surgery, leave personal belongings/luggage in your car until you have a hospital room number. Hospital discharge time is 12 noon Drivers must be here before 12 noon unless you are told differently Special Instructions   Special Instructions: · Use Chlorhexidine Care Fusion wash and sponges 3 days prior to surgery as instructed. · Incentive spirometer given with instructions to practice at home and bring back to the hospital on the day of surgery. · [facility] will contact you if your Hemoglobin A1C is greater than 7.5. · Ensure/Glucerna  sample, nutritional information, and Ensure/Glucerna coupon given. · Pain pamphlet and Call Don't Fall reminder reviewed with patient. ·  parking is complimentary Monday - Friday 7 am - 5 pm 
· Bring PTA Medication list day of surgery with the last doses taken documented · Do not bring medication bottles the day of surgery Follow all instructions so your surgery wont be cancelled. Please, be on time. If a situation occurs and you are delayed the day of surgery, call [phone] or          [phone].  
 
If your physical condition changes (like a fever, cold, flu, etc.) call your surgeon. The patient was contacted  in person. The patient verbalizes understanding of all instructions and does not  need reinforcement.

## 2018-11-13 ENCOUNTER — OFFICE VISIT (OUTPATIENT)
Dept: INTERNAL MEDICINE CLINIC | Age: 58
End: 2018-11-13

## 2018-11-13 VITALS
TEMPERATURE: 98.3 F | HEART RATE: 78 BPM | RESPIRATION RATE: 16 BRPM | SYSTOLIC BLOOD PRESSURE: 121 MMHG | HEIGHT: 71 IN | OXYGEN SATURATION: 96 % | DIASTOLIC BLOOD PRESSURE: 85 MMHG | WEIGHT: 201 LBS | BODY MASS INDEX: 28.14 KG/M2

## 2018-11-13 DIAGNOSIS — Z01.818 PRE-OP EVALUATION: ICD-10-CM

## 2018-11-13 DIAGNOSIS — M06.9 RHEUMATOID ARTHRITIS, INVOLVING UNSPECIFIED SITE, UNSPECIFIED RHEUMATOID FACTOR PRESENCE: ICD-10-CM

## 2018-11-13 DIAGNOSIS — S80.252S: Primary | ICD-10-CM

## 2018-11-13 DIAGNOSIS — J45.41 MODERATE PERSISTENT ASTHMA WITH ACUTE EXACERBATION: ICD-10-CM

## 2018-11-13 DIAGNOSIS — K21.9 GASTROESOPHAGEAL REFLUX DISEASE WITHOUT ESOPHAGITIS: ICD-10-CM

## 2018-11-13 DIAGNOSIS — Z79.60 LONG-TERM USE OF IMMUNOSUPPRESSANT MEDICATION: ICD-10-CM

## 2018-11-13 DIAGNOSIS — L08.9: Primary | ICD-10-CM

## 2018-11-13 DIAGNOSIS — E78.5 HYPERLIPIDEMIA, UNSPECIFIED HYPERLIPIDEMIA TYPE: ICD-10-CM

## 2018-11-13 DIAGNOSIS — K51.90 ULCERATIVE COLITIS WITHOUT COMPLICATIONS, UNSPECIFIED LOCATION (HCC): ICD-10-CM

## 2018-11-13 LAB
BACTERIA SPEC CULT: ABNORMAL
BACTERIA SPEC CULT: ABNORMAL
SERVICE CMNT-IMP: ABNORMAL

## 2018-11-13 RX ORDER — ROSUVASTATIN CALCIUM 10 MG/1
10 TABLET, COATED ORAL
Qty: 90 TAB | Refills: 1 | Status: SHIPPED | OUTPATIENT
Start: 2018-11-13 | End: 2019-10-29 | Stop reason: SDUPTHER

## 2018-11-13 NOTE — LETTER
11/13/2018 4:29 PM 
 
Mr. Renate Gallardo [address]ade[address] [place]more [phone] Prior to Admission medications Medication Sig Start Date End Date Taking? Authorizing Provider  
meloxicam (MOBIC) 7.5 mg tablet take 1 tablet by mouth twice a day 8/29/18  Yes Provider, Historical  
fluticasone-salmeterol (ADVAIR) 500-50 mcg/dose diskus inhaler Take 1 Puff by inhalation two (2) times a day. 10/16/18  Yes Andrea Hernandez MD  
simvastatin (ZOCOR) 20 mg tablet TAKE 1 TABLET NIGHTLY 10/1/18  Yes Andrea Hernandez MD  
leucovorin calcium (WELLCOVORIN) 5 mg tablet TAKE 1 TABLET TWICE WEEKLY 8/2/18  Yes Andrea Hernandez MD  
montelukast (SINGULAIR) 10 mg tablet TAKE 1 TABLET DAILY 11/26/17  Yes Andrea Hernandez MD  
mesalamine (LIALDA) 1.2 gram delayed release tablet Take 2.4 g by mouth Daily (before breakfast). Yes Provider, Historical  
budesonide (RHINOCORT AQUA) 32 mcg/actuation nasal spray 2 Sprays by Both Nostrils route daily. Yes Provider, Historical  
cetirizine (ZYRTEC) 10 mg tablet Take 1 Tab by mouth daily. 5/13/16  Yes Andrea Hernandez MD  
albuterol (PROVENTIL HFA, VENTOLIN HFA, PROAIR HFA) 90 mcg/actuation inhaler Take 2 Puffs by inhalation every four (4) hours as needed for Wheezing. 10/28/15  Yes Andrea Hernandez MD  
infliximab (REMICADE) 100 mg injection 5 mg/kg by IntraVENous route. Given in Md office   Yes Provider, Historical  
methotrexate (RHEUMATREX) 2.5 mg tablet Take 10 mg by mouth. Every Saturday and Sunday  Indications: RHEUMATOID ARTHRITIS   Yes Provider, Historical  
omeprazole (PRILOSEC) 20 mg capsule Take 20 mg by mouth daily. Yes Provider, Historical  
cilostazol (PLETAL) 50 mg tablet Take 50 mg by mouth Before breakfast and dinner. Raynaud's   Yes Provider, Historical  
predniSONE (DELTASONE) 10 mg tablet Take 3 tabs daily for 2 days, then 2 tabs daily for 3 days, then 1 tab daily for 3 days then stop. Patient not taking: Reported on 10/16/2018 10/1/18   Yomaira Torres MD  
ertapenem [facility]) 1 gram injection 1 g by IntraVENous route daily. Via home pump[ med ball,  Invanz in 100ml NS, infuse over 30 min  
refridgerate until 8  hrs before uses Indications: Not taking    Provider, Historical  
 
 
 
 
 
 
Sincerely, Berna Granados MD

## 2018-11-13 NOTE — PROGRESS NOTES
HPI:  Presents for f/u pre-op    Left knee infection has persisted  Once off PO abx x a few weeks, then knee pain increased and swelling occurred   Aspirate has grown same organism as prior tx    Plan to revise surgery and remove graft  Ultimate plan for knee replacement if can get infection cleared. advair at increased dose has helped asthma. No active SOB or wheeze    No CP, neuro sx  No AMIN  Tolerates activity and exercise well      Past medical, Social, and Family history reviewed    Prior to Admission medications    Medication Sig Start Date End Date Taking? Authorizing Provider   rosuvastatin (CRESTOR) 10 mg tablet Take 1 Tab by mouth nightly. 11/13/18  Yes Inés Quinones MD   fluticasone-salmeterol (ADVAIR) 500-50 mcg/dose diskus inhaler Take 1 Puff by inhalation two (2) times a day. 10/16/18  Yes Inés Quinones MD   montelukast (SINGULAIR) 10 mg tablet TAKE 1 TABLET DAILY 11/26/17  Yes Inés Quinones MD   mesalamine (LIALDA) 1.2 gram delayed release tablet Take 2.4 g by mouth Daily (before breakfast). Yes Provider, Historical   budesonide (RHINOCORT AQUA) 32 mcg/actuation nasal spray 2 Sprays by Both Nostrils route daily. Yes Provider, Historical   albuterol (PROVENTIL HFA, VENTOLIN HFA, PROAIR HFA) 90 mcg/actuation inhaler Take 2 Puffs by inhalation every four (4) hours as needed for Wheezing. 10/28/15  Yes Inés Quinones MD   methotrexate (RHEUMATREX) 2.5 mg tablet Take 10 mg by mouth. Every Saturday and Sunday  Indications: RHEUMATOID ARTHRITIS   Yes Provider, Historical   omeprazole (PRILOSEC) 20 mg capsule Take 20 mg by mouth daily. Yes Provider, Historical   cilostazol (PLETAL) 50 mg tablet Take 50 mg by mouth Before breakfast and dinner. Raynaud's   Yes Provider, Historical   ertapenem 1 gram 0.5 g IVPB 0.5 g by IntraVENous route every twenty-four (24) hours.  11/21/18   Peewee Stagers, DO   ertapenem 1 gram 1 g, ADDaptor 1 Device IVPB 1 g by IntraVENous route every twenty-four (24) hours. 11/21/18   Connor Ramos DO   oxyCODONE IR (ROXICODONE) 5 mg immediate release tablet Take 1 Tab by mouth every three (3) hours as needed. Max Daily Amount: 40 mg. 11/21/18   MARY Wheat   aspirin delayed-release 325 mg tablet Take 1 Tab by mouth two (2) times a day. 11/21/18   MARY Wheat   docusate sodium (COLACE) 50 mg capsule Take 2 Caps by mouth two (2) times daily as needed for Constipation for up to 90 days. 11/21/18 2/19/19  AMRY Wheat   mupirocin calcium (BACTROBAN NASAL) 2 % nasal ointment by Both Nostrils route two (2) times a day. 11/12/18   Provider, Historical          ROS  Complete ROS reviewed and negative or stable except as noted in HPI. Physical Exam   Constitutional: He is oriented to person, place, and time. He appears well-nourished. No distress. HENT:   Head: Normocephalic and atraumatic. Mouth/Throat: Oropharynx is clear and moist. No oropharyngeal exudate. Eyes: EOM are normal. Pupils are equal, round, and reactive to light. No scleral icterus. Neck: Normal range of motion. Neck supple. No JVD present. No thyromegaly present. Cardiovascular: Normal rate, regular rhythm and normal heart sounds. Exam reveals no gallop and no friction rub. No murmur heard. Pulmonary/Chest: Effort normal and breath sounds normal. No respiratory distress. He has no wheezes. He has no rales. Abdominal: Soft. Bowel sounds are normal. He exhibits no distension. There is no tenderness. Genitourinary: Prostate normal.   Musculoskeletal: Normal range of motion. He exhibits no edema. Lymphadenopathy:     He has no cervical adenopathy. Neurological: He is alert and oriented to person, place, and time. He exhibits normal muscle tone. Coordination normal.   Skin: Skin is warm. No rash noted. Psychiatric: He has a normal mood and affect. Nursing note and vitals reviewed. Prior labs reviewed.   EKG - reviewed NL      Assessment/Plan:    ICD-10-CM ICD-9-CM    1. Foreign body of knee with infection, left, sequela S80.252S 906.2     L08.9     2. Rheumatoid arthritis, involving unspecified site, unspecified rheumatoid factor presence ([facility] M06.9 714.0    3. Ulcerative colitis without complications, unspecified location ([facility]) K51.90 556.9    4. Long-term use of immunosuppressant medication Z79.899 V58.69    5. Hyperlipidemia, unspecified hyperlipidemia type E78.5 272.4    6. Gastroesophageal reflux disease without esophagitis K21.9 530.81    7. Moderate persistent asthma with acute exacerbation J45.41 493.92    8. Pre-op evaluation Z01.818 V72.84      Follow-up Disposition:  Return in about 3 months (around 2/13/2019), or if symptoms worsen or fail to improve, for cholesterol, asthma.    results and schedule of future studies reviewed with patient  reviewed diet, exercise and weight  cardiovascular risk and specific lipid/LDL goals reviewed  reviewed medications and side effects in detail   Stable for procedure  Continue current medications   Change to crestor

## 2018-11-13 NOTE — PROGRESS NOTES
Rm 13    Chief Complaint   Patient presents with    Pre-op Exam     left knee surgery, 11/19/18   paperwork to be completed    1. Have you been to the ER, urgent care clinic since your last visit? Hospitalized since your last visit? No    2. Have you seen or consulted any other health care providers outside of the [address] [place] [address] since your last visit? Include any pap smears or colon screening.  No    Health Maintenance Due   Topic Date Due    Pneumococcal 19-64 Medium Risk (1 of 1 - PPSV23) 08/15/1980    Shingrix Vaccine Age 50> (1 of 2) 08/15/2011     PHQ over the last two weeks 2/5/2018   Little interest or pleasure in doing things Not at all   Feeling down, depressed, irritable, or hopeless Not at all   Total Score PHQ 2 0     Learning Assessment 11/13/2018   PRIMARY LEARNER Patient   HIGHEST LEVEL OF EDUCATION - PRIMARY LEARNER  > 4 YEARS OF COLLEGE   BARRIERS PRIMARY LEARNER NONE   CO-LEARNER CAREGIVER No   PRIMARY LANGUAGE ENGLISH   LEARNER PREFERENCE PRIMARY READING   ANSWERED BY patient   RELATIONSHIP SELF

## 2018-11-14 RX ORDER — MUPIROCIN 20 MG/G
OINTMENT TOPICAL 2 TIMES DAILY
Qty: 22 G | Refills: 0 | Status: SHIPPED | OUTPATIENT
Start: 2018-11-14 | End: 2018-11-21

## 2018-11-14 RX ORDER — VANCOMYCIN/0.9 % SOD CHLORIDE 1.5G/250ML
1500 PLASTIC BAG, INJECTION (ML) INTRAVENOUS ONCE
Status: CANCELLED | OUTPATIENT
Start: 2018-11-19 | End: 2018-11-19

## 2018-11-14 NOTE — PERIOP NOTES
Notification of Positive MRSA and Treatment Ordered by Preadmission Testing Nurse Practitioner Patient Name:  Carol Alfred MRN: [id number] : 1960 Surgeon: Dr. Lynn Farnsworth Date of surgery: 18 Procedure: Left IND of Knee with ABX spacer insert Allergies: No Known Allergies MRSA results: All Micro Results Procedure Component Value Units Date/Time MRSA CULTURE NARES [[id number]]  (Abnormal) Collected:  18 1542 Order Status:  Completed Specimen:  Nares Updated:  18 1500 Special Requests: NO SPECIAL REQUESTS Culture result: MRSA PRESENT Screening of patient nares for MRSA is for surveillance purposes and, if positive, to facilitate isolation considerations in high risk settings. It is not intended for automatic decolonization interventions per se as regimens are not sufficiently effective to warrant routine use. Treatment ordered: Bactroban ointment 2%- apply intranasal twice daily for 5 days Pre operative antibiotic will be changed to Vancomycin using weight based dosing (<80kg = 1gm, >80kg = 1.5gm) unless patient has allergy to Vancomycin. Date patient notified of results / treatment prescribed: 18 The patient was instructed to add medication and date they began treatment to their \"Prior to Admission Medication\" list given to them in Preadmission Testing Suad Ritchie NP

## 2018-11-16 ENCOUNTER — ANESTHESIA EVENT (OUTPATIENT)
Dept: SURGERY | Age: 58
DRG: 467 | End: 2018-11-16
Payer: COMMERCIAL

## 2018-11-16 NOTE — PERIOP NOTES
Patient had a +MRSA nasal culture during his PAT appointment. An order for contact isolation has been placed in [facility] under sign and held, multi-phase orders for the day of surgery. DOS: 11/19/2018

## 2018-11-19 ENCOUNTER — HOSPITAL ENCOUNTER (INPATIENT)
Age: 58
LOS: 2 days | Discharge: HOME HEALTH | DRG: 467 | End: 2018-11-21
Attending: ORTHOPAEDIC SURGERY | Admitting: ORTHOPAEDIC SURGERY
Payer: COMMERCIAL

## 2018-11-19 ENCOUNTER — ANESTHESIA (OUTPATIENT)
Dept: SURGERY | Age: 58
DRG: 467 | End: 2018-11-19
Payer: COMMERCIAL

## 2018-11-19 ENCOUNTER — APPOINTMENT (OUTPATIENT)
Dept: GENERAL RADIOLOGY | Age: 58
DRG: 467 | End: 2018-11-19
Attending: ORTHOPAEDIC SURGERY
Payer: COMMERCIAL

## 2018-11-19 DIAGNOSIS — S82.142A CLOSED FRACTURE OF LEFT TIBIAL PLATEAU, INITIAL ENCOUNTER: Primary | ICD-10-CM

## 2018-11-19 PROBLEM — T84.7XXS INFECTED ORTHOPEDIC IMPLANT, SEQUELA: Status: ACTIVE | Noted: 2018-11-19

## 2018-11-19 LAB
CRP SERPL-MCNC: 1.67 MG/DL
ERYTHROCYTE [SEDIMENTATION RATE] IN BLOOD: 24 MM/HR (ref 0–20)

## 2018-11-19 PROCEDURE — 77030002986 HC SUT PROL J&J -A: Performed by: ORTHOPAEDIC SURGERY

## 2018-11-19 PROCEDURE — 77030033067 HC SUT PDO STRATFX SPIR J&J -B: Performed by: ORTHOPAEDIC SURGERY

## 2018-11-19 PROCEDURE — 85652 RBC SED RATE AUTOMATED: CPT

## 2018-11-19 PROCEDURE — 74011000250 HC RX REV CODE- 250: Performed by: ORTHOPAEDIC SURGERY

## 2018-11-19 PROCEDURE — 77030000032 HC CUF TRNQT ZIMM -B: Performed by: ORTHOPAEDIC SURGERY

## 2018-11-19 PROCEDURE — 73560 X-RAY EXAM OF KNEE 1 OR 2: CPT

## 2018-11-19 PROCEDURE — 77030013708 HC HNDPC SUC IRR PULS STRY –B: Performed by: ORTHOPAEDIC SURGERY

## 2018-11-19 PROCEDURE — 74011250637 HC RX REV CODE- 250/637: Performed by: ANESTHESIOLOGY

## 2018-11-19 PROCEDURE — 74011250637 HC RX REV CODE- 250/637: Performed by: ORTHOPAEDIC SURGERY

## 2018-11-19 PROCEDURE — 77030020782 HC GWN BAIR PAWS FLX 3M -B

## 2018-11-19 PROCEDURE — 77030012406 HC DRN WND PENRS BARD -A: Performed by: ORTHOPAEDIC SURGERY

## 2018-11-19 PROCEDURE — 74011250636 HC RX REV CODE- 250/636: Performed by: ORTHOPAEDIC SURGERY

## 2018-11-19 PROCEDURE — 36415 COLL VENOUS BLD VENIPUNCTURE: CPT

## 2018-11-19 PROCEDURE — 77030011640 HC PAD GRND REM COVD -A: Performed by: ORTHOPAEDIC SURGERY

## 2018-11-19 PROCEDURE — 77030008467 HC STPLR SKN COVD -B: Performed by: ORTHOPAEDIC SURGERY

## 2018-11-19 PROCEDURE — C1776 JOINT DEVICE (IMPLANTABLE): HCPCS | Performed by: ORTHOPAEDIC SURGERY

## 2018-11-19 PROCEDURE — 76210000036 HC AMBSU PH I REC 1.5 TO 2 HR: Performed by: ORTHOPAEDIC SURGERY

## 2018-11-19 PROCEDURE — 97161 PT EVAL LOW COMPLEX 20 MIN: CPT

## 2018-11-19 PROCEDURE — 77030007866 HC KT SPN ANES BBMI -B

## 2018-11-19 PROCEDURE — C1713 ANCHOR/SCREW BN/BN,TIS/BN: HCPCS | Performed by: ORTHOPAEDIC SURGERY

## 2018-11-19 PROCEDURE — 74011250636 HC RX REV CODE- 250/636

## 2018-11-19 PROCEDURE — 77030012935 HC DRSG AQUACEL BMS -B

## 2018-11-19 PROCEDURE — 74011000250 HC RX REV CODE- 250

## 2018-11-19 PROCEDURE — 87075 CULTR BACTERIA EXCEPT BLOOD: CPT

## 2018-11-19 PROCEDURE — 0SRD0EZ REPLACEMENT OF LEFT KNEE JOINT WITH ARTICULATING SPACER, OPEN APPROACH: ICD-10-PCS | Performed by: ORTHOPAEDIC SURGERY

## 2018-11-19 PROCEDURE — 77030028907 HC WRP KNEE WO BGS SOLM -B

## 2018-11-19 PROCEDURE — 77030010785: Performed by: ORTHOPAEDIC SURGERY

## 2018-11-19 PROCEDURE — 65270000029 HC RM PRIVATE

## 2018-11-19 PROCEDURE — 76060000065 HC AMB SURG ANES 2.5 TO 3 HR: Performed by: ORTHOPAEDIC SURGERY

## 2018-11-19 PROCEDURE — 86140 C-REACTIVE PROTEIN: CPT

## 2018-11-19 PROCEDURE — 77030016547 HC BLD SAW SAG1 STRY -B: Performed by: ORTHOPAEDIC SURGERY

## 2018-11-19 PROCEDURE — 77030031139 HC SUT VCRL2 J&J -A: Performed by: ORTHOPAEDIC SURGERY

## 2018-11-19 PROCEDURE — 74011000272 HC RX REV CODE- 272: Performed by: ORTHOPAEDIC SURGERY

## 2018-11-19 PROCEDURE — 0SPW0JZ REMOVAL OF SYNTHETIC SUBSTITUTE FROM LEFT KNEE JOINT, TIBIAL SURFACE, OPEN APPROACH: ICD-10-PCS | Performed by: ORTHOPAEDIC SURGERY

## 2018-11-19 PROCEDURE — 97530 THERAPEUTIC ACTIVITIES: CPT

## 2018-11-19 PROCEDURE — 77030002916 HC SUT ETHLN J&J -A: Performed by: ORTHOPAEDIC SURGERY

## 2018-11-19 PROCEDURE — 76030000005 HC AMB SURG OR TIME 2.5 TO 3: Performed by: ORTHOPAEDIC SURGERY

## 2018-11-19 PROCEDURE — 74011250636 HC RX REV CODE- 250/636: Performed by: ANESTHESIOLOGY

## 2018-11-19 PROCEDURE — 77030006835 HC BLD SAW SAG STRY -B: Performed by: ORTHOPAEDIC SURGERY

## 2018-11-19 PROCEDURE — 77030018822 HC SLV COMPR FT COVD -B

## 2018-11-19 PROCEDURE — 74011250636 HC RX REV CODE- 250/636: Performed by: NURSE PRACTITIONER

## 2018-11-19 PROCEDURE — 77030018836 HC SOL IRR NACL ICUM -A: Performed by: ORTHOPAEDIC SURGERY

## 2018-11-19 PROCEDURE — 87205 SMEAR GRAM STAIN: CPT

## 2018-11-19 PROCEDURE — 77030003601 HC NDL NRV BLK BBMI -A

## 2018-11-19 PROCEDURE — 94640 AIRWAY INHALATION TREATMENT: CPT

## 2018-11-19 PROCEDURE — 64447 NJX AA&/STRD FEMORAL NRV IMG: CPT

## 2018-11-19 DEVICE — TEMPORARY KNEE SPACER WITH GENTAMICIN. INTERSPACE KNEE IS COMPOSED OF FULLY FORMED GENTAMICIN/POLYMETHYLMETHACRYLATE (PMMA) RADIOPAQUE BONE CEMENT.THE DEVICE IS STERILE AND SINGLE-USE.INTERSPACE KNEE IS INDICATED FOR TEMPORARY USE (MAXIMUM OF 180 DAYS) AS A TOTAL KNEE REPLACEMENT (TKR) IN SKELETALLY MATURE PATIENTS UNDERGOING A TWO-STAGE PROCEDURE DUE TO A SEPTIC PROCESS. INTERSPACE KNEE IS APPLIED ON THE FEMORAL CONDYLES AND ON THE TIBIAL PLATE FOLLOWING REMOVAL OF THE EXISTING IMPLANTAND RADICAL DEBRIDEMENT. THE DEVICE IS INTENDED FOR USE IN CONJUNCTION WITH SYSTEMIC ANTIMICROBIAL ANTIBIOTIC THERAPY (STANDARD TREATMENT APPROACH TO AN INFECTION). INTERSPACE KNEE IS NOT INTENDED FOR USE FOR MORE THAN 180 DAYS, AT WHICH TIME IT MUST BE EXPLANTED AND A PERMANENT DEVICE IMPLANTED OR ANOTHER APPROPRIATE TREATMENT PERFORMED (E.G., RESECTION ARTHROPLASTY, FUSION, ETC.).
Type: IMPLANTABLE DEVICE | Site: KNEE | Status: FUNCTIONAL
Brand: INTERSPACE KNEE

## 2018-11-19 DEVICE — SIMPLEX® HV IS A FAST-SETTING ACRYLIC RESIN FOR USE IN BONE SURGERY. MIXING THE TWO SEPARATE STERILE COMPONENTS PRODUCES A DUCTILE BONE CEMENT WHICH, AFTER HARDENING, FIXES THE IMPLANT AND TRANSFERS STRESSES PRODUCED DURING MOVEMENT EVENLY TO THE BONE. SIMPLEX® HV CEMENT POWDER ALSO CONTAINS INSOLUBLE ZIRCONIUM DIOXIDE AS AN X-RAY CONTRAST MEDIUM. SIMPLEX® HV DOES NOT EMIT A SIGNAL AND DOES NOT POSE A SAFETY RISK IN A MAGNETIC RESONANCE ENVIRONMENT.
Type: IMPLANTABLE DEVICE | Site: KNEE | Status: FUNCTIONAL
Brand: SIMPLEX HV

## 2018-11-19 DEVICE — SIMPLEX® HV WITH GENTAMICIN IS A FAST-SETTING ACRYLIC RESIN WITH ADDITION OF GENTAMICIN SULFATE FOR USE IN BONE SURGERY. MIXING THE TWO SEPARATE STERILE COMPONENTS PRODUCES A DUCTILE BONE CEMENT WHICH, AFTER HARDENING, FIXES THE IMPLANT AND TRANSFERS STRESSES PRODUCED DURING MOVEMENT EVENLY TO THE BONE. SIMPLEX® HV WITH GENTAMICINN CEMENT POWDER ALSO CONTAINS INSOLUBLE ZIRCONIUM DIOXIDE AS AN X-RAY CONTRAST MEDIUM. SIMPLEX® HV WITH GENTAMICIN DOES NOT EMIT A SIGNAL AND DOES NOT POSE A SAFETY RISK IN A MAGNETIC RESONANCE ENVIRONMENT.
Type: IMPLANTABLE DEVICE | Site: KNEE | Status: FUNCTIONAL
Brand: SIMPLEX HV WITH GENTAMICIN

## 2018-11-19 RX ORDER — FACIAL-BODY WIPES
10 EACH TOPICAL DAILY PRN
Status: DISCONTINUED | OUTPATIENT
Start: 2018-11-21 | End: 2018-11-21 | Stop reason: HOSPADM

## 2018-11-19 RX ORDER — ASPIRIN 325 MG
325 TABLET, DELAYED RELEASE (ENTERIC COATED) ORAL 2 TIMES DAILY
Status: DISCONTINUED | OUTPATIENT
Start: 2018-11-19 | End: 2018-11-21 | Stop reason: HOSPADM

## 2018-11-19 RX ORDER — SODIUM CHLORIDE 0.9 % (FLUSH) 0.9 %
5-10 SYRINGE (ML) INJECTION EVERY 8 HOURS
Status: DISCONTINUED | OUTPATIENT
Start: 2018-11-20 | End: 2018-11-21 | Stop reason: HOSPADM

## 2018-11-19 RX ORDER — CILOSTAZOL 100 MG/1
50 TABLET ORAL
Status: DISCONTINUED | OUTPATIENT
Start: 2018-11-19 | End: 2018-11-21 | Stop reason: HOSPADM

## 2018-11-19 RX ORDER — SODIUM CHLORIDE 0.9 % (FLUSH) 0.9 %
5-10 SYRINGE (ML) INJECTION AS NEEDED
Status: DISCONTINUED | OUTPATIENT
Start: 2018-11-19 | End: 2018-11-19 | Stop reason: HOSPADM

## 2018-11-19 RX ORDER — CEFAZOLIN SODIUM/WATER 2 G/20 ML
2 SYRINGE (ML) INTRAVENOUS EVERY 8 HOURS
Status: DISCONTINUED | OUTPATIENT
Start: 2018-11-19 | End: 2018-11-21

## 2018-11-19 RX ORDER — ONDANSETRON 2 MG/ML
4 INJECTION INTRAMUSCULAR; INTRAVENOUS
Status: ACTIVE | OUTPATIENT
Start: 2018-11-19 | End: 2018-11-20

## 2018-11-19 RX ORDER — NALOXONE HYDROCHLORIDE 0.4 MG/ML
0.04 INJECTION, SOLUTION INTRAMUSCULAR; INTRAVENOUS; SUBCUTANEOUS
Status: DISCONTINUED | OUTPATIENT
Start: 2018-11-19 | End: 2018-11-19 | Stop reason: HOSPADM

## 2018-11-19 RX ORDER — FLUTICASONE PROPIONATE 50 MCG
2 SPRAY, SUSPENSION (ML) NASAL
Status: DISCONTINUED | OUTPATIENT
Start: 2018-11-19 | End: 2018-11-21 | Stop reason: HOSPADM

## 2018-11-19 RX ORDER — PROPOFOL 10 MG/ML
INJECTION, EMULSION INTRAVENOUS
Status: DISCONTINUED | OUTPATIENT
Start: 2018-11-19 | End: 2018-11-19 | Stop reason: HOSPADM

## 2018-11-19 RX ORDER — HYDROMORPHONE HYDROCHLORIDE 1 MG/ML
.25-1 INJECTION, SOLUTION INTRAMUSCULAR; INTRAVENOUS; SUBCUTANEOUS
Status: DISCONTINUED | OUTPATIENT
Start: 2018-11-19 | End: 2018-11-19 | Stop reason: HOSPADM

## 2018-11-19 RX ORDER — SODIUM CHLORIDE, SODIUM LACTATE, POTASSIUM CHLORIDE, CALCIUM CHLORIDE 600; 310; 30; 20 MG/100ML; MG/100ML; MG/100ML; MG/100ML
125 INJECTION, SOLUTION INTRAVENOUS CONTINUOUS
Status: DISCONTINUED | OUTPATIENT
Start: 2018-11-19 | End: 2018-11-19 | Stop reason: HOSPADM

## 2018-11-19 RX ORDER — MESALAMINE 1.2 G/1
2.4 TABLET, DELAYED RELEASE ORAL
Status: DISCONTINUED | OUTPATIENT
Start: 2018-11-20 | End: 2018-11-20

## 2018-11-19 RX ORDER — ALBUTEROL SULFATE 0.83 MG/ML
2.5 SOLUTION RESPIRATORY (INHALATION)
Status: DISCONTINUED | OUTPATIENT
Start: 2018-11-19 | End: 2018-11-21 | Stop reason: HOSPADM

## 2018-11-19 RX ORDER — ACETAMINOPHEN 325 MG/1
650 TABLET ORAL
Status: DISCONTINUED | OUTPATIENT
Start: 2018-11-19 | End: 2018-11-21 | Stop reason: HOSPADM

## 2018-11-19 RX ORDER — ROPIVACAINE HYDROCHLORIDE 5 MG/ML
INJECTION, SOLUTION EPIDURAL; INFILTRATION; PERINEURAL AS NEEDED
Status: DISCONTINUED | OUTPATIENT
Start: 2018-11-19 | End: 2018-11-19 | Stop reason: HOSPADM

## 2018-11-19 RX ORDER — OXYCODONE HYDROCHLORIDE 5 MG/1
5 TABLET ORAL
Status: DISCONTINUED | OUTPATIENT
Start: 2018-11-19 | End: 2018-11-21 | Stop reason: HOSPADM

## 2018-11-19 RX ORDER — OXYCODONE HCL 10 MG/1
10 TABLET, FILM COATED, EXTENDED RELEASE ORAL EVERY 12 HOURS
Status: DISCONTINUED | OUTPATIENT
Start: 2018-11-19 | End: 2018-11-21 | Stop reason: HOSPADM

## 2018-11-19 RX ORDER — OXYCODONE HYDROCHLORIDE 5 MG/1
10 TABLET ORAL
Status: DISCONTINUED | OUTPATIENT
Start: 2018-11-19 | End: 2018-11-19 | Stop reason: HOSPADM

## 2018-11-19 RX ORDER — HYDROMORPHONE HYDROCHLORIDE 2 MG/ML
0.5 INJECTION, SOLUTION INTRAMUSCULAR; INTRAVENOUS; SUBCUTANEOUS
Status: ACTIVE | OUTPATIENT
Start: 2018-11-19 | End: 2018-11-20

## 2018-11-19 RX ORDER — ACETAMINOPHEN 325 MG/1
975 TABLET ORAL ONCE
Status: COMPLETED | OUTPATIENT
Start: 2018-11-19 | End: 2018-11-19

## 2018-11-19 RX ORDER — MIDAZOLAM HYDROCHLORIDE 1 MG/ML
INJECTION, SOLUTION INTRAMUSCULAR; INTRAVENOUS AS NEEDED
Status: DISCONTINUED | OUTPATIENT
Start: 2018-11-19 | End: 2018-11-19 | Stop reason: HOSPADM

## 2018-11-19 RX ORDER — DIPHENHYDRAMINE HYDROCHLORIDE 50 MG/ML
12.5 INJECTION, SOLUTION INTRAMUSCULAR; INTRAVENOUS AS NEEDED
Status: DISCONTINUED | OUTPATIENT
Start: 2018-11-19 | End: 2018-11-19 | Stop reason: HOSPADM

## 2018-11-19 RX ORDER — METHYLENE BLUE 10 MG/ML
INJECTION INTRAVENOUS AS NEEDED
Status: DISCONTINUED | OUTPATIENT
Start: 2018-11-19 | End: 2018-11-19 | Stop reason: HOSPADM

## 2018-11-19 RX ORDER — DIPHENHYDRAMINE HYDROCHLORIDE 50 MG/ML
12.5 INJECTION, SOLUTION INTRAMUSCULAR; INTRAVENOUS
Status: ACTIVE | OUTPATIENT
Start: 2018-11-19 | End: 2018-11-20

## 2018-11-19 RX ORDER — VANCOMYCIN/0.9 % SOD CHLORIDE 1.5G/250ML
1500 PLASTIC BAG, INJECTION (ML) INTRAVENOUS ONCE
Status: COMPLETED | OUTPATIENT
Start: 2018-11-19 | End: 2018-11-19

## 2018-11-19 RX ORDER — OXYCODONE HYDROCHLORIDE 5 MG/1
10 TABLET ORAL
Status: DISCONTINUED | OUTPATIENT
Start: 2018-11-19 | End: 2018-11-21 | Stop reason: HOSPADM

## 2018-11-19 RX ORDER — SODIUM CHLORIDE 0.9 % (FLUSH) 0.9 %
5-10 SYRINGE (ML) INJECTION AS NEEDED
Status: DISCONTINUED | OUTPATIENT
Start: 2018-11-19 | End: 2018-11-21 | Stop reason: HOSPADM

## 2018-11-19 RX ORDER — CEFAZOLIN SODIUM/WATER 2 G/20 ML
2 SYRINGE (ML) INTRAVENOUS ONCE
Status: COMPLETED | OUTPATIENT
Start: 2018-11-19 | End: 2018-11-19

## 2018-11-19 RX ORDER — NALOXONE HYDROCHLORIDE 0.4 MG/ML
0.4 INJECTION, SOLUTION INTRAMUSCULAR; INTRAVENOUS; SUBCUTANEOUS AS NEEDED
Status: DISCONTINUED | OUTPATIENT
Start: 2018-11-19 | End: 2018-11-21 | Stop reason: HOSPADM

## 2018-11-19 RX ORDER — PROPOFOL 10 MG/ML
INJECTION, EMULSION INTRAVENOUS AS NEEDED
Status: DISCONTINUED | OUTPATIENT
Start: 2018-11-19 | End: 2018-11-19 | Stop reason: HOSPADM

## 2018-11-19 RX ORDER — AMOXICILLIN 250 MG
1 CAPSULE ORAL 2 TIMES DAILY
Status: DISCONTINUED | OUTPATIENT
Start: 2018-11-19 | End: 2018-11-21 | Stop reason: HOSPADM

## 2018-11-19 RX ORDER — FENTANYL CITRATE 50 UG/ML
25 INJECTION, SOLUTION INTRAMUSCULAR; INTRAVENOUS
Status: DISCONTINUED | OUTPATIENT
Start: 2018-11-19 | End: 2018-11-19 | Stop reason: HOSPADM

## 2018-11-19 RX ORDER — LIDOCAINE HYDROCHLORIDE 10 MG/ML
0.1 INJECTION, SOLUTION EPIDURAL; INFILTRATION; INTRACAUDAL; PERINEURAL AS NEEDED
Status: DISCONTINUED | OUTPATIENT
Start: 2018-11-19 | End: 2018-11-19 | Stop reason: HOSPADM

## 2018-11-19 RX ORDER — SODIUM CHLORIDE 0.9 % (FLUSH) 0.9 %
5-10 SYRINGE (ML) INJECTION EVERY 8 HOURS
Status: DISCONTINUED | OUTPATIENT
Start: 2018-11-19 | End: 2018-11-19 | Stop reason: HOSPADM

## 2018-11-19 RX ORDER — FENTANYL CITRATE 50 UG/ML
INJECTION, SOLUTION INTRAMUSCULAR; INTRAVENOUS AS NEEDED
Status: DISCONTINUED | OUTPATIENT
Start: 2018-11-19 | End: 2018-11-19 | Stop reason: HOSPADM

## 2018-11-19 RX ORDER — POLYETHYLENE GLYCOL 3350 17 G/17G
17 POWDER, FOR SOLUTION ORAL DAILY
Status: DISCONTINUED | OUTPATIENT
Start: 2018-11-20 | End: 2018-11-21 | Stop reason: HOSPADM

## 2018-11-19 RX ORDER — VANCOMYCIN/0.9 % SOD CHLORIDE 1.5G/250ML
1500 PLASTIC BAG, INJECTION (ML) INTRAVENOUS EVERY 12 HOURS
Status: DISCONTINUED | OUTPATIENT
Start: 2018-11-19 | End: 2018-11-21

## 2018-11-19 RX ORDER — BENZOCAINE .13; .15; .5; 2 G/100G; G/100G; G/100G; G/100G
2 GEL ORAL DAILY
Status: DISCONTINUED | OUTPATIENT
Start: 2018-11-19 | End: 2018-11-19 | Stop reason: CLARIF

## 2018-11-19 RX ORDER — SODIUM CHLORIDE 9 MG/ML
125 INJECTION, SOLUTION INTRAVENOUS CONTINUOUS
Status: DISPENSED | OUTPATIENT
Start: 2018-11-19 | End: 2018-11-20

## 2018-11-19 RX ORDER — ARFORMOTEROL TARTRATE 15 UG/2ML
15 SOLUTION RESPIRATORY (INHALATION)
Status: DISCONTINUED | OUTPATIENT
Start: 2018-11-19 | End: 2018-11-21 | Stop reason: HOSPADM

## 2018-11-19 RX ORDER — BUDESONIDE 0.5 MG/2ML
500 INHALANT ORAL
Status: DISCONTINUED | OUTPATIENT
Start: 2018-11-19 | End: 2018-11-21 | Stop reason: HOSPADM

## 2018-11-19 RX ORDER — OXYCODONE HCL 20 MG/1
20 TABLET, FILM COATED, EXTENDED RELEASE ORAL ONCE
Status: COMPLETED | OUTPATIENT
Start: 2018-11-19 | End: 2018-11-19

## 2018-11-19 RX ORDER — FLUMAZENIL 0.1 MG/ML
0.2 INJECTION INTRAVENOUS
Status: DISCONTINUED | OUTPATIENT
Start: 2018-11-19 | End: 2018-11-19 | Stop reason: HOSPADM

## 2018-11-19 RX ORDER — KETOROLAC TROMETHAMINE 30 MG/ML
30 INJECTION, SOLUTION INTRAMUSCULAR; INTRAVENOUS EVERY 6 HOURS
Status: COMPLETED | OUTPATIENT
Start: 2018-11-19 | End: 2018-11-20

## 2018-11-19 RX ORDER — BUPIVACAINE HYDROCHLORIDE 7.5 MG/ML
INJECTION, SOLUTION EPIDURAL; RETROBULBAR AS NEEDED
Status: DISCONTINUED | OUTPATIENT
Start: 2018-11-19 | End: 2018-11-19 | Stop reason: HOSPADM

## 2018-11-19 RX ORDER — BUPIVACAINE HYDROCHLORIDE 7.5 MG/ML
INJECTION, SOLUTION EPIDURAL; RETROBULBAR AS NEEDED
Status: DISCONTINUED | OUTPATIENT
Start: 2018-11-19 | End: 2018-11-19

## 2018-11-19 RX ORDER — ROPIVACAINE HYDROCHLORIDE 2 MG/ML
INJECTION, SOLUTION EPIDURAL; INFILTRATION; PERINEURAL AS NEEDED
Status: DISCONTINUED | OUTPATIENT
Start: 2018-11-19 | End: 2018-11-19 | Stop reason: HOSPADM

## 2018-11-19 RX ADMIN — VANCOMYCIN HYDROCHLORIDE 1500 MG: 10 INJECTION, POWDER, LYOPHILIZED, FOR SOLUTION INTRAVENOUS at 16:57

## 2018-11-19 RX ADMIN — SODIUM CHLORIDE, POTASSIUM CHLORIDE, SODIUM LACTATE AND CALCIUM CHLORIDE: 600; 310; 30; 20 INJECTION, SOLUTION INTRAVENOUS at 11:19

## 2018-11-19 RX ADMIN — ROPIVACAINE HYDROCHLORIDE 30 ML: 5 INJECTION, SOLUTION EPIDURAL; INFILTRATION; PERINEURAL at 08:44

## 2018-11-19 RX ADMIN — Medication 2 G: at 16:55

## 2018-11-19 RX ADMIN — FLUTICASONE PROPIONATE 2 SPRAY: 50 SPRAY, METERED NASAL at 22:00

## 2018-11-19 RX ADMIN — SODIUM CHLORIDE, POTASSIUM CHLORIDE, SODIUM LACTATE AND CALCIUM CHLORIDE: 600; 310; 30; 20 INJECTION, SOLUTION INTRAVENOUS at 07:45

## 2018-11-19 RX ADMIN — KETOROLAC TROMETHAMINE 30 MG: 30 INJECTION, SOLUTION INTRAMUSCULAR at 16:59

## 2018-11-19 RX ADMIN — MIDAZOLAM HYDROCHLORIDE 1 MG: 1 INJECTION, SOLUTION INTRAMUSCULAR; INTRAVENOUS at 08:43

## 2018-11-19 RX ADMIN — FENTANYL CITRATE 50 MCG: 50 INJECTION, SOLUTION INTRAMUSCULAR; INTRAVENOUS at 08:41

## 2018-11-19 RX ADMIN — PROPOFOL 100 MCG/KG/MIN: 10 INJECTION, EMULSION INTRAVENOUS at 09:59

## 2018-11-19 RX ADMIN — FENTANYL CITRATE 25 MCG: 50 INJECTION, SOLUTION INTRAMUSCULAR; INTRAVENOUS at 09:13

## 2018-11-19 RX ADMIN — SODIUM CHLORIDE 125 ML/HR: 900 INJECTION, SOLUTION INTRAVENOUS at 14:11

## 2018-11-19 RX ADMIN — BUPIVACAINE HYDROCHLORIDE 2.5 ML: 7.5 INJECTION, SOLUTION EPIDURAL; RETROBULBAR at 09:17

## 2018-11-19 RX ADMIN — BUDESONIDE 500 MCG: 0.5 INHALANT RESPIRATORY (INHALATION) at 19:56

## 2018-11-19 RX ADMIN — VANCOMYCIN HYDROCHLORIDE 1500 MG: 10 INJECTION, POWDER, LYOPHILIZED, FOR SOLUTION INTRAVENOUS at 08:52

## 2018-11-19 RX ADMIN — MIDAZOLAM HYDROCHLORIDE 2 MG: 1 INJECTION, SOLUTION INTRAMUSCULAR; INTRAVENOUS at 08:41

## 2018-11-19 RX ADMIN — ARFORMOTEROL TARTRATE 15 MCG: 15 SOLUTION RESPIRATORY (INHALATION) at 19:56

## 2018-11-19 RX ADMIN — CILOSTAZOL 50 MG: 100 TABLET ORAL at 16:55

## 2018-11-19 RX ADMIN — MIDAZOLAM HYDROCHLORIDE 1 MG: 1 INJECTION, SOLUTION INTRAMUSCULAR; INTRAVENOUS at 09:55

## 2018-11-19 RX ADMIN — MIDAZOLAM HYDROCHLORIDE 1 MG: 1 INJECTION, SOLUTION INTRAMUSCULAR; INTRAVENOUS at 09:10

## 2018-11-19 RX ADMIN — ASPIRIN 325 MG: 325 TABLET, DELAYED RELEASE ORAL at 16:58

## 2018-11-19 RX ADMIN — DOCUSATE SODIUM AND SENNOSIDES 1 TABLET: 8.6; 5 TABLET, FILM COATED ORAL at 16:57

## 2018-11-19 RX ADMIN — OXYCODONE HYDROCHLORIDE 10 MG: 5 TABLET ORAL at 18:38

## 2018-11-19 RX ADMIN — OXYCODONE HYDROCHLORIDE 10 MG: 10 TABLET, FILM COATED, EXTENDED RELEASE ORAL at 21:19

## 2018-11-19 RX ADMIN — OXYCODONE HYDROCHLORIDE 10 MG: 5 TABLET ORAL at 21:19

## 2018-11-19 RX ADMIN — OXYCODONE HYDROCHLORIDE 20 MG: 20 TABLET, FILM COATED, EXTENDED RELEASE ORAL at 09:10

## 2018-11-19 RX ADMIN — PROPOFOL 40 MG: 10 INJECTION, EMULSION INTRAVENOUS at 10:01

## 2018-11-19 RX ADMIN — ACETAMINOPHEN 975 MG: 325 TABLET ORAL at 09:10

## 2018-11-19 RX ADMIN — FENTANYL CITRATE 25 MCG: 50 INJECTION, SOLUTION INTRAMUSCULAR; INTRAVENOUS at 09:10

## 2018-11-19 RX ADMIN — Medication 2 G: at 10:03

## 2018-11-19 RX ADMIN — SODIUM CHLORIDE, POTASSIUM CHLORIDE, SODIUM LACTATE AND CALCIUM CHLORIDE: 600; 310; 30; 20 INJECTION, SOLUTION INTRAVENOUS at 10:23

## 2018-11-19 NOTE — PROGRESS NOTES
Orders received and spoke to nursing. Patient having increased drainage from operative wound site and nursing awaiting call back from MD.  PT will defer at this time and proceed when appropriate.

## 2018-11-19 NOTE — ANESTHESIA PREPROCEDURE EVALUATION
Anesthetic History No history of anesthetic complications Review of Systems / Medical History Patient summary reviewed, nursing notes reviewed and pertinent labs reviewed Pulmonary Asthma (allergy related) : well controlled Neuro/Psych Within defined limits Cardiovascular Hyperlipidemia Exercise tolerance: >4 METS 
  
GI/Hepatic/Renal 
  
GERD (no symptoms this am): well controlled Hiatal hernia Comments: UC Endo/Other Morbid obesity and arthritis (RA) Other Findings Comments: Recent steroid use Physical Exam 
 
Airway Mallampati: II 
TM Distance: > 6 cm Neck ROM: normal range of motion Mouth opening: Normal 
 
 Cardiovascular Regular rate and rhythm,  S1 and S2 normal,  no murmur, click, rub, or gallop Rhythm: regular Rate: normal 
 
 
 
 Dental 
No notable dental hx Pulmonary Breath sounds clear to auscultation Abdominal 
GI exam deferred Other Findings Anesthetic Plan ASA: 2 Anesthesia type: regional and spinal 
 
 
 
 
Induction: Intravenous Anesthetic plan and risks discussed with: Patient

## 2018-11-19 NOTE — ANESTHESIA PROCEDURE NOTES
Peripheral Block Start time: 11/19/2018 8:40 AM 
End time: 11/19/2018 8:46 AM 
Performed by: Adina Cristina MD 
Authorized by: Adina Cristina MD  
 
 
Pre-procedure: Indications: at surgeon's request and post-op pain management Preanesthetic Checklist: patient identified, risks and benefits discussed, site marked, timeout performed, anesthesia consent given and patient being monitored Timeout Time: 08:40 Block Type:  
Block Type: Adductor canal 
Laterality:  Left Monitoring:  Continuous pulse ox, frequent vital sign checks, heart rate and responsive to questions Injection Technique:  Single shot Procedures: ultrasound guided Patient Position: supine Prep: chlorhexidine Location:  Mid thigh Needle Type:  Stimuplex Needle Gauge:  21 G Needle Localization:  Ultrasound guidance Assessment: 
Number of attempts:  1 Injection Assessment:  Incremental injection every 5 mL, local visualized surrounding nerve on ultrasound, negative aspiration for blood, no paresthesia and no intravascular symptoms Patient tolerance:  Patient tolerated the procedure well with no immediate complications

## 2018-11-19 NOTE — PROGRESS NOTES
VAT Note: 
Chart reviewed for PICC placement evaluation. Areas reviewed include, but are not limited to, patient's current and past H&P, Lab and Procedure Results, all notes, media records and medications. 1400 PICC order acknowledged and plan on placing line prior to discharge. Patient with working PIV left a/c.

## 2018-11-19 NOTE — BRIEF OP NOTE
BRIEF OPERATIVE NOTE Date of Procedure: 11/19/2018 Preoperative Diagnosis: ARTHRITIS DUE TO OTHER BACTERIA Postoperative Diagnosis: ARTHRITIS DUE TO OTHER BACTERIA Procedure(s): IRRIGATION AND DEBRIDEMENT LEFT KNEE WITH ANTIBIOTIC SPACER PLACEMENT Surgeon(s) and Role: DO Allison Armendariz Surgical Assistant: none Surgical Staff: 
Circ-1: Ramirez Mess Circ-Relief: Frank Leo, RN Scrub Tech-1: Deandre Roslyn Surg Asst-1: Monica El Event Time In Time Out Incision Start 1012 Incision Close 1233 Anesthesia: Spinal  
Estimated Blood Loss: 100cc Specimens:  
ID Type Source Tests Collected by Time Destination 1 : Left knee synovium #1 Tissue Knee, left AEROBIC/ANAEROBIC CULTURE Ceil Teodora, DO 11/19/2018 1018 Microbiology 2 : Left knee synovium #2 Tissue Knee, left AEROBIC/ANAEROBIC CULTURE Ceil Teodora, DO 11/19/2018 1019 Microbiology 3 : Left knee synovium #3 Tissue Knee, left AEROBIC/ANAEROBIC CULTURE Ceil Teodora, DO 11/19/2018 1019 Microbiology Findings: Retained orthopedic implants, synovitis Complications: none Implants:  
Implant Name Type Inv. Item Serial No.  Lot No. LRB No. Used Action CEMENT BNE SIMPLEX W/GENT -- 10/PK - SNA  CEMENT BNE SIMPLEX W/GENT -- 10/PK NA [employer] HOW [id number] Left 2 Implanted SPACER KNE IMPREGN HI-REL MED -- INTERSPACE - SNA  SPACER KNE IMPREGN HI-REL MED -- INTERSPACE NA [id number] Left 1 Implanted CEMENT BNE SIMPLEX W/O GENT -- PK/10 ONLY - SNA  CEMENT BNE SIMPLEX W/O GENT -- PK/10 ONLY NA [employer] HOW [id number] Left 1 Implanted

## 2018-11-19 NOTE — PROGRESS NOTES
Patient aquacel and bed noted to be saturated with blood. New dressing replaced. Area to lower end of surgical site noted with continuous bleeding. Francisco J Ledbetter NP in to assess, area dermabonded, aquacel reapplied. Patient tolerated well. Brace on and aligned and ice replaced. Will continue to monitor.

## 2018-11-19 NOTE — PROGRESS NOTES
Patients dressing noted to be saturated status post PT. MD Banks in to assess, ABD pads and ace wrap placed. Continue to monitor and reapply dressing as needed. Notify MD Banks if bleeding continues.

## 2018-11-19 NOTE — ROUTINE PROCESS
TRANSFER - OUT REPORT: 
 
Verbal report given to Bess Jose RN(name) on [id number].  being transferred to [facility](unit) for routine progression of care Report consisted of patients Situation, Background, Assessment and  
Recommendations(SBAR). Information from the following report(s) SBAR and Kardex was reviewed with the receiving nurse. Lines:  
Peripheral IV 11/19/18 Left Antecubital (Active) Site Assessment Clean, dry, & intact 11/19/2018  2:06 PM  
Phlebitis Assessment 0 11/19/2018  2:06 PM  
Infiltration Assessment 0 11/19/2018  2:06 PM  
Dressing Status Intact 11/19/2018  2:06 PM  
Dressing Type Transparent 11/19/2018  2:06 PM  
Hub Color/Line Status Pink; Infusing 11/19/2018  2:06 PM  
  
 
Opportunity for questions and clarification was provided. Patient transported with: 
 Registered Nurse

## 2018-11-19 NOTE — ANESTHESIA PROCEDURE NOTES
Spinal Block Start time: 11/19/2018 9:10 AM 
End time: 11/19/2018 9:18 AM 
Performed by: Lucio Rodríguez MD 
Authorized by: Lucio Rodríguez MD  
 
Pre-procedure: Indications: at surgeon's request and primary anesthetic  Preanesthetic Checklist: patient identified, risks and benefits discussed, anesthesia consent, site marked, patient being monitored and timeout performed Timeout Time: 09:10 Spinal Block:  
Patient Position:  Seated Prep Region:  Lumbar Prep: DuraPrep Location:  L3-4 Technique:  Single shot Needle:  
Needle Type:  Pencan Needle Gauge:  25 G Attempts:  1 Events: CSF confirmed, no blood with aspiration and no paresthesia Assessment: 
Insertion:  Uncomplicated Patient tolerance:  Patient tolerated the procedure well with no immediate complications

## 2018-11-19 NOTE — ANESTHESIA POSTPROCEDURE EVALUATION
Procedure(s): IRRIGATION AND DEBRIDEMENT LEFT KNEE WITH ANTIBIOTIC SPACER PLACEMENT. Anesthesia Post Evaluation Multimodal analgesia: multimodal analgesia used between 6 hours prior to anesthesia start to PACU discharge Patient participation: complete - patient cannot participate Level of consciousness: awake and alert Pain score: 0 Pain management: adequate Airway patency: patent Anesthetic complications: no 
Cardiovascular status: acceptable and hemodynamically stable Respiratory status: acceptable and spontaneous ventilation Hydration status: acceptable Post anesthesia nausea and vomiting:  none Visit Vitals /82 Pulse 82 Temp 36.7 °C (98 °F) Resp 15 Ht 5' 11\" (1.803 m) Wt 89.4 kg (197 lb 1.5 oz) SpO2 95% BMI 27.49 kg/m²

## 2018-11-19 NOTE — PROGRESS NOTES
Received patient from PACU. Patient alert and oriented and in stable condition. Patient oriented to room and call bell system. Family at bedside. Primary Nurse Webster Phoenix, MAURY and Vida MENDIOLA, RN performed a dual skin assessment on this patient Impairment noted- see wound doc flow sheet Milton score is 20

## 2018-11-19 NOTE — PROGRESS NOTES
Problem: Mobility Impaired (Adult and Pediatric) Goal: *Acute Goals and Plan of Care (Insert Text) Physical Therapy Goals Initiated 11/19/2018 1. Patient will move from supine to sit and sit to supine  in bed with independence within 4 days. 2. Patient will perform sit to stand with modified independence within 4 days. 3. Patient will ambulate with supervision/set-up for 50 feet with the least restrictive device within 4 days. 4. Patient will ascend/descend 4 stairs with 1 handrail(s) with minimal assistance/contact guard assist within 4 days. physical Therapy knee EVALUATION Patient: Vincent Frias (59 y.o. male) Date: 11/19/2018 Primary Diagnosis: ARTHRITIS DUE TO OTHER BACTERIA Infected orthopedic implant, sequela Procedure(s) (LRB): 
IRRIGATION AND DEBRIDEMENT LEFT KNEE WITH ANTIBIOTIC SPACER PLACEMENT (Left) Day of Surgery Precautions:   Contact, Fall, TTWB(brace locked in extension; no ROM for 2 weeks) ASSESSMENT : 
Based on the objective data described below, the patient presents with decreased ROM and strength to LLE, decreased bed mobility, transfers and gait following admission for I & D to Left knee with antibiotic spacer placement due to chronic infection. Patient received in bed alert and willing to work with PT. T-scope in place. Dressing in tact and with some bleeding to distal aspect. Nursing aware. PT educated patient regarding TTWB status and he expressed understanding. Patient stood and took 5 side steps up to head of bed with rolling walker +2 min assist. Vitals stable. Some increased drainage noted so gait limited and nursing notified. Patient is planned for PICC line. Patient lives with his wife in a two story home. He has used crutches in the past but prefers the walker. PT will place order. He will likely need HHPT. . 
 
Patient will benefit from skilled intervention to address the above impairments. Patients rehabilitation potential is considered to be Good Factors which may influence rehabilitation potential include:  
[]         None noted 
[]         Mental ability/status [x]         Medical condition 
[]         Home/family situation and support systems 
[]         Safety awareness 
[]         Pain tolerance/management 
[]         Other: PLAN : 
Recommendations and Planned Interventions: 
[x]           Bed Mobility Training             []    Neuromuscular Re-Education 
[x]           Transfer Training                   []    Orthotic/Prosthetic Training 
[x]           Gait Training                         []    Modalities [x]           Therapeutic Exercises           []    Edema Management/Control 
[]           Therapeutic Activities            []    Patient and Family Training/Education 
[]           Other (comment): Frequency/Duration: Patient will be followed by physical therapy daily to address goals. Discharge Recommendations: Home Health Further Equipment Recommendations for Discharge: rolling walker SUBJECTIVE:  
Patient stated I am doing ok. Meri Allis 
 
OBJECTIVE DATA SUMMARY:  
HISTORY:   
Past Medical History:  
Diagnosis Date  Allergic rhinitis  Asthma   
 allergy/URI related  Colon polyp 11/13/13  Diverticulosis  GERD (gastroesophageal reflux disease)  Hiatal hernia  Hypercholesterolemia  IGT (impaired glucose tolerance)  Kidney stone 2016  
 x2  Overweight (BMI 25.0-29.9) 11/12/2018  Rheumatoid arthritis(714.0)  S/P PICC central line placement 11/09/2017  
 has been removed  UC (ulcerative colitis) ([facility] [address] In remission Past Surgical History:  
Procedure Laterality Date  ABDOMEN SURGERY PROC UNLISTED Bilateral 2007 Hernia Repair [address] UNLISTED Left 1971 Inquinal hernia  HX GI  2010 & 2016 COLONOSCOPY  
 HX KNEE ARTHROSCOPY Left 10/2017  HX LUMBAR DISKECTOMY N/A 2012  
 herniated disk  HX OPEN REDUCTION INTERNAL FIXATION Left 07/2017 Tibia fx  REMOVAL OF IMPLANT MATERIAL Left 2017 IND with plate removal of tibia Prior Level of Function/Home Situation: independent Personal factors and/or comorbidities impacting plan of care: medical history Home Situation Home Environment: Private residence # Steps to Enter: 4 Rails to Enter: Yes One/Two Story Residence: Two story Living Alone: No 
Support Systems: Spouse/Significant Other/Partner Patient Expects to be Discharged to[de-identified] Private residence Current DME Used/Available at Home: Crutches EXAMINATION/PRESENTATION/DECISION MAKING: Critical Behavior: 
Neurologic State: Alert, Appropriate for age Orientation Level: Appropriate for age, Oriented X4 Cognition: Appropriate decision making, Appropriate safety awareness, Appropriate for age attention/concentration, Follows commands Safety/Judgement: Good awareness of safety precautions Hearing: [email or web address] [facility] Skin:  Not fully observed Range Of Motion: 
AROM: Within functional limits(LLE locked in extension in brace) Strength:   
Strength: Within functional limits Tone & Sensation:  
Tone: Normal 
  
  
  
  
Sensation: Intact Functional Mobility: 
Bed Mobility: 
  
Supine to Sit: Minimum assistance Sit to Supine: Minimum assistance Transfers: 
Sit to Stand: Minimum assistance;Assist x2 Stand to Sit: Minimum assistance Balance:  
Sitting: Intact Standing: Intact; With supportAmbulation/Gait Training:Distance (ft): (5 side steps up to head of bed) Assistive Device: Brace/Splint;Gait belt;Walker, rolling Ambulation - Level of Assistance: Minimal assistance Left Side Weight Bearing: Toe touch Functional Measure: 
Barthel Index: 
 
Bathin Bladder: 10 Bowels: 10 
Groomin Dressin Feeding: 10 Mobility: 0 Stairs: 0 Toilet Use: 0 Transfer (Bed to Chair and Back): 0 Total: 40 
 
 
 Barthel and G-code impairment scale: 
Percentage of impairment CH 
0% CI 
1-19% CJ 
20-39% CK 
40-59% CL 
60-79% CM 
80-99% CN 
100% Barthel Score 0-100 100 99-80 79-60 59-40 20-39 1-19 
 0 Barthel Score 0-20 20 17-19 13-16 9-12 5-8 1-4 0 The Barthel ADL Index: Guidelines 1. The index should be used as a record of what a patient does, not as a record of what a patient could do. 2. The main aim is to establish degree of independence from any help, physical or verbal, however minor and for whatever reason. 3. The need for supervision renders the patient not independent. 4. A patient's performance should be established using the best available evidence. Asking the patient, friends/relatives and nurses are the usual sources, but direct observation and common sense are also important. However direct testing is not needed. 5. Usually the patient's performance over the preceding 24-48 hours is important, but occasionally longer periods will be relevant. 6. Middle categories imply that the patient supplies over 50 per cent of the effort. 7. Use of aids to be independent is allowed. Tuan Mantilla., Barthel, DEsteeW. ([id number]). Functional evaluation: the Barthel Index. [facility] (14)2. DANIELLE Harris, Kianna Nino., [facility], [address] [place] [address] (1999). Measuring the change indisability after inpatient rehabilitation; comparison of the responsiveness of the Barthel Index and Functional [place] Measure. Journal of Neurology, Neurosurgery, and Psychiatry, 66(4), 299-798. Marielos Trotter, N.[place].A, RANDY Moses, & Eduardo Fitzgerald, MEsteeA. (2004.) Assessment of post-stroke quality of life in cost-effectiveness studies: The usefulness of the Barthel Index and the EuroQoL-5D. [facility], 13, 530-14 Physical Therapy Evaluation Charge Determination History Examination Presentation Decision-Making MEDIUM  Complexity : 1-2 comorbidities / personal factors will impact the outcome/ POC  LOW Complexity : 1-2 Standardized tests and measures addressing body structure, function, activity limitation and / or participation in recreation  MEDIUM Complexity : Evolving with changing characteristics  Other outcome measures Barthel  MEDIUM Based on the above components, the patient evaluation is determined to be of the following complexity level: LOW Pain: 
Pain Scale 1: Numeric (0 - 10) Pain Intensity 1: 0 Activity Tolerance:  
Limited due to increased drainage when up. Please refer to the flowsheet for vital signs taken during this treatment. After treatment:  
[]         Patient left in no apparent distress sitting up in chair 
[x]         Patient left in no apparent distress in bed 
[x]         Call bell left within reach [x]         Nursing notified 
[]         Caregiver present [x]         Bed alarm activated COMMUNICATION/EDUCATION:  
The patients plan of care was discussed with: Registered Nurse. [x]         Fall prevention education was provided and the patient/caregiver indicated understanding. []         Patient/family have participated as able in goal setting and plan of care. [x]         Patient/family agree to work toward stated goals and plan of care. []         Patient understands intent and goals of therapy, but is neutral about his/her participation. []         Patient is unable to participate in goal setting and plan of care. Thank you for this referral. 
Damion Fry, PT Time Calculation: 30 mins

## 2018-11-19 NOTE — PROGRESS NOTES
[address] Dosing Services: Antimicrobial Stewardship Progress Note Automatic antibiotic dosing of Vancomycin by Dr. Mamie Kaur Pharmacist reviewed antibiotic appropriateness for 63year old , 80 kg, male  for indication of bone/joint infection. Day of Therapy 1 Plan: 
Vancomycin therapy: 
Vancomycin 1500 mg given x 1. Continue current order: 1500 mg every 12 hours Trough goal 15-20 mcg/mL. Plan for level: after third dose Pharmacy to follow daily and will make changes to dose and/or frequency based on clinical status. Other Antimicrobial 
(not dosed by pharmacist) Cefazolin 2 gm every 8 hours Serum Creatinine Lab Results Component Value Date/Time Creatinine 1.17 11/12/2018 03:42 PM  
   
Creatinine Clearance Estimated Creatinine Clearance: 74.2 mL/min (based on SCr of 1.17 mg/dL). Temp  
97.8 °F (36.6 °C) WBC Lab Results Component Value Date/Time WBC 7.9 11/12/2018 03:42 PM  
   
H/H Lab Results Component Value Date/Time HGB 13.0 11/12/2018 03:42 PM  
  
 
Platelets Lab Results Component Value Date/Time PLATELET 240 63/51/5361 03:42 PM  
  
 
 
Pharmacist: Signed Yesica Boyer Contact information: [phone]

## 2018-11-19 NOTE — OP NOTES
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 and bone graft substitute  Postoperative diagnosis: Same  Procedure performed: Irrigation and debridement with hardware removal and bone graft substitute excision, increased complexity case  Surgeon: Mariah Carlos DO  Assistant: none  Estimated blood loss: 845 cc  Complications: None  Specimens: Cultures ×3  Drains: None  Implants: ExacTech medium antibiotic spacer, femoral and tibial components with antibiotic laden cement    Operative indications: This is a 46-year-old male who does have a chronically infected knee with Propionibacterium acnes as well as with bone graft substitute which had not resorbed since his tibial plateau fracture over a year ago, he also had retained hardware from an MCL reconstruction in the past.  He did have intermittent flares of his infection, antibiotic suppression and failed and was not indicated in this young of a patient and therefore we did agree that we should proceed with essentially a two-stage exchange of the knee to ensure that when we do reconstruct his knee, which did have posterior medical arthritis, that he would have a sterile field. We did discuss the risks of surgery which include, but are not limited to: Infection, nerve or blood vessel damage, need for reoperation, death, disability, DVT, PE, postoperative pain swelling and stiffness, injury postoperative fracture, prolonged recovery, stiffness of the leg, wound healing complications. The patient did freely consent for surgery. Description of procedure in detail: After the correct site and side were identified by myself in the holding area, patient was transported to a surgical suite after spinal block as well as abductor canal block, he was then transported to a surgical table where the left leg was prepped and draped in usual sterile orthopedic fashion.   After an appropriate timeout, and confirmation that 2 g of Ancef as well as vancomycin had been infused prior to any incision, midline incision was made over the knee. A standard median parapatellar arthrotomy was performed, there was a moderate amount of joint fluid which did not appear infected, however and extensive synovitis was identified. He also had a significant amount of scar tissue medially and laterally. His MCL is extremely scarred in which did limit visualization. After an extensive synovectomy which did take over 45 minutes of mobilization and debridement, the knee was taken into flexion and the trans-epicondylar axis was marked and a standard distal femoral cut was performed on the jig based off of an intramedullary drill, this did give us a good flat distal femoral cut, attention was then turned to the tibial plateau, the knee was taken into flexion and an extra medullary guide was then used as he was quite stiff and I did not have good visualization of his plateau as well as the fact that the bone graft was getting in the way of any intramedullary drill. With that, the tibial plateau was cut at a 0° slope as well as approximately 9 mm cut. This was found to be in a neutral coronal plane balance and therefore the wound was identified and inspected again, there was the remainder of a push lock anchor on the medial side with FiberWire which was then excised, no remaining hardware or FiberWire was left in place. Attention was then turned to the lateral plateau where the montage bone graft was identified, it was relatively soft and therefore I did use a series of rongeurs as well as pituitary rongeur to remove this. Once it did get to the harder portion of it, I did use a high-speed bur and care was taken to avoid any injury to the lateral tibial plateau. This did leave a contained defect of approximately 4 cm x 3 cm x 4 cm in depth. Once this was performed, did perform the standard meniscectomies medially and laterally.   Once this was performed, the wound was copiously irrigated with normal saline mixed with bacitracin, 9 L were run through the joint. Especially did demonstrate that the bone graft substitute had been completely removed and there was no remaining foreign bodies. I then performed a basic cut of the patella, excising only the cartilaginous surface and leaving the remaining bone as bone stock. With that, I did size the ExacTech antibiotic spacers, they did have slight undercoverage a size medium, however the joint space was overstuffed as it was and I did not want to make any further cuts to preserve bone stock and therefore chose the size medium, we then used anabolic cement to fill the contained defect, this was allowed to harden and then the tibial surface was then cemented in place and allowed to fully cure. We then performed a basic cementation technique for the femoral component which was then allowed to fully cure. It was noted that there was a small crack in the tibial plateau on the lateral side which I did patch with acrylic cement. Overall, coronal plane balance was excellent and the patella did track centrally, range of motion was from a proximal by 10° to 90° without any instability. The wound was then irrigated again, the capsule was then closed with a strata fix suture, undyed 2-0 PDS were then used to close skin and staples. A sterile dressing was applied and he was placed into a range of motion brace locked in extension. He was then taken to PACU in stable condition with no intraoperative complications. Postoperative plan: Patient will be toe-touch weightbearing, he will have no range motion for the first 2 weeks to allow for wound healing, then he will proceed with range of motion exercises. Infectious disease will be consult and he will have a PICC line. We are do know that this is P acnes, and we have the sensitivities. We will cover him empirically and he will mobilize to tolerance.   Of Ecotrin for DVT prophylaxis. He is to hold off of his Remicade for at least 2 weeks.

## 2018-11-20 LAB
ANION GAP SERPL CALC-SCNC: 4 MMOL/L (ref 5–15)
BUN SERPL-MCNC: 18 MG/DL (ref 6–20)
BUN/CREAT SERPL: 14 (ref 12–20)
CALCIUM SERPL-MCNC: 7.6 MG/DL (ref 8.5–10.1)
CHLORIDE SERPL-SCNC: 107 MMOL/L (ref 97–108)
CO2 SERPL-SCNC: 30 MMOL/L (ref 21–32)
CREAT SERPL-MCNC: 1.27 MG/DL (ref 0.7–1.3)
GLUCOSE SERPL-MCNC: 119 MG/DL (ref 65–100)
HGB BLD-MCNC: 10.4 G/DL (ref 12.1–17)
POTASSIUM SERPL-SCNC: 3.8 MMOL/L (ref 3.5–5.1)
SODIUM SERPL-SCNC: 141 MMOL/L (ref 136–145)

## 2018-11-20 PROCEDURE — 85018 HEMOGLOBIN: CPT

## 2018-11-20 PROCEDURE — 94640 AIRWAY INHALATION TREATMENT: CPT

## 2018-11-20 PROCEDURE — 74011250637 HC RX REV CODE- 250/637: Performed by: NURSE PRACTITIONER

## 2018-11-20 PROCEDURE — 65270000029 HC RM PRIVATE

## 2018-11-20 PROCEDURE — 74011250636 HC RX REV CODE- 250/636: Performed by: ORTHOPAEDIC SURGERY

## 2018-11-20 PROCEDURE — 97530 THERAPEUTIC ACTIVITIES: CPT

## 2018-11-20 PROCEDURE — 80048 BASIC METABOLIC PNL TOTAL CA: CPT

## 2018-11-20 PROCEDURE — 97116 GAIT TRAINING THERAPY: CPT

## 2018-11-20 PROCEDURE — 74011250637 HC RX REV CODE- 250/637: Performed by: ORTHOPAEDIC SURGERY

## 2018-11-20 PROCEDURE — 74011000250 HC RX REV CODE- 250: Performed by: ORTHOPAEDIC SURGERY

## 2018-11-20 PROCEDURE — 36415 COLL VENOUS BLD VENIPUNCTURE: CPT

## 2018-11-20 RX ORDER — MESALAMINE 400 MG/1
800 CAPSULE, DELAYED RELEASE ORAL 3 TIMES DAILY
Status: DISCONTINUED | OUTPATIENT
Start: 2018-11-20 | End: 2018-11-21 | Stop reason: HOSPADM

## 2018-11-20 RX ADMIN — Medication 5 ML: at 14:00

## 2018-11-20 RX ADMIN — OXYCODONE HYDROCHLORIDE 10 MG: 5 TABLET ORAL at 00:41

## 2018-11-20 RX ADMIN — ASPIRIN 325 MG: 325 TABLET, DELAYED RELEASE ORAL at 17:26

## 2018-11-20 RX ADMIN — OXYCODONE HYDROCHLORIDE 10 MG: 5 TABLET ORAL at 23:30

## 2018-11-20 RX ADMIN — KETOROLAC TROMETHAMINE 30 MG: 30 INJECTION, SOLUTION INTRAMUSCULAR at 00:42

## 2018-11-20 RX ADMIN — Medication 2 G: at 00:41

## 2018-11-20 RX ADMIN — BUDESONIDE 500 MCG: 0.5 INHALANT RESPIRATORY (INHALATION) at 07:10

## 2018-11-20 RX ADMIN — OXYCODONE HYDROCHLORIDE 10 MG: 5 TABLET ORAL at 04:01

## 2018-11-20 RX ADMIN — DOCUSATE SODIUM AND SENNOSIDES 1 TABLET: 8.6; 5 TABLET, FILM COATED ORAL at 17:25

## 2018-11-20 RX ADMIN — SODIUM CHLORIDE 125 ML/HR: 900 INJECTION, SOLUTION INTRAVENOUS at 00:51

## 2018-11-20 RX ADMIN — OXYCODONE HYDROCHLORIDE 10 MG: 10 TABLET, FILM COATED, EXTENDED RELEASE ORAL at 20:29

## 2018-11-20 RX ADMIN — Medication 2 G: at 09:03

## 2018-11-20 RX ADMIN — BUDESONIDE 500 MCG: 0.5 INHALANT RESPIRATORY (INHALATION) at 19:24

## 2018-11-20 RX ADMIN — MESALAMINE 800 MG: 400 CAPSULE, DELAYED RELEASE ORAL at 22:24

## 2018-11-20 RX ADMIN — POLYETHYLENE GLYCOL 3350 17 G: 17 POWDER, FOR SOLUTION ORAL at 08:19

## 2018-11-20 RX ADMIN — OXYCODONE HYDROCHLORIDE 10 MG: 10 TABLET, FILM COATED, EXTENDED RELEASE ORAL at 08:18

## 2018-11-20 RX ADMIN — DOCUSATE SODIUM AND SENNOSIDES 1 TABLET: 8.6; 5 TABLET, FILM COATED ORAL at 08:18

## 2018-11-20 RX ADMIN — OXYCODONE HYDROCHLORIDE 10 MG: 5 TABLET ORAL at 15:26

## 2018-11-20 RX ADMIN — Medication 10 ML: at 06:13

## 2018-11-20 RX ADMIN — CILOSTAZOL 50 MG: 100 TABLET ORAL at 06:15

## 2018-11-20 RX ADMIN — VANCOMYCIN HYDROCHLORIDE 1500 MG: 10 INJECTION, POWDER, LYOPHILIZED, FOR SOLUTION INTRAVENOUS at 17:37

## 2018-11-20 RX ADMIN — MESALAMINE 800 MG: 400 CAPSULE, DELAYED RELEASE ORAL at 18:15

## 2018-11-20 RX ADMIN — VANCOMYCIN HYDROCHLORIDE 1500 MG: 10 INJECTION, POWDER, LYOPHILIZED, FOR SOLUTION INTRAVENOUS at 06:12

## 2018-11-20 RX ADMIN — CILOSTAZOL 50 MG: 100 TABLET ORAL at 17:25

## 2018-11-20 RX ADMIN — OXYCODONE HYDROCHLORIDE 10 MG: 5 TABLET ORAL at 19:20

## 2018-11-20 RX ADMIN — Medication 2 G: at 17:30

## 2018-11-20 RX ADMIN — KETOROLAC TROMETHAMINE 30 MG: 30 INJECTION, SOLUTION INTRAMUSCULAR at 06:12

## 2018-11-20 RX ADMIN — ARFORMOTEROL TARTRATE 15 MCG: 15 SOLUTION RESPIRATORY (INHALATION) at 07:10

## 2018-11-20 RX ADMIN — ARFORMOTEROL TARTRATE 15 MCG: 15 SOLUTION RESPIRATORY (INHALATION) at 19:24

## 2018-11-20 RX ADMIN — ASPIRIN 325 MG: 325 TABLET, DELAYED RELEASE ORAL at 08:18

## 2018-11-20 RX ADMIN — KETOROLAC TROMETHAMINE 30 MG: 30 INJECTION, SOLUTION INTRAMUSCULAR at 11:52

## 2018-11-20 NOTE — PROGRESS NOTES
Problem: Mobility Impaired (Adult and Pediatric) Goal: *Acute Goals and Plan of Care (Insert Text) Physical Therapy Goals Initiated 11/19/2018 1. Patient will move from supine to sit and sit to supine  in bed with independence within 4 days. 2. Patient will perform sit to stand with modified independence within 4 days. 3. Patient will ambulate with supervision/set-up for 50 feet with the least restrictive device within 4 days. 4. Patient will ascend/descend 4 stairs with 1 handrail(s) with minimal assistance/contact guard assist within 4 days. physical Therapy TREATMENT Patient: Negrito Borja (59 y.o. male) Date: 11/20/2018 Diagnosis: ARTHRITIS DUE TO OTHER BACTERIA Infected orthopedic implant, sequela <principal problem not specified> Procedure(s) (LRB): 
IRRIGATION AND DEBRIDEMENT LEFT KNEE WITH ANTIBIOTIC SPACER PLACEMENT (Left) 1 Day Post-Op Precautions: Contact, Fall, TTWB(brace locked in extension; no ROM for 2 weeks) Chart, physical therapy assessment, plan of care and goals were reviewed. ASSESSMENT: 
Patient cleared for PT. Received in bed alert and willing to work with PT. Brace in place. Patient stood and ambulated with rolling walker and  CGA about 10 feet to wheelchair. In gym, patient went up and down 4 steps using 2 rails and CGA. He maintains TTWB status well. Patient reported dizziness after steps and upon return to room, he vomited a large amount. Notified nursing. Assisted patient with changing his gown. Vitals stable. Patient is cleared from a PT standpoint. He is waiting for PICC placement. Recommend HHPT and rolling walker. Progression toward goals: 
[x]    Improving appropriately and progressing toward goals 
[]    Improving slowly and progressing toward goals 
[]    Not making progress toward goals and plan of care will be adjusted PLAN: 
Patient continues to benefit from skilled intervention to address the above impairments. Continue treatment per established plan of care. Discharge Recommendations:  Home Health Further Equipment Recommendations for Discharge:  rolling walker SUBJECTIVE:  
Patient stated Yazan Coughlin had a good night.  OBJECTIVE DATA SUMMARY:  
Critical Behavior: 
Neurologic State: Alert, Appropriate for age Orientation Level: Appropriate for age, Oriented X4 Cognition: Appropriate decision making, Appropriate for age attention/concentration, Appropriate safety awareness Safety/Judgement: Good awareness of safety precautions Functional Mobility Training: 
Bed Mobility: 
  
Supine to Sit: Modified independent; Additional time Sit to Supine: Modified independent; Additional time Transfers: 
Sit to Stand: Contact guard assistance Stand to Sit: Contact guard assistance Balance: 
Sitting: Intact Standing: Intact; With supportAmbulation/Gait Training: 
Distance (ft): 10 Feet (ft)(x4) Assistive Device: Walker, rolling;Gait belt;Brace/Splint Ambulation - Level of Assistance: Contact guard assistance Left Side Weight Bearing: Toe touch Stairs: 
Number of Stairs Trained: 4 Stairs - Level of Assistance: Contact guard assistance; Additional time Rail Use: Both Therapeutic Exercises: Ankle pumps Pain: 
Pain Scale 1: Numeric (0 - 10) Pain Intensity 1: 3 Pain Location 1: Knee Pain Orientation 1: Anterior Activity Tolerance:  
Vomited after doing steps Please refer to the flowsheet for vital signs taken during this treatment. After treatment:  
[]    Patient left in no apparent distress sitting up in chair 
[x]    Patient left in no apparent distress in bed 
[x]    Call bell left within reach [x]    Nursing notified 
[]    Caregiver present [x]    Bed alarm activated COMMUNICATION/COLLABORATION:  
The patients plan of care was discussed with: Registered Nurse and  Hortencia Cassidy, PT 
 Time Calculation: 42 mins

## 2018-11-20 NOTE — PROGRESS NOTES
Bedside and Verbal shift change report given to [address] [place] [address] S (oncoming nurse) by Saba Leach RN (offgoing nurse). Report included the following information SBAR, Kardex, OR Summary, Procedure Summary, Intake/Output, MAR and Recent Results.

## 2018-11-20 NOTE — PROGRESS NOTES
Problem: Falls - Risk of 
Goal: *Absence of Falls Document Ravi Graham Fall Risk and appropriate interventions in the flowsheet. Outcome: Progressing Towards Goal 
Fall Risk Interventions: 
Mobility Interventions: Bed/chair exit alarm, OT consult for ADLs, Patient to call before getting OOB, PT Consult for mobility concerns, PT Consult for assist device competence, Utilize walker, cane, or other assistive device, Utilize gait belt for transfers/ambulation Medication Interventions: Bed/chair exit alarm, Patient to call before getting OOB, Teach patient to arise slowly, Utilize gait belt for transfers/ambulation

## 2018-11-20 NOTE — PROGRESS NOTES
11/20/2018 
2:46 PM 
 
CM met w/ pt to begin d/c planning, charted demographics verified, pt lives w/ wife Jennifer Pierre (DAMI) [phone] and 2 children 16 and 26 yo in 3 story home w/ 4 entry steps and 13 steps to 2nd floor bed/bath. PTA pt reports to be ambulatory, independent and drives. PCP is Ashok Lesches, MD, no NN;  Pt confirmed Latrelle Lizette, covers Rx uses [facility] or [facility]. DME: shower chair , crutches. Pt has has previous [facility] agency unknown. Reason for Admission:   Infected Orthopedic Implant Lt Leg RRAT Score:      7 Plan for utilizing home health:      [facility] Likelihood of Readmission:  Moderate? Yellow due to previous Fx and infection Transition of Care Plan:   Hospital admission for surgical I& D,   PT/OT evals for dispo planning, d/c to home when stable likely w/HH and family assitance. Care Management Interventions PCP Verified by CM: Zen Manning MD, no NN;) Last Visit to PCP: 11/13/18 Palliative Care Criteria Met (RRAT>21 & CHF Dx)?: No 
Mode of Transport at Discharge: Self(wife Jennifer Pierre (K) [phone] will transport) Transition of Care Consult (CM Consult): Other(Home Infusion/IV ABx) Discharge Durable Medical Equipment: Yes(RW) Current Support Network: Lives with Spouse, Own Home(pt lives w/ wife Jennifer Pierre (DAMI) [phone]) Confirm Follow Up Transport: Family(wife) Plan discussed with Pt/Family/Caregiver: Yes Discharge Location Discharge Placement: Home with infusion therapy(and Home Health) CM discussed order for RW, pt prefers [facility], referral sent in [facility], additionally discussed home infusion pt previously used HCP, referral sent in [facility], awaiting order and PICC placement. Will follow and assist. 
Elizabet Childers

## 2018-11-20 NOTE — PROGRESS NOTES
0930 - Pt vomited during physical therapy session. Ancef was administered 5 min. Prior. Patient currently feels fine. Sipping on ginger ale and eating crackers. Will continue to monitor nausea. 1030- pt tolerated ginger ale and crackers well. Resting in bed. Call light in place. No further needs at this time. 1400 - Had to remove IV site due to infiltration and leakage. Waiting for PICC. May need to obtaion

## 2018-11-20 NOTE — PROGRESS NOTES
11/20/2018 4:14 PM 
HCP can accept pt., he is covered at 100%, awaiting PICC placement and ABx orders. Will follow. Park Zimmerman  3:27 PM 
Pt approved for RW through [employer], CM delivered to bedside, discussed HH w/ pt, he prefers [facility] and gave permission for referral; referral sent in [place]. Will follow. Park See

## 2018-11-20 NOTE — PROGRESS NOTES
Bedside, Verbal and Written shift change report given to Adilson Strickland (oncoming nurse) by Faye Shetty RN (offgoing nurse).  Report included the following information SBAR, Kardex, ED Summary, OR Summary, Procedure Summary, Intake/Output, MAR, Accordion, Alarm Parameters  and Pre Procedure Checklist.

## 2018-11-21 ENCOUNTER — APPOINTMENT (OUTPATIENT)
Dept: GENERAL RADIOLOGY | Age: 58
DRG: 467 | End: 2018-11-21
Attending: ORTHOPAEDIC SURGERY
Payer: COMMERCIAL

## 2018-11-21 VITALS
TEMPERATURE: 99.4 F | HEIGHT: 71 IN | RESPIRATION RATE: 18 BRPM | OXYGEN SATURATION: 95 % | DIASTOLIC BLOOD PRESSURE: 75 MMHG | HEART RATE: 100 BPM | SYSTOLIC BLOOD PRESSURE: 132 MMHG | WEIGHT: 197.09 LBS | BODY MASS INDEX: 27.59 KG/M2

## 2018-11-21 LAB
CREAT SERPL-MCNC: 1.09 MG/DL (ref 0.7–1.3)
VANCOMYCIN SERPL-MCNC: 11 UG/ML

## 2018-11-21 PROCEDURE — 94760 N-INVAS EAR/PLS OXIMETRY 1: CPT

## 2018-11-21 PROCEDURE — 77030018786 HC NDL GD F/USND BARD -B

## 2018-11-21 PROCEDURE — 74011250636 HC RX REV CODE- 250/636: Performed by: ORTHOPAEDIC SURGERY

## 2018-11-21 PROCEDURE — 77030018719 HC DRSG PTCH ANTIMIC J&J -A

## 2018-11-21 PROCEDURE — 74011250637 HC RX REV CODE- 250/637: Performed by: NURSE PRACTITIONER

## 2018-11-21 PROCEDURE — 77030019952 HC CANSTR VAC ASST KCON -B

## 2018-11-21 PROCEDURE — 74011000258 HC RX REV CODE- 258: Performed by: ORTHOPAEDIC SURGERY

## 2018-11-21 PROCEDURE — 80202 ASSAY OF VANCOMYCIN: CPT

## 2018-11-21 PROCEDURE — 82565 ASSAY OF CREATININE: CPT

## 2018-11-21 PROCEDURE — 76937 US GUIDE VASCULAR ACCESS: CPT

## 2018-11-21 PROCEDURE — 94640 AIRWAY INHALATION TREATMENT: CPT

## 2018-11-21 PROCEDURE — C1751 CATH, INF, PER/CENT/MIDLINE: HCPCS

## 2018-11-21 PROCEDURE — 74011000250 HC RX REV CODE- 250: Performed by: ORTHOPAEDIC SURGERY

## 2018-11-21 PROCEDURE — 36569 INSJ PICC 5 YR+ W/O IMAGING: CPT | Performed by: ORTHOPAEDIC SURGERY

## 2018-11-21 PROCEDURE — 36415 COLL VENOUS BLD VENIPUNCTURE: CPT

## 2018-11-21 PROCEDURE — 77030025162 HC DRSG VAC ASST 1 KCON -B

## 2018-11-21 PROCEDURE — 74011250637 HC RX REV CODE- 250/637: Performed by: ORTHOPAEDIC SURGERY

## 2018-11-21 RX ORDER — SODIUM CHLORIDE 0.9 % (FLUSH) 0.9 %
20 SYRINGE (ML) INJECTION EVERY 24 HOURS
Status: DISCONTINUED | OUTPATIENT
Start: 2018-11-21 | End: 2018-11-21 | Stop reason: HOSPADM

## 2018-11-21 RX ORDER — SODIUM CHLORIDE 0.9 % (FLUSH) 0.9 %
10-40 SYRINGE (ML) INJECTION EVERY 8 HOURS
Status: DISCONTINUED | OUTPATIENT
Start: 2018-11-21 | End: 2018-11-21 | Stop reason: HOSPADM

## 2018-11-21 RX ORDER — SODIUM CHLORIDE 0.9 % (FLUSH) 0.9 %
10 SYRINGE (ML) INJECTION EVERY 24 HOURS
Status: DISCONTINUED | OUTPATIENT
Start: 2018-11-21 | End: 2018-11-21 | Stop reason: HOSPADM

## 2018-11-21 RX ORDER — SODIUM CHLORIDE 0.9 % (FLUSH) 0.9 %
10 SYRINGE (ML) INJECTION AS NEEDED
Status: DISCONTINUED | OUTPATIENT
Start: 2018-11-21 | End: 2018-11-21 | Stop reason: HOSPADM

## 2018-11-21 RX ORDER — ASPIRIN 325 MG
325 TABLET, DELAYED RELEASE (ENTERIC COATED) ORAL 2 TIMES DAILY
Qty: 60 TAB | Refills: 0 | Status: SHIPPED
Start: 2018-11-21 | End: 2019-02-24

## 2018-11-21 RX ORDER — MESALAMINE 400 MG/1
800 CAPSULE, DELAYED RELEASE ORAL 3 TIMES DAILY
Status: DISCONTINUED | OUTPATIENT
Start: 2018-11-21 | End: 2018-11-21

## 2018-11-21 RX ORDER — OXYCODONE HYDROCHLORIDE 5 MG/1
5 TABLET ORAL
Qty: 40 TAB | Refills: 0 | Status: SHIPPED
Start: 2018-11-21 | End: 2019-02-24

## 2018-11-21 RX ORDER — SODIUM CHLORIDE 0.9 % (FLUSH) 0.9 %
10-30 SYRINGE (ML) INJECTION AS NEEDED
Status: DISCONTINUED | OUTPATIENT
Start: 2018-11-21 | End: 2018-11-21 | Stop reason: HOSPADM

## 2018-11-21 RX ADMIN — ACETAMINOPHEN 650 MG: 325 TABLET, FILM COATED ORAL at 00:33

## 2018-11-21 RX ADMIN — ACETAMINOPHEN 650 MG: 325 TABLET, FILM COATED ORAL at 16:37

## 2018-11-21 RX ADMIN — Medication 10 ML: at 00:23

## 2018-11-21 RX ADMIN — CILOSTAZOL 50 MG: 100 TABLET ORAL at 16:38

## 2018-11-21 RX ADMIN — VANCOMYCIN HYDROCHLORIDE 1500 MG: 10 INJECTION, POWDER, LYOPHILIZED, FOR SOLUTION INTRAVENOUS at 06:26

## 2018-11-21 RX ADMIN — OXYCODONE HYDROCHLORIDE 10 MG: 10 TABLET, FILM COATED, EXTENDED RELEASE ORAL at 09:08

## 2018-11-21 RX ADMIN — CILOSTAZOL 50 MG: 100 TABLET ORAL at 07:07

## 2018-11-21 RX ADMIN — ARFORMOTEROL TARTRATE 15 MCG: 15 SOLUTION RESPIRATORY (INHALATION) at 07:10

## 2018-11-21 RX ADMIN — BUDESONIDE 500 MCG: 0.5 INHALANT RESPIRATORY (INHALATION) at 07:10

## 2018-11-21 RX ADMIN — ERTAPENEM SODIUM 1 G: 1 INJECTION, POWDER, LYOPHILIZED, FOR SOLUTION INTRAMUSCULAR; INTRAVENOUS at 09:10

## 2018-11-21 RX ADMIN — MESALAMINE 800 MG: 400 CAPSULE, DELAYED RELEASE ORAL at 09:45

## 2018-11-21 RX ADMIN — ASPIRIN 325 MG: 325 TABLET, DELAYED RELEASE ORAL at 09:09

## 2018-11-21 RX ADMIN — OXYCODONE HYDROCHLORIDE 10 MG: 5 TABLET ORAL at 12:38

## 2018-11-21 RX ADMIN — Medication 2 G: at 00:23

## 2018-11-21 NOTE — PROGRESS NOTES
11/21/2018 
9:07 AM 
Pt discharge noted for today. PICC to be placed today. [facility] has accepted pt for IV abx, and [facility] will provide [facility] services. Pt will need to be taught by [facility], and receive a dose of his home IV abx prior to DC. Pt's wife will provide transport upon discharge. NN notified.

## 2018-11-21 NOTE — ROUTINE PROCESS
..Bedside shift change report given to MAURY Fontenot (oncoming nurse) by Pratik Whitfield RN (offgoing nurse). Report included the following information SBAR, Kardex, MAR, Accordion and Quality Measures.

## 2018-11-21 NOTE — PROGRESS NOTES
Doing well. Pain controlled Dressing clean, dry, intact Toes flex and extend Capillary refill less than 2 seconds in toes Sensation intact to light touch in toes Cultures pending S/P antibiotic spacer left knee Await cultures Pick to be placed Continue antibiotics Ttwb Plan for discharge when antibiotics established

## 2018-11-21 NOTE — PROGRESS NOTES
Bedside and Verbal shift change report given to Mo Santos (oncoming nurse) by Danielle Stevenson (offgoing nurse). Report included the following information SBAR, Kardex, Procedure Summary, MAR and Recent Results.

## 2018-11-21 NOTE — PROGRESS NOTES
Expected dates of antibiotic therapy: 11/21/18 until 1/3/19 Labs to be drawn weekly: ESR 
CRP 
CBC with differential 
BMP

## 2018-11-21 NOTE — PROGRESS NOTES
Dr. Eugenie Sanchez aware of temp increased. Noted atelectasis, patient encouraged incentive spirometer. Patient verbalized understanding. Patient in no acute distress and expresses no concerns.

## 2018-11-21 NOTE — PROGRESS NOTES
S: No complaints, no acute events. O:  
Visit Vitals /65 (BP 1 Location: Right arm, BP Patient Position: Head of bed elevated (Comment degrees)) Pulse 91 Temp 99.1 °F (37.3 °C) Resp 18 Ht 5' 11\" (1.803 m) Wt 89.4 kg (197 lb 1.5 oz) SpO2 92% BMI 27.49 kg/m² Dressing C/D/I Sensation intact L1-S1 
TA/GCS/EHL: 5/5 Capillary refill less than 2 seconds. A: pod2 antibiotic spacer Change antibiotics to ertapenem P: 
PT,ttwb Pain control Ertapenem for home care x 6 weeks DVT prophylaxis - ecotrin D/C planning - PICC then ertapenem x 6 weeks

## 2018-11-21 NOTE — DISCHARGE SUMMARY
Date of Admission: 11/19/18  Date of Discharge: 11/21/18  Attending on admission and discharge: Dr. Sherin Cotto    Admitting Diagnosis: Septic left knee with retained hardware  Discharge Diagnosis: same  Procedure Performed: irrigation and debridement with hardware removal and antibiotic spacer placement left knee    Hospital Course and Treatment:     The patient was admitted through the asu. The patient tolerated the procedure well and was taken to the surgical floor for further observation and treatment. The patient was maintained on IV fluids until tolerating a PO diet. They were also maintained on sequential compression devices and DVT prophylaxis. The diet was advanced as tolerated. The patient was mobilized with physical therapy. There were no complications during the course of the hospital stay, and the patient was deemed medically stable for discharge to home. After consultation with physical therapy, the patient was cleared for discharge. Discharge instructions: The patient should not be in a pool or tub, or otherwise immerse the wound in water. The patient has been counseled on the importance of mobilizing and moving at least every two hours to help prevent blood clots. The patient should call Dr. Dylon Nguyen office or go to an emergency department if they note a fever over 101.1 degrees fahrenheit, more or unrelieved pain, more or new swelling at the operative site, or any drainage from the wound. Condition at Discharge: Stable    Discharge medications:  Resume regular home medications  Additional medications to be prescribed on discharge  Enterically coated aspirin 325 mg po BID for 1 month  Ertapenem 500mg q24 h via picc      Follow up instructions: The patient should follow up in 2  weeks for a wound check and xrays with Dr. Tre Suazo.

## 2018-11-21 NOTE — PROGRESS NOTES
Physical Therapy: 
Called by nursing re:patient with therapy questions. Patient required reinforcement of brace usage- instructed that brace to be on at all time with upright mobility and locked in extension. He is allowed to remove straps while in bed and not moving, to allow for pressure relief and prevention of skin breakdown. He verbalized understanding. Patient was cleared by therapy yesterday, offered mobility for patient today, he declined. Still cleared from a physical therapy standpoint.  
Jay Cue PT,DPT,NCS

## 2018-11-21 NOTE — PROGRESS NOTES
PICC Placement Note PRE-PROCEDURE VERIFICATION Correct Procedure: yes Correct Site:  yes Temperature: Temp: 98.7 °F (37.1 °C), Temperature Source: Temp Source: Oral 
Recent Labs  
  11/21/18 
0608 11/20/18 
8804 BUN  --  18  
CREA 1.09 1.27 Allergies: Patient has no known allergies. Education materials for PICC Care given: yes. See Patient Education activity for further details. PICC Booklet placed at bedside: yes Closed Ended PICC Catheters: 
Flush Lumens as Follows: 
Intermittent Medication:   Flush before and after each medication with 10 ml NS. Unused Ports:  Flush every 8 hours with 10 ml NS. 
TPN Ports:  Flush every 24 hours with 20 ml NS prior to hanging new bag. Blood Draws: Stop infusion, draw off and waste 10 ml of blood. Draw sample with 10cc syringe or greater. DO NOT USE VACUTAINER . Transfer with appropriate device to lab  tubes. Flush with 20 ml NS. Dressing Change:  Every 7 days, and PRN using sterile technique if integrity of dressing is compromised. Initial dressing change for central line 24-48 hours post insertion if gauze is used. Apply new dressing per policy. PROCEDURE DETAIL Consent was obtained and all questions were answered related to risks and benefits. A double lumen PICC line was inserted, as a sterile procedure using ultrasound and modified Seldinger technique for antibiotic therapy. The following documentation is in addition to the PICC properties in the lines/airways flowsheet : 
Lot #: [id number] Lidocaine 1% administered intradermally :yes Internal Catheter Total Length: 43 cm 2 cm out (cm) Vein Selection for PICC:right basilic Central Line Bundle followed yes Complication Related to Insertion:no The placement was verified by EKG, MAX P WAVE @ 43 cm 2 cm out (cm). PER EKG PICC TIP @ C/A junction. X-ray: no.  
 
Line is okay to use: yes Mathieu Chapin RN

## 2018-11-21 NOTE — DISCHARGE INSTRUCTIONS
Discharge instructions: The patient should not be in a pool or tub, or otherwise immerse the wound in water. The patient has been counseled on the importance of mobilizing and moving at least every two hours to help prevent blood clots. The patient should call Dr. Marce Urias office or go to an emergency department if they note a fever over 101.1 degrees fahrenheit, more or unrelieved pain, more or new swelling at the operative site, or any drainage from the wound. Condition at Discharge: Stable    Discharge medications:  Resume regular home medications  Additional medications to be prescribed on discharge  Enterically coated aspirin 325 mg po BID for 1 month  Oxycodone 5 mg PO q4h prn pain    Ertapenem 500 mg IV q24 h via picc    Remaining instructions on pre-printed sheet    Follow up instructions: The patient should follow up in 2  weeks for a wound check and xrays with Dr. Kennedy Cristina.

## 2018-11-29 ENCOUNTER — TELEPHONE (OUTPATIENT)
Dept: INTERNAL MEDICINE CLINIC | Age: 58
End: 2018-11-29

## 2018-12-04 ENCOUNTER — DOCUMENTATION ONLY (OUTPATIENT)
Dept: INTERNAL MEDICINE CLINIC | Age: 58
End: 2018-12-04

## 2018-12-04 NOTE — PROGRESS NOTES
Received [facility] and [facility] Physician Order Form on 12/4/18,Scanned into CC. Placed in [facility].

## 2018-12-10 LAB
BACTERIA SPEC CULT: NORMAL
GRAM STN SPEC: NORMAL
SERVICE CMNT-IMP: NORMAL

## 2019-02-24 ENCOUNTER — OFFICE VISIT (OUTPATIENT)
Dept: URGENT CARE | Age: 59
End: 2019-02-24

## 2019-02-24 VITALS
BODY MASS INDEX: 27.72 KG/M2 | HEIGHT: 71 IN | TEMPERATURE: 99.1 F | HEART RATE: 123 BPM | SYSTOLIC BLOOD PRESSURE: 124 MMHG | WEIGHT: 198 LBS | OXYGEN SATURATION: 96 % | RESPIRATION RATE: 16 BRPM | DIASTOLIC BLOOD PRESSURE: 73 MMHG

## 2019-02-24 DIAGNOSIS — J06.9 VIRAL URI WITH COUGH: Primary | ICD-10-CM

## 2019-02-24 LAB
FLUAV+FLUBV AG NOSE QL IA.RAPID: NEGATIVE POS/NEG
FLUAV+FLUBV AG NOSE QL IA.RAPID: NEGATIVE POS/NEG
VALID INTERNAL CONTROL?: YES

## 2019-02-24 RX ORDER — PREDNISONE 5 MG/1
TABLET ORAL
Qty: 21 TAB | Refills: 0 | Status: SHIPPED | OUTPATIENT
Start: 2019-02-24 | End: 2019-03-21

## 2019-02-24 RX ORDER — BENZONATATE 200 MG/1
200 CAPSULE ORAL
Qty: 21 CAP | Refills: 0 | Status: SHIPPED | OUTPATIENT
Start: 2019-02-24 | End: 2019-03-03

## 2019-02-24 RX ORDER — PROMETHAZINE HYDROCHLORIDE AND DEXTROMETHORPHAN HYDROBROMIDE 6.25; 15 MG/5ML; MG/5ML
5 SYRUP ORAL
Qty: 60 ML | Refills: 0 | Status: SHIPPED | OUTPATIENT
Start: 2019-02-24 | End: 2019-03-21

## 2019-02-24 NOTE — PROGRESS NOTES
Cold Symptoms   The history is provided by the patient. This is a new problem. The current episode started more than 2 days ago. The problem occurs constantly. The cough is non-productive. There has been no fever. Associated symptoms include chest pain (burning on coughing ), rhinorrhea and sore throat. Pertinent negatives include no shortness of breath and no wheezing. He has tried nothing for the symptoms. He is not a smoker. His past medical history does not include pneumonia or asthma (h/o RAD).         Past Medical History:   Diagnosis Date    Allergic rhinitis     Asthma     allergy/URI related    Colon polyp 13    Diverticulosis     GERD (gastroesophageal reflux disease)     Hiatal hernia     History of vascular access device 2018    5 Fr double PICC 43 cm 2 cm out, long term abx, R basilic    Hypercholesterolemia     IGT (impaired glucose tolerance)     Kidney stone 2016    x2    Overweight (BMI 25.0-29.9) 2018    Rheumatoid arthritis(714.0)     S/P PICC central line placement 2017    has been removed    UC (ulcerative colitis) ([facility] [address]     In remission        Past Surgical History:   Procedure Laterality Date    ABDOMEN SURGERY PROC UNLISTED Bilateral     Hernia Repair    ABDOMEN SURGERY PROC UNLISTED Left     Inquinal hernia    HX GI   &     COLONOSCOPY    HX KNEE ARTHROSCOPY Left 10/2017    HX LUMBAR DISKECTOMY N/A     herniated disk    HX OPEN REDUCTION INTERNAL FIXATION Left 2017    Tibia fx    REMOVAL OF IMPLANT MATERIAL Left 2017    IND with plate removal of tibia         Family History   Problem Relation Age of Onset    Diabetes Mother     Heart Attack Father         SEVERAL MIs    Arrhythmia Father         HAD PACEMAKER    Stroke Father     Other Father          OF MRSA    No Known Problems Sister         Social History     Socioeconomic History    Marital status:      Spouse name: Not on file    Number of children: Not on file    Years of education: Not on file    Highest education level: Not on file   Social Needs    Financial resource strain: Not on file    Food insecurity - worry: Not on file    Food insecurity - inability: Not on file    Transportation needs - medical: Not on file   [employer] needs - non-medical: Not on file   Occupational History    Not on file   Tobacco Use    Smoking status: Never Smoker    Smokeless tobacco: Never Used   Substance and Sexual Activity    Alcohol use: Yes     Alcohol/week: 1.2 oz     Types: 2 Shots of liquor per week     Comment: occasional     Drug use: No    Sexual activity: Yes     Partners: Female     Birth control/protection: None   Other Topics Concern     Service Not Asked    Blood Transfusions Not Asked    Caffeine Concern Not Asked    Occupational Exposure Not Asked    Hobby Hazards Not Asked    Sleep Concern Not Asked    Stress Concern Not Asked    Weight Concern Not Asked    Special Diet Not Asked    Back Care Not Asked    Exercise Not Asked    Bike Helmet Not Asked   [address] Not Asked    Self-Exams Not Asked   Social History Narrative    Not on file                ALLERGIES: Patient has no known allergies. Review of Systems   HENT: Positive for rhinorrhea and sore throat. Respiratory: Negative for shortness of breath and wheezing. Cardiovascular: Positive for chest pain (burning on coughing ). All other systems reviewed and are negative. Vitals:    02/24/19 1444   BP: 124/73   Pulse: (!) 123   Resp: 16   Temp: 99.1 °F (37.3 °C)   SpO2: 96%   Weight: 198 lb (89.8 kg)   Height: 5' 11\" (1.803 m)       Physical Exam   Constitutional: No distress. HENT:   Right Ear: Tympanic membrane and ear canal normal.   Left Ear: Tympanic membrane and ear canal normal.   Nose: Nose normal.   Mouth/Throat: No oropharyngeal exudate, posterior oropharyngeal edema or posterior oropharyngeal erythema.    Eyes: Conjunctivae are normal. Right eye exhibits no discharge. Left eye exhibits no discharge. Neck: Neck supple. Pulmonary/Chest: Effort normal and breath sounds normal. No respiratory distress. He has no wheezes. He has no rales. Lymphadenopathy:     He has no cervical adenopathy. Skin: No rash noted. Nursing note and vitals reviewed. MDM    Procedures      ICD-10-CM ICD-9-CM    1. Viral URI with cough J06.9 465.9 AMB POC INFLUENZA A  AND B REAL-TIME RT-PCR    B97.89      rapid flu- negative     Medications Ordered Today   Medications    benzonatate (TESSALON) 200 mg capsule     Sig: Take 1 Cap by mouth three (3) times daily as needed for Cough for up to 7 days. Dispense:  21 Cap     Refill:  0    promethazine-dextromethorphan (PROMETHAZINE-DM) 6.25-15 mg/5 mL syrup     Sig: Take 5 mL by mouth nightly as needed for Cough. Dispense:  60 mL     Refill:  0    predniSONE (STERAPRED) 5 mg dose pack     Sig: See administration instruction per 5mg dose pack     Dispense:  21 Tab     Refill:  0     Results for orders placed or performed in visit on 02/24/19   AMB POC INFLUENZA A  AND B REAL-TIME RT-PCR   Result Value Ref Range    VALID INTERNAL CONTROL POC Yes     Influenza A Ag POC Negative Negative Pos/Neg    Influenza B Ag POC Negative Negative Pos/Neg     The patients condition was discussed with the patient and they understand. The patient is to follow up with primary care doctor. If signs and symptoms become worse the pt is to go to the ER. The patient is to take medications as prescribed.

## 2019-02-24 NOTE — PATIENT INSTRUCTIONS
Viral Respiratory Infection: Care Instructions  Your Care Instructions    Viruses are very small organisms. They grow in number after they enter your body. There are many types that cause different illnesses, such as colds and the mumps. The symptoms of a viral respiratory infection often start quickly. They include a fever, sore throat, and runny nose. You may also just not feel well. Or you may not want to eat much. Most viral respiratory infections are not serious. They usually get better with time and self-care. Antibiotics are not used to treat a viral infection. That's because antibiotics will not help cure a viral illness. In some cases, antiviral medicine can help your body fight a serious viral infection. Follow-up care is a key part of your treatment and safety. Be sure to make and go to all appointments, and call your doctor if you are having problems. It's also a good idea to know your test results and keep a list of the medicines you take. How can you care for yourself at home? · Rest as much as possible until you feel better. · Be safe with medicines. Take your medicine exactly as prescribed. Call your doctor if you think you are having a problem with your medicine. You will get more details on the specific medicine your doctor prescribes. · Take an over-the-counter pain medicine, such as acetaminophen (Tylenol), ibuprofen (Advil, Motrin), or naproxen (Aleve), as needed for pain and fever. Read and follow all instructions on the label. Do not give aspirin to anyone younger than 20. It has been linked to Reye syndrome, a serious illness. · Drink plenty of fluids, enough so that your urine is light yellow or clear like water. Hot fluids, such as tea or soup, may help relieve congestion in your nose and throat. If you have kidney, heart, or liver disease and have to limit fluids, talk with your doctor before you increase the amount of fluids you drink.   · Try to clear mucus from your lungs by breathing deeply and coughing. · Gargle with warm salt water once an hour. This can help reduce swelling and throat pain. Use 1 teaspoon of salt mixed in 1 cup of warm water. · Do not smoke or allow others to smoke around you. If you need help quitting, talk to your doctor about stop-smoking programs and medicines. These can increase your chances of quitting for good. To avoid spreading the virus  · Cough or sneeze into a tissue. Then throw the tissue away. · If you don't have a tissue, use your hand to cover your cough or sneeze. Then clean your hand. You can also cough into your sleeve. · Wash your hands often. Use soap and warm water. Wash for 15 to 20 seconds each time. · If you don't have soap and water near you, you can clean your hands with alcohol wipes or gel. When should you call for help? Call your doctor now or seek immediate medical care if:    · You have a new or higher fever.     · Your fever lasts more than 48 hours.     · You have trouble breathing.     · You have a fever with a stiff neck or a severe headache.     · You are sensitive to light.     · You feel very sleepy or confused.    Watch closely for changes in your health, and be sure to contact your doctor if:    · You do not get better as expected. Where can you learn more? Go to http://paris-ivon.info/. Enter E614 in the search box to learn more about \"Viral Respiratory Infection: Care Instructions. \"  Current as of: September 5, 2018  Content Version: 11.9  © 3631-2438 [employer]. Care instructions adapted under license by [employer] (which disclaims liability or warranty for this information). If you have questions about a medical condition or this instruction, always ask your healthcare professional. [address] any warranty or liability for your use of this information.

## 2019-03-07 ENCOUNTER — DOCUMENTATION ONLY (OUTPATIENT)
Dept: INTERNAL MEDICINE CLINIC | Age: 59
End: 2019-03-07

## 2019-03-20 ENCOUNTER — OFFICE VISIT (OUTPATIENT)
Dept: INTERNAL MEDICINE CLINIC | Age: 59
End: 2019-03-20

## 2019-03-20 VITALS
HEART RATE: 90 BPM | DIASTOLIC BLOOD PRESSURE: 69 MMHG | RESPIRATION RATE: 18 BRPM | HEIGHT: 71 IN | BODY MASS INDEX: 27.75 KG/M2 | SYSTOLIC BLOOD PRESSURE: 120 MMHG | WEIGHT: 198.2 LBS | TEMPERATURE: 98.8 F | OXYGEN SATURATION: 96 %

## 2019-03-20 DIAGNOSIS — M06.9 RHEUMATOID ARTHRITIS, INVOLVING UNSPECIFIED SITE, UNSPECIFIED RHEUMATOID FACTOR PRESENCE: ICD-10-CM

## 2019-03-20 DIAGNOSIS — E78.5 HYPERLIPIDEMIA, UNSPECIFIED HYPERLIPIDEMIA TYPE: ICD-10-CM

## 2019-03-20 DIAGNOSIS — J45.41 MODERATE PERSISTENT ASTHMA WITH ACUTE EXACERBATION: ICD-10-CM

## 2019-03-20 DIAGNOSIS — Z79.60 LONG-TERM USE OF IMMUNOSUPPRESSANT MEDICATION: ICD-10-CM

## 2019-03-20 DIAGNOSIS — T84.7XXS INFECTED ORTHOPEDIC IMPLANT, SEQUELA: Primary | ICD-10-CM

## 2019-03-20 DIAGNOSIS — K51.90 ULCERATIVE COLITIS WITHOUT COMPLICATIONS, UNSPECIFIED LOCATION (HCC): ICD-10-CM

## 2019-03-20 DIAGNOSIS — S82.142S CLOSED FRACTURE OF LEFT TIBIAL PLATEAU, SEQUELA: ICD-10-CM

## 2019-03-20 DIAGNOSIS — Z01.818 PRE-OP EXAMINATION: ICD-10-CM

## 2019-03-20 DIAGNOSIS — R73.02 IGT (IMPAIRED GLUCOSE TOLERANCE): ICD-10-CM

## 2019-03-20 RX ORDER — MESALAMINE 1.2 G/1
2.4 TABLET, DELAYED RELEASE ORAL
COMMUNITY

## 2019-03-20 RX ORDER — HYDROGEN PEROXIDE 3 %
SOLUTION, NON-ORAL MISCELLANEOUS DAILY
COMMUNITY
End: 2020-05-29

## 2019-03-20 RX ORDER — MELOXICAM 7.5 MG/1
TABLET ORAL
Refills: 0 | Status: ON HOLD | COMMUNITY
Start: 2019-01-14 | End: 2019-03-25 | Stop reason: ALTCHOICE

## 2019-03-20 NOTE — PROGRESS NOTES
HPI:  Presents for f/u pre-op exam    Plan for revision of left total knee on 3/25/19    Mild allergy sx but overall doing well    Using singulair, advair, nasal steroid, rinse    Plan for GA  Tolerated GA in the past  No anesthesia or bleeding problems  No latex allergy    Pre-op testing has been ordered - to be done tomorrow. Past medical, Social, and Family history reviewed    Prior to Admission medications    Medication Sig Start Date End Date Taking? Authorizing Provider   mesalamine (LIALDA) 1.2 gram delayed release tablet Take  by mouth. Yes Provider, Historical   meloxicam (MOBIC) 7.5 mg tablet  1/14/19  Yes Provider, Historical   esomeprazole (NEXIUM) 20 mg capsule Take  by mouth daily. Yes Provider, Historical   abatacept/maltose (ORENCIA, WITH MALTOSE, IV) by IntraVENous route. Yes Provider, Historical   rosuvastatin (CRESTOR) 10 mg tablet Take 1 Tab by mouth nightly. 11/13/18  Yes Colin Colon MD   fluticasone-salmeterol (ADVAIR) 500-50 mcg/dose diskus inhaler Take 1 Puff by inhalation two (2) times a day. 10/16/18  Yes Colin Colon MD   montelukast (SINGULAIR) 10 mg tablet TAKE 1 TABLET DAILY 11/26/17  Yes Colin Colon MD   budesonide (RHINOCORT AQUA) 32 mcg/actuation nasal spray 2 Sprays by Both Nostrils route daily. Yes Provider, Historical   albuterol (PROVENTIL HFA, VENTOLIN HFA, PROAIR HFA) 90 mcg/actuation inhaler Take 2 Puffs by inhalation every four (4) hours as needed for Wheezing. 10/28/15  Yes Colin Colon MD   cilostazol (PLETAL) 50 mg tablet Take 50 mg by mouth Before breakfast and dinner. [facility]   Yes Provider, Historical   promethazine-dextromethorphan (PROMETHAZINE-DM) 6.25-15 mg/5 mL syrup Take 5 mL by mouth nightly as needed for Cough.   Patient not taking: Reported on 3/20/2019 2/24/19   Alia Dietrich MD   predniSONE [facility]) 5 mg dose pack See administration instruction per 5mg dose pack  Patient not taking: Reported on 3/20/2019 2/24/19 Nick Lovell MD   omeprazole (PRILOSEC) 20 mg capsule Take 20 mg by mouth daily. Provider, Historical          ROS  Complete ROS reviewed and negative or stable except as noted in HPI. Physical Exam   Constitutional: He is oriented to person, place, and time. He appears well-nourished. No distress. HENT:   Head: Normocephalic and atraumatic. Mouth/Throat: Oropharynx is clear and moist. No oropharyngeal exudate. Eyes: Pupils are equal, round, and reactive to light. EOM are normal. No scleral icterus. Neck: Normal range of motion. Neck supple. No JVD present. No thyromegaly present. Cardiovascular: Normal rate, regular rhythm and normal heart sounds. Exam reveals no gallop and no friction rub. No murmur heard. Pulmonary/Chest: Effort normal and breath sounds normal. No respiratory distress. He has no wheezes. He has no rales. Abdominal: Soft. Bowel sounds are normal. He exhibits no distension. There is no tenderness. Genitourinary: Prostate normal.   Musculoskeletal: Normal range of motion. He exhibits no edema. Lymphadenopathy:     He has no cervical adenopathy. Neurological: He is alert and oriented to person, place, and time. He exhibits normal muscle tone. Coordination normal.   Skin: Skin is warm. No rash noted. Psychiatric: He has a normal mood and affect. Nursing note and vitals reviewed. Prior labs reviewed. Assessment/Plan:    ICD-10-CM ICD-9-CM    1. Infected orthopedic implant, sequela T84. 7XXS 909.3    2. Ulcerative colitis without complications, unspecified location ([facility]) K51.90 556.9    3. Rheumatoid arthritis, involving unspecified site, unspecified rheumatoid factor presence ([facility]) M06.9 714.0    4. Long-term use of immunosuppressant medication Z79.899 V58.69    5. Closed fracture of left tibial plateau, sequela B69.437V 905.4    6. Pre-op examination Z01.818 V72.84    7. Moderate persistent asthma with acute exacerbation J45.41 493.92    8.  IGT (impaired glucose tolerance) R73.02 790.22    9. Hyperlipidemia, unspecified hyperlipidemia type E78.5 272.4      Follow-up and Dispositions    · Return in about 2 months (around 5/20/2019), or if symptoms worsen or fail to improve, for cholesterol, asthma. results and schedule of future studies reviewed with patient  reviewed diet, exercise and weight    cardiovascular risk and specific lipid/LDL goals reviewed  reviewed medications and side effects in detail  Medically stable for procedure - proceed as planned.

## 2019-03-20 NOTE — PROGRESS NOTES
Exam room [address] [place], [place] [address]Estee Skelton. is a 63 y.o. male    Chief Complaint   Patient presents with    Pre-op Exam     Surgery scheduled for 3/25/2019     1. Have you been to the ER, urgent care clinic since your last visit? Hospitalized since your last visit? No    2. Have you seen or consulted any other health care providers outside of the [address] [place] [address] since your last visit? Include any pap smears or colon screening.  No     Health Maintenance Due   Topic Date Due    Shingrix Vaccine Age 49> (1 of 2) 08/15/2011    Pneumococcal 0-64 years (1 of 1 - PPSV23) 02/22/2018

## 2019-03-21 ENCOUNTER — HOSPITAL ENCOUNTER (OUTPATIENT)
Dept: PREADMISSION TESTING | Age: 59
Discharge: HOME | End: 2019-03-21
Payer: COMMERCIAL

## 2019-03-21 ENCOUNTER — HOSPITAL ENCOUNTER (OUTPATIENT)
Dept: NON INVASIVE DIAGNOSTICS | Age: 59
Discharge: HOME | End: 2019-03-21
Attending: ORTHOPAEDIC SURGERY
Payer: COMMERCIAL

## 2019-03-21 VITALS
TEMPERATURE: 97.7 F | OXYGEN SATURATION: 97 % | HEART RATE: 84 BPM | WEIGHT: 198.19 LBS | RESPIRATION RATE: 20 BRPM | SYSTOLIC BLOOD PRESSURE: 131 MMHG | BODY MASS INDEX: 27.75 KG/M2 | DIASTOLIC BLOOD PRESSURE: 79 MMHG | HEIGHT: 71 IN

## 2019-03-21 LAB
ABO + RH BLD: NORMAL
ALBUMIN SERPL-MCNC: 3.9 G/DL (ref 3.5–5)
ALBUMIN/GLOB SERPL: 1 {RATIO} (ref 1.1–2.2)
ALP SERPL-CCNC: 102 U/L (ref 45–117)
ALT SERPL-CCNC: 41 U/L (ref 12–78)
ANION GAP SERPL CALC-SCNC: 4 MMOL/L (ref 5–15)
APPEARANCE UR: CLEAR
APTT PPP: 29.7 SEC (ref 22.1–32)
AST SERPL-CCNC: 29 U/L (ref 15–37)
BACTERIA URNS QL MICRO: NEGATIVE /HPF
BASOPHILS # BLD: 0 K/UL (ref 0–0.1)
BASOPHILS NFR BLD: 1 % (ref 0–1)
BILIRUB SERPL-MCNC: 0.4 MG/DL (ref 0.2–1)
BILIRUB UR QL: NEGATIVE
BLOOD GROUP ANTIBODIES SERPL: NORMAL
BUN SERPL-MCNC: 13 MG/DL (ref 6–20)
BUN/CREAT SERPL: 13 (ref 12–20)
CALCIUM SERPL-MCNC: 9.1 MG/DL (ref 8.5–10.1)
CHLORIDE SERPL-SCNC: 107 MMOL/L (ref 97–108)
CO2 SERPL-SCNC: 30 MMOL/L (ref 21–32)
COLOR UR: ABNORMAL
CREAT SERPL-MCNC: 1.04 MG/DL (ref 0.7–1.3)
DIFFERENTIAL METHOD BLD: ABNORMAL
EOSINOPHIL # BLD: 0.1 K/UL (ref 0–0.4)
EOSINOPHIL NFR BLD: 2 % (ref 0–7)
EPITH CASTS URNS QL MICRO: ABNORMAL /LPF
ERYTHROCYTE [DISTWIDTH] IN BLOOD BY AUTOMATED COUNT: 13.3 % (ref 11.5–14.5)
EST. AVERAGE GLUCOSE BLD GHB EST-MCNC: 134 MG/DL
GLOBULIN SER CALC-MCNC: 3.8 G/DL (ref 2–4)
GLUCOSE SERPL-MCNC: 92 MG/DL (ref 65–100)
GLUCOSE UR STRIP.AUTO-MCNC: NEGATIVE MG/DL
HBA1C MFR BLD: 6.3 % (ref 4.2–6.3)
HCT VFR BLD AUTO: 44.1 % (ref 36.6–50.3)
HGB BLD-MCNC: 14.2 G/DL (ref 12.1–17)
HGB UR QL STRIP: NEGATIVE
IMM GRANULOCYTES # BLD AUTO: 0 K/UL (ref 0–0.04)
IMM GRANULOCYTES NFR BLD AUTO: 1 % (ref 0–0.5)
INR PPP: 1 (ref 0.9–1.1)
KETONES UR QL STRIP.AUTO: NEGATIVE MG/DL
LEUKOCYTE ESTERASE UR QL STRIP.AUTO: NEGATIVE
LYMPHOCYTES # BLD: 1.6 K/UL (ref 0.8–3.5)
LYMPHOCYTES NFR BLD: 23 % (ref 12–49)
MCH RBC QN AUTO: 30.7 PG (ref 26–34)
MCHC RBC AUTO-ENTMCNC: 32.2 G/DL (ref 30–36.5)
MCV RBC AUTO: 95.5 FL (ref 80–99)
MONOCYTES # BLD: 0.8 K/UL (ref 0–1)
MONOCYTES NFR BLD: 13 % (ref 5–13)
MUCOUS THREADS URNS QL MICRO: ABNORMAL /LPF
NEUTS SEG # BLD: 4 K/UL (ref 1.8–8)
NEUTS SEG NFR BLD: 60 % (ref 32–75)
NITRITE UR QL STRIP.AUTO: NEGATIVE
NRBC # BLD: 0 K/UL (ref 0–0.01)
NRBC BLD-RTO: 0 PER 100 WBC
PH UR STRIP: 6 [PH] (ref 5–8)
PLATELET # BLD AUTO: 294 K/UL (ref 150–400)
PMV BLD AUTO: 9.8 FL (ref 8.9–12.9)
POTASSIUM SERPL-SCNC: 4.4 MMOL/L (ref 3.5–5.1)
PROT SERPL-MCNC: 7.7 G/DL (ref 6.4–8.2)
PROT UR STRIP-MCNC: NEGATIVE MG/DL
PROTHROMBIN TIME: 10.3 SEC (ref 9–11.1)
RBC # BLD AUTO: 4.62 M/UL (ref 4.1–5.7)
RBC #/AREA URNS HPF: ABNORMAL /HPF (ref 0–5)
SODIUM SERPL-SCNC: 141 MMOL/L (ref 136–145)
SP GR UR REFRACTOMETRY: 1.02 (ref 1–1.03)
SPECIMEN EXP DATE BLD: NORMAL
THERAPEUTIC RANGE,PTTT: NORMAL SECS (ref 58–77)
UA: UC IF INDICATED,UAUC: ABNORMAL
UROBILINOGEN UR QL STRIP.AUTO: 0.2 EU/DL (ref 0.2–1)
WBC # BLD AUTO: 6.6 K/UL (ref 4.1–11.1)
WBC URNS QL MICRO: ABNORMAL /HPF (ref 0–4)

## 2019-03-21 PROCEDURE — 86900 BLOOD TYPING SEROLOGIC ABO: CPT

## 2019-03-21 PROCEDURE — 81001 URINALYSIS AUTO W/SCOPE: CPT

## 2019-03-21 PROCEDURE — 85025 COMPLETE CBC W/AUTO DIFF WBC: CPT

## 2019-03-21 PROCEDURE — 36415 COLL VENOUS BLD VENIPUNCTURE: CPT

## 2019-03-21 PROCEDURE — 85730 THROMBOPLASTIN TIME PARTIAL: CPT

## 2019-03-21 PROCEDURE — 83036 HEMOGLOBIN GLYCOSYLATED A1C: CPT

## 2019-03-21 PROCEDURE — 85610 PROTHROMBIN TIME: CPT

## 2019-03-21 PROCEDURE — 80053 COMPREHEN METABOLIC PANEL: CPT

## 2019-03-21 NOTE — PERIOP NOTES
[address]                  
[address] [facility], [address] [facility] MAIN OR                                  [phone] MAIN PRE OP                          [phone]                                                                                AMBULATORY PRE OP          [phone] PRE-ADMISSION TESTING    21  Surgery Date:   03/25/2019 Is surgery arrival time given by surgeon? NO If No Marie staff will call you between 3 and 7pm the day before your surgery with your arrival time. (If your surgery is on a Monday, we will call you the Friday before.) Call [phone] after 7pm Monday-Friday if you did not receive your arrival time. INSTRUCTIONS BEFORE YOUR SURGERY When You 
Arrive Arrive at the 2nd [address] [facility] on the day of your surgery Have your insurance card, photo ID, and any copayment (if needed) Food 
 and  
Drink NO food or drink after midnight the night before surgery This means NO water, gum, mints, coffee, juice, etc. 
No alcohol (beer, wine, liquor) 24 hours before and after surgery Medications to TAKE Morning of Surgery MEDICATIONS TO TAKE THE MORNING OF SURGERY WITH A SIP OF WATER:  
? Rhinocort, Nexium, Advair, Singulair Medications To 
STOP      7 days before surgery ? Non-Steroidal anti-inflammatory Drugs (NSAID's): for example, Ibuprofen (Advil, Motrin), Naproxen (Aleve) ? Aspirin, if taking for pain ? Herbal supplements, vitamins, and fish oil 
? Other: 
(Pain medications not listed above, including Tylenol may be taken) Blood Thinners ? If you take  Aspirin, Plavix, Coumadin, or any blood-thinning or anti-blood clot medicine, talk to the doctor who prescribed the medications for pre-operative instructions. Bathing Clothing Jewelry Valuables ?   If you shower the morning of surgery, please do not apply anything to your skin (lotions, powders, deodorant, or makeup, especially mascara) ? Follow all special bath instructions (for total joint replacement, spine and bowel surgeries) ? Do not shave or trim anywhere 24 hours before surgery ? Wear your hair loose or down; no pony-tails, buns, or metal hair clips ? Wear loose, comfortable, clean clothes ? Wear glasses instead of contacts ? Leave money, valuables, and jewelry, including body piercings, at home Going Home       or Spending the Night ? SAME-DAY SURGERY: You must have a responsible adult drive you home and stay with you 24 hours after surgery ? ADMITS: If your doctor is keeping you into the hospital after surgery, leave personal belongings/luggage in your car until you have a hospital room number. Hospital discharge time is 12 noon Drivers must be here before 12 noon unless you are told differently Special Instructions 16. Special Instructions: · Use Chlorhexidine Care Fusion wash and sponges 3 days prior to surgery as instructed. · Incentive spirometer given with instructions to practice at home and bring back to the hospital on the day of surgery. · [facility] will contact you if your Hemoglobin A1C is greater than 7.5. · Ensure/Glucerna  sample, nutritional information, and Ensure/Glucerna coupon given. · Pain pamphlet and Call Don't Fall reminder reviewed with patient. ·  parking is complimentary Monday - Friday 7 am - 5 pm 
· Bring PTA Medication list day of surgery with the last doses taken documented · Do not bring medication bottles the day of surgery Follow all instructions so your surgery wont be cancelled. Please, be on time. If a situation occurs and you are delayed the day of surgery, call      [phone]. If your physical condition changes (like a fever, cold, flu, etc.) call your surgeon. The patient was contacted  in person.  Home medication reviewed and verified during PAT appointment. The patient verbalizes understanding of all instructions and does not  need reinforcement.

## 2019-03-22 ENCOUNTER — TELEPHONE (OUTPATIENT)
Dept: INTERNAL MEDICINE CLINIC | Age: 59
End: 2019-03-22

## 2019-03-22 ENCOUNTER — ANESTHESIA EVENT (OUTPATIENT)
Dept: SURGERY | Age: 59
DRG: 466 | End: 2019-03-22
Payer: COMMERCIAL

## 2019-03-22 LAB
BACTERIA SPEC CULT: NORMAL
BACTERIA SPEC CULT: NORMAL
SERVICE CMNT-IMP: NORMAL

## 2019-03-22 NOTE — PERIOP NOTES
MRSA negative. However, had a positive nasal culture back in 11/2018. Orders for contact isolation placed in [facility] under sign and held, multiphase orders for the day of surgery. DOS: 3/25/2019

## 2019-03-22 NOTE — PERIOP NOTES
The patient saw her PCP for pre-op H&P on 3/20/2019. The note from Dr. Tea Carver dated 3/20/2019 has not been completed and is not signed in [address] [place] [address]. The paper preop medical clearance form has been signed by Dr. Tea Carver, but does not include a heart and lung assessment. Called Dr. Tea Carver' office requesting that the H&P that is in [address] [place] [address] be completed and signed in preparation for surgery on Monday. DOS: 3/25/2019

## 2019-03-25 ENCOUNTER — APPOINTMENT (OUTPATIENT)
Dept: GENERAL RADIOLOGY | Age: 59
DRG: 466 | End: 2019-03-25
Attending: ORTHOPAEDIC SURGERY
Payer: COMMERCIAL

## 2019-03-25 ENCOUNTER — ANESTHESIA (OUTPATIENT)
Dept: SURGERY | Age: 59
DRG: 466 | End: 2019-03-25
Payer: COMMERCIAL

## 2019-03-25 ENCOUNTER — HOSPITAL ENCOUNTER (INPATIENT)
Age: 59
LOS: 4 days | Discharge: HOME HEALTH | DRG: 466 | End: 2019-03-29
Attending: ORTHOPAEDIC SURGERY | Admitting: ORTHOPAEDIC SURGERY
Payer: COMMERCIAL

## 2019-03-25 DIAGNOSIS — T84.7XXS INFECTED ORTHOPEDIC IMPLANT, SEQUELA: Primary | ICD-10-CM

## 2019-03-25 PROBLEM — T84.9XXA COMPLICATION OF INTERNAL LEFT KNEE PROSTHESIS (HCC): Status: ACTIVE | Noted: 2019-03-25

## 2019-03-25 PROBLEM — Z96.652 COMPLICATION OF INTERNAL LEFT KNEE PROSTHESIS (HCC): Status: ACTIVE | Noted: 2019-03-25

## 2019-03-25 PROCEDURE — 77030033067 HC SUT PDO STRATFX SPIR J&J -B: Performed by: ORTHOPAEDIC SURGERY

## 2019-03-25 PROCEDURE — 77030032490 HC SLV COMPR SCD KNE COVD -B

## 2019-03-25 PROCEDURE — 74011000250 HC RX REV CODE- 250

## 2019-03-25 PROCEDURE — 77030018836 HC SOL IRR NACL ICUM -A: Performed by: ORTHOPAEDIC SURGERY

## 2019-03-25 PROCEDURE — 77030039398 HC MIX CEM PRSM J&J -C: Performed by: ORTHOPAEDIC SURGERY

## 2019-03-25 PROCEDURE — 74011250636 HC RX REV CODE- 250/636

## 2019-03-25 PROCEDURE — 74011000272 HC RX REV CODE- 272: Performed by: ORTHOPAEDIC SURGERY

## 2019-03-25 PROCEDURE — 65270000029 HC RM PRIVATE

## 2019-03-25 PROCEDURE — 74011250637 HC RX REV CODE- 250/637: Performed by: ANESTHESIOLOGY

## 2019-03-25 PROCEDURE — 77030000032 HC CUF TRNQT ZIMM -B: Performed by: ORTHOPAEDIC SURGERY

## 2019-03-25 PROCEDURE — 77030031139 HC SUT VCRL2 J&J -A: Performed by: ORTHOPAEDIC SURGERY

## 2019-03-25 PROCEDURE — 74011000258 HC RX REV CODE- 258

## 2019-03-25 PROCEDURE — 74011000250 HC RX REV CODE- 250: Performed by: ORTHOPAEDIC SURGERY

## 2019-03-25 PROCEDURE — 77030018822 HC SLV COMPR FT COVD -B

## 2019-03-25 PROCEDURE — 97161 PT EVAL LOW COMPLEX 20 MIN: CPT

## 2019-03-25 PROCEDURE — 76210000035 HC AMBSU PH I REC 1 TO 1.5 HR: Performed by: ORTHOPAEDIC SURGERY

## 2019-03-25 PROCEDURE — 77030002966 HC SUT PDS J&J -A: Performed by: ORTHOPAEDIC SURGERY

## 2019-03-25 PROCEDURE — C1713 ANCHOR/SCREW BN/BN,TIS/BN: HCPCS | Performed by: ORTHOPAEDIC SURGERY

## 2019-03-25 PROCEDURE — 97530 THERAPEUTIC ACTIVITIES: CPT

## 2019-03-25 PROCEDURE — 87077 CULTURE AEROBIC IDENTIFY: CPT

## 2019-03-25 PROCEDURE — 87176 TISSUE HOMOGENIZATION CULTR: CPT

## 2019-03-25 PROCEDURE — 73560 X-RAY EXAM OF KNEE 1 OR 2: CPT

## 2019-03-25 PROCEDURE — 64447 NJX AA&/STRD FEMORAL NRV IMG: CPT

## 2019-03-25 PROCEDURE — 76060000065 HC AMB SURG ANES 2.5 TO 3 HR: Performed by: ORTHOPAEDIC SURGERY

## 2019-03-25 PROCEDURE — 77030020782 HC GWN BAIR PAWS FLX 3M -B

## 2019-03-25 PROCEDURE — 0SPD08Z REMOVAL OF SPACER FROM LEFT KNEE JOINT, OPEN APPROACH: ICD-10-PCS | Performed by: ORTHOPAEDIC SURGERY

## 2019-03-25 PROCEDURE — 87075 CULTR BACTERIA EXCEPT BLOOD: CPT

## 2019-03-25 PROCEDURE — 77030002933 HC SUT MCRYL J&J -A: Performed by: ORTHOPAEDIC SURGERY

## 2019-03-25 PROCEDURE — 77030010785: Performed by: ORTHOPAEDIC SURGERY

## 2019-03-25 PROCEDURE — 77030011640 HC PAD GRND REM COVD -A: Performed by: ORTHOPAEDIC SURGERY

## 2019-03-25 PROCEDURE — 77030006835 HC BLD SAW SAG STRY -B: Performed by: ORTHOPAEDIC SURGERY

## 2019-03-25 PROCEDURE — 77030034850: Performed by: ORTHOPAEDIC SURGERY

## 2019-03-25 PROCEDURE — 94640 AIRWAY INHALATION TREATMENT: CPT

## 2019-03-25 PROCEDURE — 74011250637 HC RX REV CODE- 250/637: Performed by: ORTHOPAEDIC SURGERY

## 2019-03-25 PROCEDURE — 77030028907 HC WRP KNEE WO BGS SOLM -B

## 2019-03-25 PROCEDURE — 76030000022 HC AMB SURG 2.5 TO 3 HR INTENSV-TIER 1: Performed by: ORTHOPAEDIC SURGERY

## 2019-03-25 PROCEDURE — 77030002916 HC SUT ETHLN J&J -A: Performed by: ORTHOPAEDIC SURGERY

## 2019-03-25 PROCEDURE — 74011250636 HC RX REV CODE- 250/636: Performed by: ORTHOPAEDIC SURGERY

## 2019-03-25 PROCEDURE — 87186 SC STD MICRODIL/AGAR DIL: CPT

## 2019-03-25 PROCEDURE — 97116 GAIT TRAINING THERAPY: CPT

## 2019-03-25 PROCEDURE — 77030018673: Performed by: ORTHOPAEDIC SURGERY

## 2019-03-25 PROCEDURE — 0SRD0J9 REPLACEMENT OF LEFT KNEE JOINT WITH SYNTHETIC SUBSTITUTE, CEMENTED, OPEN APPROACH: ICD-10-PCS | Performed by: ORTHOPAEDIC SURGERY

## 2019-03-25 PROCEDURE — 74011250636 HC RX REV CODE- 250/636: Performed by: ANESTHESIOLOGY

## 2019-03-25 PROCEDURE — 87205 SMEAR GRAM STAIN: CPT

## 2019-03-25 PROCEDURE — C1776 JOINT DEVICE (IMPLANTABLE): HCPCS | Performed by: ORTHOPAEDIC SURGERY

## 2019-03-25 PROCEDURE — 77030013708 HC HNDPC SUC IRR PULS STRY –B: Performed by: ORTHOPAEDIC SURGERY

## 2019-03-25 PROCEDURE — 77030007866 HC KT SPN ANES BBMI -B

## 2019-03-25 DEVICE — SYMMETRIC PATELLA
Type: IMPLANTABLE DEVICE | Site: KNEE | Status: FUNCTIONAL
Brand: TRIATHLON

## 2019-03-25 DEVICE — UNIVERSAL TIBIAL BASEPLATE
Type: IMPLANTABLE DEVICE | Site: KNEE | Status: FUNCTIONAL
Brand: TRIATHLON

## 2019-03-25 DEVICE — TOTAL STABILIZER FEMORAL COMPONENT
Type: IMPLANTABLE DEVICE | Site: KNEE | Status: FUNCTIONAL
Brand: TRIATHLON

## 2019-03-25 DEVICE — TRITANIUM TIBIAL ASYMMETRIC CONE AUGMENT
Type: IMPLANTABLE DEVICE | Site: KNEE | Status: FUNCTIONAL
Brand: TRIATHLON

## 2019-03-25 DEVICE — HYDROSET INJECTABLE HA BONE SUBST. 5CC
Type: IMPLANTABLE DEVICE | Site: KNEE | Status: FUNCTIONAL
Brand: HYDROSET

## 2019-03-25 DEVICE — PREP KIT W/RESTRICTORS: Type: IMPLANTABLE DEVICE | Site: KNEE | Status: FUNCTIONAL

## 2019-03-25 DEVICE — CEMENT BNE SIMPLEX W/GENT -- 10/PK: Type: IMPLANTABLE DEVICE | Site: KNEE | Status: FUNCTIONAL

## 2019-03-25 DEVICE — TOTAL STABILIZER+ TIBIAL INSERT
Type: IMPLANTABLE DEVICE | Site: KNEE | Status: FUNCTIONAL
Brand: TRIATHLON

## 2019-03-25 DEVICE — CEMENTED STEM
Type: IMPLANTABLE DEVICE | Site: KNEE | Status: FUNCTIONAL
Brand: TRIATHLON

## 2019-03-25 RX ORDER — ACETAMINOPHEN 325 MG/1
650 TABLET ORAL
Status: DISCONTINUED | OUTPATIENT
Start: 2019-03-25 | End: 2019-03-29 | Stop reason: HOSPADM

## 2019-03-25 RX ORDER — HYDROMORPHONE HYDROCHLORIDE 2 MG/ML
0.5 INJECTION, SOLUTION INTRAMUSCULAR; INTRAVENOUS; SUBCUTANEOUS
Status: DISPENSED | OUTPATIENT
Start: 2019-03-25 | End: 2019-03-26

## 2019-03-25 RX ORDER — PROPOFOL 10 MG/ML
INJECTION, EMULSION INTRAVENOUS AS NEEDED
Status: DISCONTINUED | OUTPATIENT
Start: 2019-03-25 | End: 2019-03-25 | Stop reason: HOSPADM

## 2019-03-25 RX ORDER — PANTOPRAZOLE SODIUM 40 MG/1
40 TABLET, DELAYED RELEASE ORAL DAILY
Status: DISCONTINUED | OUTPATIENT
Start: 2019-03-26 | End: 2019-03-29 | Stop reason: HOSPADM

## 2019-03-25 RX ORDER — OXYCODONE HYDROCHLORIDE 5 MG/1
10 TABLET ORAL
Status: DISCONTINUED | OUTPATIENT
Start: 2019-03-25 | End: 2019-03-25 | Stop reason: HOSPADM

## 2019-03-25 RX ORDER — NALOXONE HYDROCHLORIDE 0.4 MG/ML
0.4 INJECTION, SOLUTION INTRAMUSCULAR; INTRAVENOUS; SUBCUTANEOUS AS NEEDED
Status: DISCONTINUED | OUTPATIENT
Start: 2019-03-25 | End: 2019-03-29 | Stop reason: HOSPADM

## 2019-03-25 RX ORDER — PROPOFOL 10 MG/ML
INJECTION, EMULSION INTRAVENOUS
Status: DISCONTINUED | OUTPATIENT
Start: 2019-03-25 | End: 2019-03-25 | Stop reason: HOSPADM

## 2019-03-25 RX ORDER — SODIUM CHLORIDE 9 MG/ML
125 INJECTION, SOLUTION INTRAVENOUS CONTINUOUS
Status: DISCONTINUED | OUTPATIENT
Start: 2019-03-25 | End: 2019-03-25 | Stop reason: HOSPADM

## 2019-03-25 RX ORDER — ARFORMOTEROL TARTRATE 15 UG/2ML
15 SOLUTION RESPIRATORY (INHALATION)
Status: DISCONTINUED | OUTPATIENT
Start: 2019-03-25 | End: 2019-03-29 | Stop reason: HOSPADM

## 2019-03-25 RX ORDER — ROPIVACAINE HYDROCHLORIDE 5 MG/ML
INJECTION, SOLUTION EPIDURAL; INFILTRATION; PERINEURAL AS NEEDED
Status: DISCONTINUED | OUTPATIENT
Start: 2019-03-25 | End: 2019-03-25 | Stop reason: HOSPADM

## 2019-03-25 RX ORDER — AMOXICILLIN 250 MG
1 CAPSULE ORAL 2 TIMES DAILY
Status: DISCONTINUED | OUTPATIENT
Start: 2019-03-25 | End: 2019-03-29 | Stop reason: HOSPADM

## 2019-03-25 RX ORDER — LIDOCAINE HYDROCHLORIDE 10 MG/ML
0.1 INJECTION, SOLUTION EPIDURAL; INFILTRATION; INTRACAUDAL; PERINEURAL AS NEEDED
Status: DISCONTINUED | OUTPATIENT
Start: 2019-03-25 | End: 2019-03-25 | Stop reason: HOSPADM

## 2019-03-25 RX ORDER — ROSUVASTATIN CALCIUM 10 MG/1
10 TABLET, COATED ORAL
Status: DISCONTINUED | OUTPATIENT
Start: 2019-03-25 | End: 2019-03-29 | Stop reason: HOSPADM

## 2019-03-25 RX ORDER — CEFAZOLIN SODIUM/WATER 2 G/20 ML
2 SYRINGE (ML) INTRAVENOUS ONCE
Status: DISCONTINUED | OUTPATIENT
Start: 2019-03-25 | End: 2019-03-25

## 2019-03-25 RX ORDER — MESALAMINE 800 MG/1
800 TABLET, DELAYED RELEASE ORAL 3 TIMES DAILY
Status: DISCONTINUED | OUTPATIENT
Start: 2019-03-25 | End: 2019-03-29 | Stop reason: HOSPADM

## 2019-03-25 RX ORDER — HYDROMORPHONE HYDROCHLORIDE 1 MG/ML
.25-1 INJECTION, SOLUTION INTRAMUSCULAR; INTRAVENOUS; SUBCUTANEOUS
Status: DISCONTINUED | OUTPATIENT
Start: 2019-03-25 | End: 2019-03-25 | Stop reason: HOSPADM

## 2019-03-25 RX ORDER — BENZOCAINE .13; .15; .5; 2 G/100G; G/100G; G/100G; G/100G
2 GEL ORAL DAILY
Status: DISCONTINUED | OUTPATIENT
Start: 2019-03-26 | End: 2019-03-25

## 2019-03-25 RX ORDER — ONDANSETRON 2 MG/ML
4 INJECTION INTRAMUSCULAR; INTRAVENOUS
Status: ACTIVE | OUTPATIENT
Start: 2019-03-25 | End: 2019-03-26

## 2019-03-25 RX ORDER — VANCOMYCIN HYDROCHLORIDE 1 G/20ML
INJECTION, POWDER, LYOPHILIZED, FOR SOLUTION INTRAVENOUS AS NEEDED
Status: DISCONTINUED | OUTPATIENT
Start: 2019-03-25 | End: 2019-03-25 | Stop reason: HOSPADM

## 2019-03-25 RX ORDER — SODIUM CHLORIDE 9 MG/ML
125 INJECTION, SOLUTION INTRAVENOUS CONTINUOUS
Status: DISPENSED | OUTPATIENT
Start: 2019-03-25 | End: 2019-03-26

## 2019-03-25 RX ORDER — POLYETHYLENE GLYCOL 3350 17 G/17G
17 POWDER, FOR SOLUTION ORAL DAILY
Status: DISCONTINUED | OUTPATIENT
Start: 2019-03-25 | End: 2019-03-29 | Stop reason: HOSPADM

## 2019-03-25 RX ORDER — BUPIVACAINE HYDROCHLORIDE 7.5 MG/ML
INJECTION, SOLUTION EPIDURAL; RETROBULBAR AS NEEDED
Status: DISCONTINUED | OUTPATIENT
Start: 2019-03-25 | End: 2019-03-25 | Stop reason: HOSPADM

## 2019-03-25 RX ORDER — SODIUM CHLORIDE, SODIUM LACTATE, POTASSIUM CHLORIDE, CALCIUM CHLORIDE 600; 310; 30; 20 MG/100ML; MG/100ML; MG/100ML; MG/100ML
125 INJECTION, SOLUTION INTRAVENOUS CONTINUOUS
Status: DISCONTINUED | OUTPATIENT
Start: 2019-03-25 | End: 2019-03-25 | Stop reason: HOSPADM

## 2019-03-25 RX ORDER — DIPHENHYDRAMINE HYDROCHLORIDE 50 MG/ML
12.5 INJECTION, SOLUTION INTRAMUSCULAR; INTRAVENOUS AS NEEDED
Status: DISCONTINUED | OUTPATIENT
Start: 2019-03-25 | End: 2019-03-25 | Stop reason: HOSPADM

## 2019-03-25 RX ORDER — SODIUM CHLORIDE 0.9 % (FLUSH) 0.9 %
5-40 SYRINGE (ML) INJECTION EVERY 8 HOURS
Status: DISCONTINUED | OUTPATIENT
Start: 2019-03-25 | End: 2019-03-29 | Stop reason: HOSPADM

## 2019-03-25 RX ORDER — NALOXONE HYDROCHLORIDE 0.4 MG/ML
0.2 INJECTION, SOLUTION INTRAMUSCULAR; INTRAVENOUS; SUBCUTANEOUS
Status: DISCONTINUED | OUTPATIENT
Start: 2019-03-25 | End: 2019-03-25 | Stop reason: HOSPADM

## 2019-03-25 RX ORDER — CILOSTAZOL 100 MG/1
50 TABLET ORAL
Status: DISCONTINUED | OUTPATIENT
Start: 2019-03-25 | End: 2019-03-27

## 2019-03-25 RX ORDER — MIDAZOLAM HYDROCHLORIDE 1 MG/ML
INJECTION, SOLUTION INTRAMUSCULAR; INTRAVENOUS AS NEEDED
Status: DISCONTINUED | OUTPATIENT
Start: 2019-03-25 | End: 2019-03-25 | Stop reason: HOSPADM

## 2019-03-25 RX ORDER — VANCOMYCIN/0.9 % SOD CHLORIDE 1.5G/250ML
1500 PLASTIC BAG, INJECTION (ML) INTRAVENOUS ONCE
Status: COMPLETED | OUTPATIENT
Start: 2019-03-25 | End: 2019-03-25

## 2019-03-25 RX ORDER — OXYCODONE HYDROCHLORIDE 5 MG/1
5 TABLET ORAL
Status: DISCONTINUED | OUTPATIENT
Start: 2019-03-25 | End: 2019-03-29 | Stop reason: HOSPADM

## 2019-03-25 RX ORDER — OXYCODONE HCL 20 MG/1
20 TABLET, FILM COATED, EXTENDED RELEASE ORAL ONCE
Status: COMPLETED | OUTPATIENT
Start: 2019-03-25 | End: 2019-03-25

## 2019-03-25 RX ORDER — BUDESONIDE 0.5 MG/2ML
500 INHALANT ORAL
Status: DISCONTINUED | OUTPATIENT
Start: 2019-03-25 | End: 2019-03-29 | Stop reason: HOSPADM

## 2019-03-25 RX ORDER — FLUMAZENIL 0.1 MG/ML
0.2 INJECTION INTRAVENOUS
Status: DISCONTINUED | OUTPATIENT
Start: 2019-03-25 | End: 2019-03-25 | Stop reason: HOSPADM

## 2019-03-25 RX ORDER — ACETAMINOPHEN 325 MG/1
975 TABLET ORAL ONCE
Status: COMPLETED | OUTPATIENT
Start: 2019-03-25 | End: 2019-03-25

## 2019-03-25 RX ORDER — OXYCODONE HCL 10 MG/1
10 TABLET, FILM COATED, EXTENDED RELEASE ORAL EVERY 12 HOURS
Status: DISCONTINUED | OUTPATIENT
Start: 2019-03-25 | End: 2019-03-29 | Stop reason: HOSPADM

## 2019-03-25 RX ORDER — FACIAL-BODY WIPES
10 EACH TOPICAL DAILY PRN
Status: DISCONTINUED | OUTPATIENT
Start: 2019-03-27 | End: 2019-03-29 | Stop reason: HOSPADM

## 2019-03-25 RX ORDER — SODIUM CHLORIDE 0.9 % (FLUSH) 0.9 %
5-40 SYRINGE (ML) INJECTION AS NEEDED
Status: DISCONTINUED | OUTPATIENT
Start: 2019-03-25 | End: 2019-03-29 | Stop reason: HOSPADM

## 2019-03-25 RX ORDER — VANCOMYCIN/0.9 % SOD CHLORIDE 1.5G/250ML
1500 PLASTIC BAG, INJECTION (ML) INTRAVENOUS EVERY 12 HOURS
Status: DISCONTINUED | OUTPATIENT
Start: 2019-03-25 | End: 2019-03-27

## 2019-03-25 RX ORDER — FLUTICASONE PROPIONATE 50 MCG
2 SPRAY, SUSPENSION (ML) NASAL DAILY
Status: DISCONTINUED | OUTPATIENT
Start: 2019-03-26 | End: 2019-03-29 | Stop reason: HOSPADM

## 2019-03-25 RX ORDER — GABAPENTIN 100 MG/1
200 CAPSULE ORAL 3 TIMES DAILY
Status: DISCONTINUED | OUTPATIENT
Start: 2019-03-25 | End: 2019-03-29 | Stop reason: HOSPADM

## 2019-03-25 RX ORDER — OXYCODONE HYDROCHLORIDE 5 MG/1
10 TABLET ORAL
Status: DISCONTINUED | OUTPATIENT
Start: 2019-03-25 | End: 2019-03-29 | Stop reason: HOSPADM

## 2019-03-25 RX ORDER — FENTANYL CITRATE 50 UG/ML
25 INJECTION, SOLUTION INTRAMUSCULAR; INTRAVENOUS
Status: DISCONTINUED | OUTPATIENT
Start: 2019-03-25 | End: 2019-03-25 | Stop reason: HOSPADM

## 2019-03-25 RX ORDER — FENTANYL CITRATE 50 UG/ML
INJECTION, SOLUTION INTRAMUSCULAR; INTRAVENOUS AS NEEDED
Status: DISCONTINUED | OUTPATIENT
Start: 2019-03-25 | End: 2019-03-25 | Stop reason: HOSPADM

## 2019-03-25 RX ORDER — MONTELUKAST SODIUM 10 MG/1
10 TABLET ORAL
Status: DISCONTINUED | OUTPATIENT
Start: 2019-03-25 | End: 2019-03-29 | Stop reason: HOSPADM

## 2019-03-25 RX ORDER — LIDOCAINE HYDROCHLORIDE 20 MG/ML
INJECTION, SOLUTION EPIDURAL; INFILTRATION; INTRACAUDAL; PERINEURAL AS NEEDED
Status: DISCONTINUED | OUTPATIENT
Start: 2019-03-25 | End: 2019-03-25 | Stop reason: HOSPADM

## 2019-03-25 RX ADMIN — SODIUM CHLORIDE 125 ML/HR: 900 INJECTION, SOLUTION INTRAVENOUS at 12:00

## 2019-03-25 RX ADMIN — FENTANYL CITRATE 50 MCG: 50 INJECTION, SOLUTION INTRAMUSCULAR; INTRAVENOUS at 07:34

## 2019-03-25 RX ADMIN — SODIUM CHLORIDE, SODIUM LACTATE, POTASSIUM CHLORIDE, AND CALCIUM CHLORIDE: 600; 310; 30; 20 INJECTION, SOLUTION INTRAVENOUS at 08:36

## 2019-03-25 RX ADMIN — OXYCODONE HYDROCHLORIDE 10 MG: 5 TABLET ORAL at 15:38

## 2019-03-25 RX ADMIN — SENNOSIDES, DOCUSATE SODIUM 1 TABLET: 50; 8.6 TABLET, FILM COATED ORAL at 17:52

## 2019-03-25 RX ADMIN — BUDESONIDE 500 MCG: 0.5 INHALANT RESPIRATORY (INHALATION) at 19:43

## 2019-03-25 RX ADMIN — GABAPENTIN 200 MG: 100 CAPSULE ORAL at 15:38

## 2019-03-25 RX ADMIN — VANCOMYCIN HYDROCHLORIDE 1500 MG: 10 INJECTION, POWDER, LYOPHILIZED, FOR SOLUTION INTRAVENOUS at 17:51

## 2019-03-25 RX ADMIN — VANCOMYCIN HYDROCHLORIDE 1500 MG: 10 INJECTION, POWDER, LYOPHILIZED, FOR SOLUTION INTRAVENOUS at 07:12

## 2019-03-25 RX ADMIN — PROPOFOL 50 MCG/KG/MIN: 10 INJECTION, EMULSION INTRAVENOUS at 07:40

## 2019-03-25 RX ADMIN — SODIUM CHLORIDE, SODIUM LACTATE, POTASSIUM CHLORIDE, AND CALCIUM CHLORIDE 125 ML/HR: 600; 310; 30; 20 INJECTION, SOLUTION INTRAVENOUS at 06:44

## 2019-03-25 RX ADMIN — ACETAMINOPHEN 975 MG: 325 TABLET ORAL at 06:44

## 2019-03-25 RX ADMIN — LIDOCAINE HYDROCHLORIDE 40 MG: 20 INJECTION, SOLUTION EPIDURAL; INFILTRATION; INTRACAUDAL; PERINEURAL at 07:40

## 2019-03-25 RX ADMIN — ROSUVASTATIN CALCIUM 10 MG: 10 TABLET, COATED ORAL at 21:17

## 2019-03-25 RX ADMIN — MESALAMINE 800 MG: 800 TABLET, DELAYED RELEASE ORAL at 21:17

## 2019-03-25 RX ADMIN — MESALAMINE 800 MG: 800 TABLET, DELAYED RELEASE ORAL at 17:54

## 2019-03-25 RX ADMIN — SODIUM CHLORIDE 125 ML/HR: 900 INJECTION, SOLUTION INTRAVENOUS at 20:36

## 2019-03-25 RX ADMIN — FENTANYL CITRATE 50 MCG: 50 INJECTION, SOLUTION INTRAMUSCULAR; INTRAVENOUS at 07:16

## 2019-03-25 RX ADMIN — ACETAMINOPHEN 650 MG: 325 TABLET ORAL at 17:52

## 2019-03-25 RX ADMIN — OXYCODONE HYDROCHLORIDE 10 MG: 10 TABLET, FILM COATED, EXTENDED RELEASE ORAL at 21:17

## 2019-03-25 RX ADMIN — MIDAZOLAM HYDROCHLORIDE 1 MG: 1 INJECTION, SOLUTION INTRAMUSCULAR; INTRAVENOUS at 07:39

## 2019-03-25 RX ADMIN — MIDAZOLAM HYDROCHLORIDE 2 MG: 1 INJECTION, SOLUTION INTRAMUSCULAR; INTRAVENOUS at 07:16

## 2019-03-25 RX ADMIN — MIDAZOLAM HYDROCHLORIDE 1 MG: 1 INJECTION, SOLUTION INTRAMUSCULAR; INTRAVENOUS at 07:35

## 2019-03-25 RX ADMIN — OXYCODONE HYDROCHLORIDE 10 MG: 5 TABLET ORAL at 18:36

## 2019-03-25 RX ADMIN — CILOSTAZOL 50 MG: 100 TABLET ORAL at 15:38

## 2019-03-25 RX ADMIN — SENNOSIDES, DOCUSATE SODIUM 1 TABLET: 50; 8.6 TABLET, FILM COATED ORAL at 13:23

## 2019-03-25 RX ADMIN — SODIUM CHLORIDE 125 ML/HR: 900 INJECTION, SOLUTION INTRAVENOUS at 10:47

## 2019-03-25 RX ADMIN — PROPOFOL 20 MG: 10 INJECTION, EMULSION INTRAVENOUS at 08:44

## 2019-03-25 RX ADMIN — Medication 10 ML: at 13:24

## 2019-03-25 RX ADMIN — ARFORMOTEROL TARTRATE 15 MCG: 15 SOLUTION RESPIRATORY (INHALATION) at 19:43

## 2019-03-25 RX ADMIN — OXYCODONE HYDROCHLORIDE 20 MG: 20 TABLET, FILM COATED, EXTENDED RELEASE ORAL at 06:44

## 2019-03-25 RX ADMIN — GABAPENTIN 200 MG: 100 CAPSULE ORAL at 21:17

## 2019-03-25 RX ADMIN — MONTELUKAST SODIUM 10 MG: 10 TABLET, FILM COATED ORAL at 21:17

## 2019-03-25 RX ADMIN — MIDAZOLAM HYDROCHLORIDE 1 MG: 1 INJECTION, SOLUTION INTRAMUSCULAR; INTRAVENOUS at 07:23

## 2019-03-25 RX ADMIN — ROPIVACAINE HYDROCHLORIDE 30 ML: 5 INJECTION, SOLUTION EPIDURAL; INFILTRATION; PERINEURAL at 07:21

## 2019-03-25 RX ADMIN — POLYETHYLENE GLYCOL 3350 17 G: 17 POWDER, FOR SOLUTION ORAL at 13:23

## 2019-03-25 RX ADMIN — BUPIVACAINE HYDROCHLORIDE 2.4 ML: 7.5 INJECTION, SOLUTION EPIDURAL; RETROBULBAR at 07:29

## 2019-03-25 NOTE — ANESTHESIA PROCEDURE NOTES
Peripheral Block Start time: 3/25/2019 7:16 AM 
End time: 3/25/2019 7:21 AM 
Performed by: Hardy Leal DO Authorized by: Hardy Leal DO  
 
 
Pre-procedure: Indications: at surgeon's request and post-op pain management Preanesthetic Checklist: patient identified, risks and benefits discussed, site marked, timeout performed, anesthesia consent given and patient being monitored Timeout Time: 07:16 Block Type:  
Block Type: Adductor canal 
Laterality:  Left Monitoring:  Continuous pulse ox, frequent vital sign checks, heart rate and responsive to questions Injection Technique:  Single shot Procedures: ultrasound guided Patient Position: supine Prep: chlorhexidine Location:  Mid thigh Needle Type:  Stimuplex Needle Gauge:  21 G Needle Localization:  Ultrasound guidance Assessment: 
Number of attempts:  1 Injection Assessment:  Incremental injection every 5 mL, local visualized surrounding nerve on ultrasound, negative aspiration for blood, no paresthesia and no intravascular symptoms Patient tolerance:  Patient tolerated the procedure well with no immediate complications

## 2019-03-25 NOTE — BRIEF OP NOTE
BRIEF OPERATIVE NOTE Date of Procedure: 3/25/2019 Preoperative Diagnosis: ARTHRITIS DUE TO OTHER BACTERIA LEFT KNEE Postoperative Diagnosis: ARTHRITIS DUE TO OTHER BACTERIA LEFT KNEE Procedure(s): REVISION LEFT TOTAL KNEE ARTHROPLASTY Surgeon(s) and Role: Sweetie Foster DO - Primary Surgical Assistant: none Surgical Staff: 
Circ-1: Salomon Buenrostro RN 
Circ-Relief: La Villaseñor RN Scrub Tech-1: Daja Beam Scrub RN-Relief: La Villaseñor RN Surg Asst-1: Mady Matters Surg Asst-2: Jalil Sheriff RN Event Time In Time Out Incision Start 7492 Incision Close 1021 Anesthesia: Spinal  
Estimated Blood Loss: 150cc Specimens:  
ID Type Source Tests Collected by Time Destination 1 : Left Knee Synovium #1 Tissue Knee, left CULTURE, ANAEROBIC AND AEROBIC Jyothi Millin, DO 3/25/2019 6155 Microbiology 2 : Left Knee Synovium #2 Tissue Knee, left CULTURE, ANAEROBIC AND AEROBIC Jyothi Millin, DO 3/25/2019 0297 Microbiology 3 : Left Knee Synovium #3 Tissue Knee, left CULTURE, ANAEROBIC AND AEROBIC Jyothi Millin, DO 3/25/2019 7690 Microbiology Findings: bone void, antibiotic spacer Complications: none Implants:  
Implant Name Type Inv. Item Serial No.  Lot No. LRB No. Used Action CEMENT BNE SIMPLEX W/GENT -- 10/PK - SNA  CEMENT BNE SIMPLEX W/GENT -- 10/PK NA [employer] HOW [id number] Left 3 Implanted CEMENT BNE SIMPLEX W/GENT -- 10/PK - SNA  CEMENT BNE SIMPLEX W/GENT -- 10/PK NA [employer] HOW [id number] Left 1 Implanted PREP KT CROW FEM W/RSTRCTR --  - SNA  PREP KT CROW FEM W/RSTRCTR --  [facility] [id number] Left 2 Implanted BASEPLT TIB UNIV TRIATHLN 5 --  - SNA  BASEPLT TIB UNIV TRIATHLN 5 --  NA [employer] HOW [id number] Left 1 Implanted TRIATHLON TRITANIUM TIBIAL ASYMMETRIC CONE AUGMENT   NA SERA [employer] [id number] Left 1 Implanted STEM [id number] -- TRIATHALON - SNA  STEM [id number] -- TRIATHALON NA [employer] HOWM [id number] Left 1 Implanted PAT SYM TRIATHLN X3 31X9MM -- TRIATHLON SYMMETRIC X3 - SNA  PAT SYM TRIATHLN X3 31X9MM -- TRIATHLON SYMMETRIC X3 NA [employer] HOWM [id number] Left 1 Implanted STEM [id number] -- TRIATHALON - SNA  STEM [id number] -- TRIATHALON NA [employer] HOWM [id number] Left 1 Implanted COMPNT FEM TS CROW TRITHLON 4 L --  - SNA  COMPNT FEM TS CROW TRITHLON 4 L --  NA [employer] HOWM [id number] Left 1 Implanted CEMENT BNE HA HYDRO INJ 5ML -- HYDROSET - SNA  CEMENT BNE HA HYDRO INJ 5ML -- HYDROSET NA [employer] HOWM [id number] Left 1 Implanted INSERT TIB TRIATH X3 SZ5 13 TS --  - SNA  INSERT TIB TRIATH X3 SZ5 13 TS --  NA [employer] HOWM [id number] Left 1 Implanted

## 2019-03-25 NOTE — ANESTHESIA POSTPROCEDURE EVALUATION
Procedure(s): REVISION LEFT TOTAL KNEE ARTHROPLASTY. spinal, regional 
 
Anesthesia Post Evaluation Multimodal analgesia: multimodal analgesia not used between 6 hours prior to anesthesia start to PACU discharge Patient location during evaluation: PACU Patient participation: complete - patient participated Level of consciousness: awake and alert Pain score: 0 Pain management: adequate Airway patency: patent Anesthetic complications: no 
Cardiovascular status: hemodynamically stable and acceptable Respiratory status: acceptable Hydration status: acceptable Comments: Patient seen and evaluated; no concerns. Spinal intact. Post anesthesia nausea and vomiting:  none Vitals Value Taken Time /81 3/25/2019 11:35 AM  
Temp 36.4 °C (97.5 °F) 3/25/2019 11:33 AM  
Pulse 66 3/25/2019 11:39 AM  
Resp 12 3/25/2019 11:39 AM  
SpO2 99 % 3/25/2019 11:39 AM  
Vitals shown include unvalidated device data.

## 2019-03-25 NOTE — PROGRESS NOTES
3/25/2019 5:43 PM Case management consult for home health received. Met with pt. Charted address and phone numbers confirmed. Reason for Admission: Revision left total knee arthroplasty with Dr. Rico Deluca. RRAT Score: 9 Plan for utilizing home health:  Yes, choice given, [facility] [address] selected as preference. Choice letter signed for [facility] [address] and placed on pt's hard chart. Likelihood of Readmission: low Transition of Care Plan: home with family and home health Pt lives with his wife in [facility]. Pt was independent with adls and driving prior to admission. Pt has a rw and cane at home. Pt has rx coverage and fills his scripts at the [facility] on [address] Pt's wife will transport pt home. Referral sent to [facility] [address] via [facility]. CM will follow for final discharge needs. [facility] Care Management Interventions PCP Verified by CM: Kelly Blake, nurse navigator notified of admission) Transition of Care Consult (CM Consult): Home Health [address]: No 
Reason Outside [place]: Physician referred to specific agency MyChart Signup: No 
Discharge Durable Medical Equipment: No 
Physical Therapy Consult: Yes Occupational Therapy Consult: Yes Speech Therapy Consult: No 
Current Support Network: Lives with Spouse, Own Home Confirm Follow Up Transport: Family Freedom of Choice Offered:  Yes

## 2019-03-25 NOTE — ROUTINE PROCESS
..Bedside shift change report given to [address], RN (oncoming nurse) by Joanna Mack RN (offgoing nurse). Report included the following information SBAR, Kardex, Intake/Output, MAR, Accordion, Procedure Verification and Quality Measures.

## 2019-03-25 NOTE — ANESTHESIA PREPROCEDURE EVALUATION
Relevant Problems No relevant active problems Anesthetic History Review of Systems / Medical History Patient summary reviewed, nursing notes reviewed and pertinent labs reviewed Pulmonary Asthma : well controlled Neuro/Psych Within defined limits Cardiovascular Hyperlipidemia Exercise tolerance: >4 METS 
  
GI/Hepatic/Renal 
  
 
 
 
 
 
Comments: Ulcerative colitis: remission Endo/Other Arthritis Other Findings Physical Exam 
 
Airway Mallampati: I Neck ROM: normal range of motion Mouth opening: Normal 
 
 Cardiovascular Rhythm: regular Rate: normal 
 
 
 
 Dental 
No notable dental hx Pulmonary Breath sounds clear to auscultation Abdominal 
 
 
 
 Other Findings Anesthetic Plan ASA: 2 Anesthesia type: spinal and regional - femoral single shot Post-op pain plan if not by surgeon: peripheral nerve block single Anesthetic plan and risks discussed with: Patient Informed consent obtained.

## 2019-03-25 NOTE — INTERVAL H&P NOTE
H&P Update: 
Nikita FarrEstee was seen and examined. History and physical has been reviewed. The patient has been examined. There have been no significant clinical changes since the completion of the originally dated History and Physical. 
 
Signed By: Annelle Lombard, DO  March 25, 2019 10:40 AM

## 2019-03-25 NOTE — H&P
CC: Left knee pain HPI: S/P antibiotic spacer placement right knee, here for revision Past Medical History:  
Diagnosis Date  Allergic rhinitis  Asthma   
 allergy/URI related  Colon polyp 11/13/13  Diverticulosis  GERD (gastroesophageal reflux disease)  Hiatal hernia  History of vascular access device 11/21/2018  
 5 Fr double PICC 43 cm 2 cm out, long term abx, R basilic  Hypercholesterolemia  IGT (impaired glucose tolerance)  Kidney stone 2016  
 x2  Overweight (BMI 25.0-29.9) 11/12/2018  Rheumatoid arthritis(714.0)  S/P PICC central line placement 11/09/2017  
 has been removed  UC (ulcerative colitis) ([facility] [address] In remission Past Surgical History:  
Procedure Laterality Date  ABDOMEN SURGERY PROC UNLISTED Bilateral 2007 Hernia Repair [address] UNLISTED Left 1971 Inquinal hernia  HX GI  2010 & 2016 COLONOSCOPY  
 HX KNEE ARTHROSCOPY Left 10/2017  HX LUMBAR DISKECTOMY N/A 2012  
 herniated disk  HX OPEN REDUCTION INTERNAL FIXATION Left 07/2017 Tibia fx  HX TONSILLECTOMY  REMOVAL OF IMPLANT MATERIAL Left 11/2017 IND with plate removal of tibia No Known Allergies No current facility-administered medications on file prior to encounter. Current Outpatient Medications on File Prior to Encounter Medication Sig Dispense Refill  rosuvastatin (CRESTOR) 10 mg tablet Take 1 Tab by mouth nightly. 90 Tab 1  
 fluticasone-salmeterol (ADVAIR) 500-50 mcg/dose diskus inhaler Take 1 Puff by inhalation two (2) times a day. 3 Inhaler 3  
 montelukast (SINGULAIR) 10 mg tablet TAKE 1 TABLET DAILY 90 Tab 1  
 abatacept/maltose (ORENCIA, WITH MALTOSE, IV) by IntraVENous route.  budesonide (RHINOCORT AQUA) 32 mcg/actuation nasal spray 2 Sprays by Both Nostrils route daily.  cilostazol (PLETAL) 50 mg tablet Take 50 mg by mouth Before breakfast and dinner. Raynaud's Family History Problem Relation Age of Onset  Diabetes Mother  Heart Attack Father SEVERAL MIs  Arrhythmia Father HAD PACEMAKER  Stroke Father  Other Father  OF MRSA  No Known Problems Sister Social History Socioeconomic History  Marital status:  Spouse name: Not on file  Number of children: Not on file  Years of education: Not on file  Highest education level: Not on file Occupational History  Not on file Social Needs  Financial resource strain: Not on file  Food insecurity:  
  Worry: Not on file Inability: Not on file  Transportation needs:  
  Medical: Not on file Non-medical: Not on file Tobacco Use  Smoking status: Never Smoker  Smokeless tobacco: Never Used Substance and Sexual Activity  Alcohol use: Yes Alcohol/week: 1.2 oz Types: 2 Shots of liquor per week Comment: occasional   
 Drug use: No  
 Sexual activity: Yes  
  Partners: Female Birth control/protection: None Lifestyle  Physical activity:  
  Days per week: Not on file Minutes per session: Not on file  Stress: Not on file Relationships  Social connections:  
  Talks on phone: Not on file Gets together: Not on file Attends Yazidi service: Not on file Active member of club or organization: Not on file Attends meetings of clubs or organizations: Not on file Relationship status: Not on file  Intimate partner violence:  
  Fear of current or ex partner: Not on file Emotionally abused: Not on file Physically abused: Not on file Forced sexual activity: Not on file Other Topics Concern [address] Service Not Asked  Blood Transfusions Not Asked  Caffeine Concern Not Asked  Occupational Exposure Not Asked Lorry Ralph Hazards Not Asked  Sleep Concern Not Asked  Stress Concern Not Asked  Weight Concern Not Asked  Special Diet Not Asked  Back Care Not Asked  Exercise Not Asked  Bike Helmet Not Asked  Seat Belt Not Asked  Self-Exams Not Asked Social History Narrative  Not on file ROS: Left knee pain, stiffness. No numbness or tingling. PE: 
AOX3 NAD 
RRR 
CTAB Left knee: multiple scars: ROM 0-100 Knee extension: 5/5 Capillary refill less than 2 seconds distally Sensation intact to light touch distally A/P Mechanical complication left knee Plan for revision knee arthroplasty

## 2019-03-25 NOTE — PROGRESS NOTES
Rec. Report from Leela Arita, PACU RN 
 
Patient stable upon admission to the floor. Surgical site CDI. VS stable. Patient A&Ox4 with family at bedside. Oriented to room. No further needs at this time. Stephanie Jones .Primary Nurse Ellen Mondragon and Leela Arita, RN performed a dual skin assessment on this patient Impairment noted- see wound doc flow sheet Milton score is 17

## 2019-03-25 NOTE — ANESTHESIA PROCEDURE NOTES
Spinal Block Start time: 3/25/2019 7:23 AM 
End time: 3/25/2019 7:29 AM 
Performed by: Festus Frausto DO Authorized by: Festus Frausto DO  
 
Pre-procedure: Indications: at surgeon's request and primary anesthetic  Preanesthetic Checklist: patient identified, risks and benefits discussed, anesthesia consent, site marked, patient being monitored and timeout performed Timeout Time: 07:23 Spinal Block:  
Patient Position:  Seated Prep Region:  Lumbar Prep: DuraPrep Location:  L3-4 Technique:  Single shot Needle:  
Needle Type:  Pencan Needle Gauge:  25 G Attempts:  1 Events: CSF confirmed, no blood with aspiration and no paresthesia Assessment: 
Insertion:  Uncomplicated Patient tolerance:  Patient tolerated the procedure well with no immediate complications

## 2019-03-25 NOTE — PERIOP NOTES
TRANSFER - OUT REPORT: 
 
Verbal report given to  Denia Almaraz RN  (name) on [facility].  being transferred to  Room 430 (unit) for routine progression of care Report consisted of patients Situation, Background, Assessment and  
Recommendations(SBAR). Information from the following report(s) SBAR was reviewed with the receiving nurse. Lines: PICC Double Lumen 22/70/59 Right;Basilic (Active) Peripheral IV 03/25/19 Left Hand (Active) Site Assessment Clean, dry, & intact 3/25/2019 10:35 AM  
Phlebitis Assessment 0 3/25/2019 10:35 AM  
Infiltration Assessment 0 3/25/2019 10:35 AM  
Dressing Status Clean, dry, & intact 3/25/2019 10:35 AM  
Dressing Type Transparent 3/25/2019 10:35 AM  
Hub Color/Line Status Pink; Infusing 3/25/2019 10:35 AM  
Alcohol Cap Used Yes 3/25/2019 10:35 AM  
  
 
Opportunity for questions and clarification was provided. Patient transported with: 
 Registered Nurse Telephone report given to Denia Almaraz; will update as needed on unit.

## 2019-03-26 LAB
ANION GAP SERPL CALC-SCNC: 7 MMOL/L (ref 5–15)
BUN SERPL-MCNC: 13 MG/DL (ref 6–20)
BUN/CREAT SERPL: 12 (ref 12–20)
CALCIUM SERPL-MCNC: 8 MG/DL (ref 8.5–10.1)
CHLORIDE SERPL-SCNC: 106 MMOL/L (ref 97–108)
CO2 SERPL-SCNC: 23 MMOL/L (ref 21–32)
CREAT SERPL-MCNC: 1.07 MG/DL (ref 0.7–1.3)
GLUCOSE SERPL-MCNC: 114 MG/DL (ref 65–100)
HGB BLD-MCNC: 11.4 G/DL (ref 12.1–17)
POTASSIUM SERPL-SCNC: 3.7 MMOL/L (ref 3.5–5.1)
SODIUM SERPL-SCNC: 136 MMOL/L (ref 136–145)

## 2019-03-26 PROCEDURE — 80048 BASIC METABOLIC PNL TOTAL CA: CPT

## 2019-03-26 PROCEDURE — 85018 HEMOGLOBIN: CPT

## 2019-03-26 PROCEDURE — 94640 AIRWAY INHALATION TREATMENT: CPT

## 2019-03-26 PROCEDURE — 65270000029 HC RM PRIVATE

## 2019-03-26 PROCEDURE — 97116 GAIT TRAINING THERAPY: CPT

## 2019-03-26 PROCEDURE — 74011000250 HC RX REV CODE- 250: Performed by: ORTHOPAEDIC SURGERY

## 2019-03-26 PROCEDURE — 36415 COLL VENOUS BLD VENIPUNCTURE: CPT

## 2019-03-26 PROCEDURE — 74011250637 HC RX REV CODE- 250/637: Performed by: ORTHOPAEDIC SURGERY

## 2019-03-26 PROCEDURE — 97110 THERAPEUTIC EXERCISES: CPT

## 2019-03-26 PROCEDURE — 74011250636 HC RX REV CODE- 250/636: Performed by: ORTHOPAEDIC SURGERY

## 2019-03-26 RX ADMIN — SENNOSIDES, DOCUSATE SODIUM 1 TABLET: 50; 8.6 TABLET, FILM COATED ORAL at 10:04

## 2019-03-26 RX ADMIN — MESALAMINE 800 MG: 800 TABLET, DELAYED RELEASE ORAL at 21:55

## 2019-03-26 RX ADMIN — GABAPENTIN 200 MG: 100 CAPSULE ORAL at 21:54

## 2019-03-26 RX ADMIN — ROSUVASTATIN CALCIUM 10 MG: 10 TABLET, COATED ORAL at 21:54

## 2019-03-26 RX ADMIN — MESALAMINE 800 MG: 800 TABLET, DELAYED RELEASE ORAL at 17:13

## 2019-03-26 RX ADMIN — BUDESONIDE 500 MCG: 0.5 INHALANT RESPIRATORY (INHALATION) at 20:20

## 2019-03-26 RX ADMIN — BUDESONIDE 500 MCG: 0.5 INHALANT RESPIRATORY (INHALATION) at 08:13

## 2019-03-26 RX ADMIN — MESALAMINE 800 MG: 800 TABLET, DELAYED RELEASE ORAL at 10:04

## 2019-03-26 RX ADMIN — OXYCODONE HYDROCHLORIDE 5 MG: 5 TABLET ORAL at 04:42

## 2019-03-26 RX ADMIN — MONTELUKAST SODIUM 10 MG: 10 TABLET, FILM COATED ORAL at 21:55

## 2019-03-26 RX ADMIN — GABAPENTIN 200 MG: 100 CAPSULE ORAL at 15:38

## 2019-03-26 RX ADMIN — ACETAMINOPHEN 650 MG: 325 TABLET ORAL at 12:31

## 2019-03-26 RX ADMIN — ARFORMOTEROL TARTRATE 15 MCG: 15 SOLUTION RESPIRATORY (INHALATION) at 20:20

## 2019-03-26 RX ADMIN — OXYCODONE HYDROCHLORIDE 10 MG: 10 TABLET, FILM COATED, EXTENDED RELEASE ORAL at 10:04

## 2019-03-26 RX ADMIN — Medication 10 ML: at 18:24

## 2019-03-26 RX ADMIN — OXYCODONE HYDROCHLORIDE 10 MG: 5 TABLET ORAL at 09:19

## 2019-03-26 RX ADMIN — OXYCODONE HYDROCHLORIDE 5 MG: 5 TABLET ORAL at 15:38

## 2019-03-26 RX ADMIN — OXYCODONE HYDROCHLORIDE 10 MG: 10 TABLET, FILM COATED, EXTENDED RELEASE ORAL at 21:55

## 2019-03-26 RX ADMIN — CILOSTAZOL 50 MG: 100 TABLET ORAL at 05:17

## 2019-03-26 RX ADMIN — HYDROMORPHONE HYDROCHLORIDE 0.5 MG: 2 INJECTION INTRAMUSCULAR; INTRAVENOUS; SUBCUTANEOUS at 06:57

## 2019-03-26 RX ADMIN — Medication 10 ML: at 05:02

## 2019-03-26 RX ADMIN — ARFORMOTEROL TARTRATE 15 MCG: 15 SOLUTION RESPIRATORY (INHALATION) at 08:13

## 2019-03-26 RX ADMIN — CILOSTAZOL 50 MG: 100 TABLET ORAL at 17:06

## 2019-03-26 RX ADMIN — GABAPENTIN 200 MG: 100 CAPSULE ORAL at 10:04

## 2019-03-26 RX ADMIN — OXYCODONE HYDROCHLORIDE 10 MG: 5 TABLET ORAL at 12:31

## 2019-03-26 RX ADMIN — POLYETHYLENE GLYCOL 3350 17 G: 17 POWDER, FOR SOLUTION ORAL at 09:20

## 2019-03-26 RX ADMIN — PANTOPRAZOLE SODIUM 40 MG: 40 TABLET, DELAYED RELEASE ORAL at 10:04

## 2019-03-26 RX ADMIN — VANCOMYCIN HYDROCHLORIDE 1500 MG: 10 INJECTION, POWDER, LYOPHILIZED, FOR SOLUTION INTRAVENOUS at 18:24

## 2019-03-26 RX ADMIN — VANCOMYCIN HYDROCHLORIDE 1500 MG: 10 INJECTION, POWDER, LYOPHILIZED, FOR SOLUTION INTRAVENOUS at 05:01

## 2019-03-26 RX ADMIN — SENNOSIDES, DOCUSATE SODIUM 1 TABLET: 50; 8.6 TABLET, FILM COATED ORAL at 18:24

## 2019-03-26 RX ADMIN — Medication 10 ML: at 09:22

## 2019-03-26 NOTE — ACP (ADVANCE CARE PLANNING)
Request by patient to assist with advance medical directive. Consulted with patients nurse. Explained document to patient. Assisted patient in completing the document. Made copy for patients chart and placed a copy in the patient's chart. Returned original document to the patient. Rev.  Naima Matamoros, [address] [place] [address] paging service: [phone])

## 2019-03-26 NOTE — PROGRESS NOTES
Problem: Mobility Impaired (Adult and Pediatric) Goal: *Acute Goals and Plan of Care (Insert Text) Description Physical Therapy Goals Initiated 3/25/2019 1. Patient will move from supine to sit and sit to supine , scoot up and down and roll side to side in bed with modified independence within 4 days. 2. Patient will perform sit to stand with modified independence within 4 days. 3. Patient will ambulate with modified independence for 150 feet with the least restrictive device within 4 days. 4. Patient will ascend/descend 14 stairs with one handrail(s) with minimal assistance/contact guard assist within 4 days. 5. Patient will perform home exercise program per protocol with independence within 4 days. 6. Patient will demonstrate AROM 0-90 degrees in operative joint within 4 days. Note: PHYSICAL THERAPY TREATMENT Patient: Gabriella Lopez (59 y.o. 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 
REVISION LEFT TOTAL KNEE ARTHROPLASTY (Left) 1 Day Post-Op Precautions: Bed Alarm, Fall, PWB(25% LLE) Chart, physical therapy assessment, plan of care and goals were reviewed. ASSESSMENT: 
Patient seen BID for exercises one session and then ambulation with rest break in between. Patient slow and antalgic with knee exercises. Shown how to use strap or gait belt for heel slides. Max x 1 for SLRs LLE. Able to adhere to PWB 25% restriction. Fatigued quickly with amb. VSS and asymptomatic dizziness during amb and up to chair. Minimal knee flexion tolerated. Patient is motivated and hard working. Pain med requested post tx and set up in chair with LLE elevated to foot stool. This is patient's 5th left knee surgery and 3 of surgeries in 18 month period. Tentative plan discharge home with wife tomorrow.  
Pre : Supine (pain spike w/exercises) 145/72  98 bpm  92% RA 
 EOB                                                    126/64   80 bpm  89% RA Post : amb/chair                                  147/64 104 bpm 90% RA - placed on 2L Progression toward goals: 
?      Improving appropriately and progressing toward goals ? Improving slowly and progressing toward goals ? Not making progress toward goals and plan of care will be adjusted PLAN: 
Patient continues to benefit from skilled intervention to address the above impairments. Continue treatment per established plan of care. Discharge Recommendations:  Home Health Further Equipment Recommendations for Discharge:  none new SUBJECTIVE:  
Patient stated ?boy that really hurts. If I can have a little pain medicine that would be great. ? OBJECTIVE DATA SUMMARY:  
Critical Behavior: 
Neurologic State: Alert Orientation Level: Oriented X4 Cognition: Appropriate for age attention/concentration Range of Motion: 
 14-36 Functional Mobility Training: 
Bed Mobility: 
Rolling: (NT) Supine to Sit: Moderate assistance Sit to Supine: (NT- up to chair ) Scooting: Supervision Transfers: 
Sit to Stand: Contact guard assistance;Assist x2 Stand to Sit: Contact guard assistance;Assist x2 Stand Pivot Transfers: Contact guard assistance; Additional time;Assist x2 Balance: 
Standing - Static: Constant support; Fair 
Standing - Dynamic : Constant support; Di Banister Ambulation/Gait Training: 
Distance (ft): 80 Feet (ft) Assistive Device: Gait belt;Walker, rolling Ambulation - Level of Assistance: Contact guard assistance;Minimal assistance; Additional time;Assist x1 Gait Abnormalities: Antalgic;Decreased step clearance; Step to gait Left Side Weight Bearing: Partial (%)(25%) Therapeutic Exercises:  
 
EXERCISE Sets Reps Active Active Assist  
Passive Self ROM Comments Ankle Pumps  10 ?                                        ? ?                                        ?                                          
Quad Sets  10 ?                                        ?                                        ?                                        ?                                          
Hamstring Sets  10 ?                                        ?                                        ?                                        ?                                          
Short Arc Quads  NT ? ?                                        ?                                        ?                                          
Knee Extension Stretch  5  
?                                         
Heel Slides  10 ?                                        ?                                        ?                                        ?                                          
Long Arc Quads  NT ? ?                                        ?                                        ?                                          
Knee Flexion Stretch  NT ?                                        ?                                        ?                                        ?                                          
Straight Leg Raises  5 ?                                        ?                                        ?                                        ?                                        Max assist   
 
Pain: 
Pain Scale 1: Numeric (0 - 10) Pain Intensity 1: 3 Pain Location 1: Leg 
Pain Orientation 1: Left Pain Description 1: Aching Pain Intervention(s) 1: Medication (see MAR) Activity Tolerance:  
Fatigued but fair Please refer to the flowsheet for vital signs taken during this treatment. After treatment: ? Patient left in no apparent distress sitting up in chair ? Patient left in no apparent distress in bed 
? Call bell left within reach ? Nursing notified ? Caregiver present 
? alarm activated COMMUNICATION/COLLABORATION:  
The patient?s plan of care was discussed with: Registered Nurse Raven Grider, PT, DPT Time Calculation: 50 mins

## 2019-03-26 NOTE — FACE TO FACE
Rounded on patient, f/u from Joint Pre-op Patient Education Class. Patient did not attend class. Reviewed home preparation and safety. Reviewed ice application, exercises and incentive spirometry use. Questions answered.

## 2019-03-26 NOTE — PROGRESS NOTES
Spiritual Care Assessment/Progress Note Yareli Min 
 
 
NAME: Jose Myrick MRN: [id number] AGE: 63 y.o. SEX: male Lutheran Affiliation: Cheondoism  
Language: English  
 
3/26/2019     Total Time (in minutes): 33 Spiritual Assessment begun in [facility]M POST SURG ORT 2 through conversation with: 
  
    [x]Patient        [] Family    [] Friend(s) Reason for Consult: Advance medical directive consult, Initial/Spiritual assessment, patient floor Spiritual beliefs: (Please include comment if needed) [x] Identifies with a mary tradition: Cheondoism    
   [] Supported by a mary community:        
   [] Claims no spiritual orientation:       
   [] Seeking spiritual identity:            
   [] Adheres to an individual form of spirituality:       
   [] Not able to assess:                   
 
    
Identified resources for coping:  
   [] Prayer                           
   [] Music                  [] Guided Imagery [x] Family/friends                 [] Pet visits [] Devotional reading                         [] Unknown 
   [] Other:                                          
 
 
Interventions offered during this visit: (See comments for more details) Patient Interventions: Advance medical directive completed, Advance medical directive consult, Affirmation of emotions/emotional suffering, Affirmation of mary, Catharsis/review of pertinent events in supportive environment, Coping skills reviewed/reinforced, End of life issues discussed, Iconic (affirming the presence of God/Higher Power) Plan of Care: 
 
 [] Support spiritual and/or cultural needs [x] Support AMD and/or advance care planning process    
 [] Support grieving process 
 [] Coordinate Rites and/or Rituals  
 [] Coordination with community clergy [] No spiritual needs identified at this time 
 [] Detailed Plan of Care below (See Comments)  [] Make referral to Music Therapy [] Make referral to Pet Therapy    
[] Make referral to Addiction services 
[] Make referral to [facility] 
[] Make referral to Spiritual Care Partner 
[] No future visits requested       
[x] Follow up visits as needed Comments:   visit for Advance Directive (AMD) consult and initial spiritual assessment. Patient sitting up in bed. Good eye contact, smiling, friendly. Says he is feeling better. Provided spiritual presence and listening as he spoke briefly of his current thoughts, feelings, and concerns. Request by patient to assist with advance medical directive. Consulted with patients nurse. Explained document to patient. Assisted patient in completing the document. Made copy for patients chart and placed a copy in the patient's chart. Returned original document to the patient. Rev. Yessenia Archer, [facility]  paging service: [phone])

## 2019-03-26 NOTE — PROGRESS NOTES
PT Note: Patient seen for am tx. Full report to follow. Anita tx well- VSS and up to chair with chair alarm LLE elevated. On target for possible discharge tomorrow after PT clearance. HHPT follow up Merna Scott, PT, DPT

## 2019-03-26 NOTE — PROGRESS NOTES
S: No complaints, no acute events. O:  
Visit Vitals /51 (BP 1 Location: Right arm, BP Patient Position: Head of bed elevated (Comment degrees)) Pulse (!) 107 Temp 99.8 °F (37.7 °C) Resp 15 Ht 5' 11\" (1.803 m) Wt 88.6 kg (195 lb 5.2 oz) SpO2 93% BMI 27.24 kg/m² Dressing C/D/I Sensation intact L1-S1 
TA/GCS/EHL: 5/5 Capillary refill less than 2 seconds. A: POD1 TKA revision P: 
PT,25% weight bearing Continue antibiotics until cultures are negative DVT prophylaxis D/C planning - home tomorrow with hhpt

## 2019-03-26 NOTE — OP NOTES
Date of operation: 3/25/2019  Preoperative diagnosis: Status post left knee antibiotic spacer placement  Postoperative diagnosis: Same  Procedure performed: Revision left knee arthroplasty  Surgeon: Gardenia Marie DO  Assistant: None  Anesthesia: General with adductor canal block  Specimens: Cultures ×3  Drains: None  Complications: None  Implants: [place] triathlon try tritanium tibial asymmetric cone augment, size C side R M-LL, universal tibial baseplate, triathlon, size 5, triathlon total knee cemented stem 15 mm x 100 mm, triathlon total stabilizer femoral component size 4 side left with a TS type, triathlon total knee cemented stem 15 mm x 100 mm on the femur, triathlon X3 cemented patella, S 31 size with a 9 mm thickness, triathlon X3 total stabilizer tibial insert size 5 with a 13 mm thickness    Operative indications: This is a 60-year-old male who did have chronic knee infection which required antibiotic spacer placement, this was cleared of infection over the course of several months of antibiotics, he was indicated for revision to definitive arthroplasty. We did discuss the risks of surgery which include, but are not limited to: Infection, nerve or blood vessel damage, need for reoperation, death, disability, DVT, PE, postoperative pain swelling and stiffness, need for reoperation, ligament instability. The patient did freely consent for surgery. Description of procedure in detail: After the correct site and side were identified by myself in the holding area, the patient was transported to a surgical suite where, after successful placement to a surgical table, the left leg was prepped and draped in usual sterile orthopedic fashion.   After an appropriate timeout, and confirmation that vancomycin had been infused prior to any incision, an incision was made over the previous anterior scar, full-thickness flaps were carried down to capsule, median parapatellar arthrotomy was performed with moderate effusion, this was clear synovial fluid. Arthrotomy was completed and an extensive synovectomy was performed, cultures were taken and taken for specimen. Once I had good visualization and circumferential exposure through synovectomy, the spacer was identified, with use of an osteotome and meticulous technique, all cement was removed from the femur and the tibia. This did allow for visualization of the entirety of the joint space, this was irrigated, the tibial defect was contained, however very thin rim laterally extending anteriorly. With that, I elected for metaphyseal cone fixation at the tibia as well as revision to total knee arthroplasty. I did then sequentially began reaming femur and tibia, I had already prepared the bone and this with cuts during the spacer placement, therefore I began with reaming, I was satisfied with a size 17 reamer which gave me a 15 mm stem, I left the reamer in the tibia and performed a cleanup cut, this was at 0° of varus and valgus as well as 0° of tilt. Once this was performed, I then attached the reamer for the cone, because of this or lateral defect, I elected for an asymmetric cone, I prepared the central portion as well as the lateral portion, I set rotation with this and was satisfied with positioning, once this was performed, I sized him to a size 5 tibia, I then pinned the trial in place and prepared the keel. This was removed, I then turned my attention to the femur where again a cut off of the reamer for the revision set, I did cut a box as well as the 4-in-1, he did have a size 4 femur, and some bone loss anteriorly, but this did not require cones or augments.   I then placed a trial femur and tibia, I trialed a PS insert, there was some coronal plane instability in mid flexion, this was difficult to control by changing the offset on the femur as well as playing with augments anteriorly, as he had had range of motion issues since his original injury, I did not want to tighten his extension or flexion too much. I elected for a total stabilized insert. This did have excellent stability. I then removed all trials, the wound was copiously irrigated with normal saline mixed with bacitracin through pulsatile lavage. I then placed cement plugs. Patella was then cut to the appropriate size and lug drills used. The canals were then prepared, I then in a standard technique retrograde filled antibiotic cement into the tibial canal, a precoated tibial implant was then impacted into place, I did remove excess cement, secondary impactor was used and the same technique was used. Once I had this in place, I did allowed to completely cure before moving to the femur. Once I was satisfied that this had cured in place, I performed the same technique on the femur, primary and secondary impactors were used and all excess cement was removed. I then placed a provisional trial insert, the same technique was performed for the patella. The knee was taken to full extension and not moved until this had fully cured. Final inspection was very satisfactory for all coronal plane stability, I therefore took the knee into flexion, impacted into place the final tibial insert, this was checked for locking mechanism and was very satisfactory. Wounds were closed with 0 PDS, strata fix, #1 Vicryl on skin and 2-0 Vicryl for skin and staples. Sterile dressing was applied. The patient was then awoken and taken to PACU in stable condition, no complications. Postoperative plan: Patient will be 25% weightbearing, he'll work aggressively on range of motion starting now, he will have his antiplatelet medication for DVT prophylaxis, pain medication will be to determined postoperatively. He should follow up in 2 weeks for repeat clinical and radiographic check.

## 2019-03-27 ENCOUNTER — APPOINTMENT (OUTPATIENT)
Dept: CT IMAGING | Age: 59
DRG: 466 | End: 2019-03-27
Attending: ORTHOPAEDIC SURGERY
Payer: COMMERCIAL

## 2019-03-27 PROBLEM — R65.10 SIRS (SYSTEMIC INFLAMMATORY RESPONSE SYNDROME) (HCC): Status: ACTIVE | Noted: 2019-03-27

## 2019-03-27 PROBLEM — I26.99 PULMONARY EMBOLISM (HCC): Status: ACTIVE | Noted: 2019-03-27

## 2019-03-27 LAB
ALBUMIN SERPL-MCNC: 2.7 G/DL (ref 3.5–5)
ALBUMIN/GLOB SERPL: 0.8 {RATIO} (ref 1.1–2.2)
ALP SERPL-CCNC: 79 U/L (ref 45–117)
ALT SERPL-CCNC: 23 U/L (ref 12–78)
AMORPH CRY URNS QL MICRO: ABNORMAL
ANION GAP SERPL CALC-SCNC: 6 MMOL/L (ref 5–15)
APPEARANCE UR: ABNORMAL
AST SERPL-CCNC: 20 U/L (ref 15–37)
BACTERIA URNS QL MICRO: NEGATIVE /HPF
BASOPHILS # BLD: 0 K/UL (ref 0–0.1)
BASOPHILS NFR BLD: 0 % (ref 0–1)
BILIRUB SERPL-MCNC: 0.6 MG/DL (ref 0.2–1)
BILIRUB UR QL: NEGATIVE
BUN SERPL-MCNC: 10 MG/DL (ref 6–20)
BUN/CREAT SERPL: 9 (ref 12–20)
CALCIUM SERPL-MCNC: 7.8 MG/DL (ref 8.5–10.1)
CHLORIDE SERPL-SCNC: 106 MMOL/L (ref 97–108)
CO2 SERPL-SCNC: 25 MMOL/L (ref 21–32)
COLOR UR: ABNORMAL
CREAT SERPL-MCNC: 1.15 MG/DL (ref 0.7–1.3)
DATE LAST DOSE: ABNORMAL
DIFFERENTIAL METHOD BLD: ABNORMAL
EOSINOPHIL # BLD: 0.1 K/UL (ref 0–0.4)
EOSINOPHIL NFR BLD: 1 % (ref 0–7)
EPITH CASTS URNS QL MICRO: ABNORMAL /LPF
ERYTHROCYTE [DISTWIDTH] IN BLOOD BY AUTOMATED COUNT: 13.5 % (ref 11.5–14.5)
GLOBULIN SER CALC-MCNC: 3.4 G/DL (ref 2–4)
GLUCOSE SERPL-MCNC: 109 MG/DL (ref 65–100)
GLUCOSE UR STRIP.AUTO-MCNC: NEGATIVE MG/DL
HCT VFR BLD AUTO: 32.8 % (ref 36.6–50.3)
HGB BLD-MCNC: 10.7 G/DL (ref 12.1–17)
HGB UR QL STRIP: NEGATIVE
IMM GRANULOCYTES # BLD AUTO: 0.1 K/UL (ref 0–0.04)
IMM GRANULOCYTES NFR BLD AUTO: 1 % (ref 0–0.5)
KETONES UR QL STRIP.AUTO: NEGATIVE MG/DL
LEUKOCYTE ESTERASE UR QL STRIP.AUTO: NEGATIVE
LYMPHOCYTES # BLD: 1.4 K/UL (ref 0.8–3.5)
LYMPHOCYTES NFR BLD: 10 % (ref 12–49)
MCH RBC QN AUTO: 30.6 PG (ref 26–34)
MCHC RBC AUTO-ENTMCNC: 32.6 G/DL (ref 30–36.5)
MCV RBC AUTO: 93.7 FL (ref 80–99)
MONOCYTES # BLD: 2 K/UL (ref 0–1)
MONOCYTES NFR BLD: 15 % (ref 5–13)
NEUTS SEG # BLD: 9.8 K/UL (ref 1.8–8)
NEUTS SEG NFR BLD: 73 % (ref 32–75)
NITRITE UR QL STRIP.AUTO: NEGATIVE
NRBC # BLD: 0 K/UL (ref 0–0.01)
NRBC BLD-RTO: 0 PER 100 WBC
PH UR STRIP: 5 [PH] (ref 5–8)
PLATELET # BLD AUTO: 178 K/UL (ref 150–400)
PMV BLD AUTO: 9.7 FL (ref 8.9–12.9)
POTASSIUM SERPL-SCNC: 3.6 MMOL/L (ref 3.5–5.1)
PROT SERPL-MCNC: 6.1 G/DL (ref 6.4–8.2)
PROT UR STRIP-MCNC: NEGATIVE MG/DL
RBC # BLD AUTO: 3.5 M/UL (ref 4.1–5.7)
RBC #/AREA URNS HPF: ABNORMAL /HPF (ref 0–5)
REPORTED DOSE,DOSE: ABNORMAL UNITS
REPORTED DOSE/TIME,TMG: ABNORMAL
SODIUM SERPL-SCNC: 137 MMOL/L (ref 136–145)
SP GR UR REFRACTOMETRY: 1.02 (ref 1–1.03)
UA: UC IF INDICATED,UAUC: ABNORMAL
UROBILINOGEN UR QL STRIP.AUTO: 0.2 EU/DL (ref 0.2–1)
VANCOMYCIN TROUGH SERPL-MCNC: 13.6 UG/ML (ref 5–10)
WBC # BLD AUTO: 13.4 K/UL (ref 4.1–11.1)
WBC URNS QL MICRO: ABNORMAL /HPF (ref 0–4)

## 2019-03-27 PROCEDURE — 74011250636 HC RX REV CODE- 250/636: Performed by: NURSE PRACTITIONER

## 2019-03-27 PROCEDURE — 74011250637 HC RX REV CODE- 250/637: Performed by: ORTHOPAEDIC SURGERY

## 2019-03-27 PROCEDURE — 80202 ASSAY OF VANCOMYCIN: CPT

## 2019-03-27 PROCEDURE — 87040 BLOOD CULTURE FOR BACTERIA: CPT

## 2019-03-27 PROCEDURE — 97530 THERAPEUTIC ACTIVITIES: CPT

## 2019-03-27 PROCEDURE — 74011000258 HC RX REV CODE- 258: Performed by: INTERNAL MEDICINE

## 2019-03-27 PROCEDURE — 74011250636 HC RX REV CODE- 250/636: Performed by: ORTHOPAEDIC SURGERY

## 2019-03-27 PROCEDURE — 97116 GAIT TRAINING THERAPY: CPT

## 2019-03-27 PROCEDURE — 65660000000 HC RM CCU STEPDOWN

## 2019-03-27 PROCEDURE — 74011250636 HC RX REV CODE- 250/636: Performed by: INTERNAL MEDICINE

## 2019-03-27 PROCEDURE — 93005 ELECTROCARDIOGRAM TRACING: CPT

## 2019-03-27 PROCEDURE — 71275 CT ANGIOGRAPHY CHEST: CPT

## 2019-03-27 PROCEDURE — 85025 COMPLETE CBC W/AUTO DIFF WBC: CPT

## 2019-03-27 PROCEDURE — 94640 AIRWAY INHALATION TREATMENT: CPT

## 2019-03-27 PROCEDURE — 74011000250 HC RX REV CODE- 250: Performed by: ORTHOPAEDIC SURGERY

## 2019-03-27 PROCEDURE — 94760 N-INVAS EAR/PLS OXIMETRY 1: CPT

## 2019-03-27 PROCEDURE — 81001 URINALYSIS AUTO W/SCOPE: CPT

## 2019-03-27 PROCEDURE — 80053 COMPREHEN METABOLIC PANEL: CPT

## 2019-03-27 PROCEDURE — 36415 COLL VENOUS BLD VENIPUNCTURE: CPT

## 2019-03-27 PROCEDURE — 74011636320 HC RX REV CODE- 636/320: Performed by: RADIOLOGY

## 2019-03-27 RX ORDER — SODIUM CHLORIDE 9 MG/ML
100 INJECTION, SOLUTION INTRAVENOUS CONTINUOUS
Status: DISCONTINUED | OUTPATIENT
Start: 2019-03-27 | End: 2019-03-29 | Stop reason: HOSPADM

## 2019-03-27 RX ORDER — VANCOMYCIN/0.9 % SOD CHLORIDE 1.5G/250ML
1500 PLASTIC BAG, INJECTION (ML) INTRAVENOUS EVERY 12 HOURS
Status: DISCONTINUED | OUTPATIENT
Start: 2019-03-27 | End: 2019-03-27

## 2019-03-27 RX ORDER — ENOXAPARIN SODIUM 100 MG/ML
1 INJECTION SUBCUTANEOUS EVERY 12 HOURS
Status: DISCONTINUED | OUTPATIENT
Start: 2019-03-27 | End: 2019-03-29 | Stop reason: HOSPADM

## 2019-03-27 RX ADMIN — ENOXAPARIN SODIUM 90 MG: 100 INJECTION SUBCUTANEOUS at 18:27

## 2019-03-27 RX ADMIN — OXYCODONE HYDROCHLORIDE 10 MG: 10 TABLET, FILM COATED, EXTENDED RELEASE ORAL at 09:58

## 2019-03-27 RX ADMIN — ROSUVASTATIN CALCIUM 10 MG: 10 TABLET, COATED ORAL at 23:12

## 2019-03-27 RX ADMIN — GABAPENTIN 200 MG: 100 CAPSULE ORAL at 23:11

## 2019-03-27 RX ADMIN — BUDESONIDE 500 MCG: 0.5 INHALANT RESPIRATORY (INHALATION) at 07:21

## 2019-03-27 RX ADMIN — MONTELUKAST SODIUM 10 MG: 10 TABLET, FILM COATED ORAL at 23:12

## 2019-03-27 RX ADMIN — IOPAMIDOL 90 ML: 755 INJECTION, SOLUTION INTRAVENOUS at 03:12

## 2019-03-27 RX ADMIN — FLUTICASONE PROPIONATE 2 SPRAY: 50 SPRAY, METERED NASAL at 23:12

## 2019-03-27 RX ADMIN — Medication 10 ML: at 23:13

## 2019-03-27 RX ADMIN — PIPERACILLIN SODIUM,TAZOBACTAM SODIUM 3.38 G: 3; .375 INJECTION, POWDER, FOR SOLUTION INTRAVENOUS at 23:12

## 2019-03-27 RX ADMIN — SENNOSIDES, DOCUSATE SODIUM 1 TABLET: 50; 8.6 TABLET, FILM COATED ORAL at 17:22

## 2019-03-27 RX ADMIN — MESALAMINE 800 MG: 800 TABLET, DELAYED RELEASE ORAL at 10:13

## 2019-03-27 RX ADMIN — SODIUM CHLORIDE 100 ML/HR: 900 INJECTION, SOLUTION INTRAVENOUS at 18:27

## 2019-03-27 RX ADMIN — VANCOMYCIN HYDROCHLORIDE 1500 MG: 10 INJECTION, POWDER, LYOPHILIZED, FOR SOLUTION INTRAVENOUS at 18:20

## 2019-03-27 RX ADMIN — MESALAMINE 800 MG: 800 TABLET, DELAYED RELEASE ORAL at 17:22

## 2019-03-27 RX ADMIN — GABAPENTIN 200 MG: 100 CAPSULE ORAL at 09:58

## 2019-03-27 RX ADMIN — PIPERACILLIN SODIUM,TAZOBACTAM SODIUM 3.38 G: 3; .375 INJECTION, POWDER, FOR SOLUTION INTRAVENOUS at 09:56

## 2019-03-27 RX ADMIN — BUDESONIDE 500 MCG: 0.5 INHALANT RESPIRATORY (INHALATION) at 21:05

## 2019-03-27 RX ADMIN — SENNOSIDES, DOCUSATE SODIUM 1 TABLET: 50; 8.6 TABLET, FILM COATED ORAL at 09:58

## 2019-03-27 RX ADMIN — OXYCODONE HYDROCHLORIDE 10 MG: 5 TABLET ORAL at 15:45

## 2019-03-27 RX ADMIN — ARFORMOTEROL TARTRATE 15 MCG: 15 SOLUTION RESPIRATORY (INHALATION) at 07:21

## 2019-03-27 RX ADMIN — OXYCODONE HYDROCHLORIDE 10 MG: 10 TABLET, FILM COATED, EXTENDED RELEASE ORAL at 23:12

## 2019-03-27 RX ADMIN — VANCOMYCIN HYDROCHLORIDE 1500 MG: 10 INJECTION, POWDER, LYOPHILIZED, FOR SOLUTION INTRAVENOUS at 05:08

## 2019-03-27 RX ADMIN — GABAPENTIN 200 MG: 100 CAPSULE ORAL at 15:26

## 2019-03-27 RX ADMIN — ENOXAPARIN SODIUM 90 MG: 100 INJECTION SUBCUTANEOUS at 05:08

## 2019-03-27 RX ADMIN — SODIUM CHLORIDE 100 ML/HR: 900 INJECTION, SOLUTION INTRAVENOUS at 02:00

## 2019-03-27 RX ADMIN — ARFORMOTEROL TARTRATE 15 MCG: 15 SOLUTION RESPIRATORY (INHALATION) at 21:05

## 2019-03-27 RX ADMIN — ACETAMINOPHEN 650 MG: 325 TABLET ORAL at 00:17

## 2019-03-27 RX ADMIN — MESALAMINE 800 MG: 800 TABLET, DELAYED RELEASE ORAL at 23:12

## 2019-03-27 RX ADMIN — POLYETHYLENE GLYCOL 3350 17 G: 17 POWDER, FOR SOLUTION ORAL at 09:58

## 2019-03-27 RX ADMIN — ACETAMINOPHEN 650 MG: 325 TABLET ORAL at 15:26

## 2019-03-27 RX ADMIN — Medication 5 ML: at 15:39

## 2019-03-27 RX ADMIN — PIPERACILLIN SODIUM,TAZOBACTAM SODIUM 3.38 G: 3; .375 INJECTION, POWDER, FOR SOLUTION INTRAVENOUS at 15:47

## 2019-03-27 RX ADMIN — PANTOPRAZOLE SODIUM 40 MG: 40 TABLET, DELAYED RELEASE ORAL at 09:58

## 2019-03-27 NOTE — PROGRESS NOTES
Saw patient. Reviewed chart and ordered. Added Zosyn to Vanco, since there is no evidence of MRSA and he has persistent fever that I think is too high to be explained by PE/DVT, and Zosyn would be broader empiric coverage. Recommend Xarelto or Eliquis at discharge, unless ortho has concern that urgent repeat surgery could be possible, in which case lovenox BID would be better until no surgical risk. Non-billing rounding

## 2019-03-27 NOTE — PROGRESS NOTES
0000 Nurse notified of patient temp of 100.7. Pulse in 1 teens. o2 sats 89 on RA Tylenol given. Patient put on 3L NC 
 
0130 recheck patient temp, 102. 6. Pulse low 120s. Notified MD. Orders given for CT R/O PE 
NS @ 100 EKG 
CBC 
CMP Blood Cultures Urine cultures 0345 Results reported to RN from CT scan. Showed acute pulmonary emboli. Notified MD. Orders to consult hospitalist given for management of PE Hospitalist saw patient and ordered Lovenox. Will continue to monitor. Bedside and Verbal shift change report given to Federico Boston (oncoming nurse) by Rock Abdul (offgoing nurse). Report included the following information SBAR, Kardex, Procedure Summary, Intake/Output, MAR and Recent Results.

## 2019-03-27 NOTE — PROGRESS NOTES
Vancomycin Pharmacy Consult 03/27/19 Vancomycin trough = 13.6 mcg/ml (drawn 12.63hrs post dose), extrapolates to a trough of 14.4 mcg/ml. Level is subtherapeutic Goal Trough: 15-20 mcg/ml Plan: Will change Vancomycin to 1000 mg IV q8hr and recheck level soon Thank you Negrito Carpenter, PharmD 
[phone]

## 2019-03-27 NOTE — CONSULTS
Consult History and Physical Exam    NAME:  Annamaria Jaimes. :   1960   MRN:  [id number]     PCP:  Jeovany Goldberg MD   Referring: Martínez Glover DO     Date/Time:  3/27/2019         Subjective:   REASON FOR CONSULT: PE    CHIEF COMPLAINT: \"I'm okay\"      HISTORY OF PRESENT ILLNESS:     Mr. Prema Pickens is a 63 y.o.  male with PMH of UC, RA, XOL, GERD admitted by orthopedics for a TKA revision. Pt denies any pleuritic pain, hemoptysis or subjective dyspnea but had been noted to be hypoxic prompting a CTA which showed multiple small pulmonary emboli. Pt without prior h/o DVT or PE. No family history of such. No contraindications to anticoagulation. Past Medical History:   Diagnosis Date    Allergic rhinitis     Asthma     allergy/URI related    Colon polyp 13    Diverticulosis     GERD (gastroesophageal reflux disease)     Hiatal hernia     History of vascular access device 2018    5 Fr double PICC 43 cm 2 cm out, long term abx, R basilic    Hypercholesterolemia     IGT (impaired glucose tolerance)     Kidney stone 2016    x2    Overweight (BMI 25.0-29.9) 2018    Rheumatoid arthritis(714.0)     S/P PICC central line placement 2017    has been removed    UC (ulcerative colitis) ([facility]     In remission        Past Surgical History:   Procedure Laterality Date    ABDOMEN SURGERY PROC UNLISTED Bilateral     Hernia Repair    ABDOMEN SURGERY PROC UNLISTED Left     Inquinal hernia    HX GI   & 2016    COLONOSCOPY    HX KNEE ARTHROSCOPY Left 10/2017    HX LUMBAR DISKECTOMY N/A     herniated disk    HX OPEN REDUCTION INTERNAL FIXATION Left 2017    Tibia fx    HX TONSILLECTOMY      REMOVAL OF IMPLANT MATERIAL Left 2017    IND with plate removal of tibia       Social History     Tobacco Use    Smoking status: Never Smoker    Smokeless tobacco: Never Used   Substance Use Topics    Alcohol use:  Yes     Alcohol/week: 1.2 oz     Types: 2 Shots of liquor per week     Comment: occasional         Family History   Problem Relation Age of Onset    Diabetes Mother     Heart Attack Father         SEVERAL MIs    Arrhythmia Father         HAD PACEMAKER    Stroke Father     Other Father          OF MRSA    No Known Problems Sister         No Known Allergies     Prior to Admission medications    Medication Sig Start Date End Date Taking? Authorizing Provider   mesalamine (LIALDA) 1.2 gram delayed release tablet Take 2.4 g by mouth. Yes Provider, Historical   esomeprazole (NEXIUM) 20 mg capsule Take  by mouth daily. Yes Provider, Historical   rosuvastatin (CRESTOR) 10 mg tablet Take 1 Tab by mouth nightly. 18  Yes Wanda Garcia MD   fluticasone-salmeterol (ADVAIR) 500-50 mcg/dose diskus inhaler Take 1 Puff by inhalation two (2) times a day. 10/16/18  Yes Wanda Garcia MD   montelukast (SINGULAIR) 10 mg tablet TAKE 1 TABLET DAILY 17  Yes Wanda Garcia MD   abatacept/maltose [facility], WITH MALTOSE, IV) by IntraVENous route. Provider, Historical   budesonide (RHINOCORT AQUA) 32 mcg/actuation nasal spray 2 Sprays by Both Nostrils route daily. Provider, Historical   cilostazol (PLETAL) 50 mg tablet Take 50 mg by mouth Before breakfast and dinner.  [facility]    Provider, Historical         Review of Systems:  (bold if positive, if negative)    Gen:  Eyes:  ENT:  CVS:  Pulm:  GI:    :    MS:  Skin:  Psych:  Endo:    Hem:  Renal:    Neuro:            Objective:      VITALS:    Vital signs reviewed; most recent are:    Visit Vitals  /56 (BP 1 Location: Right arm, BP Patient Position: At rest)   Pulse (!) 110   Temp (!) 102.6 °F (39.2 °C)   Resp 16   Ht 5' 11\" (1.803 m)   Wt 88.6 kg (195 lb 5.2 oz)   SpO2 90%   BMI 27.24 kg/m²     SpO2 Readings from Last 6 Encounters:   19 90%   19 97%   19 96%   19 96%   18 95%   18 96%            Intake/Output Summary (Last 24 hours) at 3/27/2019 9276  Last data filed at 3/26/2019 1824  Gross per 24 hour   Intake 1500 ml   Output    Net 1500 ml            Exam:     Physical Exam:    Gen:  Well-developed, well-nourished, in no acute distress  HEENT:  Pink conjunctivae, PERRL, hearing intact to voice, moist mucous membranes  Neck:  Supple, without masses, thyroid non-tender  Resp:  No accessory muscle use, clear breath sounds without wheezes rales or rhonchi  Card: Tachycardic. No murmurs, normal S1, S2 without thrills, bruits or peripheral edema  Abd:  Soft, non-tender, non-distended, normoactive bowel sounds are present, no palpable organomegaly  Lymph:  No cervical adenopathy  Musc:  No cyanosis or clubbing  Skin:  No rashes or ulcers, skin turgor is good  Neuro:  Cranial nerves 3-12 are grossly intact  Psych:  Alert with good insight. Oriented to person, place, and time  LLE with diffuse swelling. Dressing C/D/I. Good distal pulses        Labs:    Recent Labs     03/27/19  0230   WBC 13.4*   HGB 10.7*   HCT 32.8*        Recent Labs     03/27/19  0230      K 3.6      CO2 25   *   BUN 10   CREA 1.15   CA 7.8*   ALB 2.7*   SGOT 20   ALT 23     No components found for: GLPOC    No results for input(s): INR in the last 72 hours. No lab exists for component: INREXT     CTA chest =>   Multiple small acute pulmonary emboli. Assessment/Plan:    [facility] (3/27/2019) - provoked  -spoke with Dr. Terri Mitchell; ok to start full dose Lovenox v. Heparin gtt  -will transition to NOAC at discharge; will need to have CM run the cost of the Rx  -will need 3-6 months of anticoagulation       SIRS (systemic inflammatory response syndrome) ([facility] (3/27/2019) - pt with fevers, tachycardia and WBC count.  Possibly 2/2 PE  -treat PE   -check blood cultures and UA; no e/o PNA on CT   -defer knee to ortho       Rheumatoid arthritis, adult ([facility] (11/2/2009)  -continue home meds      Ulcerative colitis ([facility] [address] (11/2/2009)  -continue mesalamine Hyperlipidemia (7/18/2012)  -continue statin       GERD (gastroesophageal reflux disease) (7/18/2012)  -on PPI       Asthma (7/18/2012) - no wheezing   -continue home meds     [facility]   -hold pletal while on Lovenox       Complication of internal left knee prosthesis ([address] (3/25/2019)  -as per ortho     Total time spent with patient: 50 [address] [facility] discussed with: Patient and Nursing Staff    Discussed:  Code Status, Care Plan and D/C Planning    Prophylaxis:  Lovenox           ___________________________________________________    Attending Physician: Heidy Villaseñor MD

## 2019-03-27 NOTE — PROGRESS NOTES
Problem: Mobility Impaired (Adult and Pediatric) Goal: *Acute Goals and Plan of Care (Insert Text) Description Physical Therapy Goals Initiated 3/25/2019 1. Patient will move from supine to sit and sit to supine , scoot up and down and roll side to side in bed with modified independence within 4 days. 2. Patient will perform sit to stand with modified independence within 4 days. 3. Patient will ambulate with modified independence for 150 feet with the least restrictive device within 4 days. 4. Patient will ascend/descend 14 stairs with one handrail(s) with minimal assistance/contact guard assist within 4 days. 5. Patient will perform home exercise program per protocol with independence within 4 days. 6. Patient will demonstrate AROM 0-90 degrees in operative joint within 4 days. Note: PHYSICAL THERAPY TREATMENT Patient: Neri Siddiiq (59 y.o. male) Date: 3/27/2019 Diagnosis: Other mechanical complication of internal left [facility] [T84.283A] <principal problem not specified> Procedure(s) (LRB): 
REVISION LEFT TOTAL KNEE ARTHROPLASTY (Left) 2 Days Post-Op Precautions: Bed Alarm, Contact, Fall, PWB(PWB 25% LLE) Chart, physical therapy assessment, plan of care and goals were reviewed. ASSESSMENT: 
Mr. Onita Dakins who was with low SPO2 89-90% and short of breath during activity found to have multiple small PEs during the night. Started on Lovenox injections and gave substantial time for anticoagulant to take effect. Patient received in long sitting in bed on 3L NC and anxious for OOB. Appeared sweaty and checked- not diaphoretic. RN arrived shortly following PT with some of patient's routine medications as well as tylenol. RN previously had cleared patient to work with PT. Before OOB PT reviewed bed exercises with Mr. Onita Dakins. His Left knee and calf hot to touch and red and edematous. Ice packs removed and warmed from the L knee. Patient able to tolerate ~ 30 degrees of AAROM into knee flexion but very slow and antalgic pain 8/10. He is independent with ankle pumps, QS,GS, Heel sets and reports\" I've been doing those all day because I have nothing else to do. \" Patient reports frustrated and states he wished he understood what is going on with his knee. Been running higher fever than expected with B PEs. Patient on IV antibiotics. Patient assisted to edge of bed mod assist x 1 and with bed ht elevated min assist sit<>stand. He ambulates to and from bathroom and wished to stand to wash face and brush teeth as well as stand to attempt voiding. He spent increased time completing ADLs and maintained 25% WB LLE . He is short of breath on 3L throughout activity and SPO2 stable 94-96%  bpm rest and 132 bpm activity. Discharged was tentative for today but canceled due to detection of multiple PEs, on supplemental O2, febrile,and need for IV antibiotics. Progression toward goals: 
?      Improving appropriately and progressing toward goals ? Improving slowly and progressing toward goals ? Not making progress toward goals and plan of care will be adjusted PLAN: 
Patient continues to benefit from skilled intervention to address the above impairments. Continue treatment per established plan of care. Discharge Recommendations:  Home Health Further Equipment Recommendations for Discharge:  none new SUBJECTIVE:  
Patient stated ? I just wish I knew what was happening with this knee. ? OBJECTIVE DATA SUMMARY:  
Critical Behavior: 
Neurologic State: Alert Orientation Level: Oriented X4 Cognition: Appropriate safety awareness, Follows commands Range of Motion: 
  
 10-44 poor antalgic AAROM of left knee Functional Mobility Training: 
Bed Mobility: 
Rolling: (NT) Supine to Sit: Moderate assistance Sit to Supine: (up to chair) Scooting: Minimum assistance Transfers: Sit to Stand: Minimum assistance Stand to Sit: Minimum assistance Stand Pivot Transfers: Minimum assistance Bed to Chair: Minimum assistance Balance: 
Sitting: Intact; Without support Standing - Static: Constant support;Good Standing - Dynamic : Constant support; Jeremias Imus Ambulation/Gait Training: 
Distance (ft): 12 Feet (ft) Assistive Device: Gait belt;Walker, rolling Ambulation - Level of Assistance: Minimal assistance Gait Abnormalities: Antalgic;Decreased step clearance Left Side Weight Bearing: Partial (%)(25%) Stance: Left decreased;Right increased Speed/Jocy: Pace decreased (<100 feet/min) Step Length: Right shortened Stairs:NT Therapeutic Exercises:  
 
EXERCISE Sets Reps Active Active Assist  
Passive Self ROM Comments Ankle Pumps  10 ?                                        ?                                        ?                                        ?                                          
Quad Sets  10 ?                                        ?                                        ?                                        ?                                          
Hamstring Sets  10 ?                                        ?                                        ?                                        ?                                          
Short Arc Quads  NT ? ?                                        ?                                        ?                                          
Knee Extension Stretch  1  
?                                         
Heel Slides  5 ?                                        ?                                        ?                                        ?                                        Unable to tolerate Long Arc Quads  NT ? ?                                        ?                                        ?                                          
Knee Flexion Stretch  1 ?                                        ?                                        ?                                        ?                                          
Straight Leg Raises  NT ? ?                                        ?                                        ?                                          
 
Pain: 
Pain Scale 1: Numeric (0 - 10) Pain Intensity 1: 7 Pain Intervention(s) 1: Medication (see MAR) Activity Tolerance:  
Limited by shortness of breath and pain on 3L Please refer to the flowsheet for vital signs taken during this treatment. After treatment:  
? Patient left in no apparent distress sitting up in recliner chair with BLEs elevated ? Patient left in no apparent distress in bed 
? Call bell left within reach ? Nursing notified ? Caregiver present ? Patient aware and agreed to not get up unasssited COMMUNICATION/COLLABORATION:  
The patient?s plan of care was discussed with: Registered Nurse Deepthi Hadley PT, DPT Time Calculation: 30 mins

## 2019-03-27 NOTE — PROGRESS NOTES
S: Complained of sob overnight with fevers. CTA obtained indicating PE, hospitalist called and treament initiated. Left knee feels well. O:  
Visit Vitals /68 (BP 1 Location: Right arm, BP Patient Position: Head of bed elevated (Comment degrees)) Pulse 100 Temp 99.3 °F (37.4 °C) Resp 16 Ht 5' 11\" (1.803 m) Wt 88.6 kg (195 lb 5.2 oz) SpO2 99% BMI 27.24 kg/m² Wound C/D/I Expected effusion Sensation intact L1-S1 
TA/GCS/EHL: 5/5 Capillary refill less than 2 seconds. CTA: Bilateral PE Mild leucocytosis A: POD2 Revision left TKA, bilateral PE No sign of deep surgical infection, continuing vancomycin P: 
PT WBAT 
ROM Initiate PE treatment per PE Follow labs to ensure normalize Pain control If all signs normalize, consider home tomorrow. DVT prophylaxis D/C planning

## 2019-03-28 PROBLEM — J96.01 ACUTE HYPOXEMIC RESPIRATORY FAILURE (HCC): Status: ACTIVE | Noted: 2019-03-28

## 2019-03-28 PROBLEM — R50.9 FEVER: Status: ACTIVE | Noted: 2019-03-28

## 2019-03-28 PROBLEM — D72.829 LEUKOCYTOSIS: Status: ACTIVE | Noted: 2019-03-28

## 2019-03-28 LAB
BUN SERPL-MCNC: 10 MG/DL (ref 6–20)
CREAT SERPL-MCNC: 0.96 MG/DL (ref 0.7–1.3)

## 2019-03-28 PROCEDURE — 74011250637 HC RX REV CODE- 250/637: Performed by: ORTHOPAEDIC SURGERY

## 2019-03-28 PROCEDURE — 74011000258 HC RX REV CODE- 258: Performed by: INTERNAL MEDICINE

## 2019-03-28 PROCEDURE — 84520 ASSAY OF UREA NITROGEN: CPT

## 2019-03-28 PROCEDURE — 82565 ASSAY OF CREATININE: CPT

## 2019-03-28 PROCEDURE — 77030028907 HC WRP KNEE WO BGS SOLM -B

## 2019-03-28 PROCEDURE — 74011000250 HC RX REV CODE- 250: Performed by: ORTHOPAEDIC SURGERY

## 2019-03-28 PROCEDURE — 74011250636 HC RX REV CODE- 250/636: Performed by: INTERNAL MEDICINE

## 2019-03-28 PROCEDURE — 97530 THERAPEUTIC ACTIVITIES: CPT

## 2019-03-28 PROCEDURE — 36415 COLL VENOUS BLD VENIPUNCTURE: CPT

## 2019-03-28 PROCEDURE — 65660000000 HC RM CCU STEPDOWN

## 2019-03-28 PROCEDURE — 77010033678 HC OXYGEN DAILY

## 2019-03-28 PROCEDURE — 97110 THERAPEUTIC EXERCISES: CPT

## 2019-03-28 PROCEDURE — 97116 GAIT TRAINING THERAPY: CPT

## 2019-03-28 PROCEDURE — 74011250636 HC RX REV CODE- 250/636: Performed by: ORTHOPAEDIC SURGERY

## 2019-03-28 PROCEDURE — 94640 AIRWAY INHALATION TREATMENT: CPT

## 2019-03-28 RX ADMIN — OXYCODONE HYDROCHLORIDE 10 MG: 5 TABLET ORAL at 11:41

## 2019-03-28 RX ADMIN — MESALAMINE 800 MG: 800 TABLET, DELAYED RELEASE ORAL at 18:00

## 2019-03-28 RX ADMIN — GABAPENTIN 200 MG: 100 CAPSULE ORAL at 18:00

## 2019-03-28 RX ADMIN — ACETAMINOPHEN 650 MG: 325 TABLET ORAL at 03:52

## 2019-03-28 RX ADMIN — MONTELUKAST SODIUM 10 MG: 10 TABLET, FILM COATED ORAL at 21:17

## 2019-03-28 RX ADMIN — GABAPENTIN 200 MG: 100 CAPSULE ORAL at 08:34

## 2019-03-28 RX ADMIN — OXYCODONE HYDROCHLORIDE 10 MG: 5 TABLET ORAL at 21:31

## 2019-03-28 RX ADMIN — VANCOMYCIN HYDROCHLORIDE 1000 MG: 1 INJECTION, POWDER, LYOPHILIZED, FOR SOLUTION INTRAVENOUS at 06:21

## 2019-03-28 RX ADMIN — SENNOSIDES, DOCUSATE SODIUM 1 TABLET: 50; 8.6 TABLET, FILM COATED ORAL at 18:00

## 2019-03-28 RX ADMIN — OXYCODONE HYDROCHLORIDE 10 MG: 10 TABLET, FILM COATED, EXTENDED RELEASE ORAL at 08:34

## 2019-03-28 RX ADMIN — PANTOPRAZOLE SODIUM 40 MG: 40 TABLET, DELAYED RELEASE ORAL at 08:34

## 2019-03-28 RX ADMIN — FLUTICASONE PROPIONATE 2 SPRAY: 50 SPRAY, METERED NASAL at 21:16

## 2019-03-28 RX ADMIN — MESALAMINE 800 MG: 800 TABLET, DELAYED RELEASE ORAL at 08:34

## 2019-03-28 RX ADMIN — ENOXAPARIN SODIUM 90 MG: 100 INJECTION SUBCUTANEOUS at 06:21

## 2019-03-28 RX ADMIN — SENNOSIDES, DOCUSATE SODIUM 1 TABLET: 50; 8.6 TABLET, FILM COATED ORAL at 08:34

## 2019-03-28 RX ADMIN — BUDESONIDE 500 MCG: 0.5 INHALANT RESPIRATORY (INHALATION) at 20:17

## 2019-03-28 RX ADMIN — SODIUM CHLORIDE 100 ML/HR: 900 INJECTION, SOLUTION INTRAVENOUS at 08:26

## 2019-03-28 RX ADMIN — OXYCODONE HYDROCHLORIDE 10 MG: 10 TABLET, FILM COATED, EXTENDED RELEASE ORAL at 21:17

## 2019-03-28 RX ADMIN — OXYCODONE HYDROCHLORIDE 10 MG: 5 TABLET ORAL at 14:49

## 2019-03-28 RX ADMIN — POLYETHYLENE GLYCOL 3350 17 G: 17 POWDER, FOR SOLUTION ORAL at 08:28

## 2019-03-28 RX ADMIN — BUDESONIDE 500 MCG: 0.5 INHALANT RESPIRATORY (INHALATION) at 09:15

## 2019-03-28 RX ADMIN — VANCOMYCIN HYDROCHLORIDE 1000 MG: 1 INJECTION, POWDER, LYOPHILIZED, FOR SOLUTION INTRAVENOUS at 14:49

## 2019-03-28 RX ADMIN — ROSUVASTATIN CALCIUM 10 MG: 10 TABLET, COATED ORAL at 21:17

## 2019-03-28 RX ADMIN — ARFORMOTEROL TARTRATE 15 MCG: 15 SOLUTION RESPIRATORY (INHALATION) at 20:17

## 2019-03-28 RX ADMIN — ARFORMOTEROL TARTRATE 15 MCG: 15 SOLUTION RESPIRATORY (INHALATION) at 09:15

## 2019-03-28 RX ADMIN — Medication 10 ML: at 06:00

## 2019-03-28 RX ADMIN — OXYCODONE HYDROCHLORIDE 10 MG: 5 TABLET ORAL at 03:53

## 2019-03-28 RX ADMIN — MESALAMINE 800 MG: 800 TABLET, DELAYED RELEASE ORAL at 21:17

## 2019-03-28 RX ADMIN — GABAPENTIN 200 MG: 100 CAPSULE ORAL at 21:17

## 2019-03-28 RX ADMIN — ENOXAPARIN SODIUM 90 MG: 100 INJECTION SUBCUTANEOUS at 18:01

## 2019-03-28 RX ADMIN — PIPERACILLIN SODIUM,TAZOBACTAM SODIUM 3.38 G: 3; .375 INJECTION, POWDER, FOR SOLUTION INTRAVENOUS at 08:28

## 2019-03-28 NOTE — PROGRESS NOTES
Problem: Mobility Impaired (Adult and Pediatric) Goal: *Acute Goals and Plan of Care (Insert Text) Description Physical Therapy Goals Initiated 3/25/2019 1. Patient will move from supine to sit and sit to supine , scoot up and down and roll side to side in bed with modified independence within 4 days. 2. Patient will perform sit to stand with modified independence within 4 days. 3. Patient will ambulate with modified independence for 150 feet with the least restrictive device within 4 days. 4. Patient will ascend/descend 14 stairs with one handrail(s) with minimal assistance/contact guard assist within 4 days. 5. Patient will perform home exercise program per protocol with independence within 4 days. 6. Patient will demonstrate AROM 0-90 degrees in operative joint within 4 days. Note: PHYSICAL THERAPY TREATMENT Patient: Natalio Cartagena. (59 y.o. male) Date: 3/28/2019 Diagnosis: Other mechanical complication of internal left knee [facility] [T84.093A] <principal problem not specified> Procedure(s) (LRB): 
REVISION LEFT TOTAL KNEE ARTHROPLASTY (Left) 3 Days Post-Op Precautions: Bed Alarm, Fall, PWB(25%) Chart, physical therapy assessment, plan of care and goals were reviewed. ASSESSMENT: 
Patient with increased left knee bleeding following PT yesterday. Patient on 4L oxygen on arrival. Placed on RA. Discussed pain medication and he is only using extended release pain medication. Advised him to use short acting for at least first week. He agreed. On RA. Patient able to maintain 25% PWB LLE and able to ambulate short distance s and complete steps as described below. Patient still receiving IV antibiotics and febrile. Last night patient desaturated to 75% and needed 4L. Today at rest and activity although short of breath, denied dizziness and saturation stable. Patient up to recliner chair with LE extended. Sister arrived. Placed back on 3L. From PT stand point patient cleared to discharge to home from PT standpoint. Will continue to follow while at [facility]. Extensive near hour session this am and plans to sit up in chair most of today. Does not necessarily require BID PT. Amb to/from bathroom with RN Documentation for home O2: 
  
ROOM AIR  
 AT REST 
 O2 SATS 96 HR 
108 ROOM AIR WITH ACTIVITY- bed mobility 02 SATS 95 HR 
111 RA WITH ACTIVITY- gait and stairs O2 SATS 
93 HR 
118 Back on 3L PATIENT LEFT COMFORTABLY SITTING/SUPINE 02 SATS 
97 HR 
112 Progression toward goals: 
?      Improving appropriately and progressing toward goals ? Improving slowly and progressing toward goals ? Not making progress toward goals and plan of care will be adjusted PLAN: 
Patient continues to benefit from skilled intervention to address the above impairments. Continue treatment per established plan of care. Discharge Recommendations:  Home Health Further Equipment Recommendations for Discharge:  none new SUBJECTIVE:  
Patient stated much better today. I really appreciate all your help yesterday. You really made me feel better.  OBJECTIVE DATA SUMMARY:  
Critical Behavior: 
Neurologic State: Alert Orientation Level: Oriented X4 Cognition: Appropriate decision making, Appropriate for age attention/concentration, Appropriate safety awareness, Follows commands Range of Motion: 
 10-50 degrees Functional Mobility Training: 
Bed Mobility: 
  
Supine to Sit: Modified independent Sit to Supine: Modified independent Scooting: Modified independent Transfers: 
Sit to Stand: Modified independent Stand to Sit: Modified independent Stand Pivot Transfers: Modified independent Bed to Chair: Modified independent Balance: 
Standing - Static: Constant support;Good Standing - Dynamic : Constant support;Good Ambulation/Gait Training: 
Distance (ft): 40 Feet (ft)(x 2) Assistive Device: Gait belt;Walker, rolling Ambulation - Level of Assistance: Modified independent Gait Abnormalities: Antalgic;Decreased step clearance Left Side Weight Bearing: Partial (%)(25%) Stance: Left decreased Speed/Jocy: Pace decreased (<100 feet/min) Step Length: Left shortened;Right shortened Stairs: 
Number of Stairs Trained: 4 Stairs - Level of Assistance: Contact guard assistance Rail Use: Left (and crutch on right side) Therapeutic Exercises:  
 
EXERCISE Sets Reps Active Active Assist  
Passive Self ROM Comments Ankle Pumps  10 ?                                        ?                                        ?                                        ?                                          
Quad Sets  10 ?                                        ?                                        ?                                        ?                                          
Hamstring Sets  10 ?                                        ?                                        ?                                        ?                                          
Short Arc Quads  NT ? ?                                        ?                                        ?                                          
Knee Extension Stretch  1  
?                                         
Heel Slides  10 ?                                        ?                                        ?                                        ?                                          
Long Arc Quads  NT ?                                         ?                                        ?                                        ?                                          
 Knee Flexion Stretch  1 ?                                        ?                                        ?                                        ?                                          
Straight Leg Raises  unable ? ?                                        ?                                        ?                                          
 
Pain: 
Pain Scale 1: (P) Numeric (0 - 10) Pain Intensity 1: (P) 7 Pain Location 1: Knee Pain Orientation 1: Left Pain Description 1: Aching Pain Intervention(s) 1: (P) Medication (see MAR) Activity Tolerance:  
Improved - see above Please refer to the flowsheet for vital signs taken during this treatment. After treatment:  
? Patient left in no apparent distress sitting up in chair ? Patient left in no apparent distress in bed 
? Call bell left within reach ? Nursing notified ? Caregiver present ? Bed alarm activated COMMUNICATION/COLLABORATION:  
The patients plan of care was discussed with: Registered Nurse Cruz Luevano PT, DPT Time Calculation: 53 mins

## 2019-03-28 NOTE — PROGRESS NOTES
S: No complaints, no acute events. O:  
Visit Vitals /76 (BP 1 Location: Left arm, BP Patient Position: At rest;Head of bed elevated (Comment degrees)) Pulse 94 Temp 97.8 °F (36.6 °C) Resp 16 Ht 5' 11\" (1.803 m) Wt 88.6 kg (195 lb 5.2 oz) SpO2 99% BMI 27.24 kg/m² Dressing C/D/I Sensation intact L1-S1 
TA/GCS/EHL: 5/5 Capillary refill less than 2 seconds. A: POD3 TKA revision, PE 
 
P: 
PT,WBAT 
PE rx DVT prophylaxis D/C planning - home tomorrow

## 2019-03-28 NOTE — PROGRESS NOTES
Bedside and Verbal shift [facility] by Estephanie Vergara (offgoing nurse). Report included the following information SBAR, Kardex, OR Summary, Intake/Output, MAR and Recent Results.

## 2019-03-28 NOTE — PROGRESS NOTES
[facility] Medical Progress Note NAME: Annamaria Jaimes. :  1960 MRM:  [id number] Date/Time: 3/28/2019  10:50 AM 
 
  
Assessment and Plan:  
 
Complication of internal left knee prosthesis / Infected orthopedic implant, sequela - Tolerated surgery. Ortho is attending to pain control, ABx, DVT treatment and rehab decisions as usual. 
 
Pulmonary embolism - POA, new, provoked by knee surgery. Currently on lovenox BID. If there is no further risk of needing urgent surgery, I recommend xarelto 15mg PO BID for 21 days, followed by 20mg daily, 3 months. If there is a risk of urgent surgery or bleeding, continue lovenox. Fever / Leukocytosis / SIRS (systemic inflammatory response syndrome) - POA, and persistent despite antibiotics. Cx negative. He is immunocompromised due to RA and UC drugs. I am not convince that PE explains his degree of fever. Antibiotics per surgery. Acute hypoxemic respiratory failure - POA, presumed due to PE. Oxygen as needed, and will likely need it for weeks at least at home. PCP or ortho to check requirement again in weeks. Long-term use of immunosuppressant medication / Rheumatoid arthritis, adult / Ulcerative colitis - Continue usual meds, though I would hold while on any antibiotics for any presumed infection. Asacol and gabapentin Anemia - Mild, noted post op. Likely chronic anemia with some acute surgical loss. Follow closely if newly on anticoagulation. Hyperlipidemia - Continue rosuvastatin GERD (gastroesophageal reflux disease) - No symptoms. PPI Asthma - Appears stable. Brovana/pulmicort, flonase, singulair Subjective: Chief Complaint:  Knee pain, hypoxia with AMIN, and fever, all persist 
 
ROS: 
(bold if positive, if negative) Fever/chillsSOB/AMIN Tolerating some PT  Tolerating Diet Objective:  
 
Last 24hrs VS reviewed since prior progress note. Most recent are: 
 
Visit Vitals /76 (BP 1 Location: Left arm, BP Patient Position: At rest;Head of bed elevated (Comment degrees)) Pulse 94 Temp 97.8 °F (36.6 °C) Resp 16 Ht 5' 11\" (1.803 m) Wt 88.6 kg (195 lb 5.2 oz) SpO2 99% BMI 27.24 kg/m² SpO2 Readings from Last 6 Encounters:  
03/28/19 99% 03/21/19 97% 03/20/19 96% 02/24/19 96% 11/21/18 95% 11/13/18 96% O2 Flow Rate (L/min): 3 l/min Intake/Output Summary (Last 24 hours) at 3/28/2019 1050 Last data filed at 3/28/2019 0630 Gross per 24 hour Intake 2851.66 ml Output 900 ml Net 1951.66 ml Physical Exam: 
 
Gen:  Well-developed, well-nourished, in no acute distress HEENT:  Pink conjunctivae, PERRL, hearing intact to voice, moist mucous membranes Neck:  Supple, without masses, thyroid non-tender Resp:  No accessory muscle use, clear breath sounds without wheezes rales or rhonchi 
Card:  No murmurs, tachycardic S1, S2 without thrills, bruits or peripheral edema Abd:  Soft, non-tender, non-distended, normoactive bowel sounds are present, no mass Lymph:  No cervical or inguinal adenopathy Musc:  No cyanosis or clubbing Skin:  No rashes or ulcers, skin turgor is good Neuro:  Cranial nerves are grossly intact, post op pain and motor weakness, follows commands appropriately Psych:  Good insight, oriented to person, place and time, alert Telemetry reviewed:   normal sinus rhythm 
__________________________________________________________________ Medications Reviewed: (see below) Medications:  
 
Current Facility-Administered Medications Medication Dose Route Frequency  0.9% sodium chloride infusion  100 mL/hr IntraVENous CONTINUOUS  
 enoxaparin (LOVENOX) injection 90 mg  1 mg/kg SubCUTAneous Q12H  piperacillin-tazobactam (ZOSYN) 3.375 g in 0.9% sodium chloride (MBP/ADV) 100 mL  3.375 g IntraVENous Q8H  
 vancomycin (VANCOCIN) 1,000 mg in 0.9% sodium chloride (MBP/ADV) 250 mL  1,000 mg IntraVENous Q8H  
  pantoprazole (PROTONIX) tablet 40 mg  40 mg Oral DAILY  arformoterol (BROVANA) neb solution 15 mcg  15 mcg Nebulization BID RT  
 mesalamine DR (ASACOL HD) tablet 800 mg  800 mg Oral TID  montelukast (SINGULAIR) tablet 10 mg  10 mg Oral QHS  rosuvastatin (CRESTOR) tablet 10 mg  10 mg Oral QHS  sodium chloride (NS) flush 5-40 mL  5-40 mL IntraVENous Q8H  
 sodium chloride (NS) flush 5-40 mL  5-40 mL IntraVENous PRN  
 naloxone (NARCAN) injection 0.4 mg  0.4 mg IntraVENous PRN  
 senna-docusate (PERICOLACE) 8.6-50 mg per tablet 1 Tab  1 Tab Oral BID  polyethylene glycol (MIRALAX) packet 17 g  17 g Oral DAILY  bisacodyl (DULCOLAX) suppository 10 mg  10 mg Rectal DAILY PRN  
 oxyCODONE ER (OxyCONTIN) tablet 10 mg  10 mg Oral Q12H  
 acetaminophen (TYLENOL) tablet 650 mg  650 mg Oral Q6H PRN  
 oxyCODONE IR (ROXICODONE) tablet 5 mg  5 mg Oral Q3H PRN  
 oxyCODONE IR (ROXICODONE) tablet 10 mg  10 mg Oral Q3H PRN  
 gabapentin (NEURONTIN) capsule 200 mg  200 mg Oral TID  budesonide (PULMICORT) 500 mcg/2 ml nebulizer suspension  500 mcg Nebulization BID RT  
 fluticasone propionate (FLONASE) 50 mcg/actuation nasal spray 2 Spray  2 Spray Both Nostrils DAILY Lab Data Reviewed: (see below) Lab Review:  
 
Recent Labs  
  03/27/19 
0230 03/26/19 
0501 WBC 13.4*  --   
HGB 10.7* 11.4* HCT 32.8*  --   
  --   
 
Recent Labs  
  03/28/19 
0400 03/27/19 
0230 03/26/19 
0501 NA  --  137 136 K  --  3.6 3.7 CL  --  106 106 CO2  --  25 23 GLU  --  109* 114* BUN 10 10 13 CREA 0.96 1.15 1.07  
CA  --  7.8* 8.0* ALB  --  2.7*  --   
TBILI  --  0.6  --   
SGOT  --  20  --   
ALT  --  23  --   
 
Lab Results Component Value Date/Time Glucose (POC) 85 07/21/2017 09:19 PM  
 
No results for input(s): PH, PCO2, PO2, HCO3, FIO2 in the last 72 hours. No results for input(s): INR in the last 72 hours. No lab exists for component: INREXT All Micro Results Procedure Component Value Units Date/Time CULTURE, BLOOD [[id number]] Collected:  03/27/19 0443 Order Status:  Completed Specimen:  Whole Blood Updated:  03/28/19 5601 Special Requests: NO SPECIAL REQUESTS Culture result: NO GROWTH 1 DAY     
 CULTURE, BLOOD, PERIPHERAL [[id number]] Collected:  03/27/19 0443 Order Status:  Completed Specimen:  Blood Updated:  03/28/19 4487 Special Requests: NO SPECIAL REQUESTS Culture result: NO GROWTH 1 DAY     
 CULTURE, ANAEROBIC [[id number]] Collected:  03/25/19 3856 Order Status:  Completed Specimen:  Left Updated:  03/27/19 0945 Special Requests: --     
  SYNOVIAL FLUID 
3 HOLD 21 DAYS Culture result:    
  No growth thus far, holding 14 days. CULTURE, TISSUE Evertt Sleight [[id number]] Collected:  03/25/19 8662 Order Status:  Completed Specimen:  Left Updated:  03/27/19 1243 Special Requests: --     
  SYNOVIAL FLUID 
3 HOLD 21 DAYS 
  
  GRAM STAIN NO ORGANISMS SEEN Culture result:    
  No growth thus far, holding 14 days. Darrin Alfonso [[id number]] Collected:  03/25/19 0958 Order Status:  Completed Specimen:  Left Updated:  03/27/19 6960 Special Requests: --     
  SYNOVIAL FLUID 
2 HOLD 21 DAYS Culture result:    
  No growth thus far, holding 14 days. CULTURE, TISSUE Evertt Sleight [[id number]] Collected:  03/25/19 9886 Order Status:  Completed Specimen:  Left Updated:  03/27/19 6207 Special Requests: --     
  SYNOVIAL FLUID 
2 HOLD 21 DAYS 
  
  GRAM STAIN NO ORGANISMS SEEN Culture result:    
  No growth thus far, holding 14 days. Darrin Alfonso [[id number]] Collected:  03/25/19 4668 Order Status:  Completed Specimen:  Left Updated:  03/27/19 2291 Special Requests: HOLD 21 DAYS Culture result:    
  No growth thus far, holding 14 days. CULTURE, TISSUE Evertt Sleight [[id number]] Collected:  03/25/19 263 Order Status:  Completed Specimen:  Left Updated:  03/27/19 0941 Special Requests: HOLD 21 DAYS  
  GRAM STAIN NO ORGANISMS SEEN Culture result:    
  No growth thus far, holding 14 days. CULTURE, BLOOD [[id number]] Order Status:  Canceled Specimen:  Whole Blood I have reviewed notes of prior 24hr. Other pertinent lab: none Total time spent with patient: 45 Minutes Care Plan discussed with: Patient, Care Manager, Nursing Staff, Consultant/Specialist and >50% of time spent in counseling and coordination of care Discussed:  Care Plan and D/C Planning Prophylaxis:  H2B/PPI Disposition:   PT, OT, RN 
        
___________________________________________________ Attending Physician: Judge Suleiman MD

## 2019-03-29 VITALS
SYSTOLIC BLOOD PRESSURE: 153 MMHG | RESPIRATION RATE: 18 BRPM | BODY MASS INDEX: 27.35 KG/M2 | HEART RATE: 105 BPM | DIASTOLIC BLOOD PRESSURE: 82 MMHG | TEMPERATURE: 99.4 F | HEIGHT: 71 IN | WEIGHT: 195.33 LBS | OXYGEN SATURATION: 96 %

## 2019-03-29 LAB
ATRIAL RATE: 120 BPM
BUN SERPL-MCNC: 9 MG/DL (ref 6–20)
CALCULATED P AXIS, ECG09: 42 DEGREES
CALCULATED R AXIS, ECG10: 7 DEGREES
CALCULATED T AXIS, ECG11: 17 DEGREES
CREAT SERPL-MCNC: 0.92 MG/DL (ref 0.7–1.3)
DATE LAST DOSE: ABNORMAL
DIAGNOSIS, 93000: NORMAL
P-R INTERVAL, ECG05: 130 MS
Q-T INTERVAL, ECG07: 306 MS
QRS DURATION, ECG06: 90 MS
QTC CALCULATION (BEZET), ECG08: 432 MS
REPORTED DOSE,DOSE: ABNORMAL UNITS
REPORTED DOSE/TIME,TMG: ABNORMAL
VANCOMYCIN TROUGH SERPL-MCNC: 13.8 UG/ML (ref 5–10)
VENTRICULAR RATE, ECG03: 120 BPM

## 2019-03-29 PROCEDURE — 94640 AIRWAY INHALATION TREATMENT: CPT

## 2019-03-29 PROCEDURE — 84520 ASSAY OF UREA NITROGEN: CPT

## 2019-03-29 PROCEDURE — 74011000250 HC RX REV CODE- 250: Performed by: ORTHOPAEDIC SURGERY

## 2019-03-29 PROCEDURE — 74011250636 HC RX REV CODE- 250/636: Performed by: INTERNAL MEDICINE

## 2019-03-29 PROCEDURE — 80202 ASSAY OF VANCOMYCIN: CPT

## 2019-03-29 PROCEDURE — 82565 ASSAY OF CREATININE: CPT

## 2019-03-29 PROCEDURE — 74011250637 HC RX REV CODE- 250/637: Performed by: ORTHOPAEDIC SURGERY

## 2019-03-29 PROCEDURE — 74011000258 HC RX REV CODE- 258: Performed by: INTERNAL MEDICINE

## 2019-03-29 PROCEDURE — 94760 N-INVAS EAR/PLS OXIMETRY 1: CPT

## 2019-03-29 PROCEDURE — 74011250636 HC RX REV CODE- 250/636: Performed by: ORTHOPAEDIC SURGERY

## 2019-03-29 PROCEDURE — 36415 COLL VENOUS BLD VENIPUNCTURE: CPT

## 2019-03-29 RX ORDER — OXYCODONE HYDROCHLORIDE 10 MG/1
10 TABLET ORAL
Qty: 60 TAB | Refills: 0 | Status: SHIPPED | OUTPATIENT
Start: 2019-03-29 | End: 2019-04-01

## 2019-03-29 RX ORDER — FACIAL-BODY WIPES
10 EACH TOPICAL DAILY
Qty: 60 EACH | Refills: 0 | Status: SHIPPED | OUTPATIENT
Start: 2019-03-29 | End: 2019-11-04 | Stop reason: ALTCHOICE

## 2019-03-29 RX ADMIN — VANCOMYCIN HYDROCHLORIDE 1000 MG: 1 INJECTION, POWDER, LYOPHILIZED, FOR SOLUTION INTRAVENOUS at 09:44

## 2019-03-29 RX ADMIN — ENOXAPARIN SODIUM 90 MG: 100 INJECTION SUBCUTANEOUS at 04:48

## 2019-03-29 RX ADMIN — BUDESONIDE 500 MCG: 0.5 INHALANT RESPIRATORY (INHALATION) at 07:05

## 2019-03-29 RX ADMIN — SENNOSIDES, DOCUSATE SODIUM 1 TABLET: 50; 8.6 TABLET, FILM COATED ORAL at 09:44

## 2019-03-29 RX ADMIN — VANCOMYCIN HYDROCHLORIDE 1000 MG: 1 INJECTION, POWDER, LYOPHILIZED, FOR SOLUTION INTRAVENOUS at 02:09

## 2019-03-29 RX ADMIN — ARFORMOTEROL TARTRATE 15 MCG: 15 SOLUTION RESPIRATORY (INHALATION) at 07:05

## 2019-03-29 RX ADMIN — OXYCODONE HYDROCHLORIDE 10 MG: 5 TABLET ORAL at 00:11

## 2019-03-29 RX ADMIN — GABAPENTIN 200 MG: 100 CAPSULE ORAL at 09:44

## 2019-03-29 RX ADMIN — POLYETHYLENE GLYCOL 3350 17 G: 17 POWDER, FOR SOLUTION ORAL at 09:44

## 2019-03-29 RX ADMIN — OXYCODONE HYDROCHLORIDE 10 MG: 10 TABLET, FILM COATED, EXTENDED RELEASE ORAL at 09:44

## 2019-03-29 RX ADMIN — MESALAMINE 800 MG: 800 TABLET, DELAYED RELEASE ORAL at 10:36

## 2019-03-29 RX ADMIN — PANTOPRAZOLE SODIUM 40 MG: 40 TABLET, DELAYED RELEASE ORAL at 09:44

## 2019-03-29 RX ADMIN — PIPERACILLIN SODIUM,TAZOBACTAM SODIUM 3.38 G: 3; .375 INJECTION, POWDER, FOR SOLUTION INTRAVENOUS at 04:48

## 2019-03-29 NOTE — DISCHARGE INSTRUCTIONS
Knee Surgery Discharge Instructions  Dr. Mare Patino                                                                Activity:  You should be as active as you can tolerate within the guidelines you have been given. Perform the exercises you have been given twice daily. Use your walker or crutches as directed by your physician. You should rest for one hour twice a day with your feet elevated.  Ice pack/ cold packs: Application of ice will prevent and treat inflammation. You should use this three times a day for 20 minutes as needed.  Dressing Changes & showering:  Keep your clear plastic dressing on until your follow up appointment. Call Dr. Sumanth Elizabeth office if any drainage appears. Diet:  You may resume your regular home diet. Medications:    Pain:  You have been given a prescription for pain control. Take this medication as directed. Do not take more than prescribed. If your pain is not controlled by the medication you were given, please call your surgeons office. Blood Thinner:     You have been given a prescription for Eliquis tablets. You will continue Eliquis after your surgery to help \"thin\" your blood. Do not take Aleve, Ibuprofen or other anti-inflammatory medications while on Eliquis (unless advised otherwise by your doctor). Stool Softeners:  Pain medications can cause constipation. Stool softeners, warm prune juice and increasing your water and fiber intake can help prevent constipation. Do not take laxatives. Home Health:  Home health has been arranged for skilled nursing visits and physical therapy. Your home health has been set up prior to your discharge from the hospital.  If no one from the agency contacts you by 12 noon on the day after you arrive home, please contact [facility] Case Management at  [phone].     Reasons to call your surgeon:   Fever above 101 for more than 24 hours    Persistent pain in the calf or either leg   Pain uncontrolled by your pain medication    Redness, swelling or drainage from your incision    Numbness, tingling, or change in your affected extremity    Follow-Up Appointment:  Please call Dr. Usman Rust office to schedule your appointment for 2 weeks after surgery.

## 2019-03-29 NOTE — PROGRESS NOTES
PCP JINNY appt scheduled with Dr. Lizette Gipson on 4/2/2019 at 1:30pm. Appt added to AVS. Halina Velazquez CM Specialist

## 2019-03-29 NOTE — PROGRESS NOTES
3/29/2019 10:53 AM [facility] was sent pt's discharge clinicals and notified of pt's discharge home today via [employer]. Ziyadraz Tamar 3/29/2019 10:14 AM Spoke with Hilda Anselmo at [facility], pt's Eliquis coupon was approved and pt's copay will be $10. CM called and notified pt's wife. 3/29/2019  9:56 AM CM called [facility], was told his copay was $140 a month. CM faxed over eliquis coupon. CM will follow. 3/29/2019 9:11 AM Received return phone call from pt's wife. Pt's bin # [id number] 
[employer] [place] [id number] ID [id number] CM called pt's insurance information to [facility], insurance information is not valid. CM called pt's wife back, requested she contact [facility] to provide insurance information, phone number provided to pt's wife. 3/29/2019  8:55 AM [facility] [address] [place] [address] does not have pt's rx coverage information. Confirmed with pt he would like to use the [facility] [address] [place] [address] but has never used this pharmacy before. Pt does not have his pharmacy card with him. CM called and lvm with pt's wife, Betty Oneill at [phone]. CM cannot check pt's insurance coverage for eliquis until pharmacy benefits from insurance are provided by pt or his wife. CM will follow up.  
 
3/29/2019 8:36 AM Consult to check pt's elqiuis coverage received and sent to pt's prefer pharmacy, [facility] on [address]([phone]). CM will follow up. Nj Gtz, TORIW

## 2019-03-29 NOTE — PROGRESS NOTES
This nurse called pharmacy to get assist with retiming the abx, reports they cannot do. This nurse sought assist from staff unable to get retimed. This nurse advised Tayler Sifuentes RN in report

## 2019-03-29 NOTE — PROGRESS NOTES
ID Per discussion with ortho joint is not thought to be infected. Cultures NGSF. No long term abx planned unless cultures return positive

## 2019-03-29 NOTE — PROGRESS NOTES
S: No complaints, no acute events. O:  
Visit Vitals /84 (BP 1 Location: Left arm) Pulse 94 Temp 99 °F (37.2 °C) Resp 18 Ht 5' 11\" (1.803 m) Wt 88.6 kg (195 lb 5.2 oz) SpO2 96% BMI 27.24 kg/m² Dressing C/D/I Sensation intact L1-S1 
TA/GCS/EHL: 5/5 Capillary refill less than 2 seconds. A: POD4 TKA revision/PE 
 
P: 
PT,WBAT 
DVT rx 
D/C planning - discharge today

## 2019-03-29 NOTE — PROGRESS NOTES
This nurse noted right arm and hand swollen and iv site leaking. This nurse stopped iv fluids and abx, removed catheter, catheter intact. The patient tolerated two attempts to left hand without difficulty. This nurse unable to successfully restart IV, advised charge nurse, will have another nurse assist with a restart.

## 2019-04-01 ENCOUNTER — PATIENT OUTREACH (OUTPATIENT)
Dept: INTERNAL MEDICINE CLINIC | Age: 59
End: 2019-04-01

## 2019-04-01 NOTE — PROGRESS NOTES
Hospital Discharge Follow-Up Date/Time:  2019 3:25 PM 
 
Patient was admitted to [place] [address] on 3/25/19 and discharged on 3/29/19 for infected knee replacement and PE. The physician discharge summary was not available at the time of outreach. Patient was contacted within 1 business days of discharge. Top Challenges reviewed with the provider PE  Multiple clots started on Eliquis No abx need for knee per ID cx neg Method of communication with provider :face to face Inpatient RRAT score: 11 Was this a readmission? no  
Patient stated reason for the readmission:  
 
Nurse Navigator (NN) contacted the patient by telephone to perform post hospital discharge assessment. Verified name and  with patient as identifiers. Provided introduction to self, and explanation of the Nurse Navigator role. Reviewed discharge instructions and red flags with patient who verbalized understanding. Patient given an opportunity to ask questions and does not have any further questions or concerns at this time. The patient agrees to contact the PCP office for questions related to their healthcare. NN provided contact information for future reference. Disease Specific:   N/A Summary of patient's top problems: 
1. S/p revision of left knee and PE  Doing well voicing no complaints. Up at home  Home health in place. Patient has appointment tomorrow here with NP [facility]  Patient has Eliquis. Home Health orders at discharge: PT, OT, SN [address]: [facility] Date of initial visit: 3/20/19 Barriers to care? none Advance Care Planning:  
Does patient have an Advance Directive:  not on file Medication(s):  
New Medications at Discharge: Eliquis  oxycodone Changed Medications at Discharge:  
Discontinued Medications at Discharge:  
 
Medication reconciliation was performed with patient, who verbalizes understanding of administration of home medications. There were barriers to obtaining medications identified at this time. Referral to Pharm D needed: no  
 
Current Outpatient Medications Medication Sig  
 bisacodyl (DULCOLAX) 10 mg suppository Insert 10 mg into rectum daily.  oxyCODONE IR (ROXICODONE) 10 mg tab immediate release tablet Take 1 Tab by mouth every four (4) hours as needed for Pain for up to 3 days. Max Daily Amount: 60 mg.  
 apixaban (ELIQUIS) 5 mg tablet TAKE 10mg TWICE DAILY FOR 7 DAYS THEN 5MG TWICE DAILY THEREAFTER FOR A TOTAL OF 3-6 MONTHS. YOUR PRIMARY MD WILL NEED TO REFILL YOUR PRESCRIPTION  mesalamine (LIALDA) 1.2 gram delayed release tablet Take 2.4 g by mouth.  esomeprazole (NEXIUM) 20 mg capsule Take  by mouth daily.  abatacept/maltose (ORENCIA, WITH MALTOSE, IV) by IntraVENous route.  rosuvastatin (CRESTOR) 10 mg tablet Take 1 Tab by mouth nightly.  fluticasone-salmeterol (ADVAIR) 500-50 mcg/dose diskus inhaler Take 1 Puff by inhalation two (2) times a day.  montelukast (SINGULAIR) 10 mg tablet TAKE 1 TABLET DAILY  budesonide (RHINOCORT AQUA) 32 mcg/actuation nasal spray 2 Sprays by Both Nostrils route daily. No current facility-administered medications for this visit. There are no discontinued medications. [facility] follow up appointment(s):  
Future Appointments Date Time Provider Saul Engel 4/2/2019  1:30 PM YOHAN Angel [facility]  
6/21/2019  9:30 AM Jenn Butler [facility] follow up appointment(s):  
[facility]:  n/a  
 
 
Goals  Attends follow-up appointments as directed. 4/1/19  Follow up with pcp on 4/2/19  Coordinate Pain Management Plan for Patient. 4/1/19  Taking oxycodone IR for pain. S/s to watch for.  Supportive resources in place to maintain patient in the community (ie. Home Health, DME equipment, refer to, medication assistant plan, etc.) 4/1/19 follow by [facility] This note will not be viewable in [address]

## 2019-04-02 ENCOUNTER — OFFICE VISIT (OUTPATIENT)
Dept: INTERNAL MEDICINE CLINIC | Age: 59
End: 2019-04-02

## 2019-04-02 VITALS
SYSTOLIC BLOOD PRESSURE: 126 MMHG | OXYGEN SATURATION: 99 % | HEART RATE: 83 BPM | HEIGHT: 71 IN | DIASTOLIC BLOOD PRESSURE: 73 MMHG | TEMPERATURE: 98.3 F | RESPIRATION RATE: 18 BRPM | BODY MASS INDEX: 26.6 KG/M2 | WEIGHT: 190 LBS

## 2019-04-02 DIAGNOSIS — D64.9 ANEMIA, UNSPECIFIED TYPE: ICD-10-CM

## 2019-04-02 DIAGNOSIS — T84.7XXS INFECTED ORTHOPEDIC IMPLANT, SEQUELA: Primary | ICD-10-CM

## 2019-04-02 DIAGNOSIS — Z96.652 STATUS POST LEFT KNEE REPLACEMENT: ICD-10-CM

## 2019-04-02 LAB
BACTERIA SPEC CULT: NORMAL
BACTERIA SPEC CULT: NORMAL
SERVICE CMNT-IMP: NORMAL
SERVICE CMNT-IMP: NORMAL

## 2019-04-02 NOTE — PROGRESS NOTES
HISTORY OF PRESENT ILLNESS  Hipolito Aj is a 63 y.o. male presents for [facility]  HPI   He is s/p revision of left knee arthroplasty March 25 th, Discharged March 28  Follow up with Surgeon next Tuesday   On Eliquis, denies ADRs    Feels tired    Left knee painful , swollen     He has not needed prescribed pain medication, takes Tylenol     Denies fever, chills    Receives PT 3 times weekly     Poor appetite, usually big appetite     Past Medical History:   Diagnosis Date    Allergic rhinitis     Asthma     allergy/URI related    Colon polyp 11/13/13    Diverticulosis     GERD (gastroesophageal reflux disease)     Hiatal hernia     History of vascular access device 11/21/2018    5 Fr double PICC 43 cm 2 cm out, long term abx, R basilic    Hypercholesterolemia     IGT (impaired glucose tolerance)     Kidney stone 2016    x2    Overweight (BMI 25.0-29.9) 11/12/2018    Rheumatoid arthritis(714.0)     S/P PICC central line placement 11/09/2017    has been removed    UC (ulcerative colitis) ([facility] [address]     In remission       Current Outpatient Medications on File Prior to Visit   Medication Sig Dispense Refill    bisacodyl (DULCOLAX) 10 mg suppository Insert 10 mg into rectum daily. 60 Each 0    apixaban (ELIQUIS) 5 mg tablet TAKE 10mg TWICE DAILY FOR 7 DAYS THEN 5MG TWICE DAILY THEREAFTER FOR A TOTAL OF 3-6 MONTHS. YOUR PRIMARY MD WILL NEED TO REFILL YOUR PRESCRIPTION 45 Tab 0    mesalamine (LIALDA) 1.2 gram delayed release tablet Take 2.4 g by mouth.  esomeprazole (NEXIUM) 20 mg capsule Take  by mouth daily.  abatacept/maltose (ORENCIA, WITH MALTOSE, IV) by IntraVENous route.  rosuvastatin (CRESTOR) 10 mg tablet Take 1 Tab by mouth nightly. 90 Tab 1    fluticasone-salmeterol (ADVAIR) 500-50 mcg/dose diskus inhaler Take 1 Puff by inhalation two (2) times a day.  3 Inhaler 3    montelukast (SINGULAIR) 10 mg tablet TAKE 1 TABLET DAILY 90 Tab 1    budesonide (RHINOCORT AQUA) 32 mcg/actuation nasal spray 2 Sprays by Both Nostrils route daily. No current facility-administered medications on file prior to visit. Review of Systems   Constitutional: Negative for chills and fever. Respiratory: Negative. Cardiovascular: Negative. Gastrointestinal: Negative. Genitourinary: Negative. Musculoskeletal: Positive for joint pain. Neurological: Negative for dizziness. Visit Vitals  /73 (BP 1 Location: Left arm, BP Patient Position: Sitting)   Pulse 83   Temp 98.3 °F (36.8 °C) (Oral)   Resp 18   Ht 5' 11\" (1.803 m)   Wt 190 lb (86.2 kg)   SpO2 99%   BMI 26.50 kg/m²     Physical Exam   Constitutional: He is oriented to person, place, and time. He appears well-developed and well-nourished. No distress. Cardiovascular: Normal rate and regular rhythm. Pulmonary/Chest: Effort normal and breath sounds normal.   Musculoskeletal: He exhibits edema. Left knee dressing intact, with dried blood. Knee swollen with limited ROM, no erythema   Neurological: He is alert and oriented to person, place, and time. Skin: Skin is warm and dry. He is not diaphoretic. No erythema. Psychiatric: He has a normal mood and affect. His behavior is normal. Judgment and thought content normal.       ASSESSMENT and PLAN    ICD-10-CM ICD-9-CM    1. Infected orthopedic implant, sequela T84. 7XXS 909.3    2. Status post left knee replacement Z96.652 V43.65    3. Anemia, unspecified type D64.9 285.9      Follow-up and Dispositions    · Return in about 3 months (around 7/2/2019), or if symptoms worsen or fail to improve, for with Dr. Felix Mcnair.        current treatment plan is effective, no change in therapy  lab results and schedule of future lab studies reviewed with patient  reviewed medications and side effects in detail    Advised to continue  PT, keep follow up with surgeon, elevate leg, continue anticoagulation

## 2019-04-02 NOTE — PATIENT INSTRUCTIONS
Anemia: Care Instructions  Your Care Instructions    Anemia is a low level of red blood cells, which carry oxygen throughout your body. Many things can cause anemia. Lack of iron is one of the most common causes. Your body needs iron to make hemoglobin, a substance in red blood cells that carries oxygen from the lungs to your body's cells. Without enough iron, the body produces fewer and smaller red blood cells. As a result, your body's cells do not get enough oxygen, and you feel tired and weak. And you may have trouble concentrating. Bleeding is the most common cause of a lack of iron. You may have heavy menstrual bleeding or bleeding caused by conditions such as ulcers, hemorrhoids, or cancer. Regular use of aspirin or other anti-inflammatory medicines (such as ibuprofen) also can cause bleeding in some people. A lack of iron in your diet also can cause anemia, especially at times when the body needs more iron, such as during pregnancy, infancy, and the teen years. Your doctor may have prescribed iron pills. It may take several months of treatment for your iron levels to return to normal. Your doctor also may suggest that you eat foods that are rich in iron, such as meat and beans. There are many other causes of anemia. It is not always due to a lack of iron. Finding the specific cause of your anemia will help your doctor find the right treatment for you. Follow-up care is a key part of your treatment and safety. Be sure to make and go to all appointments, and call your doctor if you are having problems. It's also a good idea to know your test results and keep a list of the medicines you take. How can you care for yourself at home? · Take your medicines exactly as prescribed. Call your doctor if you think you are having a problem with your medicine. · If your doctor recommends iron pills, take them as directed:  ? Try to take the pills on an empty stomach about 1 hour before or 2 hours after meals. But you may need to take iron with food to avoid an upset stomach. ? Do not take antacids or drink milk or caffeine drinks (such as coffee, tea, or cola) at the same time or within 2 hours of the time that you take your iron. They can make it hard for your body to absorb the iron. ? Vitamin C (from food or supplements) helps your body absorb iron. Try taking iron pills with a glass of orange juice or some other food that is high in vitamin C, such as citrus fruits. ? Iron pills may cause stomach problems, such as heartburn, nausea, diarrhea, constipation, and cramps. Be sure to drink plenty of fluids, and include fruits, vegetables, and fiber in your diet each day. Iron pills often make your bowel movements dark or green. ? If you forget to take an iron pill, do not take a double dose of iron the next time you take a pill. ? Keep iron pills out of the reach of small children. An overdose of iron can be very dangerous. · Follow your doctor's advice about eating iron-rich foods. These include red meat, shellfish, poultry, eggs, beans, raisins, whole-grain bread, and leafy green vegetables. · Steam vegetables to help them keep their iron content. When should you call for help? Call 911 anytime you think you may need emergency care. For example, call if:    · You have symptoms of a heart attack. These may include:  ? Chest pain or pressure, or a strange feeling in the chest.  ? Sweating. ? Shortness of breath. ? Nausea or vomiting. ? Pain, pressure, or a strange feeling in the back, neck, jaw, or upper belly or in one or both shoulders or arms. ? Lightheadedness or sudden weakness. ? A fast or irregular heartbeat. After you call 911, the  may tell you to chew 1 adult-strength or 2 to 4 low-dose aspirin. Wait for an ambulance.  Do not try to drive yourself.     · You passed out (lost consciousness).    Call your doctor now or seek immediate medical care if:    · You have new or increased shortness of breath.     · You are dizzy or lightheaded, or you feel like you may faint.     · Your fatigue and weakness continue or get worse.     · You have any abnormal bleeding, such as:  ? Nosebleeds. ? Vaginal bleeding that is different (heavier, more frequent, at a different time of the month) than what you are used to.  ? Bloody or black stools, or rectal bleeding. ? Bloody or pink urine.    Watch closely for changes in your health, and be sure to contact your doctor if:    · You do not get better as expected. Where can you learn more? Go to http://paris-ivon.info/. Enter R301 in the search box to learn more about \"Anemia: Care Instructions. \"  Current as of: May 6, 2018  Content Version: 11.9  © 3364-1853 [employer]. Care instructions adapted under license by [employer] (which disclaims liability or warranty for this information). If you have questions about a medical condition or this instruction, always ask your healthcare professional. [place] [address] any warranty or liability for your use of this information. Total Knee Replacement: What to Expect at [address] [place] [address]    When you leave the hospital, you should be able to move around with a walker or crutches. But you will need someone to help you at home for the next few weeks or until you have more energy and can move around better. If there is no one to help you at home, you may go to a rehabilitation center. You will go home with a bandage and stitches, staples, tissue glue, or tape strips. Change the bandage as your doctor tells you to. If you have stitches or staples, your doctor will remove them 10 to 21 days after your surgery. Glue or tape strips will fall off on their own over time. You may still have some mild pain, and the area may be swollen for 3 to 6 months after surgery. Your knee will continue to improve for 6 to 12 months.  You will probably use a walker for 1 to 3 weeks and then use crutches. When you are ready, you can use a cane. You will probably be able to walk on your own in 4 to 8 weeks. You will need to do months of physical rehabilitation (rehab) after a knee replacement. Rehab will help you strengthen the muscles of the knee and help you regain movement. After you recover, your artificial knee will allow you to do normal daily activities with less pain or no pain at all. You may be able to hike, dance, ride a bike, and play golf. Talk to your doctor about whether you can do more strenuous activities. Always tell your caregivers that you have an artificial knee. How long it will take to walk on your own, return to normal activities, and go back to work depends on your health and how well your rehabilitation (rehab) program goes. The better you do with your rehab exercises, the quicker you will get your strength and movement back. This care sheet gives you a general idea about how long it will take for you to recover. But each person recovers at a different pace. Follow the steps below to get better as quickly as possible. How can you care for yourself at home? Activity    · Rest when you feel tired. You may take a nap, but do not stay in bed all day. When you sit, use a chair with arms. You can use the arms to help you stand up.     · Work with your physical therapist to find the best way to exercise. You may be able to take frequent, short walks using crutches or a walker. What you can do as your knee heals will depend on whether your new knee is cemented or uncemented. You may not be able to do certain things for a while if your new knee is uncemented.     · After your knee has healed enough, you can do more strenuous activities with caution. ? You can golf, but use a golf cart, and do not wear shoes with spikes. ? You can bike on a flat road or on a stationary bike. Avoid biking up hills.   ? Your doctor may suggest that you stay away from activities that put stress on your knee. These include tennis or badminton, squash or racquetball, contact sports like football, jumping (such as in basketball), jogging, or running. ? Avoid activities where you might fall. These include horseback riding, skiing, and mountain biking.     · Do not sit for more than 1 hour at a time. Get up and walk around for a while before you sit again. If you must sit for a long time, prop up your leg with a chair or footstool. This will help you avoid swelling.     · Ask your doctor when you can drive again. It may take up to 8 weeks after knee replacement surgery before it is safe for you to drive.     · When you get into a car, sit on the edge of the seat. Then pull in your legs, and turn to face the front.     · You should be able to do many everyday activities 3 to 6 weeks after your surgery. You will probably need to take 4 to 16 weeks off from work. When you can go back to work depends on the type of work you do and how you feel.     · Ask your doctor when it is okay for you to have sex.     · Do not lift anything heavier than 10 pounds and do not lift weights for 12 weeks. Diet    · By the time you leave the hospital, you should be eating your normal diet. If your stomach is upset, try bland, low-fat foods like plain rice, broiled chicken, toast, and yogurt. Your doctor may suggest that you take iron and vitamin supplements.     · Drink plenty of fluids (unless your doctor tells you not to).   · Eat healthy foods, and watch your portion sizes. Try to stay at your ideal weight. Too much weight puts more stress on your new knee.     · You may notice that your bowel movements are not regular right after your surgery. This is common. Try to avoid constipation and straining with bowel movements. You may want to take a fiber supplement every day. If you have not had a bowel movement after a couple of days, ask your doctor about taking a mild laxative.    Medicines    · Your doctor will tell you if and when you can restart your medicines. He or she will also give you instructions about taking any new medicines.     · If you take blood thinners, such as warfarin (Coumadin), clopidogrel (Plavix), or aspirin, be sure to talk to your doctor. He or she will tell you if and when to start taking those medicines again. Make sure that you understand exactly what your doctor wants you to do.     · Your doctor may give you a blood-thinning medicine to prevent blood clots. If you take a blood thinner, be sure you get instructions about how to take your medicine safely. Blood thinners can cause serious bleeding problems. This medicine could be in pill form or as a shot (injection). If a shot is necessary, your doctor will tell you how to do this.     · Be safe with medicines. Take pain medicines exactly as directed. ? If the doctor gave you a prescription medicine for pain, take it as prescribed. ? If you are not taking a prescription pain medicine, ask your doctor if you can take an over-the-counter medicine. ? Plan to take your pain medicine 30 minutes before exercises. It is easier to prevent pain before it starts than to stop it once it has started.     · If you think your pain medicine is making you sick to your stomach:  ? Take your medicine after meals (unless your doctor has told you not to). ? Ask your doctor for a different pain medicine.     · If your doctor prescribed antibiotics, take them as directed. Do not stop taking them just because you feel better. You need to take the full course of antibiotics. Incision care    · If your doctor told you how to care for your cut (incision), follow your doctor's instructions. You will have a dressing over the cut. A dressing helps the incision heal and protects it. Your doctor will tell you how to take care of this.     · If you did not get instructions, follow this general advice:  ?  If you have strips of tape on the cut the doctor made, leave the tape on for a week or until it falls off.  ? If you have stitches or staples, your doctor will tell you when to come back to have them removed. ? If you have skin adhesive on the cut, leave it on until it falls off. Skin adhesive is also called glue or liquid stitches. ? Change the bandage every day. ? Wash the area daily with warm water, and pat it dry. Don't use hydrogen peroxide or alcohol. They can slow healing. ? You may cover the area with a gauze bandage if it oozes fluid or rubs against clothing. ? You may shower 24 to 48 hours after surgery. Pat the incision dry. Don't swim or take a bath for the first 2 weeks, or until your doctor tells you it is okay. Exercise    · Your rehab program will give you a number of exercises to do to help you get back your knee's range of motion and strength. Always do them as your therapist tells you. Ice and elevation    · For pain and swelling, put ice or a cold pack on the area for 10 to 20 minutes at a time. Put a thin cloth between the ice and your skin. Other instructions    · Continue to wear your support stockings as your doctor says. These help to prevent blood clots. The length of time that you will have to wear them depends on your activity level and the amount of swelling.     · You have metal pieces in your knee. These may set off some airport metal detectors. Carry a medical alert card that says you have an artificial joint, just in case. Follow-up care is a key part of your treatment and safety. Be sure to make and go to all appointments, and call your doctor if you are having problems. It's also a good idea to know your test results and keep a list of the medicines you take. When should you call for help? Call 911 anytime you think you may need emergency care.  For example, call if:    · You passed out (lost consciousness).     · You have severe trouble breathing.     · You have sudden chest pain and shortness of breath, or you cough up blood.    Call your doctor now or seek immediate medical care if:    · You have signs of infection, such as:  ? Increased pain, swelling, warmth, or redness. ? Red streaks leading from the incision. ? Pus draining from the incision. ? A fever.     · You have signs of a blood clot, such as:  ? Pain in your calf, back of the knee, thigh, or groin. ? Redness and swelling in your leg or groin.     · Your incision comes open and begins to bleed, or the bleeding increases.     · You have pain that does not get better after you take pain medicine.    Watch closely for changes in your health, and be sure to contact your doctor if:    · You do not have a bowel movement after taking a laxative. Where can you learn more? Go to http://paris-ivon.info/. Enter K277 in the search box to learn more about \"Total Knee Replacement: What to Expect at Home. \"  Current as of: September 20, 2018  Content Version: 11.9  © 0259-6611 [employer]. Care instructions adapted under license by [employer] (which disclaims liability or warranty for this information). If you have questions about a medical condition or this instruction, always ask your healthcare professional. [place] [address] any warranty or liability for your use of this information.

## 2019-04-02 NOTE — PROGRESS NOTES
[facility] is [facility] 63 y.[facility]. male  Chief Complaint   Patient presents with    Transitions Of Care    Surgical Follow-up     Visit Vitals  /73 (BP 1 Location: Left arm, BP Patient Position: Sitting)   Pulse 83   Temp 98.3 °F (36.8 °C) (Oral)   Resp 18   Ht 5' 11\" (1.803 m)   Wt 190 lb (86.2 kg)   SpO2 99%   BMI 26.50 kg/m²     1. Have you been to the ER, urgent care clinic since your last visit? Hospitalized since your last visit? Yes 3/25/19    2. Have you seen or consulted any other health care providers outside of the [address] [place] [address] since your last visit? Include any pap smears or colon screening.  No  Health Maintenance Due   Topic Date Due    Shingrix Vaccine Age 49> (1 of 2) 08/15/2011    Pneumococcal 0-64 years (1 of 1 - PPSV23) 02/22/2018     Learning Assessment 11/13/2018   PRIMARY LEARNER Patient   HIGHEST LEVEL OF EDUCATION - PRIMARY LEARNER  > 4 YEARS OF COLLEGE   BARRIERS PRIMARY LEARNER NONE   CO-LEARNER CAREGIVER No   PRIMARY LANGUAGE ENGLISH   LEARNER PREFERENCE PRIMARY READING   ANSWERED BY patient   RELATIONSHIP SELF

## 2019-04-05 ENCOUNTER — TELEPHONE (OUTPATIENT)
Dept: MEDSURG UNIT | Age: 59
End: 2019-04-05

## 2019-04-05 NOTE — TELEPHONE ENCOUNTER
Phone call made to patient after hospital discharge from surgical joint replacement. 1. States discharge instructions were clear and easy to understand. 2. Patient was able to fill prescriptions and has no medications questions. 3. States pain is tolerable and pain medication is effective. He currently is only taking tylenol for pain control. 4. Bruising, swelling are well controlled. Ice is effective  5. Patient reports post-operative bleeding that required a visit to Dr. Andrea Rey office on Wednesday of this week. Steri-strips applied, knee immobilizer placed and PT held until next follow-up this coming Tuesday. Pt also instructed to hold blood thinners for two days. Patient states bleeding is now under control and denies any questions or concerns about bleeding/dressing/incision care. 6. Denies N/V, loss of appetite, constipation. Has been able to have a BM. 7. Mobility without difficulty using immobilizer. Will resume PT per Dr. Andrea Rey instructions. 8. Using I.S. and doing exercises as instructed. 9. Patient states needs were met by the staff while in the hospital.  10. Follow up appointment scheduled for Tuesday 4-9-19 to remove staples. Patient had no questions or concerns to report.

## 2019-04-09 NOTE — DISCHARGE SUMMARY
Date of Admission: 3/25/19  Date of Discharge: 3/28/19  Attending on admission and discharge: Dr. Jack Mcguire    Admitting Diagnosis: Left knee failed arthroplasty  Discharge Diagnosis: Left knee same  Procedure Performed: Left total knee revision arthroplasty    Hospital Course and Treatment:     The patient was admitted through the operating room, with a same day admssion after a total knee arthroplasty. The patient tolerated the procedure well and was taken to the surgical floor for further observation and treatment. The patient was maintained on IV fluids until tolerating a PO diet. They were also maintained on sequential compression devices and ecotrin for DVT prophylaxis. The diet was advanced as tolerated. The patient was mobilized with physical therapy. He did have fevers and subsequent workup indicated a PE, which was treated with heparin transitioned to xarelto. There were no further complications during the course of the hospital stay, and the patient was deemed medically stable for discharge to home. After consultation with physical therapy, the patient was cleared for discharge. Discharge instructions:  Patient is to be discharged to home, they will be weight bearing as tolerated with no restrictions on hip motion. Showering is allowed while dressing is intact. The dressing should remain in place for two weeks, then can be removed. The patient should not be in a pool or tub, or otherwise immerse the wound in water. The patient has been counseled on the importance of mobilizing and moving at least every two hours to help prevent blood clots. The patient should call Dr. Serena Jaimes office or go to an emergency department if they note a fever over 101.1 degrees fahrenheit, more or unrelieved pain, more or new swelling at the operative site, or any drainage from the wound.     Condition at Discharge: Stable    Discharge medications:  Resume regular home medications  Additional medications to be prescribed on discharge  xarelto 10 mg po daily Oxycodone 5 mg 1 po q 4 h prn pain  Colace 100 mg PO BID prn constipation      Follow up instructions: The patient should follow up in 6 weeks for a wound check and xrays with Dr. Rico Deluca.

## 2019-04-15 LAB
BACTERIA SPEC CULT: ABNORMAL
BACTERIA SPEC CULT: ABNORMAL
BACTERIA SPEC CULT: NORMAL
GRAM STN SPEC: ABNORMAL
GRAM STN SPEC: NORMAL
GRAM STN SPEC: NORMAL
SERVICE CMNT-IMP: ABNORMAL
SERVICE CMNT-IMP: NORMAL

## 2019-04-19 NOTE — TELEPHONE ENCOUNTER
Medication refill request:    Last Office Visit: 4/2/19  Next Office Visit:    Future Appointments   Date Time Provider Saul Engel   6/21/2019  9:30 AM Kayley Peck MD [facility] [address] [facility] verified.  Yes      Patient Requesting Refill

## 2019-04-29 ENCOUNTER — PATIENT OUTREACH (OUTPATIENT)
Dept: INTERNAL MEDICINE CLINIC | Age: 59
End: 2019-04-29

## 2019-04-29 NOTE — PROGRESS NOTES
Hospital follow up. Patient's knee remains swollen and unable to bend. Per wife patient is going to [facility] tomorrow to have knee manipulation done. Will follow up next week to see how his knee is doing.

## 2019-05-24 ENCOUNTER — PATIENT OUTREACH (OUTPATIENT)
Dept: INTERNAL MEDICINE CLINIC | Age: 59
End: 2019-05-24

## 2019-05-24 NOTE — PROGRESS NOTES
Patient has graduated from the Transitions of Care Coordination  program on 5/24/19. Patient's symptoms are stable at this time. Patient/family has the ability to self-manage. Care management goals have been completed at this time. No further nurse navigator follow up scheduled. Goals Addressed                 This Visit's Progress     COMPLETED: Attends follow-up appointments as directed. 4/1/19  Follow up with pcp on 4/2/19       COMPLETED: Coordinate Pain Management Plan for Patient. 4/1/19  Taking oxycodone IR for pain. S/s to watch for.  COMPLETED: Supportive resources in place to maintain patient in the community (ie. Home Health, DME equipment, refer to, medication assistant plan, etc.)        4/1/19 follow by [facility] has nurse navigator's contact information for any further questions, concerns, or needs.   Patients upcoming visits:    Future Appointments   Date Time Provider Saul Engel   6/21/2019  9:30 AM Nadeem Herrera MD [id number] [facility]

## 2019-06-04 RX ORDER — MONTELUKAST SODIUM 10 MG/1
TABLET ORAL
Qty: 90 TAB | Refills: 1 | Status: SHIPPED | OUTPATIENT
Start: 2019-06-04 | End: 2020-02-26

## 2019-06-04 NOTE — TELEPHONE ENCOUNTER
Medication refill request:    Last Office Visit: 4/2/19  Next Office Visit:    Future Appointments   Date Time Provider Saul Engel   6/21/2019  9:30 AM Izzy Garrido MD [facility] [address] [facility] verified. Yes    Patient requesting 90 day supply of his Singular 10 mg.

## 2019-07-12 ENCOUNTER — OFFICE VISIT (OUTPATIENT)
Dept: INTERNAL MEDICINE CLINIC | Age: 59
End: 2019-07-12

## 2019-07-12 VITALS
RESPIRATION RATE: 16 BRPM | BODY MASS INDEX: 28.9 KG/M2 | WEIGHT: 206.4 LBS | SYSTOLIC BLOOD PRESSURE: 130 MMHG | HEIGHT: 71 IN | HEART RATE: 77 BPM | OXYGEN SATURATION: 97 % | TEMPERATURE: 98.5 F | DIASTOLIC BLOOD PRESSURE: 80 MMHG

## 2019-07-12 DIAGNOSIS — J45.41 MODERATE PERSISTENT ASTHMA WITH ACUTE EXACERBATION: ICD-10-CM

## 2019-07-12 DIAGNOSIS — T84.7XXS INFECTED ORTHOPEDIC IMPLANT, SEQUELA: ICD-10-CM

## 2019-07-12 DIAGNOSIS — K51.90 ULCERATIVE COLITIS WITHOUT COMPLICATIONS, UNSPECIFIED LOCATION (HCC): ICD-10-CM

## 2019-07-12 DIAGNOSIS — R73.02 IGT (IMPAIRED GLUCOSE TOLERANCE): ICD-10-CM

## 2019-07-12 DIAGNOSIS — I26.99 OTHER ACUTE PULMONARY EMBOLISM WITHOUT ACUTE COR PULMONALE (HCC): Primary | ICD-10-CM

## 2019-07-12 DIAGNOSIS — E78.5 HYPERLIPIDEMIA, UNSPECIFIED HYPERLIPIDEMIA TYPE: ICD-10-CM

## 2019-07-12 DIAGNOSIS — Z79.60 LONG-TERM USE OF IMMUNOSUPPRESSANT MEDICATION: ICD-10-CM

## 2019-07-12 DIAGNOSIS — M06.9 RHEUMATOID ARTHRITIS, INVOLVING UNSPECIFIED SITE, UNSPECIFIED RHEUMATOID FACTOR PRESENCE: ICD-10-CM

## 2019-07-12 NOTE — PROGRESS NOTES
Rm#    No chief complaint on file. 1. Have you been to the ER, urgent care clinic since your last visit? Hospitalized since your last visit? 2. Have you seen or consulted any other health care providers outside of the [address] [place] [address] since your last visit? Include any pap smears or colon screening.    Health Maintenance Due   Topic Date Due    Shingrix Vaccine Age 49> (1 of 2) 08/15/2011    Pneumococcal 0-64 years (1 of 1 - PPSV23) 02/22/2018    COLONOSCOPY  12/20/2019

## 2019-07-12 NOTE — PROGRESS NOTES
HPI:  Presents for f/u PE, anticoag, lipids    Tolerating Eliquis  > 3months of tx at this point  No bleeding consequences    No personal or family hx DVT/PE/thromboembolism    +provoked event - knee surgery, infection. Very active and doing well at the moment      Past medical, Social, and Family history reviewed    Prior to Admission medications    Medication Sig Start Date End Date Taking? Authorizing Provider   montelukast (SINGULAIR) 10 mg tablet TAKE 1 TABLET DAILY 6/4/19  Yes Patty Hernández MD   apixaban (ELIQUIS) 5 mg tablet Take 1 Tab by mouth two (2) times a day. 4/19/19  Yes Patty Hernández MD   mesalamine (LIALDA) 1.2 gram delayed release tablet Take 2.4 g by mouth. Yes Provider, Historical   abatacept/maltose (ORENCIA, WITH MALTOSE, IV) by IntraVENous route. Yes Provider, Historical   rosuvastatin (CRESTOR) 10 mg tablet Take 1 Tab by mouth nightly. 11/13/18  Yes Patty Hernández MD   fluticasone-salmeterol (ADVAIR) 500-50 mcg/dose diskus inhaler Take 1 Puff by inhalation two (2) times a day. 10/16/18  Yes Patty Hernández MD   budesonide (RHINOCORT AQUA) 32 mcg/actuation nasal spray 2 Sprays by Both Nostrils route daily. Yes Provider, Historical   bisacodyl (DULCOLAX) 10 mg suppository Insert 10 mg into rectum daily. 3/29/19   Thomas Palmer, DO   esomeprazole (NEXIUM) 20 mg capsule Take  by mouth daily. Provider, Historical          ROS  Complete ROS reviewed and negative or stable except as noted in HPI. Physical Exam   Constitutional: He is oriented to person, place, and time. He appears well-nourished. No distress. HENT:   Head: Normocephalic and atraumatic. Mouth/Throat: Oropharynx is clear and moist. No oropharyngeal exudate. Eyes: Pupils are equal, round, and reactive to light. EOM are normal. No scleral icterus. Neck: Normal range of motion. Neck supple. No JVD present. No thyromegaly present.    Cardiovascular: Normal rate, regular rhythm and normal heart sounds. Exam reveals no gallop and no friction rub. No murmur heard. Pulmonary/Chest: Effort normal and breath sounds normal. No respiratory distress. He has no wheezes. He has no rales. Abdominal: Soft. Bowel sounds are normal. He exhibits no distension. There is no tenderness. Genitourinary: Prostate normal.   Musculoskeletal: Normal range of motion. He exhibits no edema. Healed knee scar   Lymphadenopathy:     He has no cervical adenopathy. Neurological: He is alert and oriented to person, place, and time. He exhibits normal muscle tone. Coordination normal.   Skin: Skin is warm. No rash noted. Psychiatric: He has a normal mood and affect. Nursing note and vitals reviewed. Prior labs reviewed. Assessment/Plan:    ICD-10-CM ICD-9-CM    1. Other acute pulmonary embolism without acute cor pulmonale ([facility]) I26.99 415.19 PROTEIN C DEFICIENCY      PROTEIN S, FUNCTIONAL      FACTOR V LEIDEN      FACTOR II  DNA ANALYSIS      ANTIPHOSPHOLIPID SYNDROME PANEL   2. Ulcerative colitis without complications, unspecified location ([facility]) K51.90 556.9 CBC WITH AUTOMATED DIFF   3. Long-term use of immunosuppressant medication Z79.899 V58.69    4. Rheumatoid arthritis, involving unspecified site, unspecified rheumatoid factor presence ([facility]) M06.9 714.0 CBC WITH AUTOMATED DIFF   5. Infected orthopedic implant, sequela T84. 7XXS 909.3    6. Hyperlipidemia, unspecified hyperlipidemia type E78.5 272.4 CK      LIPID PANEL      METABOLIC PANEL, COMPREHENSIVE   7. Moderate persistent asthma with acute exacerbation J45.41 493.92    8. IGT (impaired glucose tolerance) R73.02 790.22 HEMOGLOBIN A1C WITH EAG     Follow-up and Dispositions    · Return in about 6 months (around 1/12/2020), or if symptoms worsen or fail to improve, for asthma, cholesterol.         results and schedule of future studies reviewed with patient  reviewed diet, exercise and weight   cardiovascular risk and specific lipid/LDL goals reviewed  reviewed medications and side effects in detail   Pt to review with Rheum re: shingrix vaccine. May dc Eliquis   Check hyper coag w/u to confirm risk to include antiphospholipid syndrome due to autoimmune dz status.

## 2019-07-12 NOTE — PROGRESS NOTES
Rm 13    Chief Complaint   Patient presents with    Medication Evaluation     Eliquis     1. Have you been to the ER, urgent care clinic since your last visit? Hospitalized since your last visit? No    2. Have you seen or consulted any other health care providers outside of the [address] [place] [address] since your last visit? Include any pap smears or colon screening.  No    Health Maintenance Due   Topic Date Due    Shingrix Vaccine Age 49> (1 of 2) 08/15/2011    Pneumococcal 0-64 years (1 of 1 - PPSV23) 02/22/2018    COLONOSCOPY  12/20/2019     3 most recent PHQ Screens 4/2/2019   Little interest or pleasure in doing things Not at all   Feeling down, depressed, irritable, or hopeless Not at all   Total Score PHQ 2 0     Learning Assessment 11/13/2018   PRIMARY LEARNER Patient   HIGHEST LEVEL OF EDUCATION - PRIMARY LEARNER  > 4 YEARS OF COLLEGE   BARRIERS PRIMARY LEARNER NONE   CO-LEARNER CAREGIVER No   PRIMARY LANGUAGE ENGLISH   LEARNER PREFERENCE PRIMARY READING   ANSWERED BY patient   RELATIONSHIP SELF

## 2019-07-23 NOTE — PROGRESS NOTES
Labs are normal or stable and at goal.  No clotting disorder identified thus far, 2 additional tests have not been reported yet.   Continue your current medications

## 2019-07-25 LAB
ALBUMIN SERPL-MCNC: 4.7 G/DL (ref 3.5–5.5)
ALBUMIN/GLOB SERPL: 1.5 {RATIO} (ref 1.2–2.2)
ALP SERPL-CCNC: 137 IU/L (ref 39–117)
ALT SERPL-CCNC: 30 IU/L (ref 0–44)
APTT HEX PL PPP: 6 SEC
APTT IMM NP PPP: ABNORMAL SEC
APTT PPP 1:1 SALINE: ABNORMAL SEC
APTT PPP: 30.1 SEC
AST SERPL-CCNC: 26 IU/L (ref 0–40)
B2 GLYCOPROT1 IGA SER-ACNC: <10 SAU
B2 GLYCOPROT1 IGG SER-ACNC: <10 SGU
B2 GLYCOPROT1 IGM SER-ACNC: <10 SMU
BASOPHILS # BLD AUTO: 0 X10E3/UL (ref 0–0.2)
BASOPHILS NFR BLD AUTO: 0 %
BILIRUB SERPL-MCNC: 0.3 MG/DL (ref 0–1.2)
BUN SERPL-MCNC: 20 MG/DL (ref 6–24)
BUN/CREAT SERPL: 19 (ref 9–20)
CALCIUM SERPL-MCNC: 9.6 MG/DL (ref 8.7–10.2)
CARDIOLIPIN IGA SER IA-ACNC: <10 APL
CARDIOLIPIN IGG SER IA-ACNC: <10 GPL
CARDIOLIPIN IGM SER IA-ACNC: 29 MPL
CHLORIDE SERPL-SCNC: 99 MMOL/L (ref 96–106)
CHOLEST SERPL-MCNC: 159 MG/DL (ref 100–199)
CK SERPL-CCNC: 157 U/L (ref 24–204)
CO2 SERPL-SCNC: 22 MMOL/L (ref 20–29)
CONFIRM DRVVT: ABNORMAL SEC
CREAT SERPL-MCNC: 1.05 MG/DL (ref 0.76–1.27)
DRVVT SCREEN TO CONFIRM RATIO: ABNORMAL RATIO
EOSINOPHIL # BLD AUTO: 0.1 X10E3/UL (ref 0–0.4)
EOSINOPHIL NFR BLD AUTO: 1 %
ERYTHROCYTE [DISTWIDTH] IN BLOOD BY AUTOMATED COUNT: 13.7 % (ref 12.3–15.4)
EST. AVERAGE GLUCOSE BLD GHB EST-MCNC: 126 MG/DL
F2 GENE MUT ANL BLD/T: NORMAL
F5 GENE MUT ANL BLD/T: NORMAL
GLOBULIN SER CALC-MCNC: 3.1 G/DL (ref 1.5–4.5)
GLUCOSE SERPL-MCNC: 93 MG/DL (ref 65–99)
HBA1C MFR BLD: 6 % (ref 4.8–5.6)
HCT VFR BLD AUTO: 41.2 % (ref 37.5–51)
HDLC SERPL-MCNC: 39 MG/DL
HGB BLD-MCNC: 13.7 G/DL (ref 13–17.7)
IMM GRANULOCYTES # BLD AUTO: 0 X10E3/UL (ref 0–0.1)
IMM GRANULOCYTES NFR BLD AUTO: 0 %
LAC INTERPRETATION, 502038: ABNORMAL
LDLC SERPL CALC-MCNC: 89 MG/DL (ref 0–99)
LYMPHOCYTES # BLD AUTO: 1.4 X10E3/UL (ref 0.7–3.1)
LYMPHOCYTES NFR BLD AUTO: 19 %
MCH RBC QN AUTO: 31.3 PG (ref 26.6–33)
MCHC RBC AUTO-ENTMCNC: 33.3 G/DL (ref 31.5–35.7)
MCV RBC AUTO: 94 FL (ref 79–97)
MONOCYTES # BLD AUTO: 1.3 X10E3/UL (ref 0.1–0.9)
MONOCYTES NFR BLD AUTO: 17 %
NEUTROPHILS # BLD AUTO: 4.6 X10E3/UL (ref 1.4–7)
NEUTROPHILS NFR BLD AUTO: 63 %
PLATELET # BLD AUTO: 250 X10E3/UL (ref 150–450)
PLATELET NEUTRALIZATION 500049: 1.8 SEC
POTASSIUM SERPL-SCNC: 4.2 MMOL/L (ref 3.5–5.2)
PROT C ACT/NOR PPP: 134 % (ref 73–180)
PROT C AG ACT/NOR PPP IA: 113 % (ref 60–150)
PROT S ACT/NOR PPP: 117 % (ref 63–140)
PROT SERPL-MCNC: 7.8 G/DL (ref 6–8.5)
PROTHROM IGG SERPL-ACNC: 4 G UNITS
PS IGG SER IA-ACNC: 8 GPS
PS IGM SER IA-ACNC: 6 MPS
RBC # BLD AUTO: 4.38 X10E6/UL (ref 4.14–5.8)
SCREEN DRVVT: 37 SEC
SODIUM SERPL-SCNC: 135 MMOL/L (ref 134–144)
TRIGL SERPL-MCNC: 153 MG/DL (ref 0–149)
VLDLC SERPL CALC-MCNC: 31 MG/DL (ref 5–40)
WBC # BLD AUTO: 7.4 X10E3/UL (ref 3.4–10.8)

## 2019-07-26 NOTE — PROGRESS NOTES
The additional testing was normal as well. See the comment about the single antiphospolipid antibody abnormality.

## 2019-09-09 ENCOUNTER — OFFICE VISIT (OUTPATIENT)
Dept: ORTHOPEDIC SURGERY | Age: 59
End: 2019-09-09

## 2019-09-09 VITALS
BODY MASS INDEX: 27.72 KG/M2 | HEIGHT: 71 IN | DIASTOLIC BLOOD PRESSURE: 82 MMHG | TEMPERATURE: 98.3 F | OXYGEN SATURATION: 95 % | SYSTOLIC BLOOD PRESSURE: 122 MMHG | HEART RATE: 71 BPM | WEIGHT: 198 LBS

## 2019-09-09 DIAGNOSIS — Z96.652 AFTERCARE FOLLOWING LEFT KNEE JOINT REPLACEMENT SURGERY: ICD-10-CM

## 2019-09-09 DIAGNOSIS — Z47.1 AFTERCARE FOLLOWING LEFT KNEE JOINT REPLACEMENT SURGERY: ICD-10-CM

## 2019-09-09 DIAGNOSIS — Z96.652 PRESENCE OF ARTIFICIAL KNEE JOINT, LEFT: Primary | ICD-10-CM

## 2019-09-09 NOTE — PROGRESS NOTES
Chief Complaint   Patient presents with    Follow-up     left knee surgery March 2019     Visit Vitals  /82 (BP 1 Location: Left arm, BP Patient Position: Sitting)   Pulse 71   Temp 98.3 °F (36.8 °C) (Oral)   Ht 5' 11\" (1.803 m)   Wt 198 lb (89.8 kg)   SpO2 95%   BMI 27.62 kg/m²       1. Have you been to the ER, urgent care clinic since your last visit? Hospitalized since your last visit? NO    2. Have you seen or consulted any other health care providers outside of the [address] [place] [address] since your last visit? Include any pap smears or colon screening. Dr. Merline Arizmendi end of June 2019.

## 2019-09-09 NOTE — PROGRESS NOTES
9/9/2019    Chief Complaint: Follow up for left knee replacement    HPI: Hector Martínez is a 63 y.o. [unfilled] who is now 5 months status post complex left total knee arthroplasty. The patient states that they are doing well. The preoperative pain is gone and the postoperative pain is cone. The pain is 0/10 in intensity. Regular exercises have helped with residual symptoms. He has had some good range of motion, but has lost some since he stopped taking meloxicam.       Past Medical History:   Diagnosis Date    Allergic rhinitis     Asthma     allergy/URI related    Colon polyp 11/13/13    Diverticulosis     GERD (gastroesophageal reflux disease)     Hiatal hernia     History of vascular access device 11/21/2018    5 Fr double PICC 43 cm 2 cm out, long term abx, R basilic    Hypercholesterolemia     IGT (impaired glucose tolerance)     Kidney stone 2016    x2    Overweight (BMI 25.0-29.9) 11/12/2018    Pulmonary embolism (HCC)     Rheumatoid arthritis(714.0)     S/P PICC central line placement 11/09/2017    has been removed    UC (ulcerative colitis) ([facility] [address]     In remission       Past Surgical History:   Procedure Laterality Date    ABDOMEN SURGERY PROC UNLISTED Bilateral 2007    Hernia Repair    ABDOMEN SURGERY PROC UNLISTED Left 1971    Inquinal hernia    HX GI  2010 & 2016    COLONOSCOPY    HX KNEE ARTHROSCOPY Left 10/2017    HX LUMBAR DISKECTOMY N/A 2012    herniated disk    HX OPEN REDUCTION INTERNAL FIXATION Left 07/2017    Tibia fx    HX TONSILLECTOMY      REMOVAL OF IMPLANT MATERIAL Left 11/2017    IND with plate removal of tibia       Current Outpatient Medications on File Prior to Visit   Medication Sig Dispense Refill    montelukast (SINGULAIR) 10 mg tablet TAKE 1 TABLET DAILY 90 Tab 1    mesalamine (LIALDA) 1.2 gram delayed release tablet Take 2.4 g by mouth.  esomeprazole (NEXIUM) 20 mg capsule Take  by mouth daily.       abatacept/maltose (ORENCIA, WITH MALTOSE, IV) by IntraVENous route.  rosuvastatin (CRESTOR) 10 mg tablet Take 1 Tab by mouth nightly. 90 Tab 1    fluticasone-salmeterol (ADVAIR) 500-50 mcg/dose diskus inhaler Take 1 Puff by inhalation two (2) times a day. 3 Inhaler 3    budesonide (RHINOCORT AQUA) 32 mcg/actuation nasal spray 2 Sprays by Both Nostrils route daily.  bisacodyl (DULCOLAX) 10 mg suppository Insert 10 mg into rectum daily. 60 Each 0     No current facility-administered medications on file prior to visit. No Known Allergies    Family History   Problem Relation Age of Onset    Diabetes Mother     Heart Attack Father         SEVERAL MIs    Arrhythmia Father         HAD PACEMAKER    Stroke Father     Other Father          OF MRSA    No Known Problems Sister        Social History     Socioeconomic History    Marital status:      Spouse name: Not on file    Number of children: Not on file    Years of education: Not on file    Highest education level: Not on file   Tobacco Use    Smoking status: Never Smoker    Smokeless tobacco: Never Used   Substance and Sexual Activity    Alcohol use:  Yes     Alcohol/week: 2.0 standard drinks     Types: 2 Shots of liquor per week     Comment: occasional     Drug use: No    Sexual activity: Yes     Partners: Female     Birth control/protection: None   Other Topics Concern         Review of Systems:       General: Denies headache, lethargy, fever, weight loss  Ears/Nose/Throat: Denies ear discharge, drainage, nosebleeds, hoarse voice, dental problems  Cardiovascular: Denies chest pain, shortness of breath  Lungs: Denies chest pain, breathing problems, wheezing, pneumonia  Stomach: Denies stomach pain, heartburn, constipation, irritable bowel  Skin: Denies rash, sores, open wounds  Musculoskeletal: Stiffness, no pain  Genitourinary: Denies dysuria, hematuria, polyuria  Gastrointestinal: Denies constipation, obstipation, diarrhea  Neurological: Denies changes in sight, smell, hearing, taste, seizures. Denies loss of consciousness. Psychiatric: Denies depression, sleep pattern changes, anxiety, change in personality  Endocrine: Denies mood swings, heat or cold intolerance  Hematologic/Lymphatic: Denies anemia, purpura, petechia  Allergic/Immunologic: Denies swelling of throat, pain or swelling at lymph nodes      Physical Examination:    Visit Vitals  /82 (BP 1 Location: Left arm, BP Patient Position: Sitting)   Pulse 71   Temp 98.3 °F (36.8 °C) (Oral)   Ht 5' 11\" (1.803 m)   Wt 198 lb (89.8 kg)   SpO2 95%   BMI 27.62 kg/m²        General: AOX3, no apparent distress  Psychiatric: mood and affect appropriate  Lungs: breathing is symmetric and unlabored bilaterally  Heart: regular rate and rhythm  Abdomen: no guarding  Head: normocephalic, atraumatic  Skin: No significant abnormalities, good turgor  Sensation intact to light touch: L1-S1 dermatomes  Muscular exam: 5/5 strength in all major muscle groups unless noted in specialty exam.    Extremities:      Left upper extremity: Full active and passive range of motion without pain, deformity, no open wound, strength 5/5 in all major muscle groups. Right upper extremity: Full active and passive range of motion without pain, deformity, no open wound, strength 5/5 in all major muscle groups. Right lower extremity: Full active and passive range of motion without pain, deformity, no open wound, strength 5/5 in all major muscle groups. Left lower extremity:  Well healed anterior scar is noted. Patient ambulates with reciprocal gait and no limp. No deformity is noted. Range of motion of the knee is 0-105. Ligamentous testing of the knee indicates stability of the the MCL and LCL. Coronal plane stability throughout the range of motion is excellent. No joint line tenderness to palpation medially or laterally. Popliteal area is unremarkable. Negative for effusion. No patellar crepitus. Patella tracks centrally.   Strength testing is indicative of 5/5 strength at hip flexion, extension, knee flexion and extension, tibialis anterior, EHL, and FHL. Sensation is intact to light touch in the L1-S1 dermatomes with the exception of the infrapatellar branch of the saphenous. Capillary refill is less than 2 seconds distally. Diagnostics:    Pertinent Xrays:  Xrays are unavailable today      Assessment: Aftercare, status post left total knee arthroplasty    Plan:  Huseyin Shearer will continue physical therapy exercises until full range of motion and strength are obtained. We discussed the importance of continued vigilance to this end. We also discussed dental prophylaxis and safe activities and reasonable life choices regarding activity level. Patient stated their understanding. Deep venous thrombosis can be discontinued once activity level is adequate, and pain control for now will be as needed only, and will be patient directed exercises. I will speak with his rheumatologist about getting him back on meloxicam      will follow up in 6-8.  no x-rays on follow up. Mr. Kasi Guzman has a reminder for a \"due or due soon\" health maintenance. I have asked that he contact his primary care provider for follow-up on this health maintenance.

## 2019-10-29 DIAGNOSIS — J45.41 MODERATE PERSISTENT ASTHMA WITH ACUTE EXACERBATION: ICD-10-CM

## 2019-10-29 RX ORDER — FLUTICASONE PROPIONATE AND SALMETEROL 50; 500 UG/1; UG/1
POWDER RESPIRATORY (INHALATION)
Qty: 180 EACH | Refills: 4 | Status: SHIPPED | OUTPATIENT
Start: 2019-10-29 | End: 2020-05-29 | Stop reason: DRUGHIGH

## 2019-10-29 RX ORDER — ROSUVASTATIN CALCIUM 10 MG/1
TABLET, COATED ORAL
Qty: 90 TAB | Refills: 4 | Status: SHIPPED | OUTPATIENT
Start: 2019-10-29 | End: 2020-12-06

## 2019-11-04 ENCOUNTER — OFFICE VISIT (OUTPATIENT)
Dept: ORTHOPEDIC SURGERY | Age: 59
End: 2019-11-04

## 2019-11-04 VITALS
RESPIRATION RATE: 18 BRPM | DIASTOLIC BLOOD PRESSURE: 84 MMHG | WEIGHT: 204 LBS | SYSTOLIC BLOOD PRESSURE: 128 MMHG | HEART RATE: 73 BPM | TEMPERATURE: 98.3 F | OXYGEN SATURATION: 96 % | BODY MASS INDEX: 28.56 KG/M2 | HEIGHT: 71 IN

## 2019-11-04 DIAGNOSIS — Z47.1 AFTERCARE FOLLOWING LEFT KNEE JOINT REPLACEMENT SURGERY: Primary | ICD-10-CM

## 2019-11-04 DIAGNOSIS — Z96.652 AFTERCARE FOLLOWING LEFT KNEE JOINT REPLACEMENT SURGERY: Primary | ICD-10-CM

## 2019-11-04 RX ORDER — DICLOFENAC SODIUM 10 MG/G
GEL TOPICAL
Refills: 1 | COMMUNITY
Start: 2019-09-06

## 2019-11-04 RX ORDER — MELOXICAM 7.5 MG/1
TABLET ORAL
Refills: 0 | COMMUNITY
Start: 2019-09-06

## 2019-11-04 NOTE — PROGRESS NOTES
11/4/2019    Chief Complaint: Follow up for left knee replacement    HPI: Jenny Singer is a 63 y.o.  who is now approximately 7 months status post left total knee arthroplasty. The patient states that they are doing well. The preoperative pain is gone and the postoperative pain is gone. The pain is minimal in intensity. Regular exercises have helped with residual symptoms. No other complications.       Past Medical History:   Diagnosis Date    Allergic rhinitis     Asthma     allergy/URI related    Colon polyp 11/13/13    Diverticulosis     GERD (gastroesophageal reflux disease)     Hiatal hernia     History of vascular access device 11/21/2018    5 Fr double PICC 43 cm 2 cm out, long term abx, R basilic    Hypercholesterolemia     IGT (impaired glucose tolerance)     Kidney stone 2016    x2    Overweight (BMI 25.0-29.9) 11/12/2018    Pulmonary embolism (HCC)     Rheumatoid arthritis(714.0)     S/P PICC central line placement 11/09/2017    has been removed    UC (ulcerative colitis) ([facility] [address]     In remission       Past Surgical History:   Procedure Laterality Date    ABDOMEN SURGERY PROC UNLISTED Bilateral 2007    Hernia Repair    ABDOMEN SURGERY PROC UNLISTED Left 1971    Inquinal hernia    HX GI  2010 & 2016    COLONOSCOPY    HX KNEE ARTHROSCOPY Left 10/2017    HX KNEE REPLACEMENT Left 03/25/2019    revision Dr. Austin Nguyen    HX LUMBAR DISKECTOMY N/A 2012    herniated disk    HX OPEN REDUCTION INTERNAL FIXATION Left 07/2017    Tibia fx    HX TONSILLECTOMY      REMOVAL OF IMPLANT MATERIAL Left 11/2017    IND with plate removal of tibia       Current Outpatient Medications on File Prior to Visit   Medication Sig Dispense Refill    rosuvastatin (CRESTOR) 10 mg tablet TAKE 1 TABLET NIGHTLY 90 Tab 4    ADVAIR DISKUS 500-50 mcg/dose diskus inhaler USE 1 INHALATION TWICE A  Each 4    montelukast (SINGULAIR) 10 mg tablet TAKE 1 TABLET DAILY 90 Tab 1    mesalamine (LIALDA) 1.2 gram delayed release tablet Take 2.4 g by mouth.  esomeprazole (NEXIUM) 20 mg capsule Take  by mouth daily.  abatacept/maltose (ORENCIA, WITH MALTOSE, IV) by IntraVENous route.  budesonide (RHINOCORT AQUA) 32 mcg/actuation nasal spray 2 Sprays by Both Nostrils route daily. No current facility-administered medications on file prior to visit. No Known Allergies    Family History   Problem Relation Age of Onset    Diabetes Mother     Heart Attack Father         SEVERAL MIs    Arrhythmia Father         HAD PACEMAKER    Stroke Father     Other Father          OF MRSA    No Known Problems Sister        Social History     Socioeconomic History    Marital status:      Spouse name: Not on file    Number of children: Not on file    Years of education: Not on file    Highest education level: Not on file   Tobacco Use    Smoking status: Never Smoker    Smokeless tobacco: Never Used   Substance and Sexual Activity    Alcohol use: Yes     Alcohol/week: 2.0 standard drinks     Types: 2 Shots of liquor per week     Comment: occasional     Drug use: No    Sexual activity: Yes     Partners: Female     Birth control/protection: None   Other Topics Concern         Review of Systems:       General: Denies headache, lethargy, fever, weight loss  Ears/Nose/Throat: Denies ear discharge, drainage, nosebleeds, hoarse voice, dental problems  Cardiovascular: Denies chest pain, shortness of breath  Lungs: Denies chest pain, breathing problems, wheezing, pneumonia  Stomach: Denies stomach pain, heartburn, constipation, irritable bowel  Skin: Denies rash, sores, open wounds  Musculoskeletal: No complaints  Genitourinary: Denies dysuria, hematuria, polyuria  Gastrointestinal: Denies constipation, obstipation, diarrhea  Neurological: Denies changes in sight, smell, hearing, taste, seizures. Denies loss of consciousness.   Psychiatric: Denies depression, sleep pattern changes, anxiety, change in personality  Endocrine: Denies mood swings, heat or cold intolerance  Hematologic/Lymphatic: Denies anemia, purpura, petechia  Allergic/Immunologic: Denies swelling of throat, pain or swelling at lymph nodes      Physical Examination:    Visit Vitals  /84 (BP 1 Location: Right arm, BP Patient Position: Sitting)   Pulse 73   Temp 98.3 °F (36.8 °C) (Oral)   Resp 18   Ht 5' 11\" (1.803 m)   Wt 204 lb (92.5 kg)   SpO2 96%   BMI 28.45 kg/m²        General: AOX3, no apparent distress  Psychiatric: mood and affect appropriate  Lungs: breathing is symmetric and unlabored bilaterally  Heart: regular rate and rhythm  Abdomen: no guarding  Head: normocephalic, atraumatic  Skin: No significant abnormalities, good turgor  Sensation intact to light touch: L1-S1 dermatomes  Muscular exam: 5/5 strength in all major muscle groups unless noted in specialty exam.    Extremities:      Left upper extremity: Full active and passive range of motion without pain, deformity, no open wound, strength 5/5 in all major muscle groups. Right upper extremity: Full active and passive range of motion without pain, deformity, no open wound, strength 5/5 in all major muscle groups. Right lower extremity: Full active and passive range of motion without pain, deformity, no open wound, strength 5/5 in all major muscle groups. Left lower extremity:  Well healed anterior scar is noted. Patient ambulates with no assistive device and no limp. No deformity is noted. Range of motion of the knee is 0-110. Ligamentous testing of the knee indicates stability of the the MCL and LCL. Coronal plane stability throughout the range of motion is excellent. No joint line tenderness to palpation medially or laterally. Popliteal area is unremarkable. Negative for effusion. No patellar crepitus. Patella tracks centrally.   Strength testing is indicative of 5/5 strength at hip flexion, extension, knee flexion and extension, tibialis anterior, EHL, and FHL. Sensation is intact to light touch in the L1-S1 dermatomes with the exception of the infrapatellar branch of the saphenous. Capillary refill is less than 2 seconds distally. Diagnostics:    Pertinent Xrays:  Xrays are not taken today      Assessment: Aftercare, status post left total knee arthroplasty    Plan:  Lyndsey García will continue physical therapy exercises until full range of motion and strength are obtained. We discussed the importance of continued vigilance to this end. We also discussed dental prophylaxis and safe activities and reasonable life choices regarding activity level. Patient stated their understanding. Patient will be weightbearing as tolerated without restriction. Lyndsey Cost will follow up in 5 months. Left knee x-rays on follow up. Mr. Jacek Luke has a reminder for a \"due or due soon\" health maintenance. I have asked that he contact his primary care provider for follow-up on this health maintenance.

## 2019-11-04 NOTE — PROGRESS NOTES
Chief Complaint   Patient presents with    Surgical Follow-up     left knee replacemtn. 1. Have you been to the ER, urgent care clinic since your last visit? Hospitalized since your last visit? No    2. Have you seen or consulted any other health care providers outside of the [address] [place] [address] since your last visit? Include any pap smears or colon screening.  No

## 2019-11-18 ENCOUNTER — OFFICE VISIT (OUTPATIENT)
Dept: INTERNAL MEDICINE CLINIC | Age: 59
End: 2019-11-18

## 2019-11-18 VITALS
WEIGHT: 206 LBS | BODY MASS INDEX: 28.84 KG/M2 | HEART RATE: 70 BPM | RESPIRATION RATE: 12 BRPM | TEMPERATURE: 98.8 F | HEIGHT: 71 IN | DIASTOLIC BLOOD PRESSURE: 77 MMHG | SYSTOLIC BLOOD PRESSURE: 129 MMHG | OXYGEN SATURATION: 97 %

## 2019-11-18 DIAGNOSIS — J45.41 MODERATE PERSISTENT ASTHMA WITH ACUTE EXACERBATION: Primary | ICD-10-CM

## 2019-11-18 DIAGNOSIS — J20.9 BRONCHITIS WITH BRONCHOSPASM: ICD-10-CM

## 2019-11-18 DIAGNOSIS — J20.9 ACUTE BRONCHITIS, UNSPECIFIED ORGANISM: ICD-10-CM

## 2019-11-18 DIAGNOSIS — M06.9 RHEUMATOID ARTHRITIS, INVOLVING UNSPECIFIED SITE, UNSPECIFIED RHEUMATOID FACTOR PRESENCE: ICD-10-CM

## 2019-11-18 DIAGNOSIS — Z79.60 LONG-TERM USE OF IMMUNOSUPPRESSANT MEDICATION: ICD-10-CM

## 2019-11-18 DIAGNOSIS — E78.5 HYPERLIPIDEMIA, UNSPECIFIED HYPERLIPIDEMIA TYPE: ICD-10-CM

## 2019-11-18 RX ORDER — PREDNISONE 10 MG/1
TABLET ORAL
Qty: 21 TAB | Refills: 0 | Status: SHIPPED | OUTPATIENT
Start: 2019-11-18

## 2019-11-18 RX ORDER — AZITHROMYCIN 250 MG/1
TABLET, FILM COATED ORAL
Qty: 6 TAB | Refills: 0 | Status: SHIPPED | OUTPATIENT
Start: 2019-11-18 | End: 2019-11-23

## 2019-11-18 NOTE — PROGRESS NOTES
HPI:  Presents for acute care    Increased cough and congestion over weekend then fevers and chills  +productive of yellow sputum    Arthritis stable. Past medical, Social, and Family history reviewed    Prior to Admission medications    Medication Sig Start Date End Date Taking? Authorizing Provider   predniSONE (DELTASONE) 10 mg tablet Taper daily. 60mg x1, 50mg x 1, 40mg x 1, 30mg x 1, 20mg x 1, 10mg x 1 11/18/19  Yes Jess Perez MD   [facility]) 250 mg tablet Take 2 tablets today, then take 1 tablet daily 11/18/19 11/23/19 Yes Jess Perez MD   albuterol sulfate 90 mcg/actuation aepb Take 2 Puffs by inhalation every four (4) hours as needed for Cough. 11/18/19  Yes Jess Perez MD   meloxicam (MOBIC) 7.5 mg tablet TAKE 1 TABLET BY MOUTH EVERY DAY IN THE MORNING WITH FOOD FOR 3-5 DAYS FOR SERIOUS ARTHRITIC FLARES 9/6/19  Yes Provider, Historical   rosuvastatin (CRESTOR) 10 mg tablet TAKE 1 TABLET NIGHTLY 10/29/19  Yes Jess Perez MD   ADVAIR DISKUS 500-50 mcg/dose diskus inhaler USE 1 INHALATION TWICE A DAY 10/29/19  Yes Jess Perez MD   montelukast (SINGULAIR) 10 mg tablet TAKE 1 TABLET DAILY 6/4/19  Yes Jess Perez MD   mesalamine (LIALDA) 1.2 gram delayed release tablet Take 2.4 g by mouth. Yes Provider, Historical   esomeprazole (NEXIUM) 20 mg capsule Take  by mouth daily. Yes Provider, Historical   abatacept/maltose (ORENCIA, WITH MALTOSE, IV) by IntraVENous route. Yes Provider, Historical   budesonide (RHINOCORT AQUA) 32 mcg/actuation nasal spray 2 Sprays by Both Nostrils route daily. Yes Provider, Historical   diclofenac (VOLTAREN) 1 % gel APPLY FOUR GRAMS TO LEFT KNEE FOUR TIMES DAILY AS NEEDED 9/6/19   Provider, Historical          ROS  Complete ROS reviewed and negative or stable except as noted in HPI. Physical Exam   Constitutional: He is oriented to person, place, and time. He appears well-nourished. No distress.    HENT:   Head: Normocephalic and atraumatic. Mouth/Throat: Oropharynx is clear and moist. No oropharyngeal exudate. Eyes: Pupils are equal, round, and reactive to light. EOM are normal. No scleral icterus. Neck: Normal range of motion. Neck supple. No JVD present. No thyromegaly present. Cardiovascular: Normal rate, regular rhythm and normal heart sounds. Exam reveals no gallop and no friction rub. No murmur heard. Pulmonary/Chest: Effort normal. No respiratory distress. He has wheezes (on forced exp). He has rales (scattered). Bronchospastic cough   Abdominal: Soft. Bowel sounds are normal. He exhibits no distension. There is no tenderness. Genitourinary:    Prostate normal.     Musculoskeletal: Normal range of motion. General: No edema. Comments: Healed knee scar   Lymphadenopathy:     He has no cervical adenopathy. Neurological: He is alert and oriented to person, place, and time. He exhibits normal muscle tone. Coordination normal.   Skin: Skin is warm. No rash noted. Psychiatric: He has a normal mood and affect. Nursing note and vitals reviewed. Prior labs reviewed. Assessment/Plan:    ICD-10-CM ICD-9-CM    1. Moderate persistent asthma with acute exacerbation J45.41 493.92 albuterol sulfate 90 mcg/actuation aepb   2. Bronchitis with bronchospasm J20.9 490 predniSONE (DELTASONE) 10 mg tablet   3. Acute bronchitis, unspecified organism J20.9 466.0 azithromycin (ZITHROMAX) 250 mg tablet   4. Rheumatoid arthritis, involving unspecified site, unspecified rheumatoid factor presence ([facility]) M06.9 714.0    5. Long-term use of immunosuppressant medication Z79.899 V58.69    6. Hyperlipidemia, unspecified hyperlipidemia type E78.5 272.4      Follow-up and Dispositions    · Return in about 2 months (around 1/18/2020), or if symptoms worsen or fail to improve, for cholesterol, asthma.         results and schedule of future studies reviewed with patient  reviewed diet, exercise and weight cardiovascular risk and specific lipid/LDL goals reviewed  reviewed medications and side effects in detail   pred taper  azithro  mucinex   Albuterol

## 2019-11-18 NOTE — PROGRESS NOTES
Rm#[id number]  Chief Complaint   Patient presents with    Cough     x3 days. productive cough, chest congestion, burning sensation in chest.  yellow mucous. 1. Have you been to the ER, urgent care clinic since your last visit? Hospitalized since your last visit? No    2. Have you seen or consulted any other health care providers outside of the [address] [place] [address] since your last visit? Include any pap smears or colon screening. Yes Dr. Sophia Tavarez, rheum. ,      Health Maintenance Due   Topic Date Due    Shingrix Vaccine Age 49> (1 of 2) 08/15/2011    Pneumococcal 0-64 years (1 of 1 - PPSV23) 02/22/2018    COLONOSCOPY  12/20/2019     3 most recent PHQ Screens 11/18/2019   Little interest or pleasure in doing things Not at all   Feeling down, depressed, irritable, or hopeless Not at all   Total Score PHQ 2 0

## 2020-02-26 RX ORDER — MONTELUKAST SODIUM 10 MG/1
TABLET ORAL
Qty: 90 TAB | Refills: 4 | Status: SHIPPED | OUTPATIENT
Start: 2020-02-26 | End: 2021-06-13

## 2020-03-25 DIAGNOSIS — Z47.1 AFTERCARE FOLLOWING LEFT KNEE JOINT REPLACEMENT SURGERY: Primary | ICD-10-CM

## 2020-03-25 DIAGNOSIS — Z96.652 AFTERCARE FOLLOWING LEFT KNEE JOINT REPLACEMENT SURGERY: Primary | ICD-10-CM

## 2020-03-26 ENCOUNTER — HOSPITAL ENCOUNTER (OUTPATIENT)
Dept: GENERAL RADIOLOGY | Age: 60
Discharge: HOME | End: 2020-03-26
Payer: COMMERCIAL

## 2020-03-26 ENCOUNTER — OFFICE VISIT (OUTPATIENT)
Dept: ORTHOPEDIC SURGERY | Age: 60
End: 2020-03-26

## 2020-03-26 VITALS
WEIGHT: 210 LBS | HEIGHT: 71 IN | HEART RATE: 85 BPM | DIASTOLIC BLOOD PRESSURE: 86 MMHG | OXYGEN SATURATION: 97 % | SYSTOLIC BLOOD PRESSURE: 129 MMHG | BODY MASS INDEX: 29.4 KG/M2

## 2020-03-26 DIAGNOSIS — Z96.652 AFTERCARE FOLLOWING LEFT KNEE JOINT REPLACEMENT SURGERY: Primary | ICD-10-CM

## 2020-03-26 DIAGNOSIS — Z47.1 AFTERCARE FOLLOWING LEFT KNEE JOINT REPLACEMENT SURGERY: ICD-10-CM

## 2020-03-26 DIAGNOSIS — Z96.652 AFTERCARE FOLLOWING LEFT KNEE JOINT REPLACEMENT SURGERY: ICD-10-CM

## 2020-03-26 DIAGNOSIS — Z47.1 AFTERCARE FOLLOWING LEFT KNEE JOINT REPLACEMENT SURGERY: Primary | ICD-10-CM

## 2020-03-26 PROCEDURE — 73562 X-RAY EXAM OF KNEE 3: CPT

## 2020-03-26 NOTE — PROGRESS NOTES
Identified pt with two pt identifiers (name and ). Reviewed chart in preparation for visit and have obtained necessary documentation. Rossi Galvan. is a 63 y.o. male  Chief Complaint   Patient presents with    Follow-up     LT TKA 1 yr     Visit Vitals  /86 (BP 1 Location: Right arm, BP Patient Position: Sitting)   Pulse 85   Ht 5' 11\" (1.803 m)   Wt 210 lb (95.3 kg)   SpO2 97%   BMI 29.29 kg/m²     1. Have you been to the ER, urgent care clinic since your last visit? Hospitalized since your last visit? No    2. Have you seen or consulted any other health care providers outside of the [address] [place] [address] since your last visit? Include any pap smears or colon screening.  No

## 2020-03-26 NOTE — PROGRESS NOTES
3/26/2020      CC: left knee replacement    HPI:      This is a 63y.o. year old male who presents for a follow up visit. The patient was last seen and diagnosed with left knee revision replacement. The patient's treatments since the most recent visit have comprised of normal exercises. The patient has had excellent relief of the knee pain and no other issues.       PMH:  Past Medical History:   Diagnosis Date    Allergic rhinitis     Asthma     allergy/URI related    Colon polyp 11/13/13    Diverticulosis     GERD (gastroesophageal reflux disease)     Hiatal hernia     History of vascular access device 11/21/2018    5 Fr double PICC 43 cm 2 cm out, long term abx, R basilic    Hypercholesterolemia     IGT (impaired glucose tolerance)     Kidney stone 2016    x2    Overweight (BMI 25.0-29.9) 11/12/2018    Pulmonary embolism (HCC)     Rheumatoid arthritis(714.0)     S/P PICC central line placement 11/09/2017    has been removed    UC (ulcerative colitis) ([facility] [address]     In remission       PSxHx:  Past Surgical History:   Procedure Laterality Date    ABDOMEN SURGERY PROC UNLISTED Bilateral 2007    Hernia Repair    ABDOMEN SURGERY PROC UNLISTED Left 1971    Inquinal hernia    HX GI  2010 & 2016    COLONOSCOPY    HX KNEE ARTHROSCOPY Left 10/2017    HX KNEE REPLACEMENT Left 03/25/2019    revision Dr. Juan R Doherty HX LUMBAR DISKECTOMY N/A 2012    herniated disk    HX OPEN REDUCTION INTERNAL FIXATION Left 07/2017    Tibia fx    HX TONSILLECTOMY      [place] REMOVAL OF IMPLANT MATERIAL Left 11/2017    IND with plate removal of tibia       Meds:    Current Outpatient Medications:     montelukast (SINGULAIR) 10 mg tablet, TAKE 1 TABLET DAILY, Disp: 90 Tab, Rfl: 4    meloxicam (MOBIC) 7.5 mg tablet, TAKE 1 TABLET BY MOUTH EVERY DAY IN THE MORNING WITH FOOD FOR 3-5 DAYS FOR SERIOUS ARTHRITIC FLARES, Disp: , Rfl: 0    rosuvastatin (CRESTOR) 10 mg tablet, TAKE 1 TABLET NIGHTLY, Disp: 90 Tab, Rfl: 4    ADVAIR DISKUS 500-50 mcg/dose diskus inhaler, USE 1 INHALATION TWICE A DAY, Disp: 180 Each, Rfl: 4    mesalamine (LIALDA) 1.2 gram delayed release tablet, Take 2.4 g by mouth., Disp: , Rfl:     esomeprazole (NEXIUM) 20 mg capsule, Take  by mouth daily. , Disp: , Rfl:     abatacept/maltose (ORENCIA, WITH MALTOSE, IV), by IntraVENous route., Disp: , Rfl:     budesonide (RHINOCORT AQUA) 32 mcg/actuation nasal spray, 2 Sprays by Both Nostrils route daily. , Disp: , Rfl:     predniSONE (DELTASONE) 10 mg tablet, Taper daily. 60mg x1, 50mg x 1, 40mg x 1, 30mg x 1, 20mg x 1, 10mg x 1, Disp: 21 Tab, Rfl: 0    albuterol sulfate 90 mcg/actuation aepb, Take 2 Puffs by inhalation every four (4) hours as needed for Cough. , Disp: 1 Inhaler, Rfl: 2    diclofenac (VOLTAREN) 1 % gel, APPLY FOUR GRAMS TO LEFT KNEE FOUR TIMES DAILY AS NEEDED, Disp: , Rfl: 1    All:  No Known Allergies    Social Hx:  Social History     Socioeconomic History    Marital status:      Spouse name: Not on file    Number of children: Not on file    Years of education: Not on file    Highest education level: Not on file   Tobacco Use    Smoking status: Never Smoker    Smokeless tobacco: Never Used   Substance and Sexual Activity    Alcohol use:  Yes     Alcohol/week: 2.0 standard drinks     Types: 2 Shots of liquor per week     Comment: occasional     Drug use: No    Sexual activity: Yes     Partners: Female     Birth control/protection: None   Other Topics Concern       Family Hx:  Family History   Problem Relation Age of Onset    Diabetes Mother     Heart Attack Father         SEVERAL MIs    Arrhythmia Father         HAD PACEMAKER    Stroke Father     Other Father          OF MRSA    No Known Problems Sister          Review of Systems:       General: Denies headache, lethargy, fever, weight loss  Ears/Nose/Throat: Denies ear discharge, drainage, nosebleeds, hoarse voice, dental problems  Cardiovascular: Denies chest pain, shortness of breath  Lungs: Denies chest pain, breathing problems, wheezing, pneumonia  Stomach: Denies stomach pain, heartburn, constipation, irritable bowel  Skin: Denies rash, sores, open wounds  Musculoskeletal: no issues  Genitourinary: Denies dysuria, hematuria, polyuria  Gastrointestinal: Denies constipation, obstipation, diarrhea  Neurological: Denies changes in sight, smell, hearing, taste, seizures. Denies loss of consciousness. Psychiatric: Denies depression, sleep pattern changes, anxiety, change in personality  Endocrine: Denies mood swings, heat or cold intolerance  Hematologic/Lymphatic: Denies anemia, purpura, petechia  Allergic/Immunologic: Denies swelling of throat, pain or swelling at lymph nodes      Physical Examination:    Visit Vitals  /86 (BP 1 Location: Right arm, BP Patient Position: Sitting)   Pulse 85   Ht 5' 11\" (1.803 m)   Wt 210 lb (95.3 kg)   SpO2 97%   BMI 29.29 kg/m²        General: AOX3, no apparent distress  Psychiatric: mood and affect appropriate  Lungs: breathing is symmetric and unlabored bilaterally  Heart: regular rate and rhythm  Abdomen: no guarding  Head: normocephalic, atraumatic  Skin: No significant abnormalities, good turgor  Sensation intact to light touch: C5-T1 dermatomes  Muscular exam: 5/5 strength in all major muscle groups unless noted in specialty exam.    Extremities      Right upper extremity: no exam  Left upper extremity:  No exam  Right lower extremity: No gross deformity. No restriction to range of motion of the hip, knee, ankle. Muscle bulk is appropriate without wasting. Sensation is intact to light touch in the L1-S1 dermatomes. Capillary refill is less than 2 seconds in the fingers. Strength testing is 5/5 at the major muscle groups of the hip, knee, ankle. Left lower extremity:  Well healed surgical scar. ROM 0-125. Excellent coronal plane stability. Strength 5/5 knee extension and flexion.   Sensation intact to light touch in the leg.      Diagnostics:    Pertinent Diagnostics: Xrays of the left knee indicate total knee arthroplasty in excellent position, otherwise,  indicate no fractures, osseus lesions, abnormalities, cartilage space is well maintained. Overall alignment is within normal limits, no effusion or other soft tissue abnormality. Assessment: left knee revision arthroplasty  Plan:    Weight bearing to tolerance  We discussed dental prophylaxis  Continued normal exercises  Plan for follow up in 2 years with xray of the left knee. Mr. Say Wan has a reminder for a \"due or due soon\" health maintenance. I have asked that he contact his primary care provider for follow-up on this health maintenance.

## 2020-05-29 ENCOUNTER — VIRTUAL VISIT (OUTPATIENT)
Dept: INTERNAL MEDICINE CLINIC | Age: 60
End: 2020-05-29

## 2020-05-29 VITALS — WEIGHT: 205 LBS | BODY MASS INDEX: 28.59 KG/M2

## 2020-05-29 DIAGNOSIS — Z79.60 LONG-TERM USE OF IMMUNOSUPPRESSANT MEDICATION: ICD-10-CM

## 2020-05-29 DIAGNOSIS — J45.41 MODERATE PERSISTENT ASTHMA WITH ACUTE EXACERBATION: ICD-10-CM

## 2020-05-29 DIAGNOSIS — R49.0 CHRONIC HOARSENESS: Primary | ICD-10-CM

## 2020-05-29 DIAGNOSIS — K51.90 ULCERATIVE COLITIS WITHOUT COMPLICATIONS, UNSPECIFIED LOCATION (HCC): ICD-10-CM

## 2020-05-29 DIAGNOSIS — K21.9 GASTROESOPHAGEAL REFLUX DISEASE, ESOPHAGITIS PRESENCE NOT SPECIFIED: ICD-10-CM

## 2020-05-29 DIAGNOSIS — R73.02 IGT (IMPAIRED GLUCOSE TOLERANCE): ICD-10-CM

## 2020-05-29 DIAGNOSIS — M06.9 RHEUMATOID ARTHRITIS, INVOLVING UNSPECIFIED SITE, UNSPECIFIED RHEUMATOID FACTOR PRESENCE: ICD-10-CM

## 2020-05-29 DIAGNOSIS — E78.2 MIXED HYPERLIPIDEMIA: ICD-10-CM

## 2020-05-29 RX ORDER — ESOMEPRAZOLE MAGNESIUM 40 MG/1
40 CAPSULE, DELAYED RELEASE ORAL DAILY
Qty: 90 CAP | Refills: 1 | Status: SHIPPED | OUTPATIENT
Start: 2020-05-29 | End: 2020-12-06

## 2020-05-29 RX ORDER — FLUTICASONE PROPIONATE AND SALMETEROL 100; 50 UG/1; UG/1
1 POWDER RESPIRATORY (INHALATION) EVERY 12 HOURS
Qty: 3 INHALER | Refills: 3 | Status: SHIPPED | OUTPATIENT
Start: 2020-05-29 | End: 2021-06-13

## 2020-05-29 NOTE — PROGRESS NOTES
Annalee Walsh is a 63 y.o. male who was seen by synchronous (real-time) audio-video technology on 5/29/2020. Consent: Annalee Walsh, who was seen by synchronous (real-time) audio-video technology, and/or his healthcare decision maker, is aware that this patient-initiated, Telehealth encounter on 5/29/2020 is a billable service, with coverage as determined by his insurance carrier. He is aware that he may receive a bill and has provided verbal consent to proceed: Yes. I was in the office while conducting this encounter. Subjective:   Annalee Walsh was seen for Hoarse (loss of voice. on going issue x1 year.  )      Notes:  Pt reports hoarseness x 1 year  Off and on  At times has had a complete loss of voice    Cannot sing in choir or speak in public     No sore throat   No runny nose. +eye itching and occasional sneeze - but mild symptoms at most    The hoarseness does not seem seasonal, though. No neck swelling  No injury  No strain or loud talking or overuse reported. +GERD hx taking 20 mg OTC nexium   Symptoms are stable aside from hoarseness. Taking advair daily at 500/50 dose  resp status is doing well. No recent need for albuterol    Past medical, Social, and Family history reviewed  Medications reviewed and updated. Nursing screenings reviewed by provider at visit. Past medical, Social, and Family history reviewed  Medications reviewed and updated. No Known Allergies    Prior to Admission medications    Medication Sig Start Date End Date Taking? Authorizing Provider   montelukast (SINGULAIR) 10 mg tablet TAKE 1 TABLET DAILY 2/26/20  Yes Vazquez Barcenas NP   albuterol sulfate 90 mcg/actuation aepb Take 2 Puffs by inhalation every four (4) hours as needed for Cough.  11/18/19  Yes Ruslan Lees MD   meloxicam (MOBIC) 7.5 mg tablet TAKE 1 TABLET BY MOUTH EVERY DAY IN THE MORNING WITH FOOD FOR 3-5 DAYS FOR SERIOUS ARTHRITIC FLARES 9/6/19  Yes Provider, Historical   rosuvastatin (CRESTOR) 10 mg tablet TAKE 1 TABLET NIGHTLY 10/29/19  Yes Tram Wang MD   ADVAIR DISKUS 500-50 mcg/dose diskus inhaler USE 1 INHALATION TWICE A DAY 10/29/19  Yes Tram Wang MD   mesalamine (LIALDA) 1.2 gram delayed release tablet Take 2.4 g by mouth. Yes Provider, Historical   esomeprazole (NEXIUM) 20 mg capsule Take  by mouth daily. Yes Provider, Historical   abatacept/maltose (ORENCIA, WITH MALTOSE, IV) by IntraVENous route. Yes Provider, Historical   budesonide (RHINOCORT AQUA) 32 mcg/actuation nasal spray 2 Sprays by Both Nostrils route daily. Yes Provider, Historical   predniSONE (DELTASONE) 10 mg tablet Taper daily. 60mg x1, 50mg x 1, 40mg x 1, 30mg x 1, 20mg x 1, 10mg x 1 11/18/19   Tram Wang MD   diclofenac (VOLTAREN) 1 % gel APPLY FOUR GRAMS TO LEFT KNEE FOUR TIMES DAILY AS NEEDED 9/6/19   Provider, Historical         ROS    A complete ROS was performed and negative except as noted in HPI     PHYSICAL EXAMINATION:    Vital Signs:  Weight 205 lb (93 kg).       Constitutional: [x] Appears well-developed and well-nourished [x] No apparent distress      Mental status: [x] Alert and awake  [x] Oriented [x] Able to follow commands       Eyes:   EOM    [x]  Normal      Sclera  [x]  Normal              Discharge [x]  None visible       HENT: [x] Normocephalic, atraumatic    [x] Mouth/Throat: Mucous membranes are moist    External Ears [x] Normal      Neck: [x] No visualized mass     Pulmonary/Chest: [x] Respiratory effort normal   [x] No visualized signs of difficulty breathing or respiratory distress    Musculoskeletal:  [x] Normal range of motion of neck    Neurological:        [x] No Facial Asymmetry (Cranial nerve 7 motor function) (limited exam due to video visit)          [x] No gaze palsy     Skin:        [x] No significant exanthematous lesions or discoloration noted on facial skin             Psychiatric:       [x] Normal Affect Other pertinent observable physical exam findings:  None. We discussed the expected course, resolution and complications of the diagnosis(es) in detail. Medication risks, benefits, costs, interactions, and alternatives were discussed as indicated. I advised him to contact the office if his condition worsens, changes or fails to improve as anticipated. He expressed understanding with the diagnosis(es) and plan. Niya Joyner is a 63 y.o. male who was evaluated by a video visit encounter for concerns as above. Patient identification was verified prior to start of the visit. A caregiver was present when appropriate. Due to this being a TeleHealth encounter (During [facility] public health emergency), evaluation of the following organ systems was limited: Vitals/Constitutional/EENT/Resp/CV/GI//MS/Neuro/Skin/Heme-Lymph-Imm. Pursuant to the emergency declaration under the [address] [place] [address], [facility] waiver authority and the [employer] Act, this Virtual  Visit was conducted, with patient's (and/or legal guardian's) consent, to reduce the patient's risk of exposure to COVID-19 and provide necessary medical care. Services were provided through a video synchronous discussion virtually to substitute for in-person clinic visit. Patient and provider were located at their individual homes. 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 Chronic hoarseness R49.0 784.42 REFERRAL TO ENT-OTOLARYNGOLOGY      CBC WITH AUTOMATED DIFF   2. Moderate persistent asthma with acute exacerbation J45.41 493.92 CBC WITH AUTOMATED DIFF      IMMUNOGLOBULIN E, QT      fluticasone propion-salmeteroL (ADVAIR/WIXELA) 100-50 mcg/dose diskus inhaler   3.  Mixed hyperlipidemia E78.2 272.2 CBC WITH AUTOMATED DIFF      LIPID PANEL METABOLIC PANEL, COMPREHENSIVE      CK   4. Gastroesophageal reflux disease, esophagitis presence not specified K21.9 530.81 esomeprazole (NEXIUM) 40 mg capsule   5. IGT (impaired glucose tolerance) R73.02 790.22 HEMOGLOBIN A1C WITH EAG   6. Rheumatoid arthritis, involving unspecified site, unspecified rheumatoid factor presence (HCC) M06.9 714.0    7. Long-term use of immunosuppressant medication Z79.899 V58.69    8. Ulcerative colitis without complications, unspecified location [facility]) K51.90 556.9      Follow-up and Dispositions    · Return in about 6 months (around 11/29/2020), or if symptoms worsen or fail to improve, for asthma, cholesterol. results and schedule of future studies reviewed with patient  reviewed diet, exercise and weight  cardiovascular risk and specific lipid/LDL goals reviewed  reviewed medications and side effects in detail    increase PPI to 40 mg daily  Advair - reduce dose to 100/50 daily  Consider IgE and eos check for asthma alternatives - Xolair, Nucala, Dupixant. - if need to d/c ICS/LABA  Ref ENT for full exam and further rec's    AVS:  [x]  Sent to patient as [employer] message after visit. []  Mailed to patient after visit. []  Not sent to patient after visit.

## 2020-05-29 NOTE — PROGRESS NOTES
[phone]    Chief Complaint   Patient presents with    Hoarse     loss of voice. on going issue x1 year. 1. Have you been to the ER, urgent care clinic since your last visit? Hospitalized since your last visit? No    2. Have you seen or consulted any other health care providers outside of the [address] [place] [address] since your last visit? Include any pap smears or colon screening. Yes rheumatology normal follow up, Dr. Gato Guzman Maintenance Due   Topic Date Due    Shingrix Vaccine Age 50> (1 of 2) 08/15/2011    Pneumococcal 0-64 years (1 of 1 - PPSV23) 02/22/2018    Colonoscopy  12/20/2019     3 most recent PHQ Screens 5/29/2020   Little interest or pleasure in doing things Not at all   Feeling down, depressed, irritable, or hopeless Not at all   Total Score PHQ 2 0     Recent Travel Screening and Travel History documentation     Travel Screening       Question Response     In the last month, have you been in contact with someone who was confirmed or suspected to have Coronavirus / COVID-19? No / Unsure     Do you have any of the following symptoms? None of these     Have you traveled internationally in the last month?  No      Travel History   Travel since 04/29/20     No documented travel since 04/29/20

## 2020-06-14 LAB
ALBUMIN SERPL-MCNC: 4.5 G/DL (ref 3.8–4.9)
ALBUMIN/GLOB SERPL: 1.7 {RATIO} (ref 1.2–2.2)
ALP SERPL-CCNC: 100 IU/L (ref 39–117)
ALT SERPL-CCNC: 34 IU/L (ref 0–44)
AST SERPL-CCNC: 26 IU/L (ref 0–40)
BASOPHILS # BLD AUTO: 0.1 X10E3/UL (ref 0–0.2)
BASOPHILS NFR BLD AUTO: 1 %
BILIRUB SERPL-MCNC: 0.5 MG/DL (ref 0–1.2)
BUN SERPL-MCNC: 16 MG/DL (ref 6–24)
BUN/CREAT SERPL: 14 (ref 9–20)
CALCIUM SERPL-MCNC: 9.7 MG/DL (ref 8.7–10.2)
CHLORIDE SERPL-SCNC: 101 MMOL/L (ref 96–106)
CHOLEST SERPL-MCNC: 169 MG/DL (ref 100–199)
CK SERPL-CCNC: 249 U/L (ref 41–331)
CO2 SERPL-SCNC: 25 MMOL/L (ref 20–29)
CREAT SERPL-MCNC: 1.11 MG/DL (ref 0.76–1.27)
EOSINOPHIL # BLD AUTO: 0.1 X10E3/UL (ref 0–0.4)
EOSINOPHIL NFR BLD AUTO: 1 %
ERYTHROCYTE [DISTWIDTH] IN BLOOD BY AUTOMATED COUNT: 12.5 % (ref 11.6–15.4)
EST. AVERAGE GLUCOSE BLD GHB EST-MCNC: 126 MG/DL
GLOBULIN SER CALC-MCNC: 2.7 G/DL (ref 1.5–4.5)
GLUCOSE SERPL-MCNC: 99 MG/DL (ref 65–99)
HBA1C MFR BLD: 6 % (ref 4.8–5.6)
HCT VFR BLD AUTO: 45.3 % (ref 37.5–51)
HDLC SERPL-MCNC: 39 MG/DL
HGB BLD-MCNC: 15.1 G/DL (ref 13–17.7)
IGE SERPL-ACNC: 23 IU/ML (ref 6–495)
IMM GRANULOCYTES # BLD AUTO: 0 X10E3/UL (ref 0–0.1)
IMM GRANULOCYTES NFR BLD AUTO: 0 %
LDLC SERPL CALC-MCNC: 97 MG/DL (ref 0–99)
LYMPHOCYTES # BLD AUTO: 1.7 X10E3/UL (ref 0.7–3.1)
LYMPHOCYTES NFR BLD AUTO: 21 %
MCH RBC QN AUTO: 31.9 PG (ref 26.6–33)
MCHC RBC AUTO-ENTMCNC: 33.3 G/DL (ref 31.5–35.7)
MCV RBC AUTO: 96 FL (ref 79–97)
MONOCYTES # BLD AUTO: 0.8 X10E3/UL (ref 0.1–0.9)
MONOCYTES NFR BLD AUTO: 10 %
NEUTROPHILS # BLD AUTO: 5.5 X10E3/UL (ref 1.4–7)
NEUTROPHILS NFR BLD AUTO: 67 %
PLATELET # BLD AUTO: 310 X10E3/UL (ref 150–450)
POTASSIUM SERPL-SCNC: 4.5 MMOL/L (ref 3.5–5.2)
PROT SERPL-MCNC: 7.2 G/DL (ref 6–8.5)
RBC # BLD AUTO: 4.74 X10E6/UL (ref 4.14–5.8)
SODIUM SERPL-SCNC: 139 MMOL/L (ref 134–144)
TRIGL SERPL-MCNC: 166 MG/DL (ref 0–149)
VLDLC SERPL CALC-MCNC: 33 MG/DL (ref 5–40)
WBC # BLD AUTO: 8.3 X10E3/UL (ref 3.4–10.8)

## 2020-06-14 NOTE — PROGRESS NOTES
Labs are either normal or stable and at goal.  IgE is normal so you would not benefit from Xolair for asthma  Eos are normal so you would not benefit from [address] for asthma  Continue your current medications

## 2020-12-05 DIAGNOSIS — K21.9 GASTROESOPHAGEAL REFLUX DISEASE: ICD-10-CM

## 2020-12-05 DIAGNOSIS — K21.9 GASTROESOPHAGEAL REFLUX DISEASE, UNSPECIFIED WHETHER ESOPHAGITIS PRESENT: Primary | ICD-10-CM

## 2020-12-05 DIAGNOSIS — E78.2 MIXED HYPERLIPIDEMIA: ICD-10-CM

## 2020-12-06 RX ORDER — ROSUVASTATIN CALCIUM 10 MG/1
TABLET, COATED ORAL
Qty: 90 TAB | Refills: 3 | Status: SHIPPED | OUTPATIENT
Start: 2020-12-06 | End: 2022-04-17

## 2020-12-06 RX ORDER — ESOMEPRAZOLE MAGNESIUM 40 MG/1
CAPSULE, DELAYED RELEASE ORAL
Qty: 90 CAP | Refills: 3 | Status: SHIPPED | OUTPATIENT
Start: 2020-12-06 | End: 2022-01-16

## 2021-02-15 ENCOUNTER — OFFICE VISIT (OUTPATIENT)
Dept: INTERNAL MEDICINE CLINIC | Age: 61
End: 2021-02-15
Payer: COMMERCIAL

## 2021-02-15 VITALS
TEMPERATURE: 97 F | WEIGHT: 215 LBS | HEART RATE: 71 BPM | RESPIRATION RATE: 18 BRPM | OXYGEN SATURATION: 98 % | BODY MASS INDEX: 29.99 KG/M2 | DIASTOLIC BLOOD PRESSURE: 84 MMHG | SYSTOLIC BLOOD PRESSURE: 137 MMHG

## 2021-02-15 DIAGNOSIS — Z00.00 ROUTINE MEDICAL EXAM: Primary | ICD-10-CM

## 2021-02-15 DIAGNOSIS — K51.90 ULCERATIVE COLITIS WITHOUT COMPLICATIONS, UNSPECIFIED LOCATION (HCC): ICD-10-CM

## 2021-02-15 DIAGNOSIS — J45.41 MODERATE PERSISTENT ASTHMA WITH ACUTE EXACERBATION: ICD-10-CM

## 2021-02-15 DIAGNOSIS — R73.02 IGT (IMPAIRED GLUCOSE TOLERANCE): ICD-10-CM

## 2021-02-15 DIAGNOSIS — Z79.60 LONG-TERM USE OF IMMUNOSUPPRESSANT MEDICATION: ICD-10-CM

## 2021-02-15 DIAGNOSIS — E78.2 MIXED HYPERLIPIDEMIA: ICD-10-CM

## 2021-02-15 DIAGNOSIS — K21.9 GASTROESOPHAGEAL REFLUX DISEASE, UNSPECIFIED WHETHER ESOPHAGITIS PRESENT: ICD-10-CM

## 2021-02-15 PROCEDURE — 99396 PREV VISIT EST AGE 40-64: CPT | Performed by: INTERNAL MEDICINE

## 2021-02-15 PROCEDURE — 99214 OFFICE O/P EST MOD 30 MIN: CPT | Performed by: INTERNAL MEDICINE

## 2021-02-15 NOTE — PROGRESS NOTES
HPI:  established patient  Presents for f/u asthma, lipids, GERD, etc    Seeing rheum - on orencia    Has gotten #1 Moderna Covid vaccine    Seeing GI for early colonoscopy    Asthma stable on once daily advair  Did well this winter so far  No resp illness or significant sx        Past medical, Social, and Family history reviewed    Prior to Admission medications    Medication Sig Start Date End Date Taking? Authorizing Provider   esomeprazole (NEXIUM) 40 mg capsule TAKE 1 CAPSULE DAILY 12/6/20  Yes Jyothi Godinez MD   rosuvastatin (CRESTOR) 10 mg tablet TAKE 1 TABLET NIGHTLY 12/6/20  Yes Jyothi Godinez MD   fluticasone propion-salmeteroL (ADVAIR/WIXELA) 100-50 mcg/dose diskus inhaler Take 1 Puff by inhalation every twelve (12) hours. 5/29/20  Yes Jyothi Godinez MD   montelukast (SINGULAIR) 10 mg tablet TAKE 1 TABLET DAILY 2/26/20  Yes Tanika Luna, YOHAN   meloxicam (MOBIC) 7.5 mg tablet TAKE 1 TABLET BY MOUTH EVERY DAY IN THE MORNING WITH FOOD FOR 3-5 DAYS FOR SERIOUS ARTHRITIC FLARES 9/6/19  Yes Provider, Historical   mesalamine (LIALDA) 1.2 gram delayed release tablet Take 2.4 g by mouth. Yes Provider, Historical   predniSONE (DELTASONE) 10 mg tablet Taper daily. 60mg x1, 50mg x 1, 40mg x 1, 30mg x 1, 20mg x 1, 10mg x 1 11/18/19   Jyothi Godinez MD   albuterol sulfate 90 mcg/actuation aepb Take 2 Puffs by inhalation every four (4) hours as needed for Cough. 11/18/19   Jyothi Godinez MD   diclofenac (VOLTAREN) 1 % gel APPLY FOUR GRAMS TO LEFT KNEE FOUR TIMES DAILY AS NEEDED 9/6/19   Provider, Historical   abatacept/maltose (ORENCIA, WITH MALTOSE, IV) by IntraVENous route. Provider, Historical   budesonide (RHINOCORT AQUA) 32 mcg/actuation nasal spray 2 Sprays by Both Nostrils route daily. Provider, Historical          ROS  Complete ROS reviewed and negative or stable except as noted in HPI. Physical Exam  Vitals signs and nursing note reviewed.    Constitutional:       General: He is not in acute distress. HENT:      Head: Normocephalic and atraumatic. Mouth/Throat:      Pharynx: No oropharyngeal exudate. Eyes:      General: No scleral icterus. Pupils: Pupils are equal, round, and reactive to light. Neck:      Musculoskeletal: Normal range of motion and neck supple. Thyroid: No thyromegaly. Vascular: No JVD. Cardiovascular:      Rate and Rhythm: Normal rate and regular rhythm. Heart sounds: Normal heart sounds. No murmur. No friction rub. No gallop. Pulmonary:      Effort: Pulmonary effort is normal. No respiratory distress. Breath sounds: Normal breath sounds. No wheezing or rales. Abdominal:      General: Bowel sounds are normal. There is no distension. Palpations: Abdomen is soft. Tenderness: There is no abdominal tenderness. Genitourinary:     Prostate: Normal.      Comments: Minimal subtle right inguinal laxity but no hernia. Musculoskeletal: Normal range of motion. Lymphadenopathy:      Cervical: No cervical adenopathy. Skin:     General: Skin is warm. Findings: No rash. Neurological:      Mental Status: He is alert and oriented to person, place, and time. Motor: No abnormal muscle tone. Coordination: Coordination normal.           Prior labs reviewed. Assessment/Plan:    ICD-10-CM ICD-9-CM    1. Moderate persistent asthma with acute exacerbation  J45.41 493.92    2. Routine medical exam  Z00.00 V70.0 CBC WITH AUTOMATED DIFF      METABOLIC PANEL, COMPREHENSIVE      LIPID PANEL      PSA SCREENING (SCREENING)   3. Gastroesophageal reflux disease, unspecified whether esophagitis present  K21.9 530.81    4. Mixed hyperlipidemia  E78.2 178.8 CK      METABOLIC PANEL, COMPREHENSIVE      LIPID PANEL   5. IGT (impaired glucose tolerance)  R73.02 790.22 HEMOGLOBIN A1C WITH EAG   6. Long-term use of immunosuppressant medication  Z79.899 V58.69    7.  Ulcerative colitis without complications, unspecified location ([facility] K51.90 556.9      Follow-up and Dispositions    · Return in about 6 months (around 8/15/2021), or if symptoms worsen or fail to improve. results and schedule of future studies reviewed with patient  reviewed diet, exercise and weight   cardiovascular risk and specific lipid/LDL goals reviewed  reviewed medications and side effects in detail    Pneumovax 23 as nursing visit when pt clarifies ideal timing with rheumatologist.  Continue current medications     An electronic signature was used to authenticate this note.   -- Dyan Bass MD

## 2021-02-15 NOTE — PROGRESS NOTES
Subjective:   Abhay Keene is a 62 y.o. male presenting for his annual checkup. ROS:  Feeling well. No dyspnea or chest pain on exertion. No abdominal pain, change in bowel habits, black or bloody stools. No urinary tract or prostatic symptoms. No neurological complaints. Specific concerns today:     See other. Past medical, Social, and Family history reviewed  Medications reviewed and updated. Objective:     Visit Vitals  /84   Pulse 71   Temp 97 °F (36.1 °C) (Temporal)   Resp 18   Wt 215 lb (97.5 kg)   SpO2 98%   BMI 29.99 kg/m²     The patient appears well, alert, oriented x 3, in no distress. ENT normal.  Neck supple. No adenopathy or thyromegaly. SHOAIB. Lungs are clear, good air entry, no wheezes, rhonchi or rales. S1 and S2 normal, no murmurs, regular rate and rhythm. Abdomen is soft without tenderness, guarding, mass or organomegaly.  exam: deferred. .  Extremities show no edema, normal peripheral pulses. Neurological is normal without focal findings. Prior labs reviewed. Assessment/Plan:   healthy adult male  follow low fat diet, routine labs ordered, call if any problems. ICD-10-CM ICD-9-CM    1. Routine medical exam  Z00.00 V70.0 CBC WITH AUTOMATED DIFF      METABOLIC PANEL, COMPREHENSIVE      LIPID PANEL      PSA SCREENING (SCREENING)   2. Gastroesophageal reflux disease, unspecified whether esophagitis present  K21.9 530.81    3. Mixed hyperlipidemia  E78.2 649.3 CK      METABOLIC PANEL, COMPREHENSIVE      LIPID PANEL   4. IGT (impaired glucose tolerance)  R73.02 790.22 HEMOGLOBIN A1C WITH EAG   5. Moderate persistent asthma with acute exacerbation  J45.41 493.92    6. Long-term use of immunosuppressant medication  Z79.899 V58.69    7. Ulcerative colitis without complications, unspecified location ([facility]  K51.90 556.9      Follow-up and Dispositions    · Return in about 6 months (around 8/15/2021), or if symptoms worsen or fail to improve.        results and schedule of future studies reviewed with patient  reviewed diet, exercise and weight   cardiovascular risk and specific lipid/LDL goals reviewed  reviewed medications and side effects in detail. Continue other care.

## 2021-02-15 NOTE — PROGRESS NOTES
Chief Complaint   Patient presents with    Annual Exam     Visit Vitals  /84   Pulse 71   Temp 97 °F (36.1 °C) (Temporal)   Resp 18   Wt 215 lb (97.5 kg)   SpO2 98%   BMI 29.99 kg/m²     1. Have you been to the ER, urgent care clinic since your last visit? Hospitalized since your last visit?no    2. Have you seen or consulted any other health care providers outside of the [address] [place] [address] since your last visit? Include any pap smears or colon screening.  no

## 2021-06-13 DIAGNOSIS — J45.41 MODERATE PERSISTENT ASTHMA WITH ACUTE EXACERBATION: ICD-10-CM

## 2021-06-13 RX ORDER — MONTELUKAST SODIUM 10 MG/1
TABLET ORAL
Qty: 90 TABLET | Refills: 3 | Status: SHIPPED | OUTPATIENT
Start: 2021-06-13 | End: 2022-05-26

## 2021-06-13 RX ORDER — CEPHALEXIN 250 MG/1
CAPSULE ORAL
Qty: 180 EACH | Refills: 3 | Status: SHIPPED | OUTPATIENT
Start: 2021-06-13 | End: 2022-02-01 | Stop reason: SDUPTHER

## 2021-07-29 ENCOUNTER — PATIENT MESSAGE (OUTPATIENT)
Dept: INTERNAL MEDICINE CLINIC | Age: 61
End: 2021-07-29

## 2021-09-27 ENCOUNTER — PATIENT MESSAGE (OUTPATIENT)
Dept: INTERNAL MEDICINE CLINIC | Age: 61
End: 2021-09-27

## 2021-09-28 NOTE — TELEPHONE ENCOUNTER
----- Message from Guadalupe Tran sent at 9/28/2021  1:59 PM EDT -----  Regarding: Dr Warden Magallanes is calling to see if the doctor has send his orders to [facility], so he can do his Covid treatments, please call pt at [phone]

## 2021-09-29 ENCOUNTER — TRANSCRIBE ORDER (OUTPATIENT)
Dept: NEUROLOGY | Age: 61
End: 2021-09-29

## 2021-09-29 RX ORDER — HYDROCORTISONE SODIUM SUCCINATE 100 MG/2ML
100 INJECTION, POWDER, FOR SOLUTION INTRAMUSCULAR; INTRAVENOUS
Status: DISCONTINUED | OUTPATIENT
Start: 2021-10-01 | End: 2021-10-02 | Stop reason: HOSPADM

## 2021-09-29 RX ORDER — ACETAMINOPHEN 325 MG/1
650 TABLET ORAL
Status: ACTIVE | OUTPATIENT
Start: 2021-10-01 | End: 2021-10-01

## 2021-09-29 RX ORDER — DIPHENHYDRAMINE HYDROCHLORIDE 50 MG/ML
50 INJECTION, SOLUTION INTRAMUSCULAR; INTRAVENOUS
Status: ACTIVE | OUTPATIENT
Start: 2021-10-01 | End: 2021-10-01

## 2021-09-29 NOTE — TELEPHONE ENCOUNTER
Spoke with Camille Mendoza in regards to COVID 19 infusions. She states she spoke with provider via phone and everything was taken care of. No further actions required at this time.

## 2021-10-01 ENCOUNTER — HOSPITAL ENCOUNTER (OUTPATIENT)
Dept: INFUSION THERAPY | Age: 61
Discharge: HOME | End: 2021-10-01

## 2021-10-01 NOTE — TELEPHONE ENCOUNTER
Pt is calling stating the the treatment at [facility] is going to be a $2000 copay. Pt cancelled appointment and would like Provider to call back with some other options/treatments. Best contact number is  [phone].

## 2021-10-05 ENCOUNTER — TELEPHONE (OUTPATIENT)
Dept: PRIMARY CARE CLINIC | Age: 61
End: 2021-10-05

## 2021-10-05 NOTE — TELEPHONE ENCOUNTER
On call note:    Mr. Magali Carolina was diagnosed with COVID about a week ago. He received a monoclonal antibody antibody treatment yesterday. Reports that his symptoms are not any better. O2 sats been running in 80s at rest but on ambulation dropping to 70s. Advised him to go to ER now. Advised  to call 911. He voiced understanding.

## 2021-10-08 NOTE — TELEPHONE ENCOUNTER
Pt is not at a [facility] facility    Please reach out to pt and clarify his condition and if admitted elsewhere, obtain records.

## 2021-10-24 ENCOUNTER — PATIENT MESSAGE (OUTPATIENT)
Dept: INTERNAL MEDICINE CLINIC | Age: 61
End: 2021-10-24

## 2021-10-27 NOTE — TELEPHONE ENCOUNTER
From: Charissa Tobar. To: Mukund Arthur MD  Sent: 10/24/2021 10:25 AM EDT  Subject: Non-Urgent Medical Question    Dr. Bro Smith,  As you know, I recently had. I ended up in the hospital Tue, 10/5 - Thu, 10/7 and left there on oxygen 24/7, but only needed it for an additional day. I have not used the oxygen since Fri, 10/8. I am having trouble getting a \"discharge order\" so [facility]  [facility]. I tried to get my doctor from the hospital to do this with no success so far. The oxygen/equipment has been sitting in my house for over two weeks now. Can you contact this company and discharge me from the oxygen orders, please? [address] [place] [address] [facility]  Office: [phone]  Unfortunately, I don't have a name from that company other than Rudy Zavala, the person who delivered the equipment to my house.   Thank you,  Cherrie Márquez

## 2021-10-27 NOTE — TELEPHONE ENCOUNTER
Spoke to pt via phone call, he states his O2 stats have been within normal range since 10/8/21 and he no longer need the oxygen equipment. He would like to have it picked up from his house. I spoke to [facility] and they advised that pt will need a discharge order faxed to them. Needs to be written on script paper and signed by physician. Needs faxed to [phone]. Please provide script and writer will fax to facility.

## 2021-12-20 DIAGNOSIS — M25.551 RIGHT HIP PAIN: Primary | ICD-10-CM

## 2021-12-21 ENCOUNTER — HOSPITAL ENCOUNTER (OUTPATIENT)
Dept: GENERAL RADIOLOGY | Age: 61
Discharge: HOME | End: 2021-12-21
Payer: COMMERCIAL

## 2021-12-21 ENCOUNTER — OFFICE VISIT (OUTPATIENT)
Dept: ORTHOPEDIC SURGERY | Age: 61
End: 2021-12-21
Payer: COMMERCIAL

## 2021-12-21 VITALS
WEIGHT: 211 LBS | OXYGEN SATURATION: 99 % | HEART RATE: 82 BPM | SYSTOLIC BLOOD PRESSURE: 140 MMHG | TEMPERATURE: 97.2 F | HEIGHT: 71 IN | DIASTOLIC BLOOD PRESSURE: 87 MMHG | BODY MASS INDEX: 29.54 KG/M2

## 2021-12-21 DIAGNOSIS — M87.051 ASEPTIC NECROSIS OF BONE OF RIGHT HIP (HCC): ICD-10-CM

## 2021-12-21 DIAGNOSIS — Z47.1 AFTERCARE FOLLOWING LEFT KNEE JOINT REPLACEMENT SURGERY: ICD-10-CM

## 2021-12-21 DIAGNOSIS — M25.551 RIGHT HIP PAIN: ICD-10-CM

## 2021-12-21 DIAGNOSIS — Z96.652 AFTERCARE FOLLOWING LEFT KNEE JOINT REPLACEMENT SURGERY: ICD-10-CM

## 2021-12-21 DIAGNOSIS — M75.42 IMPINGEMENT SYNDROME OF LEFT SHOULDER: Primary | ICD-10-CM

## 2021-12-21 PROCEDURE — 20610 DRAIN/INJ JOINT/BURSA W/O US: CPT | Performed by: ORTHOPAEDIC SURGERY

## 2021-12-21 PROCEDURE — 73502 X-RAY EXAM HIP UNI 2-3 VIEWS: CPT

## 2021-12-21 PROCEDURE — 99214 OFFICE O/P EST MOD 30 MIN: CPT | Performed by: ORTHOPAEDIC SURGERY

## 2021-12-21 RX ORDER — BETAMETHASONE SODIUM PHOSPHATE AND BETAMETHASONE ACETATE 3; 3 MG/ML; MG/ML
6 INJECTION, SUSPENSION INTRA-ARTICULAR; INTRALESIONAL; INTRAMUSCULAR; SOFT TISSUE ONCE
Status: COMPLETED | OUTPATIENT
Start: 2021-12-21 | End: 2021-12-21

## 2021-12-21 RX ADMIN — BETAMETHASONE SODIUM PHOSPHATE AND BETAMETHASONE ACETATE 6 MG: 3; 3 INJECTION, SUSPENSION INTRA-ARTICULAR; INTRALESIONAL; INTRAMUSCULAR; SOFT TISSUE at 08:56

## 2021-12-21 NOTE — PROGRESS NOTES
Identified pt with two pt identifiers (name and ). Reviewed chart in preparation for visit and have obtained necessary documentation. Carlos Huynh is a 62 y.o. male  Chief Complaint   Patient presents with    Hip Pain     RT hip     Visit Vitals  BP (!) 140/87 (BP 1 Location: Left upper arm, BP Patient Position: Sitting, BP Cuff Size: Large adult)   Pulse 82   Temp 97.2 °F (36.2 °C) (Tympanic)   Ht 5' 11\" (1.803 m)   Wt 211 lb (95.7 kg)   SpO2 99%   BMI 29.43 kg/m²     1. Have you been to the ER, urgent care clinic since your last visit? Hospitalized since your last visit? No    2. Have you seen or consulted any other health care providers outside of the [address] [place] [address] since your last visit? Include any pap smears or colon screening.  No

## 2021-12-21 NOTE — LETTER
12/21/2021    Patient: Deondre Avalos. YOB: 1960   Date of Visit: 12/21/2021     Misael Mcnulty MD  [facility] [address] [id number]  [address] [facility] [address]    Dear Misael Mcnulty MD,      Thank you for referring Mr. Hilda Menendez to [facility] for evaluation. My notes for this consultation are attached. If you have questions, please do not hesitate to call me. I look forward to following your patient along with you.       Sincerely,    Matias Schwartz, DO

## 2021-12-22 NOTE — PROGRESS NOTES
[id number]/21/2021      CC: Left shoulder pain, right hip femoral head avascular necrosis    HPI:      This is a 62y.o. year old patient who complains of left shoulder pain. This pain has been going on for several months. This is activity dependent pain. The pain is in the shoulder, anteriorly as well as laterally. This pain is worse with activity and better with rest.  The pain is moderate in nature. So far, the patient has tried nsaids, PT. The patient is right hand dominant. He also presents for evaluation of his right hip, for which he has had incidental findings by MRI of AVN of the femoral head. He has no complaints on the hip.       PMH:  Past Medical History:   Diagnosis Date    Allergic rhinitis     Asthma     allergy/URI related    Colon polyp 11/13/13    Diverticulosis     GERD (gastroesophageal reflux disease)     Hiatal hernia     History of vascular access device 11/21/2018    5 Fr double PICC 43 cm 2 cm out, long term abx, R basilic    Hypercholesterolemia     IGT (impaired glucose tolerance)     Kidney stone 2016    x2    Overweight (BMI 25.0-29.9) 11/12/2018    Pulmonary embolism (HCC)     Rheumatoid arthritis(714.0)     S/P PICC central line placement 11/09/2017    has been removed    UC (ulcerative colitis) ([facility]     In remission       PSxHx:  Past Surgical History:   Procedure Laterality Date    HX GI  2010 & 2016    COLONOSCOPY    HX KNEE ARTHROSCOPY Left 10/2017    HX KNEE REPLACEMENT Left 03/25/2019    revision Dr. Mojica Curet HX LUMBAR DISKECTOMY N/A 2012    herniated disk    HX OPEN REDUCTION INTERNAL FIXATION Left 07/2017    Tibia fx    HX TONSILLECTOMY      [facility] ABDOM[facility] PROC UNLISTED Bilateral 2007    Hernia Repair    [place] ABDOMEN SURG[facility] PROC UNLISTED Left 1971    Inquinal hernia    [place] RMVL RUPTURED BREAST IMPLANT W/IMPLANT CONTENTS Left 11/2017    IND with plate removal of tibia       Meds:    Current Outpatient Medications:     montelukast (SINGULAIR) 10 mg tablet, TAKE 1 TABLET DAILY, Disp: 90 Tablet, Rfl: 3    Advair Diskus 100-50 mcg/dose diskus inhaler, USE 1 INHALATION EVERY 12 HOURS, Disp: 180 Each, Rfl: 3    esomeprazole (NEXIUM) 40 mg capsule, TAKE 1 CAPSULE DAILY, Disp: 90 Cap, Rfl: 3    rosuvastatin (CRESTOR) 10 mg tablet, TAKE 1 TABLET NIGHTLY, Disp: 90 Tab, Rfl: 3    meloxicam (MOBIC) 7.5 mg tablet, TAKE 1 TABLET BY MOUTH EVERY DAY IN THE MORNING WITH FOOD FOR 3-5 DAYS FOR SERIOUS ARTHRITIC FLARES, Disp: , Rfl: 0    mesalamine (LIALDA) 1.2 gram delayed release tablet, Take 2.4 g by mouth., Disp: , Rfl:     abatacept/maltose (ORENCIA, WITH MALTOSE, IV), by IntraVENous route., Disp: , Rfl:     budesonide (RHINOCORT AQUA) 32 mcg/actuation nasal spray, 2 Sprays by Both Nostrils route daily. , Disp: , Rfl:     predniSONE (DELTASONE) 10 mg tablet, Taper daily. 60mg x1, 50mg x 1, 40mg x 1, 30mg x 1, 20mg x 1, 10mg x 1 (Patient not taking: Reported on 12/21/2021), Disp: 21 Tab, Rfl: 0    albuterol sulfate 90 mcg/actuation aepb, Take 2 Puffs by inhalation every four (4) hours as needed for Cough. (Patient not taking: Reported on 12/21/2021), Disp: 1 Inhaler, Rfl: 2    diclofenac (VOLTAREN) 1 % gel, APPLY FOUR GRAMS TO LEFT KNEE FOUR TIMES DAILY AS NEEDED (Patient not taking: Reported on 12/21/2021), Disp: , Rfl: 1  No current facility-administered medications for this visit. All:  No Known Allergies    Social Hx:  Social History     Socioeconomic History    Marital status:    Tobacco Use    Smoking status: Never Smoker    Smokeless tobacco: Never Used   Substance and Sexual Activity    Alcohol use:  Yes     Alcohol/week: 2.0 standard drinks     Types: 2 Shots of liquor per week     Comment: occasional     Drug use: No    Sexual activity: Yes     Partners: Female     Birth control/protection: None       Family Hx:  Family History   Problem Relation Age of Onset    Diabetes Mother     Heart Attack Father         SEVERAL MIs    Arrhythmia Father         HAD PACEMAKER    Stroke Father     Other Father          OF MRSA    No Known Problems Sister          Review of Systems:       General: Denies headache, lethargy, fever, weight loss  Ears/Nose/Throat: Denies ear discharge, drainage, nosebleeds, hoarse voice, dental problems  Cardiovascular: Denies chest pain, shortness of breath  Lungs: Denies chest pain, breathing problems, wheezing, pneumonia  Stomach: Denies stomach pain, heartburn, constipation, irritable bowel  Skin: Denies rash, sores, open wounds  Musculoskeletal: Left shoulder pain which is activity dependent, it is anteriorly on the shoulder  Genitourinary: Denies dysuria, hematuria, polyuria  Gastrointestinal: Denies constipation, obstipation, diarrhea  Neurological: Denies changes in sight, smell, hearing, taste, seizures. Denies loss of consciousness.   Psychiatric: Denies depression, sleep pattern changes, anxiety, change in personality  Endocrine: Denies mood swings, heat or cold intolerance  Hematologic/Lymphatic: Denies anemia, purpura, petechia  Allergic/Immunologic: Denies swelling of throat, pain or swelling at lymph nodes      Physical Examination:    Visit Vitals  BP (!) 140/87 (BP 1 Location: Left upper arm, BP Patient Position: Sitting, BP Cuff Size: Large adult)   Pulse 82   Temp 97.2 °F (36.2 °C) (Tympanic)   Ht 5' 11\" (1.803 m)   Wt 211 lb (95.7 kg)   SpO2 99%   BMI 29.43 kg/m²        General: AOX3, no apparent distress  Psychiatric: mood and affect appropriate  Lungs: breathing is symmetric and unlabored bilaterally  Heart: regular rate and rhythm  Abdomen: no guarding  Head: normocephalic, atraumatic  Skin: No significant abnormalities, good turgor  Sensation intact to light touch: C5-T1 dermatomes  Muscular exam: 5/5 strength in all major muscle groups unless noted in specialty exam.    Extremities      Right upper extremity: Full active and passive range of motion without pain, deformity, no open wound, strength 5/5 in all major muscle groups. Left upper extremity:  Full active and passive range of motion without pain, deformity, no open wound, strength 5/5 in all major muscle groups. Left lower extremity: Patient complains of mild tenderness at the lateral proximal humerus. Range of motion is limited secondary to pain, open can test is positive, impingement maneuver is positive, Speeds and Yergason's tests are equivocal.  Bearhug test is negative. There is no specific tenderness to palpation along the acromioclavicular joint. There is no gross deformity, no obvious muscular atrophy. Sensation is intact light touch in the C5-T1 dermatomes. Cervical range of motion is full and pain-free and does not reproduce any of the symptoms consistent with cervical pathology. Strength is 4 out of 5 with abduction of the shoulder, 5 out of 5 with forward flexion, biceps and triceps as well as hand intrinsics are 5 out of 5 strength. Capillary refill is less than 2 seconds distally. Right lower extremity:  Full active and passive range of motion without pain, deformity, no open wound, strength 5/5 in all major muscle groups. Diagnostics:    Pertinent Xrays:  Xrays are available of the shoulder. These indicate no fractures, dislocations, or osseous abnormalities. Soft tissue is within expected limits. 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 As his hip is asymptomatic, there is no indication for further workup or management, though we went over the relevant anatomy.   As there is no indication of overt tearing of the rotator cuff, and that in the setting conservative treatment is the standard of care, I have recommended that some combination of oral analgesics, ideally anti-inflammatories, physical therapy exercises, subacromial injections perhaps other topical forms of treatment would be the first-line of treatment for this particular problem. Patient stated their understanding the pathology as well as the anatomy and treatment options. We have agreed to injection in to the shoulder, PT exercises, pain medications prn, follow up prn for the hip. For his left knee, which was revised by me several years ago, he will follow up in 3 years. The patient will follow-up approximately 4 to 6 weeks for a clinical check should any symptoms remain. No x-rays are necessary on follow-up. Procedures performed: PROCEDURE NOTE :    Consent was obtained from the patient. The correct site was identified after confirmation with the patient. Following identification and confirmation of the correct site with the patient, the area was prepped in the normal sterile fashion. An injection of a mixture of 6 mg of betamethasone and 1% lidocaine without epinephrine was administered to the left shoulder subacromial space. The needle was then withdrawn and the injection site dressed with a sterile bandage at the conclusion. The procedure was well tolerated by the patient. No immediate adverse reactions were noted. Post injection instructions were given. Mr. Francisco J Palmer has a reminder for a \"due or due soon\" health maintenance. I have asked that he contact his primary care provider for follow-up on this health maintenance.

## 2022-01-16 DIAGNOSIS — K21.9 GASTROESOPHAGEAL REFLUX DISEASE, UNSPECIFIED WHETHER ESOPHAGITIS PRESENT: ICD-10-CM

## 2022-01-16 RX ORDER — ESOMEPRAZOLE MAGNESIUM 40 MG/1
CAPSULE, DELAYED RELEASE ORAL
Qty: 90 CAPSULE | Refills: 3 | Status: SHIPPED | OUTPATIENT
Start: 2022-01-16

## 2022-02-01 DIAGNOSIS — J45.41 MODERATE PERSISTENT ASTHMA WITH ACUTE EXACERBATION: ICD-10-CM

## 2022-02-01 RX ORDER — FLUTICASONE PROPIONATE AND SALMETEROL 100; 50 UG/1; UG/1
1 POWDER RESPIRATORY (INHALATION) 2 TIMES DAILY
Qty: 180 EACH | Refills: 3 | Status: SHIPPED | OUTPATIENT
Start: 2022-02-01 | End: 2022-10-24 | Stop reason: SDUPTHER

## 2022-03-11 ENCOUNTER — OFFICE VISIT (OUTPATIENT)
Dept: ORTHOPEDIC SURGERY | Age: 62
End: 2022-03-11
Payer: COMMERCIAL

## 2022-03-11 VITALS
RESPIRATION RATE: 18 BRPM | BODY MASS INDEX: 28.7 KG/M2 | SYSTOLIC BLOOD PRESSURE: 129 MMHG | OXYGEN SATURATION: 95 % | DIASTOLIC BLOOD PRESSURE: 79 MMHG | TEMPERATURE: 98 F | WEIGHT: 205 LBS | HEART RATE: 72 BPM | HEIGHT: 71 IN

## 2022-03-11 DIAGNOSIS — M75.42 IMPINGEMENT SYNDROME OF LEFT SHOULDER: Primary | ICD-10-CM

## 2022-03-11 PROCEDURE — 99213 OFFICE O/P EST LOW 20 MIN: CPT | Performed by: ORTHOPAEDIC SURGERY

## 2022-03-11 NOTE — PROGRESS NOTES
Patient3/11/2022      CC: Left shoulder pain    HPI:      This is a 62y.o. year old patient who complains of left shoulder pain. This pain has been going on for many years. This is activity dependent pain. The pain is in the shoulder, anteriorly as well as laterally. This pain is worse with activity and better with rest.  The pain is severe in nature. So far, the patient has tried PT, injections, nsaids. The patient is right hand dominant.       PMH:  Past Medical History:   Diagnosis Date    Allergic rhinitis     Asthma     allergy/URI related    Colon polyp 11/13/13    Diverticulosis     GERD (gastroesophageal reflux disease)     Hiatal hernia     History of vascular access device 11/21/2018    5 Fr double PICC 43 cm 2 cm out, long term abx, R basilic    Hypercholesterolemia     IGT (impaired glucose tolerance)     Kidney stone 2016    x2    Overweight (BMI 25.0-29.9) 11/12/2018    Pulmonary embolism (HCC)     Rheumatoid arthritis(714.0)     S/P PICC central line placement 11/09/2017    has been removed    UC (ulcerative colitis) ([facility] [address]     In remission       PSxHx:  Past Surgical History:   Procedure Laterality Date    HX GI  2010 & 2016    COLONOSCOPY    HX KNEE ARTHROSCOPY Left 10/2017    HX KNEE REPLACEMENT Left 03/25/2019    revision Dr. Joce Barba HX LUMBAR DISKECTOMY N/A 2012    herniated disk    HX OPEN REDUCTION INTERNAL FIXATION Left 07/2017    Tibia fx    HX TONSILLECTOMY      MS ABDOMEN SURGERY PROC UNLISTED Bilateral 2007    Hernia Repair    MS ABDOMEN SURGERY PROC UNLISTED Left 1971    Inquinal hernia    MS RMVL RUPTURED BREAST IMPLANT W/IMPLANT CONTENTS Left 11/2017    IND with plate removal of tibia       Meds:    Current Outpatient Medications:     fluticasone propion-salmeteroL (Advair Diskus) 100-50 mcg/dose diskus inhaler, Take 1 Puff by inhalation two (2) times a day., Disp: 180 Each, Rfl: 3    esomeprazole (NEXIUM) 40 mg capsule, TAKE 1 CAPSULE DAILY, Disp: 90 Capsule, Rfl: 3    montelukast (SINGULAIR) 10 mg tablet, TAKE 1 TABLET DAILY, Disp: 90 Tablet, Rfl: 3    rosuvastatin (CRESTOR) 10 mg tablet, TAKE 1 TABLET NIGHTLY, Disp: 90 Tab, Rfl: 3    meloxicam (MOBIC) 7.5 mg tablet, TAKE 1 TABLET BY MOUTH EVERY DAY IN THE MORNING WITH FOOD FOR 3-5 DAYS FOR SERIOUS ARTHRITIC FLARES, Disp: , Rfl: 0    mesalamine (LIALDA) 1.2 gram delayed release tablet, Take 2.4 g by mouth., Disp: , Rfl:     abatacept/maltose (ORENCIA, WITH MALTOSE, IV), by IntraVENous route., Disp: , Rfl:     budesonide (RHINOCORT AQUA) 32 mcg/actuation nasal spray, 2 Sprays by Both Nostrils route daily. , Disp: , Rfl:     predniSONE (DELTASONE) 10 mg tablet, Taper daily. 60mg x1, 50mg x 1, 40mg x 1, 30mg x 1, 20mg x 1, 10mg x 1 (Patient not taking: Reported on 2021), Disp: 21 Tab, Rfl: 0    albuterol sulfate 90 mcg/actuation aepb, Take 2 Puffs by inhalation every four (4) hours as needed for Cough. (Patient not taking: Reported on 2021), Disp: 1 Inhaler, Rfl: 2    diclofenac (VOLTAREN) 1 % gel, APPLY FOUR GRAMS TO LEFT KNEE FOUR TIMES DAILY AS NEEDED (Patient not taking: Reported on 2021), Disp: , Rfl: 1    All:  No Known Allergies    Social Hx:  Social History     Socioeconomic History    Marital status:    Tobacco Use    Smoking status: Never Smoker    Smokeless tobacco: Never Used   Substance and Sexual Activity    Alcohol use:  Yes     Alcohol/week: 2.0 standard drinks     Types: 2 Shots of liquor per week     Comment: occasional     Drug use: No    Sexual activity: Yes     Partners: Female     Birth control/protection: None       Family Hx:  Family History   Problem Relation Age of Onset    Diabetes Mother     Heart Attack Father         SEVERAL MIs    Arrhythmia Father         HAD PACEMAKER    Stroke Father     Other Father          OF MRSA    No Known Problems Sister          Review of Systems:       General: Denies headache, lethargy, fever, weight loss  Ears/Nose/Throat: Denies ear discharge, drainage, nosebleeds, hoarse voice, dental problems  Cardiovascular: Denies chest pain, shortness of breath  Lungs: Denies chest pain, breathing problems, wheezing, pneumonia  Stomach: Denies stomach pain, heartburn, constipation, irritable bowel  Skin: Denies rash, sores, open wounds  Musculoskeletal: Left shoulder pain which is activity dependent, it is anteriorly on the shoulder  Genitourinary: Denies dysuria, hematuria, polyuria  Gastrointestinal: Denies constipation, obstipation, diarrhea  Neurological: Denies changes in sight, smell, hearing, taste, seizures. Denies loss of consciousness. Psychiatric: Denies depression, sleep pattern changes, anxiety, change in personality  Endocrine: Denies mood swings, heat or cold intolerance  Hematologic/Lymphatic: Denies anemia, purpura, petechia  Allergic/Immunologic: Denies swelling of throat, pain or swelling at lymph nodes      Physical Examination:    Visit Vitals  /79 (BP 1 Location: Left arm, BP Patient Position: Sitting, BP Cuff Size: Adult)   Pulse 72   Temp 98 °F (36.7 °C) (Temporal)   Resp 18   Ht 5' 11\" (1.803 m)   Wt 205 lb (93 kg)   SpO2 95%   BMI 28.59 kg/m²        General: AOX3, no apparent distress  Psychiatric: mood and affect appropriate  Lungs: breathing is symmetric and unlabored bilaterally  Heart: regular rate and rhythm  Abdomen: no guarding  Head: normocephalic, atraumatic  Skin: No significant abnormalities, good turgor  Sensation intact to light touch: C5-T1 dermatomes  Muscular exam: 5/5 strength in all major muscle groups unless noted in specialty exam.    Extremities      Right upper extremity: Full active and passive range of motion without pain, deformity, no open wound, strength 5/5 in all major muscle groups. Left upper extremity:  Full active and passive range of motion without pain, deformity, no open wound, strength 5/5 in all major muscle groups.     Left lower extremity: Patient complains of mild tenderness at the lateral proximal humerus. Range of motion is limited secondary to pain, open can test is positive, impingement maneuver is positive, Speeds and Yergason's tests are equivocal.  Bearhug test is negative. There is no specific tenderness to palpation along the acromioclavicular joint. There is no gross deformity, no obvious muscular atrophy. Sensation is intact light touch in the C5-T1 dermatomes. Cervical range of motion is full and pain-free and does not reproduce any of the symptoms consistent with cervical pathology. Strength is 4 out of 5 with abduction of the shoulder, 5 out of 5 with forward flexion, biceps and triceps as well as hand intrinsics are 5 out of 5 strength. Capillary refill is less than 2 seconds distally. Right lower extremity:  Full active and passive range of motion without pain, deformity, no open wound, strength 5/5 in all major muscle groups. Diagnostics:    Pertinent Xrays:  Xrays are available of the shoulder. These indicate no fractures, dislocations, or osseous abnormalities. Soft tissue is within expected limits. Assessment: Impingement syndrome, left shoulder    Plan: This patient and I did discuss options for this particular pathology. As there is no indication of overt tearing of the rotator cuff, and that in the setting conservative treatment is the standard of care, I have recommended that some combination of oral analgesics, ideally anti-inflammatories, physical therapy exercises, subacromial injections perhaps other topical forms of treatment would be the first-line of treatment for this particular problem. Patient stated their understanding the pathology as well as the anatomy and treatment options. We have agreed to MRI. The patient will follow-up approximately 4 to 6 weeks for a clinical check should any symptoms remain. No x-rays are necessary on follow-up.     Procedures performed: none    Mr. Swapnil Lowery has a reminder for a \"due or due soon\" health maintenance. I have asked that he contact his primary care provider for follow-up on this health maintenance.

## 2022-03-11 NOTE — PROGRESS NOTES
Identified pt with two pt identifiers(name and ). Reviewed record in preparation for visit and have obtained necessary documentation. Chief Complaint   Patient presents with    Shoulder Pain      Vitals:    22 1023   BP: 129/79   Pulse: 72   Resp: 18   Temp: 98 °F (36.7 °C)   TempSrc: Temporal   SpO2: 95%   Weight: 205 lb (93 kg)   Height: 5' 11\" (1.803 m)   PainSc:   0 - No pain       No Known Allergies    Current Outpatient Medications   Medication Instructions    abatacept/maltose (ORENCIA, WITH MALTOSE, IV) IntraVENous    albuterol sulfate 90 mcg/actuation aepb 2 Puffs, Inhalation, EVERY 4 HOURS AS NEEDED    budesonide (RHINOCORT AQUA) 32 mcg/actuation nasal spray 2 Sprays by Both Nostrils route daily.  diclofenac (VOLTAREN) 1 % gel APPLY FOUR GRAMS TO LEFT KNEE FOUR TIMES DAILY AS NEEDED    esomeprazole (NEXIUM) 40 mg capsule TAKE 1 CAPSULE DAILY    fluticasone propion-salmeteroL (Advair Diskus) 100-50 mcg/dose diskus inhaler 1 Puff, Inhalation, 2 TIMES DAILY    meloxicam (MOBIC) 7.5 mg tablet TAKE 1 TABLET BY MOUTH EVERY DAY IN THE MORNING WITH FOOD FOR 3-5 DAYS FOR SERIOUS ARTHRITIC FLARES    mesalamine (LIALDA) 2.4 g, Oral    montelukast (SINGULAIR) 10 mg tablet TAKE 1 TABLET DAILY    predniSONE (DELTASONE) 10 mg tablet Taper daily. 60mg x1, 50mg x 1, 40mg x 1, 30mg x 1, 20mg x 1, 10mg x 1    rosuvastatin (CRESTOR) 10 mg tablet TAKE 1 TABLET NIGHTLY       Health Maintenance Review: Patient reminded of \"due or due soon\" health maintenance. I have asked the patient to contact his/her primary care provider (PCP) for follow-up on his/her health maintenance.       Immunization History   Administered Date(s) Administered    COVID-19, Moderna, Primary or Immunocompromised Series, MRNA, PF, 100mcg/0.5mL 2021, 2021    Hep A Vaccine (Adult) 2014, 2016    Influenza Vaccine 2014, 11/15/2015, 11/10/2016, 10/01/2017    Influenza Vaccine (Quad) Mdck Pf (>2 Yrs Flucelvax QUAD [id number]) 10/16/2019    Influenza Vaccine (Quad) PF (>6 Mo Flulaval, Fluarix, and >3 Yrs Afluria, Fluzone [id number]) 09/21/2018, 10/28/2020    Influenza Vaccine Whole 11/01/2010    Pneumococcal Conjugate (PCV-13) 12/13/2017, 12/28/2017    Tdap 05/12/2014    Typhoid Vi Capsular Polysaccharide Vaccine 05/12/2014, 05/31/2016    Zoster Vaccine, Live 03/18/2014           Coordination of Care Questionnaire:  :   1) Have you been to an emergency room, urgent care, or hospitalized since your last visit? If yes, where when, and reason for visit? no       2. Have seen or consulted any other health care provider since your last visit? If yes, where when, and reason for visit? NO      Patient is accompanied by self I have received verbal consent from Sohail Meehan. to discuss any/all medical information while they are present in the room.

## 2022-03-11 NOTE — LETTER
3/11/2022    Patient: Mary Araya. YOB: 1960   Date of Visit: 3/11/2022     Vero Marcos MD  [id number] [id number]  [address] [facility] [address]    Dear Vero Marcos MD,      Thank you for referring Mr. Rich Quevedo to [facility] for evaluation. My notes for this consultation are attached. If you have questions, please do not hesitate to call me. I look forward to following your patient along with you.       Sincerely,    Dank Antony, DO

## 2022-03-16 ENCOUNTER — HOSPITAL ENCOUNTER (OUTPATIENT)
Dept: MRI IMAGING | Age: 62
Discharge: HOME | End: 2022-03-16
Attending: ORTHOPAEDIC SURGERY
Payer: COMMERCIAL

## 2022-03-16 DIAGNOSIS — M75.42 IMPINGEMENT SYNDROME OF LEFT SHOULDER: ICD-10-CM

## 2022-03-16 PROCEDURE — 73221 MRI JOINT UPR EXTREM W/O DYE: CPT

## 2022-03-18 PROBLEM — T84.9XXA COMPLICATION OF INTERNAL LEFT KNEE PROSTHESIS (HCC): Status: ACTIVE | Noted: 2019-03-25

## 2022-03-18 PROBLEM — S89.92XA INJURY OF LEFT KNEE: Status: ACTIVE | Noted: 2017-11-07

## 2022-03-18 PROBLEM — R50.9 FEVER: Status: ACTIVE | Noted: 2019-03-28

## 2022-03-18 PROBLEM — Z96.652 COMPLICATION OF INTERNAL LEFT KNEE PROSTHESIS (HCC): Status: ACTIVE | Noted: 2019-03-25

## 2022-03-19 PROBLEM — Z96.652 AFTERCARE FOLLOWING LEFT KNEE JOINT REPLACEMENT SURGERY: Status: ACTIVE | Noted: 2019-09-09

## 2022-03-19 PROBLEM — D72.829 LEUKOCYTOSIS: Status: ACTIVE | Noted: 2019-03-28

## 2022-03-19 PROBLEM — Z47.1 AFTERCARE FOLLOWING LEFT KNEE JOINT REPLACEMENT SURGERY: Status: ACTIVE | Noted: 2019-09-09

## 2022-03-19 PROBLEM — S82.142A TIBIAL PLATEAU FRACTURE, LEFT: Status: ACTIVE | Noted: 2017-07-21

## 2022-03-19 PROBLEM — Z96.652 PRESENCE OF ARTIFICIAL KNEE JOINT, LEFT: Status: ACTIVE | Noted: 2019-09-09

## 2022-03-19 PROBLEM — J96.01 ACUTE HYPOXEMIC RESPIRATORY FAILURE (HCC): Status: ACTIVE | Noted: 2019-03-28

## 2022-03-20 PROBLEM — T84.7XXS INFECTED ORTHOPEDIC IMPLANT, SEQUELA: Status: ACTIVE | Noted: 2018-11-19

## 2022-03-20 PROBLEM — I26.99 PULMONARY EMBOLISM (HCC): Status: ACTIVE | Noted: 2019-03-27

## 2022-03-20 PROBLEM — R65.10 SIRS (SYSTEMIC INFLAMMATORY RESPONSE SYNDROME) (HCC): Status: ACTIVE | Noted: 2019-03-27

## 2022-03-20 PROBLEM — Z79.60 LONG-TERM USE OF IMMUNOSUPPRESSANT MEDICATION: Status: ACTIVE | Noted: 2017-02-06

## 2022-03-22 ENCOUNTER — VIRTUAL VISIT (OUTPATIENT)
Dept: ORTHOPEDIC SURGERY | Age: 62
End: 2022-03-22
Payer: COMMERCIAL

## 2022-03-22 DIAGNOSIS — M75.42 IMPINGEMENT SYNDROME OF LEFT SHOULDER: Primary | ICD-10-CM

## 2022-03-22 PROCEDURE — 99213 OFFICE O/P EST LOW 20 MIN: CPT | Performed by: ORTHOPAEDIC SURGERY

## 2022-03-23 ENCOUNTER — TELEPHONE (OUTPATIENT)
Dept: ORTHOPEDIC SURGERY | Age: 62
End: 2022-03-23

## 2022-03-23 NOTE — TELEPHONE ENCOUNTER
Attempted to call pt to get availability information to schedule appt with Laura Hopping return call

## 2022-03-23 NOTE — TELEPHONE ENCOUNTER
----- Message from Dank Antony DO sent at 3/23/2022  7:24 AM EDT -----  Please schedule with Gisele Abbasi.

## 2022-03-23 NOTE — PROGRESS NOTES
3/23/2022      CC: Left shoulder pain    HPI:      This is a 62y.o. year old patient who presents for MRI follow up of the left shoulder. The patient states their pain is still consistent per the previous visit.         PMH:  Past Medical History:   Diagnosis Date    Allergic rhinitis     Asthma     allergy/URI related    Colon polyp 11/13/13    Diverticulosis     GERD (gastroesophageal reflux disease)     Hiatal hernia     History of vascular access device 11/21/2018    5 Fr double PICC 43 cm 2 cm out, long term abx, R basilic    Hypercholesterolemia     IGT (impaired glucose tolerance)     Kidney stone 2016    x2    Overweight (BMI 25.0-29.9) 11/12/2018    Pulmonary embolism (HCC)     Rheumatoid arthritis(714.0)     S/P PICC central line placement 11/09/2017    has been removed    UC (ulcerative colitis) ([facility] [address]     In remission       PSxHx:  Past Surgical History:   Procedure Laterality Date    HX GI  2010 & 2016    COLONOSCOPY    HX KNEE ARTHROSCOPY Left 10/2017    HX KNEE REPLACEMENT Left 03/25/2019    revision Dr. Mloly Dinero    HX LUMBAR DISKECTOMY N/A 2012    herniated disk    HX OPEN REDUCTION INTERNAL FIXATION Left 07/2017    Tibia fx    HX TONSILLECTOMY      MO ABDOMEN SURGERY PROC UNLISTED Bilateral 2007    Hernia Repair    MO ABDOMEN SURGERY PROC UNLISTED Left 1971    Inquinal hernia    MO RMVL RUPTURED BREAST IMPLANT W/IMPLANT CONTENTS Left 11/2017    IND with plate removal of tibia       Meds:    Current Outpatient Medications:     fluticasone propion-salmeteroL (Advair Diskus) 100-50 mcg/dose diskus inhaler, Take 1 Puff by inhalation two (2) times a day., Disp: 180 Each, Rfl: 3    esomeprazole (NEXIUM) 40 mg capsule, TAKE 1 CAPSULE DAILY, Disp: 90 Capsule, Rfl: 3    montelukast (SINGULAIR) 10 mg tablet, TAKE 1 TABLET DAILY, Disp: 90 Tablet, Rfl: 3    rosuvastatin (CRESTOR) 10 mg tablet, TAKE 1 TABLET NIGHTLY, Disp: 90 Tab, Rfl: 3    predniSONE (DELTASONE) 10 mg tablet, Taper daily. 60mg x1, 50mg x 1, 40mg x 1, 30mg x 1, 20mg x 1, 10mg x 1 (Patient not taking: Reported on 2021), Disp: 21 Tab, Rfl: 0    albuterol sulfate 90 mcg/actuation aepb, Take 2 Puffs by inhalation every four (4) hours as needed for Cough. (Patient not taking: Reported on 2021), Disp: 1 Inhaler, Rfl: 2    diclofenac (VOLTAREN) 1 % gel, APPLY FOUR GRAMS TO LEFT KNEE FOUR TIMES DAILY AS NEEDED (Patient not taking: Reported on 2021), Disp: , Rfl: 1    meloxicam (MOBIC) 7.5 mg tablet, TAKE 1 TABLET BY MOUTH EVERY DAY IN THE MORNING WITH FOOD FOR 3-5 DAYS FOR SERIOUS ARTHRITIC FLARES, Disp: , Rfl: 0    mesalamine (LIALDA) 1.2 gram delayed release tablet, Take 2.4 g by mouth., Disp: , Rfl:     abatacept/maltose (ORENCIA, WITH MALTOSE, IV), by IntraVENous route., Disp: , Rfl:     budesonide (RHINOCORT AQUA) 32 mcg/actuation nasal spray, 2 Sprays by Both Nostrils route daily. , Disp: , Rfl:     All:  No Known Allergies    Social Hx:  Social History     Socioeconomic History    Marital status:    Tobacco Use    Smoking status: Never Smoker    Smokeless tobacco: Never Used   Substance and Sexual Activity    Alcohol use:  Yes     Alcohol/week: 2.0 standard drinks     Types: 2 Shots of liquor per week     Comment: occasional     Drug use: No    Sexual activity: Yes     Partners: Female     Birth control/protection: None       Family Hx:  Family History   Problem Relation Age of Onset    Diabetes Mother     Heart Attack Father         SEVERAL MIs    Arrhythmia Father         HAD PACEMAKER    Stroke Father     Other Father          OF MRSA    No Known Problems Sister          Review of Systems:       General: Denies headache, lethargy, fever, weight loss  Ears/Nose/Throat: Denies ear discharge, drainage, nosebleeds, hoarse voice, dental problems  Cardiovascular: Denies chest pain, shortness of breath  Lungs: Denies chest pain, breathing problems, wheezing, pneumonia  Stomach: Denies stomach pain, heartburn, constipation, irritable bowel  Skin: Denies rash, sores, open wounds  Musculoskeletal: Left shoulder pain  Genitourinary: Denies dysuria, hematuria, polyuria  Gastrointestinal: Denies constipation, obstipation, diarrhea  Neurological: Denies changes in sight, smell, hearing, taste, seizures. Denies loss of consciousness. Psychiatric: Denies depression, sleep pattern changes, anxiety, change in personality  Endocrine: Denies mood swings, heat or cold intolerance  Hematologic/Lymphatic: Denies anemia, purpura, petechia  Allergic/Immunologic: Denies swelling of throat, pain or swelling at lymph nodes      Physical Examination:    There were no vitals taken for this visit. General: AOX3, no apparent distress  Psychiatric: mood and affect appropriate      Diagnostics:    Pertinent Diagnostics: MRI is available of the left shoulder indicates supraspinatus and infraspinatus tendon tearing with retraction of over 2 cm, muscle belly does appear intact. Assessment: Left rotator cuff tear  Plan: This patient and I had a long discussion regarding treatment options, has been through several modalities of nonoperative management. Plan to be to have him proceed with surgical evaluation with one of our shoulder surgeons. Plan to be to have him follow-up with me for his knee replacement in approximately a year and a half. At his follow-up for his knee, he will have x-rays of the left knee. Patient was in [place] at the time of consultation. I was in the office while conducting this encounter. Consent:  He and/or his healthcare decision maker is aware that this patient-initiated Telehealth encounter is a billable service, with coverage as determined by his insurance carrier. He is aware that he may receive a bill and has provided verbal consent to proceed: Yes    This virtual visit was conducted via [employer] me.   Pursuant to the emergency declaration under the [address] Emergencies Act, 1135 waiver authority and the Coronavirus Preparedness and Response Supplemental Appropriations Act, this Virtual  Visit was conducted to reduce the patient's risk of exposure to COVID-19 and provide continuity of care for an established patient. Services were provided through a video synchronous discussion virtually to substitute for in-person clinic visit. Due to this being a TeleHealth evaluation, many elements of the physical examination are unable to be assessed. Total Time: minutes: 21-30 minutes. Mr. Shar Horne has a reminder for a \"due or due soon\" health maintenance. I have asked that he contact his primary care provider for follow-up on this health maintenance.

## 2022-03-28 ENCOUNTER — OFFICE VISIT (OUTPATIENT)
Dept: ORTHOPEDIC SURGERY | Age: 62
End: 2022-03-28
Payer: COMMERCIAL

## 2022-03-28 VITALS — WEIGHT: 200 LBS | BODY MASS INDEX: 28 KG/M2 | HEIGHT: 71 IN

## 2022-03-28 DIAGNOSIS — M75.42 IMPINGEMENT SYNDROME OF LEFT SHOULDER: Primary | ICD-10-CM

## 2022-03-28 DIAGNOSIS — M75.122 NONTRAUMATIC COMPLETE TEAR OF LEFT ROTATOR CUFF: ICD-10-CM

## 2022-03-28 PROCEDURE — 99203 OFFICE O/P NEW LOW 30 MIN: CPT | Performed by: ORTHOPAEDIC SURGERY

## 2022-03-28 NOTE — PATIENT INSTRUCTIONS
What to expect after Shoulder Arthroscopy   Dr. Karyna Koenig should not have ANYTHING to eat or drink after midnight the night before your surgery.  This includes NO gum, mints, candy, lifesavers or lollipops!  Please make sure to remove ALL jewelry.  When you arrive at the hospital or surgery center, you will be checked in and given an IV.   o You will be offered a nerve block, which I do recommend. This will be done pre-operatively by the anesthesiologist. It is an injection around the base of your neck that numbs the nerves to your shoulder and arm. It lasts anywhere from 12 hours to 3 days. This will give you pain relief that hopefully lasts throughout your first night. When the nerve block wears off, you will likely be in pain. This can range from mild to severe. If you experience severe pain, this is still normal. Nothing is wrong. You would have woken up with worse pain if you did not have the nerve block!  During first three days, the pain is usually the worst, and then gradually subsides after that.  Swelling in the shoulder, elbow and hands is normal. You may also experience bruising in the arm    If you elected to have the nerve block, you may have some residual numbness that may last weeks.  You will likely continue to have pain for several weeks or even months.  Recovery from shoulder surgery takes several months. BE PATIENT!  Wear the sling at all times (even sleeping!) except for showering and for the exercises listed below.  All you need to do for the first five weeks is the following (four times per day): Remove your sling and:   o Flex and extend your elbow with your elbow next to your side.   o You may be instructed to begin shoulder pendulum exercises after your first post-operative visit:  lean forward slightly, and with your arm hanging down and your hand pointing toward the floor, perform small circles with your arm and shoulder.       At your first follow-up visit, you will have your sutures removed and will be given a script for physical therapy. o If you did NOT have a repair, your therapy will begin at that time and you will stop your sling use   o If you DID have a repair, you will continue your sling until 5 weeks post-op. - At 5 weeks post-op, you will begin physical therapy and discontinue your sling that day   - You will be in therapy for about 6 weeks - 10 weeks.     Driving is dangerous while in a sling and it is recommended that you wait until you are out of your sling to begin. (unless otherwise directed by your physician)    Your restrictions will be as follows: (unless otherwise directed by your physician)   o NO use of the arm/shoulder (except for writing / typing, fine manipulation) for the first two weeks   o If you DID NOT have a repair:   - NO lifting / carrying / pushing / pulling greater than 10 lbs for 6 weeks   - No repetitive overhead activity for 6 weeks   - Return to sports typically at 6 -8 weeks   o If you DID have a repair   - NO lifting / carrying / pushing / pulling greater than 10 lbs for 4 months   - No repetitive overhead activity for 4 months   - Return to sports: 4-6 months depending on the sport   If you have any questions or concerns throughout this process, call the [place] [address] office at [phone]

## 2022-03-28 NOTE — PROGRESS NOTES
Bev Stark (: 1960) is a 62 y.o. male, new patient, here for evaluation of the following chief complaint(s):  Shoulder Pain (left)       ASSESSMENT/PLAN:  Below is the assessment and plan developed based on review of pertinent history, physical exam, labs, studies, and medications. He would like to plan for left shoulder arthroscopy with acromioplasty and rotator cuff repair. He does have a history of pulmonary embolism and therefore we will likely start him with Lovenox for 2 to 3 weeks after surgery. Risks and benefits of surgical treatment were explained. We discussed risks of infection, blood loss, neurovascular injury, anesthesia risks, and risk secondary to patient comorbidities. Risk of continued shoulder pain/instability was explained. We discussed that there may be need for future surgical procedures. We discussed that implants may need to be used for this procedure. Patient understands the risks of this procedure and elects to schedule in the near future. 1. Impingement syndrome of left shoulder  2. Nontraumatic complete tear of left rotator cuff      Return for for surgery. SUBJECTIVE/OBJECTIVE:  Bev Stark (: 1960) is a 62 y.o. male. He notes chronic left shoulder pain. He does have rheumatoid arthritis and follows with a rheumatologist.  He has received multiple injections in the past.  He notes that pain worsens starting in November had his left shoulder. He had an MRI which shows rotator cuff tear. No Known Allergies    Current Outpatient Medications   Medication Sig    fluticasone propion-salmeteroL (Advair Diskus) 100-50 mcg/dose diskus inhaler Take 1 Puff by inhalation two (2) times a day.  esomeprazole (NEXIUM) 40 mg capsule TAKE 1 CAPSULE DAILY    montelukast (SINGULAIR) 10 mg tablet TAKE 1 TABLET DAILY    rosuvastatin (CRESTOR) 10 mg tablet TAKE 1 TABLET NIGHTLY    predniSONE (DELTASONE) 10 mg tablet Taper daily.   60mg x1, 50mg x 1, 40mg x 1, 30mg x 1, 20mg x 1, 10mg x 1 (Patient not taking: Reported on 12/21/2021)    albuterol sulfate 90 mcg/actuation aepb Take 2 Puffs by inhalation every four (4) hours as needed for Cough. (Patient not taking: Reported on 12/21/2021)    diclofenac (VOLTAREN) 1 % gel APPLY FOUR GRAMS TO LEFT KNEE FOUR TIMES DAILY AS NEEDED (Patient not taking: Reported on 12/21/2021)    meloxicam (MOBIC) 7.5 mg tablet TAKE 1 TABLET BY MOUTH EVERY DAY IN THE MORNING WITH FOOD FOR 3-5 DAYS FOR SERIOUS ARTHRITIC FLARES    mesalamine (LIALDA) 1.2 gram delayed release tablet Take 2.4 g by mouth.  abatacept/maltose (ORENCIA, WITH MALTOSE, IV) by IntraVENous route.  budesonide (RHINOCORT AQUA) 32 mcg/actuation nasal spray 2 Sprays by Both Nostrils route daily. No current facility-administered medications for this visit. Social History     Socioeconomic History    Marital status:      Spouse name: Not on file    Number of children: Not on file    Years of education: Not on file    Highest education level: Not on file   Occupational History    Not on file   Tobacco Use    Smoking status: Never Smoker    Smokeless tobacco: Never Used   Substance and Sexual Activity    Alcohol use:  Yes     Alcohol/week: 2.0 standard drinks     Types: 2 Shots of liquor per week     Comment: occasional     Drug use: No    Sexual activity: Yes     Partners: Female     Birth control/protection: None   Other Topics Concern     Service Not Asked    Blood Transfusions Not Asked    Caffeine Concern Not Asked    Occupational Exposure Not Asked    Hobby Hazards Not Asked    Sleep Concern Not Asked    Stress Concern Not Asked    Weight Concern Not Asked    Special Diet Not Asked    Back Care Not Asked    Exercise Not Asked    Bike Helmet Not Asked   [address] Not Asked    Self-Exams Not Asked   Social History Narrative    Not on file     Social Determinants of Health     Financial Resource Strain:     Difficulty of Paying Living Expenses: Not on file   Food Insecurity:     Worried About Running Out of Food in the Last Year: Not on file    Aleshia of Food in the Last Year: Not on file   Transportation Needs:     Lack of Transportation (Medical): Not on file    Lack of Transportation (Non-Medical):  Not on file   Physical Activity:     Days of Exercise per Week: Not on file    Minutes of Exercise per Session: Not on file   Stress:     Feeling of Stress : Not on file   Social Connections:     Frequency of Communication with Friends and Family: Not on file    Frequency of Social Gatherings with Friends and Family: Not on file    Attends Church Services: Not on file    Active Member of Clubs or Organizations: Not on file    Attends Club or Organization Meetings: Not on file    Marital Status: Not on file   Intimate Partner Violence:     Fear of Current or Ex-Partner: Not on file    Emotionally Abused: Not on file    Physically Abused: Not on file    Sexually Abused: Not on file   Housing Stability:     Unable to Pay for Housing in the Last Year: Not on file    Number of [place] in the Last Year: Not on file    Unstable Housing in the Last Year: Not on file       Past Surgical History:   Procedure Laterality Date    HX GI  2010 & 2016    COLONOSCOPY    HX KNEE ARTHROSCOPY Left 10/2017    HX KNEE REPLACEMENT Left 03/25/2019    revision Dr. Emmanuel Valdes    HX LUMBAR DISKECTOMY N/A 2012    herniated disk    HX OPEN REDUCTION INTERNAL FIXATION Left 07/2017    Tibia fx    HX TONSILLECTOMY      DC ABDOMEN SURGERY PROC UNLISTED Bilateral 2007    Hernia Repair    DC ABDOMEN SURGERY PROC UNLISTED Left 1971    Inquinal hernia    DC RMVL RUPTURED BREAST IMPLANT W/IMPLANT CONTENTS Left 11/2017    IND with plate removal of tibia       Family History   Problem Relation Age of Onset    Diabetes Mother     Heart Attack Father         SEVERAL MIs    Arrhythmia Father         HAD PACEMAKER  Stroke Father     Other Father          OF MRSA    No Known Problems Sister                REVIEW OF SYSTEMS:  ROS     Positive for: Musculoskeletal    Last edited by Lexi Guzman on 3/28/2022  8:50 AM. (History)        Patient denies any recent fever, chills, nausea, vomiting, chest pain, or shortness of breath. Vitals:  Ht 5' 11\" (1.803 m)   Wt 200 lb (90.7 kg)   BMI 27.89 kg/m²    Body mass index is 27.89 kg/m². PHYSICAL EXAM:  General exam: Patient is awake, alert, and oriented x3. Well-appearing. No acute distress. Ambulates with a normal gait. Left shoulder: Neurovascular and sensory intact. There is tenderness to palpation at the anterior lateral shoulder. Limited passive range of motion is noted. There is limited active range of motion to 90 degrees of forward flexion and abduction. Pain is noted with impingement testing including Kirk exam.  Pain and weakness 4/5 is noted with rotator cuff strength testing including resisted abduction and resisted external rotation. Normal stability. There is some tenderness palpation at the [facility] joint and pain with crossarm exam.        IMAGING:  MRI Results (most recent):  Results from Hospital Encounter encounter on 22    MRI SHOULDER LT WO CONT    Narrative  EXAM: MRI SHOULDER LT WO CONT    INDICATION: Left shoulder pain    COMPARISON: None    TECHNIQUE: Axial proton density fat-saturated; oblique coronal T1, T2  fat-saturated, and proton density fat-saturated; and oblique sagittal T2  fat-saturated MRI of the left shoulder . CONTRAST: None. FINDINGS: A.C. joint: Intact. Anterior acromion process type: 1    Bone marrow: No acute fracture, dislocation, or marrow replacing process. Joint fluid: No significant joint effusion. Mild amount of fluid in the  subacromial and subdeltoid bursa. Rotator cuff tendons: There is a full-thickness tear of the supraspinatus and  infraspinatus tendons with retraction by 2.6 cm. The teres minor and  subscapularis tendons remain intact. Biceps tendon: Intact and located within the bicipital groove. Muscles: Mild edema is present in the supraspinatus and infraspinatus muscles. No atrophy. Glenoid labrum: Degenerative changes are seen in the superior posterior labrum. Glenohumeral joint capsule: within normal limits. Glenohumeral articular cartilage: Intact. No focal osteochondral lesion. Soft tissue mass: None. Impression  1. Full-thickness retracted tears of the supraspinatus and infraspinatus tendons  with mild edema in the muscles. 2. Mild amount of fluid in the subacromial and subdeltoid bursa. XR Results (most recent):  Results from Hospital Encounter encounter on 12/21/21    XR HIP RT W OR WO PELV 2-3 VWS    Narrative  EXAM: XR HIP RT W OR WO PELV 2-3 VWS    INDICATION: Right hip pain. COMPARISON: None. FINDINGS: AP view of the pelvis and a frogleg lateral view of the right hip  demonstrate no fracture, dislocation or other acute abnormality. There is mild  bilateral hip osteoarthritis, left greater than right. Surgical clips overlie  the lower pelvis. Impression  No acute abnormality. Mild bilateral hip osteoarthritis. Results from [place] encounter on 03/26/20    XR KNEE LT 3 V    Narrative  INDICATION: postop    Exam: AP, lateral, oblique views of the left knee. FINDINGS: There is no acute fracture-dislocation. There is a left knee  arthroplasty without evidence of orthopedic hardware complication. Bones are  diffusely demineralized. Soft tissues are normal.    Impression  IMPRESSION: No acute fracture or dislocation. Results from [place] encounter on 03/25/19    XR KNEE LT MAX 2 VWS    Narrative  Indication: postop    Impression  IMPRESSION: AP and lateral views of the left knee demonstrate a total knee  arthroplasty in near anatomic alignment and without evidence of orthopedic  hardware complication.          No orders of the defined types were placed in this encounter. An electronic signature was used to authenticate this note.   -- Adelita Paulson DO

## 2022-03-28 NOTE — LETTER
3/28/2022    Patient: Pamela Mo. YOB: 1960   Date of Visit: 3/28/2022     Sharon Martins MD  [address] [facility] [phone] [id number]  Via In [facility] [address] [place] [address] Drive  [address]  [phone]  Via In [facility] [address]    Dear MD Pati Esquivel DO,      Thank you for referring Mr. Ora Dakin to [facility] for evaluation. My notes for this consultation are attached. If you have questions, please do not hesitate to call me. I look forward to following your patient along with you.       Sincerely,    Aminta Shepherd DO

## 2022-04-04 ENCOUNTER — TELEPHONE (OUTPATIENT)
Dept: ORTHOPEDIC SURGERY | Age: 62
End: 2022-04-04

## 2022-04-04 DIAGNOSIS — M75.122 NONTRAUMATIC COMPLETE TEAR OF LEFT ROTATOR CUFF: ICD-10-CM

## 2022-04-04 DIAGNOSIS — M75.42 IMPINGEMENT SYNDROME OF LEFT SHOULDER: Primary | ICD-10-CM

## 2022-04-16 DIAGNOSIS — E78.2 MIXED HYPERLIPIDEMIA: ICD-10-CM

## 2022-04-17 RX ORDER — ROSUVASTATIN CALCIUM 10 MG/1
TABLET, COATED ORAL
Qty: 90 TABLET | Refills: 1 | Status: SHIPPED | OUTPATIENT
Start: 2022-04-17 | End: 2022-10-31 | Stop reason: SDUPTHER

## 2022-05-10 DIAGNOSIS — Z98.890 STATUS POST ARTHROSCOPY OF SHOULDER: Primary | ICD-10-CM

## 2022-05-10 RX ORDER — ENOXAPARIN SODIUM 100 MG/ML
40 INJECTION SUBCUTANEOUS DAILY
Qty: 21 EACH | Refills: 0 | Status: SHIPPED | OUTPATIENT
Start: 2022-05-10

## 2022-05-10 RX ORDER — OXYCODONE HYDROCHLORIDE 5 MG/1
5 TABLET ORAL
Qty: 28 TABLET | Refills: 0 | Status: SHIPPED | OUTPATIENT
Start: 2022-05-10 | End: 2022-05-17

## 2022-05-18 ENCOUNTER — OFFICE VISIT (OUTPATIENT)
Dept: ORTHOPEDIC SURGERY | Age: 62
End: 2022-05-18
Payer: COMMERCIAL

## 2022-05-18 VITALS — BODY MASS INDEX: 28 KG/M2 | HEIGHT: 71 IN | WEIGHT: 200 LBS

## 2022-05-18 DIAGNOSIS — Z98.890 STATUS POST ARTHROSCOPY OF SHOULDER: Primary | ICD-10-CM

## 2022-05-18 PROCEDURE — 99024 POSTOP FOLLOW-UP VISIT: CPT | Performed by: ORTHOPAEDIC SURGERY

## 2022-05-18 NOTE — LETTER
5/18/2022    Patient: Mago Hicks. YOB: 1960   Date of Visit: 5/18/2022     Shanita Henning MD  [address] [address] [place] [address],[address] [id number]  Via In Basket    Dear Shanita Henning MD,      Thank you for referring Mr. To Hendrix to [facility] for evaluation. My notes for this consultation are attached. If you have questions, please do not hesitate to call me. I look forward to following your patient along with you.       Sincerely,    Vertis Severe, DO

## 2022-05-18 NOTE — PROGRESS NOTES
Fatuma Levi (: 1960) is a 62 y.o. male, established patient, here for evaluation of the following chief complaint(s):  Post OP Follow Up and Shoulder Pain       ASSESSMENT/PLAN:  Below is the assessment and plan developed based on review of pertinent history, physical exam, labs, studies, and medications. We discussed his massive rotator cuff tear and the repair. We will continue on his sling. He would like to return to work in a sling. I will plan to see him back in 4 weeks and we will start physical therapy for passive range of motion at that time. 1. Status post arthroscopy of shoulder      Return in about 4 weeks (around 6/15/2022). SUBJECTIVE/OBJECTIVE:  Fatuma Levi (: 1960) is a 62 y.o. male. Is status post massive left shoulder rotator cuff repair. Overall he notes the pain has been tolerable. No Known Allergies    Current Outpatient Medications   Medication Sig    enoxaparin (LOVENOX) 40 mg/0.4 mL 0.4 mL by SubCUTAneous route daily.  rosuvastatin (CRESTOR) 10 mg tablet TAKE 1 TABLET NIGHTLY    fluticasone propion-salmeteroL (Advair Diskus) 100-50 mcg/dose diskus inhaler Take 1 Puff by inhalation two (2) times a day.  esomeprazole (NEXIUM) 40 mg capsule TAKE 1 CAPSULE DAILY    montelukast (SINGULAIR) 10 mg tablet TAKE 1 TABLET DAILY    predniSONE (DELTASONE) 10 mg tablet Taper daily. 60mg x1, 50mg x 1, 40mg x 1, 30mg x 1, 20mg x 1, 10mg x 1 (Patient not taking: Reported on 2021)    albuterol sulfate 90 mcg/actuation aepb Take 2 Puffs by inhalation every four (4) hours as needed for Cough.  (Patient not taking: Reported on 2021)    diclofenac (VOLTAREN) 1 % gel APPLY FOUR GRAMS TO LEFT KNEE FOUR TIMES DAILY AS NEEDED (Patient not taking: Reported on 2021)    meloxicam (MOBIC) 7.5 mg tablet TAKE 1 TABLET BY MOUTH EVERY DAY IN THE MORNING WITH FOOD FOR 3-5 DAYS FOR SERIOUS ARTHRITIC FLARES    mesalamine (LIALDA) 1.2 gram delayed release tablet Take 2.4 g by mouth.  abatacept/maltose (ORENCIA, WITH MALTOSE, IV) by IntraVENous route.  budesonide (RHINOCORT AQUA) 32 mcg/actuation nasal spray 2 Sprays by Both Nostrils route daily. No current facility-administered medications for this visit. Social History     Socioeconomic History    Marital status:      Spouse name: Not on file    Number of children: Not on file    Years of education: Not on file    Highest education level: Not on file   Occupational History    Not on file   Tobacco Use    Smoking status: Never Smoker    Smokeless tobacco: Never Used   Substance and Sexual Activity    Alcohol use: Yes     Alcohol/week: 2.0 standard drinks     Types: 2 Shots of liquor per week     Comment: occasional     Drug use: No    Sexual activity: Yes     Partners: Female     Birth control/protection: None   Other Topics Concern     Service Not Asked    Blood Transfusions Not Asked    Caffeine Concern Not Asked    Occupational Exposure Not Asked    Hobby Hazards Not Asked    Sleep Concern Not Asked    Stress Concern Not Asked    Weight Concern Not Asked    Special Diet Not Asked    Back Care Not Asked    Exercise Not Asked    Bike Helmet Not Asked   [address] Not Asked    Self-Exams Not Asked   Social History Narrative    Not on file     Social Determinants of Health     Financial Resource Strain:     Difficulty of Paying Living Expenses: Not on file   Food Insecurity:     Worried About Running Out of Food in the Last Year: Not on file    Aleshia of Food in the Last Year: Not on file   Transportation Needs:     Lack of Transportation (Medical): Not on file    Lack of Transportation (Non-Medical):  Not on file   Physical Activity:     Days of Exercise per Week: Not on file    Minutes of Exercise per Session: Not on file   Stress:     Feeling of Stress : Not on file   Social Connections:     Frequency of Communication with Friends and Family: Not on file    Frequency of Social Gatherings with Friends and Family: Not on file    Attends Christianity Services: Not on file    Active Member of Clubs or Organizations: Not on file    Attends Club or Organization Meetings: Not on file    Marital Status: Not on file   Intimate Partner Violence:     Fear of Current or Ex-Partner: Not on file    Emotionally Abused: Not on file    Physically Abused: Not on file    Sexually Abused: Not on file   Housing Stability:     Unable to Pay for Housing in the Last Year: Not on file    Number of [place] in the Last Year: Not on file    Unstable Housing in the Last Year: Not on file       Past Surgical History:   Procedure Laterality Date    HX GI   & 2016    COLONOSCOPY    HX KNEE ARTHROSCOPY Left 10/2017    HX KNEE REPLACEMENT Left 2019    revision Dr. Lennon Range    HX LUMBAR DISKECTOMY N/A     herniated disk    HX OPEN REDUCTION INTERNAL FIXATION Left 2017    Tibia fx    HX TONSILLECTOMY      OR ABDOMEN SURGERY PROC UNLISTED Bilateral     Hernia Repair    OR ABDOMEN SURGERY PROC UNLISTED Left     Inquinal hernia    OR RMVL RUPTURED BREAST IMPLANT W/IMPLANT CONTENTS Left 2017    IND with plate removal of tibia       Family History   Problem Relation Age of Onset    Diabetes Mother     Heart Attack Father         SEVERAL MIs    Arrhythmia Father         HAD PACEMAKER    Stroke Father     Other Father          OF MRSA    No Known Problems Sister                REVIEW OF SYSTEMS:  ROS     Positive for: Musculoskeletal    Last edited by Cristhian Ramírez on 2022 10:30 AM. (History)        Patient denies any recent fever, chills, nausea, vomiting, chest pain, or shortness of breath. Vitals:  Ht 5' 11\" (1.803 m)   Wt 200 lb (90.7 kg)   BMI 27.89 kg/m²    Body mass index is 27.89 kg/m². PHYSICAL EXAM:  General exam: Patient is awake, alert, and oriented x3. Well-appearing. No acute distress. Ambulates with a normal gait. Left shoulder: Neurovascular and sensory intact. Incisions well-healed with no signs of erythema or drainage. Mild swelling and ecchymosis. Decreased range of motion. Normal stability. IMAGING:    XR Results (most recent):  Results from Hospital Encounter encounter on 12/21/21    XR HIP RT W OR WO PELV 2-3 VWS    Narrative  EXAM: XR HIP RT W OR WO PELV 2-3 VWS    INDICATION: Right hip pain. COMPARISON: None. FINDINGS: AP view of the pelvis and a frogleg lateral view of the right hip  demonstrate no fracture, dislocation or other acute abnormality. There is mild  bilateral hip osteoarthritis, left greater than right. Surgical clips overlie  the lower pelvis. Impression  No acute abnormality. Mild bilateral hip osteoarthritis. Results from [place] encounter on 03/26/20    XR KNEE LT 3 V    Narrative  INDICATION: postop    Exam: AP, lateral, oblique views of the left knee. FINDINGS: There is no acute fracture-dislocation. There is a left knee  arthroplasty without evidence of orthopedic hardware complication. Bones are  diffusely demineralized. Soft tissues are normal.    Impression  IMPRESSION: No acute fracture or dislocation. Results from [place] encounter on 03/25/19    XR KNEE LT MAX 2 VWS    Narrative  Indication: postop    Impression  IMPRESSION: AP and lateral views of the left knee demonstrate a total knee  arthroplasty in near anatomic alignment and without evidence of orthopedic  hardware complication. No orders of the defined types were placed in this encounter. An electronic signature was used to authenticate this note.   -- Deena Fake, DO

## 2022-05-18 NOTE — PATIENT INSTRUCTIONS
Date of appointment:  5/18/2022     Examining Physician: Dominick Teran      Employee information    Name:  Aggie Santos YOB: 1960                               Medical record number: [id number]      Company information      Reason for visit: Status post left rotator cuff repair    Follow-up Requested:  Four Weeks    Treatment: Immobilization    Work Status Restrictions: May return to work with the following restrictions: Other: May return to work 5/23/22 restrictions. Allow sling at all times. No lifting, pushing or pulling with the left shoulder.       Signed: Sandeep Rosenbaum DO

## 2022-05-26 RX ORDER — MONTELUKAST SODIUM 10 MG/1
TABLET ORAL
Qty: 90 TABLET | Refills: 3 | Status: SHIPPED | OUTPATIENT
Start: 2022-05-26

## 2022-06-17 ENCOUNTER — OFFICE VISIT (OUTPATIENT)
Dept: ORTHOPEDIC SURGERY | Age: 62
End: 2022-06-17
Payer: COMMERCIAL

## 2022-06-17 VITALS — HEIGHT: 71 IN | BODY MASS INDEX: 28 KG/M2 | WEIGHT: 200 LBS

## 2022-06-17 DIAGNOSIS — Z98.890 STATUS POST ARTHROSCOPY OF SHOULDER: Primary | ICD-10-CM

## 2022-06-17 PROCEDURE — 99024 POSTOP FOLLOW-UP VISIT: CPT | Performed by: ORTHOPAEDIC SURGERY

## 2022-06-17 NOTE — LETTER
6/17/2022    Patient: Fallon Lucas. YOB: 1960   Date of Visit: 6/17/2022     Reena Gonzalez MD  [address] [facility] [phone]  Via In Basket    Dear Reena Gonzalez MD,      Thank you for referring Mr. Maria L Martínez to [facility] for evaluation. My notes for this consultation are attached. If you have questions, please do not hesitate to call me. I look forward to following your patient along with you.       Sincerely,    Ashley Flowers, DO

## 2022-06-17 NOTE — PROGRESS NOTES
Gato English (: 1960) is a 62 y.o. male, established patient, here for evaluation of the following chief complaint(s):  Post OP Follow Up and Shoulder Pain       ASSESSMENT/PLAN:  Below is the assessment and plan developed based on review of pertinent history, physical exam, labs, studies, and medications. Overall he is progressing well. His passive range of motion feels good today. I recommended starting a therapy regimen for passive range of motion. I will plan to see him back in 1 month months and we will advance his activity level. 1. Status post arthroscopy of shoulder  -     REFERRAL TO PHYSICAL THERAPY      Return in about 4 weeks (around 7/15/2022). SUBJECTIVE/OBJECTIVE:  Gato English (: 1960) is a 62 y.o. male. He notes no pain at this time. He has continued in his sling for the most part but over the last couple days he has not used it as much. No Known Allergies    Current Outpatient Medications   Medication Sig    montelukast (SINGULAIR) 10 mg tablet TAKE 1 TABLET DAILY    enoxaparin (LOVENOX) 40 mg/0.4 mL 0.4 mL by SubCUTAneous route daily.  rosuvastatin (CRESTOR) 10 mg tablet TAKE 1 TABLET NIGHTLY    fluticasone propion-salmeteroL (Advair Diskus) 100-50 mcg/dose diskus inhaler Take 1 Puff by inhalation two (2) times a day.  esomeprazole (NEXIUM) 40 mg capsule TAKE 1 CAPSULE DAILY    predniSONE (DELTASONE) 10 mg tablet Taper daily. 60mg x1, 50mg x 1, 40mg x 1, 30mg x 1, 20mg x 1, 10mg x 1 (Patient not taking: Reported on 2021)    albuterol sulfate 90 mcg/actuation aepb Take 2 Puffs by inhalation every four (4) hours as needed for Cough.  (Patient not taking: Reported on 2021)    diclofenac (VOLTAREN) 1 % gel APPLY FOUR GRAMS TO LEFT KNEE FOUR TIMES DAILY AS NEEDED (Patient not taking: Reported on 2021)    meloxicam (MOBIC) 7.5 mg tablet TAKE 1 TABLET BY MOUTH EVERY DAY IN THE MORNING WITH FOOD FOR 3-5 DAYS FOR SERIOUS ARTHRITIC FLARES    mesalamine (LIALDA) 1.2 gram delayed release tablet Take 2.4 g by mouth.  abatacept/maltose (ORENCIA, WITH MALTOSE, IV) by IntraVENous route.  budesonide (RHINOCORT AQUA) 32 mcg/actuation nasal spray 2 Sprays by Both Nostrils route daily. No current facility-administered medications for this visit. Social History     Socioeconomic History    Marital status:      Spouse name: Not on file    Number of children: Not on file    Years of education: Not on file    Highest education level: Not on file   Occupational History    Not on file   Tobacco Use    Smoking status: Never Smoker    Smokeless tobacco: Never Used   Substance and Sexual Activity    Alcohol use: Yes     Alcohol/week: 2.0 standard drinks     Types: 2 Shots of liquor per week     Comment: occasional     Drug use: No    Sexual activity: Yes     Partners: Female     Birth control/protection: None   Other Topics Concern     Service Not Asked    Blood Transfusions Not Asked    Caffeine Concern Not Asked    Occupational Exposure Not Asked    Hobby Hazards Not Asked    Sleep Concern Not Asked    Stress Concern Not Asked    Weight Concern Not Asked    Special Diet Not Asked    Back Care Not Asked    Exercise Not Asked    Bike Helmet Not Asked   [address] Not Asked    Self-Exams Not Asked   Social History Narrative    Not on file     Social Determinants of Health     Financial Resource Strain:     Difficulty of Paying Living Expenses: Not on file   Food Insecurity:     Worried About Running Out of Food in the Last Year: Not on file    Aleshia of Food in the Last Year: Not on file   Transportation Needs:     Lack of Transportation (Medical): Not on file    Lack of Transportation (Non-Medical):  Not on file   Physical Activity:     Days of Exercise per Week: Not on file    Minutes of Exercise per Session: Not on file   Stress:     Feeling of Stress : Not on file   Social Connections:     Frequency of Communication with Friends and Family: Not on file    Frequency of Social Gatherings with Friends and Family: Not on file    Attends Muslim Services: Not on file    Active Member of Clubs or Organizations: Not on file    Attends Club or Organization Meetings: Not on file    Marital Status: Not on file   Intimate Partner Violence:     Fear of Current or Ex-Partner: Not on file    Emotionally Abused: Not on file    Physically Abused: Not on file    Sexually Abused: Not on file   Housing Stability:     Unable to Pay for Housing in the Last Year: Not on file    Number of [place] in the Last Year: Not on file    Unstable Housing in the Last Year: Not on file       Past Surgical History:   Procedure Laterality Date    HX GI   & 2016    COLONOSCOPY    HX KNEE ARTHROSCOPY Left 10/2017    HX KNEE REPLACEMENT Left 2019    revision Dr. Oralia Kimble    HX LUMBAR DISKECTOMY N/A     herniated disk    HX OPEN REDUCTION INTERNAL FIXATION Left 2017    Tibia fx    HX TONSILLECTOMY      [place] ABDOMEN SURGERY PROC UNLISTED Bilateral     Hernia Repair    [place] ABDOMEN SURGERY PROC UNLISTED Left     Inquinal hernia    [place] RMVL RUPTURED BREAST IMPLANT W/IMPLANT CONTENTS Left 2017    IND with plate removal of tibia       Family History   Problem Relation Age of Onset    Diabetes Mother     Heart Attack Father         SEVERAL MIs    Arrhythmia Father         HAD PACEMAKER    Stroke Father     Other Father          OF MRSA    No Known Problems Sister                REVIEW OF SYSTEMS:  ROS     Positive for: Musculoskeletal    Last edited by Ariadna Castañeda on 2022  8:32 AM. (History)        Patient denies any recent fever, chills, nausea, vomiting, chest pain, or shortness of breath. Vitals:  Ht 5' 11\" (1.803 m)   Wt 200 lb (90.7 kg)   BMI 27.89 kg/m²    Body mass index is 27.89 kg/m².        PHYSICAL EXAM:  General exam: Patient is awake, alert, and oriented x3. Well-appearing. No acute distress. Ambulates with a normal gait. Left shoulder: Neurovascular and sensory intact. Incisions well-healed with no signs of erythema or drainage. Passive forward flexion abduction 90 degrees today. Normal stability. No strength testing was performed. IMAGING:    XR Results (most recent):  Results from Hospital Encounter encounter on 12/21/21    XR HIP RT W OR WO PELV 2-3 VWS    Narrative  EXAM: XR HIP RT W OR WO PELV 2-3 VWS    INDICATION: Right hip pain. COMPARISON: None. FINDINGS: AP view of the pelvis and a frogleg lateral view of the right hip  demonstrate no fracture, dislocation or other acute abnormality. There is mild  bilateral hip osteoarthritis, left greater than right. Surgical clips overlie  the lower pelvis. Impression  No acute abnormality. Mild bilateral hip osteoarthritis. Results from [place] encounter on 03/26/20    XR KNEE LT 3 V    Narrative  INDICATION: postop    Exam: AP, lateral, oblique views of the left knee. FINDINGS: There is no acute fracture-dislocation. There is a left knee  arthroplasty without evidence of orthopedic hardware complication. Bones are  diffusely demineralized. Soft tissues are normal.    Impression  IMPRESSION: No acute fracture or dislocation. Results from [place] encounter on 03/25/19    XR KNEE LT MAX 2 VWS    Narrative  Indication: postop    Impression  IMPRESSION: AP and lateral views of the left knee demonstrate a total knee  arthroplasty in near anatomic alignment and without evidence of orthopedic  hardware complication. Orders Placed This Encounter    REFERRAL TO PHYSICAL THERAPY     Referral Priority:   Routine     Referral Type:   PT/OT/ST     Referral Reason:   Specialty Services Required     Number of Visits Requested:   1              An electronic signature was used to authenticate this note.   -- Gabe Weber DO

## 2022-06-20 ENCOUNTER — OFFICE VISIT (OUTPATIENT)
Dept: INTERNAL MEDICINE CLINIC | Age: 62
End: 2022-06-20
Payer: COMMERCIAL

## 2022-06-20 VITALS
DIASTOLIC BLOOD PRESSURE: 88 MMHG | BODY MASS INDEX: 29.4 KG/M2 | TEMPERATURE: 98.6 F | SYSTOLIC BLOOD PRESSURE: 136 MMHG | WEIGHT: 210 LBS | HEART RATE: 73 BPM | HEIGHT: 71 IN | OXYGEN SATURATION: 96 %

## 2022-06-20 DIAGNOSIS — Z12.5 PROSTATE CANCER SCREENING: ICD-10-CM

## 2022-06-20 DIAGNOSIS — B07.9 WART OF HAND: ICD-10-CM

## 2022-06-20 DIAGNOSIS — Z00.00 ROUTINE MEDICAL EXAM: Primary | ICD-10-CM

## 2022-06-20 DIAGNOSIS — E78.2 MIXED HYPERLIPIDEMIA: ICD-10-CM

## 2022-06-20 DIAGNOSIS — E55.9 VITAMIN D DEFICIENCY: ICD-10-CM

## 2022-06-20 DIAGNOSIS — R73.02 IGT (IMPAIRED GLUCOSE TOLERANCE): ICD-10-CM

## 2022-06-20 DIAGNOSIS — K42.9 UMBILICAL HERNIA WITHOUT OBSTRUCTION AND WITHOUT GANGRENE: ICD-10-CM

## 2022-06-20 DIAGNOSIS — K51.90 ULCERATIVE COLITIS WITHOUT COMPLICATIONS, UNSPECIFIED LOCATION (HCC): ICD-10-CM

## 2022-06-20 DIAGNOSIS — R22.2 SUPRACLAVICULAR FOSSA FULLNESS: ICD-10-CM

## 2022-06-20 PROCEDURE — 99396 PREV VISIT EST AGE 40-64: CPT | Performed by: INTERNAL MEDICINE

## 2022-06-20 PROCEDURE — 17110 DESTRUCTION B9 LES UP TO 14: CPT | Performed by: INTERNAL MEDICINE

## 2022-06-20 NOTE — PROGRESS NOTES
RM 13    Chief Complaint   Patient presents with    Complete Physical       Visit Vitals  /88   Pulse 73   Temp 98.6 °F (37 °C)   Ht 5' 11\" (1.803 m)   Wt 210 lb (95.3 kg)   SpO2 96%   BMI 29.29 kg/m²       3 most recent PHQ Screens 6/20/2022   Little interest or pleasure in doing things Not at all   Feeling down, depressed, irritable, or hopeless Not at all   Total Score PHQ 2 0         1. Have you been to the ER, urgent care clinic since your last visit? Hospitalized since your last visit? No    2. Have you seen or consulted any other health care providers outside of the [address] [place] [address] since your last visit? Include any pap smears or colon screening. Dr Alyssa Mcdonnell rheumatology     Health Maintenance Due   Topic Date Due    Shingrix Vaccine Age 50> (1 of 2) Never done    Pneumococcal 0-64 years (2 - PPSV23 or PCV20) 12/28/2018    COVID-19 Vaccine (3 - Booster for Moderna series) 07/22/2021    Colorectal Cancer Screening Combo  11/05/2021       Learning Assessment 11/13/2018   PRIMARY LEARNER Patient   HIGHEST LEVEL OF EDUCATION - PRIMARY LEARNER  > 4 YEARS OF COLLEGE   BARRIERS PRIMARY LEARNER NONE   CO-LEARNER CAREGIVER No   PRIMARY LANGUAGE ENGLISH   LEARNER PREFERENCE PRIMARY READING   ANSWERED BY patient   RELATIONSHIP SELF         AVS  education, follow up, and recommendations provided and addressed with patient.   services used to advise patient -no

## 2022-06-20 NOTE — PROGRESS NOTES
Subjective:   Mike Wells is a 62 y.o. male presenting for his annual checkup. ROS:  Feeling well. No dyspnea or chest pain on exertion. No abdominal pain, change in bowel habits, black or bloody stools. No urinary tract or prostatic symptoms. No neurological complaints. Specific concerns today:   Left shoulder lump    Umbilical hernia? S/p shoulder surgery 6 weeks ago  Starting PT this week. right hand wart  Cryo at derm a few months ago, then grew back    Past medical, Social, and Family history reviewed  Medications reviewed and updated. Objective:     Visit Vitals  /88   Pulse 73   Temp 98.6 °F (37 °C)   Ht 5' 11\" (1.803 m)   Wt 210 lb (95.3 kg)   SpO2 96%   BMI 29.29 kg/m²     The patient appears well, alert, oriented x 3, in no distress. ENT normal.  Neck supple. No adenopathy or thyromegaly. SHOAIB. Lungs are clear, good air entry, no wheezes, rhonchi or rales. S1 and S2 normal, no murmurs, regular rate and rhythm. Abdomen is soft without tenderness, guarding, mass or organomegaly. +hernia - umbilical, +tender.  exam: deferred. Extremities show no edema, normal peripheral pulses. Neurological is normal without focal findings. Right hand/finger wart. Prior labs reviewed. Assessment/Plan:   healthy adult male  follow low fat diet, routine labs ordered, call if any problems. ICD-10-CM ICD-9-CM    1. Routine medical exam  Z00.00 V70.0       2. Mixed hyperlipidemia  E78.2 678.5 CK      METABOLIC PANEL, COMPREHENSIVE      LIPID PANEL      LIPID PANEL      METABOLIC PANEL, COMPREHENSIVE      CK      3. IGT (impaired glucose tolerance)  R73.02 790.22 HEMOGLOBIN A1C WITH EAG      HEMOGLOBIN A1C WITH EAG      4. Ulcerative colitis without complications, unspecified location (HCC)  K51.90 556.9 CBC WITH AUTOMATED DIFF      CBC WITH AUTOMATED DIFF      5.  Prostate cancer screening  Z12.5 V76.44 PSA SCREENING (SCREENING)      PSA SCREENING (SCREENING)      6. Vitamin D deficiency  E55.9 268.9 VITAMIN D, 25 HYDROXY      VITAMIN D, 25 HYDROXY      7. Umbilical hernia without obstruction and without gangrene  K42.9 553.1 REFERRAL TO GENERAL SURGERY      8. Wart of hand  B07.9 078.10 DESTRUC BENIGN LESION, UP TO 14 LESIONS      9. Supraclavicular fossa fullness  R22.2 786.6 US EXT NONVAS LT LTD        Follow-up and Dispositions    Return in about 6 months (around 12/20/2022), or if symptoms worsen or fail to improve, for cholesterol. results and schedule of future studies reviewed with patient  reviewed diet, exercise and weight   cardiovascular risk and specific lipid/LDL goals reviewed  reviewed medications and side effects in detail. Continue current medications   Cryo to wart   Reviewed Shingrix vaccine  US supraclavicular area  Labs   Ref to surgeon to consider hernia repair. Liquid nitrogen cryotherapy performed. Applied x 3 with thaw in between. Procedure tolerated well, no complication.

## 2022-06-21 LAB
25(OH)D3+25(OH)D2 SERPL-MCNC: 31.6 NG/ML (ref 30–100)
ALBUMIN SERPL-MCNC: 4.5 G/DL (ref 3.8–4.9)
ALBUMIN/GLOB SERPL: 1.6 {RATIO} (ref 1.2–2.2)
ALP SERPL-CCNC: 124 IU/L (ref 44–121)
ALT SERPL-CCNC: 34 IU/L (ref 0–44)
AST SERPL-CCNC: 36 IU/L (ref 0–40)
BASOPHILS # BLD AUTO: 0.1 X10E3/UL (ref 0–0.2)
BASOPHILS NFR BLD AUTO: 1 %
BILIRUB SERPL-MCNC: <0.2 MG/DL (ref 0–1.2)
BUN SERPL-MCNC: 14 MG/DL (ref 8–27)
BUN/CREAT SERPL: 14 (ref 10–24)
CALCIUM SERPL-MCNC: 9.8 MG/DL (ref 8.6–10.2)
CHLORIDE SERPL-SCNC: 108 MMOL/L (ref 96–106)
CHOLEST SERPL-MCNC: 177 MG/DL (ref 100–199)
CK SERPL-CCNC: 197 U/L (ref 41–331)
CO2 SERPL-SCNC: 17 MMOL/L (ref 20–29)
CREAT SERPL-MCNC: 0.99 MG/DL (ref 0.76–1.27)
EOSINOPHIL # BLD AUTO: 0.1 X10E3/UL (ref 0–0.4)
EOSINOPHIL NFR BLD AUTO: 2 %
ERYTHROCYTE [DISTWIDTH] IN BLOOD BY AUTOMATED COUNT: 12 % (ref 11.6–15.4)
EST. AVERAGE GLUCOSE BLD GHB EST-MCNC: 126 MG/DL
GLOBULIN SER CALC-MCNC: 2.8 G/DL (ref 1.5–4.5)
GLUCOSE SERPL-MCNC: 102 MG/DL (ref 65–99)
HBA1C MFR BLD: 6 % (ref 4.8–5.6)
HCT VFR BLD AUTO: 44.9 % (ref 37.5–51)
HDLC SERPL-MCNC: 54 MG/DL
HGB BLD-MCNC: 15 G/DL (ref 13–17.7)
IMM GRANULOCYTES # BLD AUTO: 0 X10E3/UL (ref 0–0.1)
IMM GRANULOCYTES NFR BLD AUTO: 1 %
LDLC SERPL CALC-MCNC: 101 MG/DL (ref 0–99)
LYMPHOCYTES # BLD AUTO: 2 X10E3/UL (ref 0.7–3.1)
LYMPHOCYTES NFR BLD AUTO: 28 %
MCH RBC QN AUTO: 31.8 PG (ref 26.6–33)
MCHC RBC AUTO-ENTMCNC: 33.4 G/DL (ref 31.5–35.7)
MCV RBC AUTO: 95 FL (ref 79–97)
MONOCYTES # BLD AUTO: 0.8 X10E3/UL (ref 0.1–0.9)
MONOCYTES NFR BLD AUTO: 11 %
NEUTROPHILS # BLD AUTO: 4.1 X10E3/UL (ref 1.4–7)
NEUTROPHILS NFR BLD AUTO: 57 %
PLATELET # BLD AUTO: 301 X10E3/UL (ref 150–450)
POTASSIUM SERPL-SCNC: 4.3 MMOL/L (ref 3.5–5.2)
PROT SERPL-MCNC: 7.3 G/DL (ref 6–8.5)
PSA SERPL-MCNC: 7.1 NG/ML (ref 0–4)
RBC # BLD AUTO: 4.72 X10E6/UL (ref 4.14–5.8)
SODIUM SERPL-SCNC: 143 MMOL/L (ref 134–144)
TRIGL SERPL-MCNC: 124 MG/DL (ref 0–149)
VLDLC SERPL CALC-MCNC: 22 MG/DL (ref 5–40)
WBC # BLD AUTO: 7.2 X10E3/UL (ref 3.4–10.8)

## 2022-06-21 NOTE — PROGRESS NOTES
PSA is elevated again, and you should see the urologist again to consider a biopsy or another MRI to be sure this level does not represent prostate cancer.   Dr. [facility] - [phone]  Other labs are either normal or stable and at goal.

## 2022-06-24 ENCOUNTER — HOSPITAL ENCOUNTER (OUTPATIENT)
Dept: ULTRASOUND IMAGING | Age: 62
Discharge: HOME | End: 2022-06-24
Attending: INTERNAL MEDICINE
Payer: COMMERCIAL

## 2022-06-24 DIAGNOSIS — R22.2 SUPRACLAVICULAR FOSSA FULLNESS: ICD-10-CM

## 2022-06-24 PROCEDURE — 76882 US LMTD JT/FCL EVL NVASC XTR: CPT

## 2022-06-29 ENCOUNTER — OFFICE VISIT (OUTPATIENT)
Dept: SURGERY | Age: 62
End: 2022-06-29
Payer: COMMERCIAL

## 2022-06-29 VITALS
WEIGHT: 212 LBS | TEMPERATURE: 97.6 F | DIASTOLIC BLOOD PRESSURE: 78 MMHG | HEART RATE: 72 BPM | OXYGEN SATURATION: 96 % | HEIGHT: 71 IN | SYSTOLIC BLOOD PRESSURE: 126 MMHG | BODY MASS INDEX: 29.68 KG/M2 | RESPIRATION RATE: 16 BRPM

## 2022-06-29 DIAGNOSIS — K42.9 UMBILICAL HERNIA WITHOUT OBSTRUCTION AND WITHOUT GANGRENE: Primary | ICD-10-CM

## 2022-06-29 PROCEDURE — 99202 OFFICE O/P NEW SF 15 MIN: CPT | Performed by: SURGERY

## 2022-06-29 NOTE — PROGRESS NOTES
Subjective:      Princess Caballero  is a 62 y.o. male who presents for evaluation of umbilical hernia. Pt c/o asymptomatic umbilical hernia for the past year. His PCP suggested he have this looked at by a surgeon. Pt denies pain or discomfort. Past Medical History:   Diagnosis Date    Allergic rhinitis     Asthma     allergy/URI related    Colon polyp 11/13/13    Diverticulosis     GERD (gastroesophageal reflux disease)     Hiatal hernia     History of vascular access device 11/21/2018    5 Fr double PICC 43 cm 2 cm out, long term abx, R basilic    Hypercholesterolemia     IGT (impaired glucose tolerance)     Kidney stone 2016    x2    Overweight (BMI 25.0-29.9) 11/12/2018    Pulmonary embolism (HCC)     Rheumatoid arthritis(714.0)     S/P PICC central line placement 11/09/2017    has been removed    UC [facility]ulcerative colitis) ([facility] [address]     In remission    Umbilical hernia without obstruction and without gangrene 6/29/2022       Past Surgical History:   Procedure Laterality Date    HX GI  2010 & 2016    COLONOSCOPY    HX KNEE ARTHROSCOPY Left 10/2017    HX KNEE REPLACEMENT Left 03/25/2019    revision Dr. Sanaz Childers    HX LUMBAR DISKECTOMY N/A 2012    herniated disk    HX OPEN REDUCTION INTERNAL FIXATION Left 07/2017    Tibia fx    HX ROTATOR CUFF REPAIR Left 05/10/2022    HX TONSILLECTOMY      [place] ABDOMEN SURGERY PROC UNLISTED Bilateral 2007    Hernia Repair    [place] ABDOMEN SURGERY PROC UNLISTED Left 1971    Inquinal hernia    [place] RMVL RUPTURED BREAST IMPLANT W/IMPLANT CONTENTS Left 11/2017    IND with plate removal of tibia       Social History     Tobacco Use    Smoking status: Never Smoker    Smokeless tobacco: Never Used   Substance Use Topics    Alcohol use:  Yes     Alcohol/week: 2.0 standard drinks     Types: 2 Shots of liquor per week     Comment: occasional        Family History   Problem Relation Age of Onset    Diabetes Mother     Heart Attack Father         SEVERAL MIs    Arrhythmia Father         HAD PACEMAKER    Stroke Father     Other Father          OF MRSA    No Known Problems Sister        Current Outpatient Medications on File Prior to Visit   Medication Sig Dispense Refill    montelukast (SINGULAIR) 10 mg tablet TAKE 1 TABLET DAILY 90 Tablet 3    rosuvastatin (CRESTOR) 10 mg tablet TAKE 1 TABLET NIGHTLY 90 Tablet 1    fluticasone propion-salmeteroL (Advair Diskus) 100-50 mcg/dose diskus inhaler Take 1 Puff by inhalation two (2) times a day. 180 Each 3    esomeprazole (NEXIUM) 40 mg capsule TAKE 1 CAPSULE DAILY 90 Capsule 3    meloxicam (MOBIC) 7.5 mg tablet TAKE 1 TABLET BY MOUTH EVERY DAY IN THE MORNING WITH FOOD FOR 3-5 DAYS FOR SERIOUS ARTHRITIC FLARES  0    mesalamine (LIALDA) 1.2 gram delayed release tablet Take 2.4 g by mouth.  abatacept/maltose (ORENCIA, WITH MALTOSE, IV) by IntraVENous route.  budesonide (RHINOCORT AQUA) 32 mcg/actuation nasal spray 2 Sprays by Both Nostrils route daily.  enoxaparin (LOVENOX) 40 mg/0.4 mL 0.4 mL by SubCUTAneous route daily. (Patient not taking: Reported on 2022) 21 Each 0    predniSONE (DELTASONE) 10 mg tablet Taper daily. 60mg x1, 50mg x 1, 40mg x 1, 30mg x 1, 20mg x 1, 10mg x 1 (Patient not taking: Reported on 2021) 21 Tab 0    albuterol sulfate 90 mcg/actuation aepb Take 2 Puffs by inhalation every four (4) hours as needed for Cough. (Patient not taking: Reported on 2021) 1 Inhaler 2    diclofenac (VOLTAREN) 1 % gel APPLY FOUR GRAMS TO LEFT KNEE FOUR TIMES DAILY AS NEEDED (Patient not taking: Reported on 2021)  1     No current facility-administered medications on file prior to visit.        No Known Allergies    Review of Systems:  Constitutional: No fever or chills  Neurologic: No headache  Eyes: No scleral icterus or irritated eyes  Nose: No nasal pain or drainage  Mouth: No oral lesions or sore throat  Cardiac: No palpations or chest pain  Pulmonary: +asthma  Gastrointestinal: +diverticulosis, +GERD  Genitourinary: No dysuria  Musculoskeletal: No muscle or joint tenderness  Skin: No rashes or lesions  Psychiatric: No anxiety or depressed mood    Objective:     Visit Vitals  /78 (BP 1 Location: Left upper arm, BP Patient Position: Sitting, BP Cuff Size: Adult)   Pulse 72   Temp 97.6 °F (36.4 °C) (Oral)   Resp 16   Ht 5' 11\" (1.803 m)   Wt 212 lb (96.2 kg)   SpO2 96%   BMI 29.57 kg/m²        Physical Exam:  General: No acute distress, conversant  Eyes: PERRLA, no scleral icterus  HENT: Normocephalic without oral lesions  Neck: Trachea midline without LAD  Cardiac: Normal pulse rate and rhythm  Pulmonary: Symmetric chest rise with normal effort  Abdomen: 1 cm easily reducible nontender umbilical hernia  Skin: Warm without rash  Extremities: No edema or joint stiffness  Psych: Appropriate mood and affect    Labs: No results found for this or any previous visit (from the past 24 hour(s)). Data Review: All previous imaging, testing and lab work was reviewed and interpreted. Assessment and Plan:       ICD-10-CM ICD-9-CM    1. Umbilical hernia without obstruction and without gangrene  K42.9 553.1        Explained their diagnosis thoroughly and noted that the presence of a hernia is not a determining factor when considering surgical repair. Due to the natural progression of a hernia, this will not heal on its own and may continue to increase in size over time. Will repair hernia if it becomes symptomatic/bothersome to pt or enlarging in size. Discussed details of repair as an outpatient with and without mesh. Reviewed what pt should expect for recovery. Pt would like to wait for repair, and will return if/when they consider repair. All questions were answered and they agree this this plan. Total face to face time with patient: 15 minutes. Greater than 50% of the time was spent in counseling.     This note was scribed by Lissy Greenfield as dictated by  [employer].      Signed By: Krysta Spears MD     06/29/22

## 2022-06-29 NOTE — LETTER
6/29/2022    Patient: Vanna Varela. YOB: 1960   Date of Visit: 6/29/2022     Miguel Mccall MD  [address] [id number]  Via In Basket    Dear Miguel Mccall MD,      Thank you for referring Mr. Mago Haro to [facility] for evaluation. My notes for this consultation are attached. If you have questions, please do not hesitate to call me. I look forward to following your patient along with you.       Sincerely,    Anamika Parikh MD

## 2022-06-29 NOTE — PROGRESS NOTES
1. Have you been to the ER, urgent care clinic since your last visit? Hospitalized since your last visit? No    2. Have you seen or consulted any other health care providers outside of the [address] [place] [address] since your last visit? Include any pap smears or colon screening.  No

## 2022-07-15 ENCOUNTER — OFFICE VISIT (OUTPATIENT)
Dept: ORTHOPEDIC SURGERY | Age: 62
End: 2022-07-15
Payer: COMMERCIAL

## 2022-07-15 VITALS — WEIGHT: 212 LBS | BODY MASS INDEX: 29.68 KG/M2 | HEIGHT: 71 IN

## 2022-07-15 DIAGNOSIS — Z98.890 STATUS POST ARTHROSCOPY OF SHOULDER: Primary | ICD-10-CM

## 2022-07-15 PROCEDURE — 99024 POSTOP FOLLOW-UP VISIT: CPT | Performed by: ORTHOPAEDIC SURGERY

## 2022-07-15 NOTE — PROGRESS NOTES
Anoop Barajas (: 1960) is a 62 y.o. male, established patient, here for evaluation of the following chief complaint(s):  Post OP Follow Up and Shoulder Pain       ASSESSMENT/PLAN:  Below is the assessment and plan developed based on review of pertinent history, physical exam, labs, studies, and medications. Findings were discussed with the patient today. He will start to progress his active range of motion with physical therapy. I will plan to see him back in 2 months. 1. Status post arthroscopy of shoulder      Return in about 2 months (around 9/15/2022). SUBJECTIVE/OBJECTIVE:  Anoop Barajas (: 1960) is a 62 y.o. male. He is 2 months status post left massive rotator cuff repair. Overall he notes that he is moving in the right direction with physical therapy as he has regained most of his passive range of motion. No Known Allergies    Current Outpatient Medications   Medication Sig    montelukast (SINGULAIR) 10 mg tablet TAKE 1 TABLET DAILY    enoxaparin (LOVENOX) 40 mg/0.4 mL 0.4 mL by SubCUTAneous route daily. (Patient not taking: Reported on 2022)    rosuvastatin (CRESTOR) 10 mg tablet TAKE 1 TABLET NIGHTLY    fluticasone propion-salmeteroL (Advair Diskus) 100-50 mcg/dose diskus inhaler Take 1 Puff by inhalation two (2) times a day.  esomeprazole (NEXIUM) 40 mg capsule TAKE 1 CAPSULE DAILY    predniSONE (DELTASONE) 10 mg tablet Taper daily. 60mg x1, 50mg x 1, 40mg x 1, 30mg x 1, 20mg x 1, 10mg x 1 (Patient not taking: Reported on 2021)    albuterol sulfate 90 mcg/actuation aepb Take 2 Puffs by inhalation every four (4) hours as needed for Cough.  (Patient not taking: Reported on 2021)    diclofenac (VOLTAREN) 1 % gel APPLY FOUR GRAMS TO LEFT KNEE FOUR TIMES DAILY AS NEEDED (Patient not taking: Reported on 2021)    meloxicam (MOBIC) 7.5 mg tablet TAKE 1 TABLET BY MOUTH EVERY DAY IN THE MORNING WITH FOOD FOR 3-5 DAYS FOR SERIOUS ARTHRITIC FLARES    mesalamine (LIALDA) 1.2 gram delayed release tablet Take 2.4 g by mouth.  abatacept/maltose (ORENCIA, WITH MALTOSE, IV) by IntraVENous route.  budesonide (RHINOCORT AQUA) 32 mcg/actuation nasal spray 2 Sprays by Both Nostrils route daily. No current facility-administered medications for this visit. Social History     Socioeconomic History    Marital status:      Spouse name: Not on file    Number of children: Not on file    Years of education: Not on file    Highest education level: Not on file   Occupational History    Not on file   Tobacco Use    Smoking status: Never Smoker    Smokeless tobacco: Never Used   Vaping Use    Vaping Use: Never used   Substance and Sexual Activity    Alcohol use: Yes     Alcohol/week: 2.0 standard drinks     Types: 2 Shots of liquor per week     Comment: occasional     Drug use: No    Sexual activity: Yes     Partners: Female     Birth control/protection: None   Other Topics Concern     Service Not Asked    Blood Transfusions Not Asked    Caffeine Concern Not Asked    Occupational Exposure Not Asked    Hobby Hazards Not Asked    Sleep Concern Not Asked    Stress Concern Not Asked    Weight Concern Not Asked    Special Diet Not Asked    Back Care Not Asked    Exercise Not Asked    Bike Helmet Not Asked   [address] [facility] Not Asked    Self-Exams Not Asked   Social History Narrative    Not on file     Social Determinants of Health     Financial Resource Strain:     Difficulty of Paying Living Expenses: Not on file   Food Insecurity:     Worried About Running Out of Food in the Last Year: Not on file    Aleshia of Food in the Last Year: Not on file   Transportation Needs:     Lack of Transportation (Medical): Not on file    Lack of Transportation (Non-Medical):  Not on file   Physical Activity:     Days of Exercise per Week: Not on file    Minutes of Exercise per Session: Not on file   Stress:     Feeling of Stress : Not on file   Social Connections:     Frequency of Communication with Friends and Family: Not on file    Frequency of Social Gatherings with Friends and Family: Not on file    Attends Worship Services: Not on file    Active Member of Clubs or Organizations: Not on file    Attends Club or Organization Meetings: Not on file    Marital Status: Not on file   Intimate Partner Violence:     Fear of Current or Ex-Partner: Not on file    Emotionally Abused: Not on file    Physically Abused: Not on file    Sexually Abused: Not on file   Housing Stability:     Unable to Pay for Housing in the Last Year: Not on file    Number of [place] in the Last Year: Not on file    Unstable Housing in the Last Year: Not on file       Past Surgical History:   Procedure Laterality Date    HX GI   & 2016    COLONOSCOPY    HX KNEE ARTHROSCOPY Left 10/2017    HX KNEE REPLACEMENT Left 2019    revision Dr. Kaylee Hagan    HX LUMBAR DISKECTOMY N/A     herniated disk    HX OPEN REDUCTION INTERNAL FIXATION Left 2017    Tibia fx    HX ROTATOR CUFF REPAIR Left 05/10/2022    HX TONSILLECTOMY      IL ABDOMEN SURGERY PROC UNLISTED Bilateral     Hernia Repair    IL ABDOMEN SURGERY PROC UNLISTED Left 1971    Inquinal hernia    [place] RMVL RUPTURED BREAST IMPLANT W/IMPLANT CONTENTS Left 2017    IND with plate removal of tibia       Family History   Problem Relation Age of Onset    Diabetes Mother     Heart Attack Father         SEVERAL MIs    Arrhythmia Father         HAD PACEMAKER    Stroke Father     Other Father          OF MRSA    No Known Problems Sister                REVIEW OF SYSTEMS:  ROS     Positive for: Musculoskeletal    Last edited by Piyush Celaya on 7/15/2022  8:37 AM. (History)        Patient denies any recent fever, chills, nausea, vomiting, chest pain, or shortness of breath.       Vitals:  Ht 5' 11\" (1.803 m)   Wt 212 lb (96.2 kg)   BMI 29.57 kg/m²    Body mass index is 29.57 kg/m². PHYSICAL EXAM:  General exam: Patient is awake, alert, and oriented x3. Well-appearing. No acute distress. Ambulates with a normal gait. Left shoulder: Passive forward flexion and abduction 140 degrees today. He is able to actively forward flex/abduct 120 degrees. Gentle external rotation strength testing was adequate today. IMAGING:    XR Results (most recent):  Results from Hospital Encounter encounter on 12/21/21    XR HIP RT W OR WO PELV 2-3 VWS    Narrative  EXAM: XR HIP RT W OR WO PELV 2-3 VWS    INDICATION: Right hip pain. COMPARISON: None. FINDINGS: AP view of the pelvis and a frogleg lateral view of the right hip  demonstrate no fracture, dislocation or other acute abnormality. There is mild  bilateral hip osteoarthritis, left greater than right. Surgical clips overlie  the lower pelvis. Impression  No acute abnormality. Mild bilateral hip osteoarthritis. Results from [place] encounter on 03/26/20    XR KNEE LT 3 V    Narrative  INDICATION: postop    Exam: AP, lateral, oblique views of the left knee. FINDINGS: There is no acute fracture-dislocation. There is a left knee  arthroplasty without evidence of orthopedic hardware complication. Bones are  diffusely demineralized. Soft tissues are normal.    Impression  IMPRESSION: No acute fracture or dislocation. Results from [place] encounter on 03/25/19    XR KNEE LT MAX 2 VWS    Narrative  Indication: postop    Impression  IMPRESSION: AP and lateral views of the left knee demonstrate a total knee  arthroplasty in near anatomic alignment and without evidence of orthopedic  hardware complication. No orders of the defined types were placed in this encounter. An electronic signature was used to authenticate this note.   -- Niki Nichols DO

## 2022-07-15 NOTE — LETTER
7/15/2022    Patient: Keerthi Torres. YOB: 1960   Date of Visit: 7/15/2022     Giuliano Cooley MD  [address] [id number]  Via In Basket    Dear Giuliano Cooley MD,      Thank you for referring Mr. Salvador Marino to [facility] for evaluation. My notes for this consultation are attached. If you have questions, please do not hesitate to call me. I look forward to following your patient along with you.       Sincerely,    Kevin Morrison, DO

## 2022-09-16 ENCOUNTER — OFFICE VISIT (OUTPATIENT)
Dept: ORTHOPEDIC SURGERY | Age: 62
End: 2022-09-16
Payer: COMMERCIAL

## 2022-09-16 VITALS — WEIGHT: 212 LBS | HEIGHT: 71 IN | BODY MASS INDEX: 29.68 KG/M2

## 2022-09-16 DIAGNOSIS — Z98.890 STATUS POST ARTHROSCOPY OF SHOULDER: Primary | ICD-10-CM

## 2022-09-16 PROCEDURE — 99212 OFFICE O/P EST SF 10 MIN: CPT | Performed by: ORTHOPAEDIC SURGERY

## 2022-09-16 NOTE — Clinical Note
9/16/2022    Patient: Eleln Olguin. YOB: 1960   Date of Visit: 9/16/2022     Tom Santana MD  Via     Dear Tom Santana MD,      Thank you for referring Mr. Rosetta Stevens to [facility] for evaluation. My notes for this consultation are attached. If you have questions, please do not hesitate to call me. I look forward to following your patient along with you.       Sincerely,    Chelsey Juarez, DO

## 2022-09-16 NOTE — PROGRESS NOTES
Jenna Desir (: 1960) is a 65 y.o. male, established patient, here for evaluation of the following chief complaint(s):  Shoulder Pain       ASSESSMENT/PLAN:  Below is the assessment and plan developed based on review of pertinent history, physical exam, labs, studies, and medications. Findings were discussed with the patient today. He is progressing very well for having such a large rotator cuff repair. He will continue to cautiously progress his active range of motion and strengthening. I will see him back in 2 months as needed    1. Status post arthroscopy of shoulder    Return in about 2 months (around 2022), or if symptoms worsen or fail to improve. SUBJECTIVE/OBJECTIVE:  Jenna Desir (: 1960) is a 65 y.o. male. He is now about 4 months out from massive rotator cuff repair. Overall he notes that he is doing very well. He is progressing with his passive range of motion and strengthening and physical therapy. No Known Allergies    Current Outpatient Medications   Medication Sig    montelukast (SINGULAIR) 10 mg tablet TAKE 1 TABLET DAILY    enoxaparin (LOVENOX) 40 mg/0.4 mL 0.4 mL by SubCUTAneous route daily. (Patient not taking: Reported on 2022)    rosuvastatin (CRESTOR) 10 mg tablet TAKE 1 TABLET NIGHTLY    fluticasone propion-salmeteroL (Advair Diskus) 100-50 mcg/dose diskus inhaler Take 1 Puff by inhalation two (2) times a day. esomeprazole (NEXIUM) 40 mg capsule TAKE 1 CAPSULE DAILY    predniSONE (DELTASONE) 10 mg tablet Taper daily. 60mg x1, 50mg x 1, 40mg x 1, 30mg x 1, 20mg x 1, 10mg x 1 (Patient not taking: No sig reported)    albuterol sulfate 90 mcg/actuation aepb Take 2 Puffs by inhalation every four (4) hours as needed for Cough.  (Patient not taking: Reported on 2021)    diclofenac (VOLTAREN) 1 % gel APPLY FOUR GRAMS TO LEFT KNEE FOUR TIMES DAILY AS NEEDED (Patient not taking: Reported on 2021)    meloxicam (MOBIC) 7.5 mg tablet TAKE 1 TABLET BY MOUTH EVERY DAY IN THE MORNING WITH FOOD FOR 3-5 DAYS FOR SERIOUS ARTHRITIC FLARES    mesalamine (LIALDA) 1.2 gram delayed release tablet Take 2.4 g by mouth. abatacept/maltose (ORENCIA, WITH MALTOSE, IV) by IntraVENous route. budesonide (RHINOCORT AQUA) 32 mcg/actuation nasal spray 2 Sprays by Both Nostrils route daily. No current facility-administered medications for this visit. Social History     Socioeconomic History    Marital status:      Spouse name: Not on file    Number of children: Not on file    Years of education: Not on file    Highest education level: Not on file   Occupational History    Not on file   Tobacco Use    Smoking status: Never    Smokeless tobacco: Never   Vaping Use    Vaping Use: Never used   Substance and Sexual Activity    Alcohol use:  Yes     Alcohol/week: 2.0 standard drinks     Types: 2 Shots of liquor per week     Comment: occasional     Drug use: No    Sexual activity: Yes     Partners: Female     Birth control/protection: None   Other Topics Concern     Service Not Asked    Blood Transfusions Not Asked    Caffeine Concern Not Asked    Occupational Exposure Not Asked    [address] Hazards Not Asked    Sleep Concern Not Asked    Stress Concern Not Asked    Weight Concern Not Asked    Special Diet Not Asked    Back Care Not Asked    Exercise Not Asked    Bike Helmet Not Asked    Seat Belt Not Asked    Self-Exams Not Asked   Social History Narrative    Not on file     Social Determinants of Health     Financial Resource Strain: Not on file   Food Insecurity: Not on file   Transportation Needs: Not on file   Physical Activity: Not on file   Stress: Not on file   Social Connections: Not on file   Intimate Partner Violence: Not on file   Housing Stability: Not on file       Past Surgical History:   Procedure Laterality Date    HX GI  2010 & 2016    COLONOSCOPY    HX KNEE ARTHROSCOPY Left 10/2017    HX KNEE REPLACEMENT Left 03/25/2019 revision Dr. Beth Ohara LUMBAR DISKECTOMY N/A     herniated disk    HX OPEN REDUCTION INTERNAL FIXATION Left 2017    Tibia fx    HX ROTATOR CUFF REPAIR Left 05/10/2022    HX TONSILLECTOMY      [place] ABDOMEN SURGERY PROC UNLISTED Bilateral     Hernia Repair    HI ABDOMEN SURGERY PROC UNLISTED Left     Inquinal hernia    [place] RMVL RUPTURED BREAST IMPLANT W/IMPLANT CONTENTS Left 2017    IND with plate removal of tibia       Family History   Problem Relation Age of Onset    Diabetes Mother     Heart Attack Father         SEVERAL MIs    Arrhythmia Father         HAD PACEMAKER    Stroke Father     Other Father          OF MRSA    No Known Problems Sister                REVIEW OF SYSTEMS:  ROS    Positive for: Musculoskeletal  Last edited by Carina Plata on 2022  8:26 AM.        Patient denies any recent fever, chills, nausea, vomiting, chest pain, or shortness of breath. Vitals:  Ht 5' 11\" (1.803 m)   Wt 212 lb (96.2 kg)   BMI 29.57 kg/m²    Body mass index is 29.57 kg/m². PHYSICAL EXAM:  General exam: Patient is awake, alert, and oriented x3. Well-appearing. No acute distress. Ambulates with a normal gait. Left shoulder: Near full passive and active range of motion noted on exam today. Significant improvement in rotator cuff strength noted    IMAGING:    XR Results (most recent):  Results from Hospital Encounter encounter on 21    XR HIP RT W OR WO PELV 2-3 VWS    Narrative  EXAM: XR HIP RT W OR WO PELV 2-3 VWS    INDICATION: Right hip pain. COMPARISON: None. FINDINGS: AP view of the pelvis and a frogleg lateral view of the right hip  demonstrate no fracture, dislocation or other acute abnormality. There is mild  bilateral hip osteoarthritis, left greater than right. Surgical clips overlie  the lower pelvis. Impression  No acute abnormality. Mild bilateral hip osteoarthritis.       Results from [place] encounter on 20    XR KNEE LT 3 V    Narrative  INDICATION: postop    Exam: AP, lateral, oblique views of the left knee. FINDINGS: There is no acute fracture-dislocation. There is a left knee  arthroplasty without evidence of orthopedic hardware complication. Bones are  diffusely demineralized. Soft tissues are normal.    Impression  IMPRESSION: No acute fracture or dislocation. Results from [place] encounter on 03/25/19    XR KNEE LT MAX 2 VWS    Narrative  Indication: postop    Impression  IMPRESSION: AP and lateral views of the left knee demonstrate a total knee  arthroplasty in near anatomic alignment and without evidence of orthopedic  hardware complication. No orders of the defined types were placed in this encounter. An electronic signature was used to authenticate this note.   -- Melody Mar DO

## 2022-10-24 DIAGNOSIS — J45.41 MODERATE PERSISTENT ASTHMA WITH ACUTE EXACERBATION: ICD-10-CM

## 2022-10-24 NOTE — TELEPHONE ENCOUNTER
Pt called he is a mirlande pt he has an appt with a new pcp but he has the mail order and saying they are charging him $391 for 3 month supply for Advair

## 2022-10-25 RX ORDER — FLUTICASONE PROPIONATE AND SALMETEROL 100; 50 UG/1; UG/1
1 POWDER RESPIRATORY (INHALATION) 2 TIMES DAILY
Qty: 180 EACH | Refills: 3 | Status: SHIPPED | OUTPATIENT
Start: 2022-10-25

## 2022-10-30 ENCOUNTER — PATIENT MESSAGE (OUTPATIENT)
Dept: INTERNAL MEDICINE CLINIC | Age: 62
End: 2022-10-30

## 2022-10-30 DIAGNOSIS — E78.2 MIXED HYPERLIPIDEMIA: ICD-10-CM

## 2022-10-31 NOTE — TELEPHONE ENCOUNTER
From: Lefty Chandra. To: [facility]  Sent: 10/30/2022 11:37 AM EDT  Subject: Refill request for [facility]    Im a former patient of Dr Andrea Jimenes and have made an appointment with another doctor, but not able to get in until late December. I need an [facility] refill request sent for my Rosuvastatin, please. I am going to run out before Thanksgiving! Thank you!   John Shearer

## 2022-11-02 RX ORDER — ROSUVASTATIN CALCIUM 10 MG/1
TABLET, COATED ORAL
Qty: 90 TABLET | Refills: 1 | Status: SHIPPED | OUTPATIENT
Start: 2022-11-02

## 2022-12-16 NOTE — PROGRESS NOTES
ICD-10-CM ICD-9-CM    1. Routine adult health maintenance  Z00.00 V70.0       2. Mixed hyperlipidemia  E78.2 272.2       3. Prediabetes  R73.03 790.29       4. Ulcerative colitis without complications, unspecified location (HCC)  K51.90 556.9       5. Hypercholesterolemia  E78.00 272.0       6. Mild persistent asthma without complication  X71.28 748.65                Subjective:     Chief Complaint   Patient presents with    [facility] is a 65 y.o. M. He has a past medical history of GERD. I reviewed and updated the medical record. This patient has not been seen in this clinic before. His last visit was with his previous primary care provider in June. At the time, he was noted to have hypercholesterolemia. Physical examination at the time had been unremarkable except for borderline obesity with a BMI of 30 kg/m². Routine laboratory studies were ordered. His PSA was noted to be elevated on laboratory studies. He was referred to urology. Today, the patient comes in for establishment, routine preventive care discussion, and followup on his chronic medical concerns. He reports feeling fine today and has no somatic complaints. Since his last visit, he was seen by Urology and had been diagnosed with prostate cancer based upon biopsy results. However, he says that this was felt to be low grade and that the Urology provider has opted to maintain a course of observation for now. He denies hematuria, abdominal pain, or other LUTS currently. He continues to follow with his Rheumatologist for his history of RA; he is on abatercept for this currently and denies any recent fevers, chills, or other constitutional or systemic complaints. He denies any recent joint symptoms. His Rheumatologist continues to monitor his laboratory studies for routine monitoring. He also is noted to have a history of UC for which he will be having his usual yearly CSP in January.  No recent hematochezia, melena, or abdominal discomfort or other constitutional or systemic complaints; he continues on Lialda for this and says he has been tolerating this well. He continues on rosuvastatin 10 mg/d for his hypercholesterolemia; I reviewed with him his labs from his previous appointment which showed mild hypercholesterolemia with LDL > 100 mg/dL as of 6/2022. His ROS is otherwise negative. Routine Healthcare Maintenance issues are reviewed and discussed with the patient as noted below. Orders to update gaps in healthcare maintenance were placed as noted below in the Assessment and Plan, where applicable. 10-year Cardiovascular Risk Keshawn Matta, 2013):   The 10-year ASCVD risk score (Michael MULLER, et al., 2019) is: 7.8%    Values used to calculate the score:      Age: 65 years      Sex: Male      Is Non- : No      Diabetic: No      Tobacco smoker: No      Systolic Blood Pressure: 876 mmHg      Is BP treated: No      HDL Cholesterol: 54 mg/dL      Total Cholesterol: 177 mg/dL       Past Medical History:  Past Medical History:   Diagnosis Date    Allergic rhinitis     Asthma, mild persistent     allergy/URI related    Diverticulosis     GERD (gastroesophageal reflux disease)     Hiatal hernia     History of colon polyps 11/13/2013    History of kidney stones 2016    x2    History of pulmonary embolism 03/2019    related to orthopedic surgery    History of umbilical hernia 83/55/1945    Umbilical hernia without obstruction and without gangrene    History of vascular access device 11/21/2018    5 Fr double PICC 43 cm 2 cm out, long term abx, R basilic    Hypercholesterolemia     Overweight (BMI 25.0-29.9) 11/12/2018    Prediabetes     Primary osteoarthritis involving multiple joints     Prostate cancer ([address] 11/2022    Follows with Urology (Dr. Bakari Farias @ [facility])    Rheumatoid arthritis(714.0)     UC (ulcerative colitis) ([address]     In remission       Past Surgical Histor:  Past Surgical History:   Procedure Laterality Date    HX GI  2010 & 2016    COLONOSCOPY    HX KNEE ARTHROSCOPY Left 10/2017    HX KNEE REPLACEMENT Left 03/25/2019    revision Dr. Rodrigo Botello LUMBAR DISKECTOMY N/A 2012    herniated disk    HX OPEN REDUCTION INTERNAL FIXATION Left 07/2017    Tibia fx    HX ROTATOR CUFF REPAIR Left 05/10/2022    HX TONSILLECTOMY      MS ABDOMEN SURGERY PROC UNLISTED Bilateral 2007    Hernia Repair    MS ABDOMEN SURGERY PROC UNLISTED Left 1971    Inquinal hernia    MS RMVL RUPTURED BREAST IMPLANT W/IMPLANT CONTENTS Left 11/2017    IND with plate removal of tibia       Allergies:  No Known Allergies    Medications:  Current Outpatient Medications   Medication Sig Dispense Refill    Breo Ellipta 100-25 mcg/dose inhaler INHALE 1 PUFF BY MOUTH TWICE A DAY      rosuvastatin (CRESTOR) 10 mg tablet TAKE 1 TABLET NIGHTLY 90 Tablet 1    montelukast (SINGULAIR) 10 mg tablet TAKE 1 TABLET DAILY 90 Tablet 3    esomeprazole (NEXIUM) 40 mg capsule TAKE 1 CAPSULE DAILY 90 Capsule 3    meloxicam (MOBIC) 7.5 mg tablet TAKE 1 TABLET BY MOUTH EVERY DAY IN THE MORNING WITH FOOD FOR 3-5 DAYS FOR SERIOUS ARTHRITIC FLARES  0    mesalamine (LIALDA) 1.2 gram delayed release tablet Take 2.4 g by mouth daily. abatacept/maltose (ORENCIA, WITH MALTOSE, IV) 1 Dose by IntraVENous route every four (4) weeks. Social History:  Social History     Socioeconomic History    Marital status:    Tobacco Use    Smoking status: Never    Smokeless tobacco: Never   Vaping Use    Vaping Use: Never used   Substance and Sexual Activity    Alcohol use:  Yes     Alcohol/week: 2.0 standard drinks     Types: 2 Shots of liquor per week     Comment: occasional     Drug use: No    Sexual activity: Yes     Partners: Female     Birth control/protection: None       Family History:  Family History   Problem Relation Age of Onset    Diabetes Mother     Heart Attack Father         SEVERAL MIs    Arrhythmia Father         HAD PACEMAKER    Stroke Father     Other Father          OF MRSA    No Known Problems Sister        Immunizations:  Immunization History   Administered Date(s) Administered    COVID-19, MODERNA BLUE border, Primary or Immunocompromised, (age 18y+), IM, 100 mcg/0.5mL 2021, 2021    COVID-19, MODERNA Booster BLUE border, (age 18y+), IM, 50mcg/0.25mL 2022    Hep A Vaccine (Adult) 2014, 2016    Influenza Vaccine 2014, 11/15/2015, 11/10/2016, 10/01/2017, 10/23/2022    Influenza Vaccine Whole 2010    Influenza, FLUARIX, FLULAVAL, FLUZONE (age 10 mo+) AND AFLURIA, (age 1 y+), PF, 0.5mL 2018, 10/28/2020    Influenza, FLUCELVAX, (age 10 mo+), MDCK, PF 10/16/2019    Pneumococcal Conjugate (PCV-13) 2017, 2017    Tdap 2014    Typhoid Vi Capsular Polysaccharide Vaccine 2014, 2016    Zoster Vaccine, Live 2014        Healthcare Maintenance:  Health Maintenance   Topic Date Due    Shingles Vaccine (1 of 2) Never done    Pneumococcal 0-64 years (2 - PPSV23 if available, else PCV20) 2018    COVID-19 Vaccine (4 - Booster for Moderna series) 2022    A1C test (Diabetic or Prediabetic)  2023    Lipid Screen  2023    Depression Screen  2023    DTaP/Tdap/Td series (2 - Td or Tdap) 2024    Colorectal Cancer Screening Combo  2024    Hepatitis C Screening  Completed    Flu Vaccine  Completed        Review of Systems:  ROS:  Review of Systems   Constitutional: Negative. HENT: Negative. Eyes: Negative. Respiratory: Negative. Cardiovascular: Negative. Gastrointestinal: Negative. Genitourinary: Negative. Musculoskeletal: Negative. Skin: Negative. Neurological: Negative. Endo/Heme/Allergies: Negative. Psychiatric/Behavioral: Negative.       ROS otherwise negative      Objective:     Vital Signs:  Visit Vitals  /82 (BP 1 Location: Left upper arm, BP Patient Position: Sitting, BP Cuff Size: Large adult)   Pulse 90   Resp 16   Ht 5' 11\" (1.803 m)   Wt 215 lb 11.2 oz (97.8 kg)   SpO2 97%   BMI 30.08 kg/m²       BMI:  Body mass index is 30.08 kg/m². Physical Examination:  Physical Exam  Vitals reviewed. Constitutional:       Appearance: Normal appearance. He is obese. HENT:      Head: Normocephalic and atraumatic. Nose: Nose normal.      Mouth/Throat:      Mouth: Mucous membranes are moist.   Eyes:      Extraocular Movements: Extraocular movements intact. Conjunctiva/sclera: Conjunctivae normal.      Pupils: Pupils are equal, round, and reactive to light. Cardiovascular:      Rate and Rhythm: Normal rate and regular rhythm. Pulses: Normal pulses. Heart sounds: Normal heart sounds. No murmur heard. No friction rub. No gallop. Pulmonary:      Effort: Pulmonary effort is normal. No respiratory distress. Breath sounds: Normal breath sounds. No wheezing, rhonchi or rales. Abdominal:      General: Bowel sounds are normal. There is no distension. Palpations: Abdomen is soft. There is no mass. Tenderness: There is no abdominal tenderness. There is no guarding or rebound. Musculoskeletal:         General: No tenderness, deformity or signs of injury. Normal range of motion. Cervical back: Normal range of motion and neck supple. Right lower leg: No edema. Left lower leg: No edema. Skin:     General: Skin is warm and dry. Findings: No bruising, lesion or rash. Neurological:      General: No focal deficit present. Mental Status: He is alert and oriented to person, place, and time. Mental status is at baseline. Cranial Nerves: No cranial nerve deficit. Sensory: No sensory deficit. Motor: No weakness.       Coordination: Coordination normal.      Gait: Gait normal.      Deep Tendon Reflexes: Reflexes normal.   Psychiatric:         Mood and Affect: Mood normal.         Behavior: Behavior normal.        Physical exam otherwise negative    Diagnostic Testing:    Laboratory Studies:  No visits with results within 6 Month(s) from this visit. Latest known visit with results is:   Office Visit on 06/20/2022   Component Date Value Ref Range Status    Cholesterol, total 06/20/2022 177  100 - 199 mg/dL Final    Triglyceride 06/20/2022 124  0 - 149 mg/dL Final    HDL Cholesterol 06/20/2022 54  >39 mg/dL Final    VLDL, calculated 06/20/2022 22  5 - 40 mg/dL Final    LDL, calculated 06/20/2022 101 (A)  0 - 99 mg/dL Final    Glucose 06/20/2022 102 (A)  65 - 99 mg/dL Final    BUN 06/20/2022 14  8 - 27 mg/dL Final    Creatinine 06/20/2022 0.99  0.76 - 1.27 mg/dL Final    BUN/Creatinine ratio 06/20/2022 14  10 - 24 Final    Sodium 06/20/2022 143  134 - 144 mmol/L Final    Potassium 06/20/2022 4.3  3.5 - 5.2 mmol/L Final    Chloride 06/20/2022 108 (A)  96 - 106 mmol/L Final    CO2 06/20/2022 17 (A)  20 - 29 mmol/L Final    Calcium 06/20/2022 9.8  8.6 - 10.2 mg/dL Final    Protein, total 06/20/2022 7.3  6.0 - 8.5 g/dL Final    Albumin 06/20/2022 4.5  3.8 - 4.9 g/dL Final    GLOBULIN, TOTAL 06/20/2022 2.8  1.5 - 4.5 g/dL Final    A-G Ratio 06/20/2022 1.6  1.2 - 2.2 Final    Bilirubin, total 06/20/2022 <0.2  0.0 - 1.2 mg/dL Final    Alk. phosphatase 06/20/2022 124 (A)  44 - 121 IU/L Final    AST (SGOT) 06/20/2022 36  0 - 40 IU/L Final    ALT (SGPT) 06/20/2022 34  0 - 44 IU/L Final    Prostate Specific Ag 06/20/2022 7.1 (A)  0.0 - 4.0 ng/mL Final    Comment: Roche ECLIA methodology. According to the American Urological Association, Serum PSA should  decrease and remain at undetectable levels after radical  prostatectomy. The AUA defines biochemical recurrence as an initial  PSA value 0.2 ng/mL or greater followed by a subsequent confirmatory  PSA value 0.2 ng/mL or greater. Values obtained with different assay methods or kits cannot be used  interchangeably.  Results cannot be interpreted as absolute evidence  of the presence or absence of malignant disease. VITAMIN D, 25-HYDROXY 06/20/2022 31.6  30.0 - 100.0 ng/mL Final    Comment: Vitamin D deficiency has been defined by the [address] practice guideline as a  level of serum 25-OH vitamin D less than 20 ng/mL (1,2). The Endocrine Society went on to further define vitamin D  insufficiency as a level between 21 and 29 ng/mL (2). 1. IOM ([place] of Medicine). 2010. Dietary reference     intakes for calcium and D. [facility]: The     [employer]. 2. Alejo MF, Cleo ANDRADE, Jenny CARLOS, et al.     Evaluation, treatment, and prevention of vitamin D     deficiency: an Endocrine Society clinical practice     guideline. JCEM. 2011 Jul; 96(7):1911-30. Hemoglobin A1c 06/20/2022 6.0 (A)  4.8 - 5.6 % Final    Comment:          Prediabetes: 5.7 - 6.4           Diabetes: >6.4           Glycemic control for adults with diabetes: <7.0      Estimated average glucose 06/20/2022 126  mg/dL Final    Creatine Kinase,Total 06/20/2022 197  41 - 331 U/L Final    WBC 06/20/2022 7.2  3.4 - 10.8 x10E3/uL Final    RBC 06/20/2022 4.72  4.14 - 5.80 x10E6/uL Final    HGB 06/20/2022 15.0  13.0 - 17.7 g/dL Final    HCT 06/20/2022 44.9  37.5 - 51.0 % Final    MCV 06/20/2022 95  79 - 97 fL Final    MCH 06/20/2022 31.8  26.6 - 33.0 pg Final    MCHC 06/20/2022 33.4  31.5 - 35.7 g/dL Final    RDW 06/20/2022 12.0  11.6 - 15.4 % Final    PLATELET 72/20/5781 510  150 - 450 x10E3/uL Final    NEUTROPHILS 06/20/2022 57  Not Estab. % Final    Lymphocytes 06/20/2022 28  Not Estab. % Final    MONOCYTES 06/20/2022 11  Not Estab. % Final    EOSINOPHILS 06/20/2022 2  Not Estab. % Final    BASOPHILS 06/20/2022 1  Not Estab. % Final    ABS. NEUTROPHILS 06/20/2022 4.1  1.4 - 7.0 x10E3/uL Final    Abs Lymphocytes 06/20/2022 2.0  0.7 - 3.1 x10E3/uL Final    ABS. MONOCYTES 06/20/2022 0.8  0.1 - 0.9 x10E3/uL Final    ABS. EOSINOPHILS 06/20/2022 0.1  0.0 - 0.4 x10E3/uL Final    ABS. BASOPHILS 06/20/2022 0.1  0.0 - 0.2 x10E3/uL Final    IMMATURE GRANULOCYTES 06/20/2022 1  Not Estab. % Final    ABS. IMM. GRANS. 06/20/2022 0.0  0.0 - 0.1 x10E3/uL Final         Radiographic Studies:  XR Results (most recent):  Results from Hospital Encounter encounter on 12/21/21    XR HIP RT W OR WO PELV 2-3 VWS    Narrative  EXAM: XR HIP RT W OR WO PELV 2-3 VWS    INDICATION: Right hip pain. COMPARISON: None. FINDINGS: AP view of the pelvis and a frogleg lateral view of the right hip  demonstrate no fracture, dislocation or other acute abnormality. There is mild  bilateral hip osteoarthritis, left greater than right. Surgical clips overlie  the lower pelvis. Impression  No acute abnormality. Mild bilateral hip osteoarthritis. RITA Results (most recent):  No results found for this or any previous visit. CT Results (most recent):  Results from Hospital Encounter encounter on 03/25/19    CTA CHEST W OR W WO CONT    Narrative  INDICATION: Tachycardia. Hypoxia. COMPARISON:August 7, 2017    TECHNIQUE:  Routine noncontrast imaging the chest was performed for localization purposes. Then, following the uneventful intravenous administration of 90 cc [id number],  thin helical axial images were obtained through the chest. 3D image  postprocessing was performed. CT dose reduction was achieved through use of a  standardized protocol tailored for this examination and automatic exposure  control for dose modulation. FINDINGS:    THYROID: No nodule. MEDIASTINUM: No mass or lymphadenopathy. KUNAL: No mass or lymphadenopathy. THORACIC AORTA: No dissection or aneurysm. PULMONARY ARTERIES: Main pulmonary artery is normal in caliber. Multiple small  pulmonary emboli in the right upper, middle, and lower lobes. TRACHEA/BRONCHI: Patent. ESOPHAGUS: No wall thickening or dilatation. HEART: Normal in size. PLEURA: No effusion or pneumothorax. LUNGS: No nodule, mass, or airspace disease.   INCIDENTALLY IMAGED UPPER ABDOMEN: No focal abnormality. BONES: No destructive bone lesion. ADDITIONAL COMMENTS: N/A    Impression  IMPRESSION:  Multiple small acute pulmonary emboli. The findings were called to the floor nurse, Lelo Manriquez on March 27, 2019 at 3:46  AM by Dr. Karo Schwarz. [facility] Results (most recent):  No results found for this or any previous visit. MRI Results (most recent):  Results from [place] encounter on 03/16/22    MRI SHOULDER LT WO CONT    Narrative  EXAM: MRI SHOULDER LT WO CONT    INDICATION: Left shoulder pain    COMPARISON: None    TECHNIQUE: Axial proton density fat-saturated; oblique coronal T1, T2  fat-saturated, and proton density fat-saturated; and oblique sagittal T2  fat-saturated MRI of the left shoulder . CONTRAST: None. FINDINGS: A.C. joint: Intact. Anterior acromion process type: 1    Bone marrow: No acute fracture, dislocation, or marrow replacing process. Joint fluid: No significant joint effusion. Mild amount of fluid in the  subacromial and subdeltoid bursa. Rotator cuff tendons: There is a full-thickness tear of the supraspinatus and  infraspinatus tendons with retraction by 2.6 cm. The teres minor and  subscapularis tendons remain intact. Biceps tendon: Intact and located within the bicipital groove. Muscles: Mild edema is present in the supraspinatus and infraspinatus muscles. No atrophy. Glenoid labrum: Degenerative changes are seen in the superior posterior labrum. Glenohumeral joint capsule: within normal limits. Glenohumeral articular cartilage: Intact. No focal osteochondral lesion. Soft tissue mass: None. Impression  1. Full-thickness retracted tears of the supraspinatus and infraspinatus tendons  with mild edema in the muscles. 2. Mild amount of fluid in the subacromial and subdeltoid bursa. Assessment/Plan:       ICD-10-CM ICD-9-CM    1. Routine adult health maintenance  Z00.00 V70.0       2.  Mixed hyperlipidemia E78.2 272.2       3. Prediabetes  R73.03 790.29       4. Ulcerative colitis without complications, unspecified location (HCC)  K51.90 556.9       5. Hypercholesterolemia  E78.00 272.0       6. Mild persistent asthma without complication  N46.38 562.75                  Healthcare Maintenance:  - Preventive measures are reviewed as per above  - Up to date on routine interventions except as noted above  - Orders placed to update gaps as noted  - Notes: Prevnar RX provided; fasting labs ordered, CSP pending 1/2023    Diabetes Mellitus:   - Type: prediabetes   - Current A1C:   Lab Results   Component Value Date/Time    Hemoglobin A1c 6.0 (H) 06/20/2022 10:25 AM       - Target U9M: <9.4%   - Complications: none   - Relevant Diabetes Medications:  Key Antihyperglycemic Medications       Patient is on no antihyperglycemic meds.               - General Recommendations discussed today: ---   - Notes: rechecking A1C; no medications indicated at this time; discussed TLC, dietary modifications      Hyperlipidemia/Dyslipidemia:   - Summary of Cardiovascular Risks and Goals:     LDL goal is under 100  hyperlipidemia  obese  [place] 10-year risk <10%  History of RA, UC   -   Lab Results   Component Value Date/Time    LDL, calculated 101 (H) 06/20/2022 10:25 AM    LDL, calculated 97 06/11/2020 09:31 AM    HDL Cholesterol 54 06/20/2022 10:25 AM       - Relevant Cholesterol Meds:  Key Antihyperlipidemia Meds               rosuvastatin (CRESTOR) 10 mg tablet (Taking) TAKE 1 TABLET NIGHTLY              - Cholesterol at target: no   - Does patient meet USPSTF and ACC/AHA indications for pharmacotherapy (e.g., statin): yes   - GI symptoms with meds: NO   - Muscle aches with meds: NO   - Other Adverse effects with meds: NO   - Medication Plan: continue   - Notes: rechecking LDL, continue with current care, consider increase in rosuvastatin dosing to 20 mg/d    Asthma:   - Current Symptoms: taking medications as instructed, no medication side effects noted, no significant ongoing wheezing or shortness of breath, using bronchodilator MDI less than twice a week   - CAT/ACT: ---   - MMRC: ---   - Current Assessment: mild persistent asthma   - Current Regimen:   Key COPD Medications               Breo Ellipta 100-25 mcg/dose inhaler (Taking) INHALE 1 PUFF BY MOUTH TWICE A DAY    montelukast (SINGULAIR) 10 mg tablet (Taking) TAKE 1 TABLET DAILY           - Plan: current treatment plan is effective, no change in therapy        I have reviewed the patient's medical history in detail and updated the computerized patient record. We had a prolonged discussion about these complex clinical issues and went over the various important aspects to consider. All questions were answered. Advised the patient to call back or return to office if symptoms do not improve, change in nature, or persist.     The patient was given an after visit summary or informed of [employer] Access which includes patient instructions, diagnoses, current medications, & vitals. he expressed understanding with the diagnosis and plan. Kailey Constantino MD    Please note that this dictation was completed with [employer], the computer voice recognition software. Quite often unanticipated grammatical, syntax, homophones, and other interpretive errors are inadvertently transcribed by the computer software. Please disregard these errors. Please excuse any errors that have escaped final proofreading.

## 2022-12-23 ENCOUNTER — OFFICE VISIT (OUTPATIENT)
Dept: INTERNAL MEDICINE CLINIC | Age: 62
End: 2022-12-23
Payer: COMMERCIAL

## 2022-12-23 VITALS
DIASTOLIC BLOOD PRESSURE: 82 MMHG | WEIGHT: 215.7 LBS | RESPIRATION RATE: 16 BRPM | SYSTOLIC BLOOD PRESSURE: 126 MMHG | HEART RATE: 90 BPM | OXYGEN SATURATION: 97 % | HEIGHT: 71 IN | BODY MASS INDEX: 30.2 KG/M2

## 2022-12-23 DIAGNOSIS — Z00.00 ROUTINE ADULT HEALTH MAINTENANCE: Primary | ICD-10-CM

## 2022-12-23 DIAGNOSIS — J45.30 MILD PERSISTENT ASTHMA WITHOUT COMPLICATION: ICD-10-CM

## 2022-12-23 DIAGNOSIS — R73.03 PREDIABETES: ICD-10-CM

## 2022-12-23 DIAGNOSIS — E78.2 MIXED HYPERLIPIDEMIA: ICD-10-CM

## 2022-12-23 DIAGNOSIS — K51.90 ULCERATIVE COLITIS WITHOUT COMPLICATIONS, UNSPECIFIED LOCATION (HCC): ICD-10-CM

## 2022-12-23 DIAGNOSIS — Z23 ENCOUNTER FOR IMMUNIZATION: ICD-10-CM

## 2022-12-23 PROCEDURE — 99386 PREV VISIT NEW AGE 40-64: CPT | Performed by: INTERNAL MEDICINE

## 2022-12-23 PROCEDURE — 99204 OFFICE O/P NEW MOD 45 MIN: CPT | Performed by: INTERNAL MEDICINE

## 2022-12-23 RX ORDER — FLUTICASONE FUROATE AND VILANTEROL TRIFENATATE 100; 25 UG/1; UG/1
POWDER RESPIRATORY (INHALATION)
COMMUNITY
Start: 2022-10-25

## 2022-12-23 RX ORDER — PNEUMOCOCCAL 20-VALENT CONJUGATE VACCINE 2.2; 2.2; 2.2; 2.2; 2.2; 2.2; 2.2; 2.2; 2.2; 2.2; 2.2; 2.2; 2.2; 2.2; 2.2; 2.2; 4.4; 2.2; 2.2; 2.2 UG/.5ML; UG/.5ML; UG/.5ML; UG/.5ML; UG/.5ML; UG/.5ML; UG/.5ML; UG/.5ML; UG/.5ML; UG/.5ML; UG/.5ML; UG/.5ML; UG/.5ML; UG/.5ML; UG/.5ML; UG/.5ML; UG/.5ML; UG/.5ML; UG/.5ML; UG/.5ML
0.5 INJECTION, SUSPENSION INTRAMUSCULAR
Qty: 1 EACH | Refills: 0 | Status: SHIPPED | OUTPATIENT
Start: 2022-12-23

## 2022-12-23 NOTE — PROGRESS NOTES
Chief Complaint   Patient presents with    Establish Care     Visit Vitals  /82 (BP 1 Location: Left upper arm, BP Patient Position: Sitting, BP Cuff Size: Large adult)   Pulse 90   Resp 16   Ht 5' 11\" (1.803 m)   Wt 215 lb 11.2 oz (97.8 kg)   SpO2 97%   BMI 30.08 kg/m²       1. \"Have you been to the ER, urgent care clinic since your last visit? Hospitalized since your last visit? \" No    2. \"Have you seen or consulted any other health care providers outside of the [address] [place] [address] since your last visit? \" No     3. For patients aged 39-70: Has the patient had a colonoscopy / FIT/ Cologuard? No      If the patient is female:    4. For patients aged 41-77: Has the patient had a mammogram within the past 2 years? No      5. For patients aged 21-65: Has the patient had a pap smear?  No

## 2022-12-28 ENCOUNTER — APPOINTMENT (OUTPATIENT)
Dept: INTERNAL MEDICINE CLINIC | Age: 62
End: 2022-12-28

## 2022-12-28 DIAGNOSIS — E78.2 MIXED HYPERLIPIDEMIA: ICD-10-CM

## 2022-12-28 DIAGNOSIS — R73.03 PREDIABETES: ICD-10-CM

## 2022-12-28 DIAGNOSIS — K51.90 ULCERATIVE COLITIS WITHOUT COMPLICATIONS, UNSPECIFIED LOCATION (HCC): ICD-10-CM

## 2022-12-28 RX ORDER — PNEUMOCOCCAL 20-VALENT CONJUGATE VACCINE 2.2; 2.2; 2.2; 2.2; 2.2; 2.2; 2.2; 2.2; 2.2; 2.2; 2.2; 2.2; 2.2; 2.2; 2.2; 2.2; 4.4; 2.2; 2.2; 2.2 UG/.5ML; UG/.5ML; UG/.5ML; UG/.5ML; UG/.5ML; UG/.5ML; UG/.5ML; UG/.5ML; UG/.5ML; UG/.5ML; UG/.5ML; UG/.5ML; UG/.5ML; UG/.5ML; UG/.5ML; UG/.5ML; UG/.5ML; UG/.5ML; UG/.5ML; UG/.5ML
0.5 INJECTION, SUSPENSION INTRAMUSCULAR
Qty: 1 EACH | Refills: 0 | Status: SHIPPED | OUTPATIENT
Start: 2022-12-28

## 2022-12-29 LAB
ALBUMIN SERPL-MCNC: 4.6 G/DL (ref 3.8–4.8)
ALBUMIN/CREAT UR: 18 MG/G CREAT (ref 0–29)
ALBUMIN/GLOB SERPL: 1.8 {RATIO} (ref 1.2–2.2)
ALP SERPL-CCNC: 109 IU/L (ref 44–121)
ALT SERPL-CCNC: 29 IU/L (ref 0–44)
AST SERPL-CCNC: 25 IU/L (ref 0–40)
BASOPHILS # BLD AUTO: 0.1 X10E3/UL (ref 0–0.2)
BASOPHILS NFR BLD AUTO: 1 %
BILIRUB SERPL-MCNC: 0.4 MG/DL (ref 0–1.2)
BUN SERPL-MCNC: 14 MG/DL (ref 8–27)
BUN/CREAT SERPL: 12 (ref 10–24)
CALCIUM SERPL-MCNC: 9.5 MG/DL (ref 8.6–10.2)
CHLORIDE SERPL-SCNC: 104 MMOL/L (ref 96–106)
CHOLEST SERPL-MCNC: 178 MG/DL (ref 100–199)
CO2 SERPL-SCNC: 26 MMOL/L (ref 20–29)
CREAT SERPL-MCNC: 1.14 MG/DL (ref 0.76–1.27)
CREAT UR-MCNC: 167.2 MG/DL
EGFR: 73 ML/MIN/1.73
EOSINOPHIL # BLD AUTO: 0.1 X10E3/UL (ref 0–0.4)
EOSINOPHIL NFR BLD AUTO: 2 %
ERYTHROCYTE [DISTWIDTH] IN BLOOD BY AUTOMATED COUNT: 12.4 % (ref 11.6–15.4)
EST. AVERAGE GLUCOSE BLD GHB EST-MCNC: 128 MG/DL
GLOBULIN SER CALC-MCNC: 2.5 G/DL (ref 1.5–4.5)
GLUCOSE SERPL-MCNC: 113 MG/DL (ref 70–99)
HBA1C MFR BLD: 6.1 % (ref 4.8–5.6)
HCT VFR BLD AUTO: 45 % (ref 37.5–51)
HDLC SERPL-MCNC: 44 MG/DL
HGB BLD-MCNC: 15.3 G/DL (ref 13–17.7)
IMM GRANULOCYTES # BLD AUTO: 0 X10E3/UL (ref 0–0.1)
IMM GRANULOCYTES NFR BLD AUTO: 1 %
LDLC SERPL CALC-MCNC: 111 MG/DL (ref 0–99)
LYMPHOCYTES # BLD AUTO: 2 X10E3/UL (ref 0.7–3.1)
LYMPHOCYTES NFR BLD AUTO: 23 %
MCH RBC QN AUTO: 32 PG (ref 26.6–33)
MCHC RBC AUTO-ENTMCNC: 34 G/DL (ref 31.5–35.7)
MCV RBC AUTO: 94 FL (ref 79–97)
MICROALBUMIN UR-MCNC: 30.4 UG/ML
MONOCYTES # BLD AUTO: 0.8 X10E3/UL (ref 0.1–0.9)
MONOCYTES NFR BLD AUTO: 10 %
NEUTROPHILS # BLD AUTO: 5.4 X10E3/UL (ref 1.4–7)
NEUTROPHILS NFR BLD AUTO: 63 %
PLATELET # BLD AUTO: 341 X10E3/UL (ref 150–450)
POTASSIUM SERPL-SCNC: 4.6 MMOL/L (ref 3.5–5.2)
PROT SERPL-MCNC: 7.1 G/DL (ref 6–8.5)
RBC # BLD AUTO: 4.78 X10E6/UL (ref 4.14–5.8)
SODIUM SERPL-SCNC: 141 MMOL/L (ref 134–144)
TRIGL SERPL-MCNC: 131 MG/DL (ref 0–149)
VLDLC SERPL CALC-MCNC: 23 MG/DL (ref 5–40)
WBC # BLD AUTO: 8.5 X10E3/UL (ref 3.4–10.8)

## 2022-12-29 RX ORDER — ROSUVASTATIN CALCIUM 20 MG/1
20 TABLET, COATED ORAL
Qty: 90 TABLET | Refills: 3 | Status: SHIPPED | OUTPATIENT
Start: 2022-12-29

## 2022-12-29 NOTE — PROGRESS NOTES
Please call the patient regarding his diagnostic evaluation. Stable hyperglycemia, and stable A1c suggestive of prediabetes. LDL elevated, stable, not at target less than 100 mg/dL. Currently taking rosuvastatin 10 mg nightly. Advised increasing this to 20 mg daily. This is ordered for him.

## 2023-01-16 RX ORDER — ROSUVASTATIN CALCIUM 20 MG/1
20 TABLET, COATED ORAL
Qty: 90 TABLET | Refills: 3 | Status: SHIPPED | OUTPATIENT
Start: 2023-01-16

## 2023-01-16 NOTE — TELEPHONE ENCOUNTER
PCP: Vale Chester MD    Last appt: 12/28/2022  Future Appointments   Date Time Provider Saul Engel   4/27/2023  9:15 AM Vale Chester MD [facility] AMB       Requested Prescriptions     Pending Prescriptions Disp Refills    rosuvastatin (CRESTOR) 20 mg tablet 90 Tablet 3     Sig: Take 1 Tablet by mouth nightly.        Prior labs and Blood pressures:  BP Readings from Last 3 Encounters:   12/23/22 126/82   06/29/22 126/78   06/20/22 136/88     Lab Results   Component Value Date/Time    Sodium 141 12/28/2022 08:18 AM    Potassium 4.6 12/28/2022 08:18 AM    Chloride 104 12/28/2022 08:18 AM    CO2 26 12/28/2022 08:18 AM    Anion gap 6 03/27/2019 02:30 AM    Glucose 113 (H) 12/28/2022 08:18 AM    BUN 14 12/28/2022 08:18 AM    Creatinine 1.14 12/28/2022 08:18 AM    BUN/Creatinine ratio 12 12/28/2022 08:18 AM    GFR est AA >60 03/29/2019 05:31 AM    GFR est non-AA >60 03/29/2019 05:31 AM    Calcium 9.5 12/28/2022 08:18 AM

## 2023-02-28 NOTE — TELEPHONE ENCOUNTER
PCP: Nic Shetty MD    Last appt: 2022  Future Appointments   Date Time Provider Saul Engel   2023  9:15 AM Nic Shetty MD [facility] AMB       Requested Prescriptions     Pending Prescriptions Disp Refills    Breo Ellipta 100-25 mcg/dose inhaler 60 Each     montelukast (SINGULAIR) 10 mg tablet 90 Tablet 3     Si Tablet daily.        Prior labs and Blood pressures:  BP Readings from Last 3 Encounters:   22 126/82   22 126/78   22 136/88     Lab Results   Component Value Date/Time    Sodium 141 2022 08:18 AM    Potassium 4.6 2022 08:18 AM    Chloride 104 2022 08:18 AM    CO2 26 2022 08:18 AM    Anion gap 6 2019 02:30 AM    Glucose 113 (H) 2022 08:18 AM    BUN 14 2022 08:18 AM    Creatinine 1.14 2022 08:18 AM    BUN/Creatinine ratio 12 2022 08:18 AM    GFR est AA >60 2019 05:31 AM    GFR est non-AA >60 2019 05:31 AM    Calcium 9.5 2022 08:18 AM     Lab Results   Component Value Date/Time    Hemoglobin A1c 6.1 (H) 2022 08:18 AM     Lab Results   Component Value Date/Time    Cholesterol, total 178 2022 08:18 AM    HDL Cholesterol 44 2022 08:18 AM    LDL, calculated 111 (H) 2022 08:18 AM    LDL, calculated 97 2020 09:31 AM    VLDL, calculated 23 2022 08:18 AM    VLDL, calculated 33 2020 09:31 AM    Triglyceride 131 2022 08:18 AM     Lab Results   Component Value Date/Time    VITAMIN D, 25-HYDROXY 31.6 2022 10:25 AM       No results found for: TSH, TSH2, TSH3, TSHP, TSHEXT

## 2023-03-01 RX ORDER — MONTELUKAST SODIUM 10 MG/1
10 TABLET ORAL DAILY
Qty: 90 TABLET | Refills: 3 | Status: SHIPPED | OUTPATIENT
Start: 2023-03-01

## 2023-03-01 RX ORDER — FLUTICASONE FUROATE AND VILANTEROL TRIFENATATE 100; 25 UG/1; UG/1
POWDER RESPIRATORY (INHALATION)
Qty: 60 EACH | Refills: 2 | Status: SHIPPED | OUTPATIENT
Start: 2023-03-01

## 2023-04-27 ENCOUNTER — OFFICE VISIT (OUTPATIENT)
Dept: INTERNAL MEDICINE CLINIC | Age: 63
End: 2023-04-27
Payer: COMMERCIAL

## 2023-04-27 VITALS
DIASTOLIC BLOOD PRESSURE: 84 MMHG | BODY MASS INDEX: 29.41 KG/M2 | HEART RATE: 79 BPM | WEIGHT: 210.1 LBS | SYSTOLIC BLOOD PRESSURE: 126 MMHG | OXYGEN SATURATION: 95 % | HEIGHT: 71 IN | RESPIRATION RATE: 16 BRPM

## 2023-04-27 DIAGNOSIS — J45.30 MILD PERSISTENT ASTHMA WITHOUT COMPLICATION: ICD-10-CM

## 2023-04-27 DIAGNOSIS — R73.03 PREDIABETES: ICD-10-CM

## 2023-04-27 DIAGNOSIS — Z00.00 ROUTINE ADULT HEALTH MAINTENANCE: Primary | ICD-10-CM

## 2023-04-27 DIAGNOSIS — K51.90 ULCERATIVE COLITIS WITHOUT COMPLICATIONS, UNSPECIFIED LOCATION (HCC): ICD-10-CM

## 2023-04-27 DIAGNOSIS — E78.2 MIXED HYPERLIPIDEMIA: ICD-10-CM

## 2023-04-27 PROCEDURE — 99214 OFFICE O/P EST MOD 30 MIN: CPT | Performed by: INTERNAL MEDICINE

## 2023-04-27 NOTE — PATIENT INSTRUCTIONS
DASH Diet: Care Instructions  Your Care Instructions     The DASH diet is an eating plan that can help lower your blood pressure. DASH stands for Dietary Approaches to Stop Hypertension. Hypertension is high blood pressure. The DASH diet focuses on eating foods that are high in calcium, potassium, and magnesium. These nutrients can lower blood pressure. The foods that are highest in these nutrients are fruits, vegetables, low-fat dairy products, nuts, seeds, and legumes. But taking calcium, potassium, and magnesium supplements instead of eating foods that are high in those nutrients does not have the same effect. The DASH diet also includes whole grains, fish, and poultry. The DASH diet is one of several lifestyle changes your doctor may recommend to lower your high blood pressure. Your doctor may also want you to decrease the amount of sodium in your diet. Lowering sodium while following the DASH diet can lower blood pressure even further than just the DASH diet alone. Follow-up care is a key part of your treatment and safety. Be sure to make and go to all appointments, and call your doctor if you are having problems. It's also a good idea to know your test results and keep a list of the medicines you take. How can you care for yourself at home? Following the DASH diet  Eat 4 to 5 servings of fruit each day. A serving is 1 medium-sized piece of fruit, ½ cup chopped or canned fruit, 1/4 cup dried fruit, or 4 ounces (½ cup) of fruit juice. Choose fruit more often than fruit juice. Eat 4 to 5 servings of vegetables each day. A serving is 1 cup of lettuce or raw leafy vegetables, ½ cup of chopped or cooked vegetables, or 4 ounces (½ cup) of vegetable juice. Choose vegetables more often than vegetable juice. Get 2 to 3 servings of low-fat and fat-free dairy each day. A serving is 8 ounces of milk, 1 cup of yogurt, or 1 ½ ounces of cheese. Eat 6 to 8 servings of grains each day.  A serving is 1 slice of bread, 1 ounce of dry cereal, or ½ cup of cooked rice, pasta, or cooked cereal. Try to choose whole-grain products as much as possible. Limit lean meat, poultry, and fish to 2 servings each day. A serving is 3 ounces, about the size of a deck of cards. Eat 4 to 5 servings of nuts, seeds, and legumes (cooked dried beans, lentils, and split peas) each week. A serving is 1/3 cup of nuts, 2 tablespoons of seeds, or ½ cup of cooked beans or peas. Limit fats and oils to 2 to 3 servings each day. A serving is 1 teaspoon of vegetable oil or 2 tablespoons of salad dressing. Limit sweets and added sugars to 5 servings or less a week. A serving is 1 tablespoon jelly or jam, ½ cup sorbet, or 1 cup of lemonade. Eat less than 2,300 milligrams (mg) of sodium a day. If you limit your sodium to 1,500 mg a day, you can lower your blood pressure even more. Be aware that all of these are the suggested number of servings for people who eat 1,800 to 2,000 calories a day. Your recommended number of servings may be different if you need more or fewer calories. Tips for success  Start small. Do not try to make dramatic changes to your diet all at once. You might feel that you are missing out on your favorite foods and then be more likely to not follow the plan. Make small changes, and stick with them. Once those changes become habit, add a few more changes. Try some of the following:  Make it a goal to eat a fruit or vegetable at every meal and at snacks. This will make it easy to get the recommended amount of fruits and vegetables each day. Try yogurt topped with fruit and nuts for a snack or healthy dessert. Add lettuce, tomato, cucumber, and onion to sandwiches. Combine a ready-made pizza crust with low-fat mozzarella cheese and lots of vegetable toppings. Try using tomatoes, squash, spinach, broccoli, carrots, cauliflower, and onions.   Have a variety of cut-up vegetables with a low-fat dip as an appetizer instead of chips and dip.  Sprinkle sunflower seeds or chopped almonds over salads. Or try adding chopped walnuts or almonds to cooked vegetables. Try some vegetarian meals using beans and peas. Add garbanzo or kidney beans to salads. Make burritos and tacos with mashed ho beans or black beans. Where can you learn more? Go to http://www.bull.com/  Enter H967 in the search box to learn more about \"DASH Diet: Care Instructions. \"  Current as of: May 9, 2022               Content Version: 13.6  © 4363-0916 [employer]. Care instructions adapted under license by [employer] (which disclaims liability or warranty for this information). If you have questions about a medical condition or this instruction, always ask your healthcare professional. [address] any warranty or liability for your use of this information. Learning About the [address] Diet  What is the Mediterranean diet? The Mediterranean diet is a style of eating rather than a diet plan. It features foods eaten in [place], [place], [place] and [place], and other countries along the [facility]. It emphasizes eating foods like fish, fruits, vegetables, beans, high-fiber breads and whole grains, nuts, and olive oil. This style of eating includes limited red meat, cheese, and sweets. Why choose the Mediterranean diet? A Mediterranean-style diet may improve heart health. It contains more fat than other heart-healthy diets. But the fats are mainly from nuts, unsaturated oils (such as fish oils and olive oil), and certain nut or seed oils (such as canola, soybean, or flaxseed oil). These fats may help protect the heart and blood vessels. How can you get started on the Mediterranean diet? Here are some things you can do to switch to a more Mediterranean way of eating.   What to eat  Eat a variety of fruits and vegetables each day, such as grapes, blueberries, tomatoes, broccoli, peppers, figs, olives, spinach, eggplant, beans, lentils, and chickpeas. Eat a variety of whole-grain foods each day, such as oats, brown rice, and whole wheat bread, pasta, and couscous. Eat fish at least 2 times a week. Try tuna, salmon, mackerel, lake trout, herring, or sardines. Eat moderate amounts of low-fat dairy products, such as milk, cheese, or yogurt. Eat moderate amounts of poultry and eggs. Choose healthy (unsaturated) fats, such as nuts, olive oil, and certain nut or seed oils like canola, soybean, and flaxseed. Limit unhealthy (saturated) fats, such as butter, palm oil, and coconut oil. And limit fats found in animal products, such as meat and dairy products made with whole milk. Try to eat red meat only a few times a month in very small amounts. Limit sweets and desserts to only a few times a week. This includes sugar-sweetened drinks like soda. The Mediterranean diet may also include red wine with your meal--1 glass each day for women and up to 2 glasses a day for men. Tips for eating at home  Use herbs, spices, garlic, lemon zest, and citrus juice instead of salt to add flavor to foods. Add avocado slices to your sandwich instead of mejia. Have fish for lunch or dinner instead of red meat. Brush the fish with olive oil, and broil or grill it. Sprinkle your salad with seeds or nuts instead of cheese. Cook with olive or canola oil instead of butter or oils that are high in saturated fat. Switch from 2% milk or whole milk to 1% or fat-free milk. Dip raw vegetables in a vinaigrette dressing or hummus instead of dips made from mayonnaise or sour cream.  Have a piece of fruit for dessert instead of a piece of cake. Try baked apples, or have some dried fruit. Tips for eating out  Try broiled, grilled, baked, or poached fish instead of having it fried or breaded. Ask your  to have your meals prepared with olive oil instead of butter.   Order dishes made with marinara sauce or sauces made from olive oil. Avoid sauces made from cream or mayonnaise. Choose whole-grain breads, whole wheat pasta and pizza crust, brown rice, beans, and lentils. Cut back on butter or margarine on bread. Instead, you can dip your bread in a small amount of olive oil. Ask for a side salad or grilled vegetables instead of french fries or chips. Where can you learn more? Go to http://www.bull.com/  Enter O407 in the search box to learn more about \"Learning About the Mediterranean Diet. \"  Current as of: May 9, 2022               Content Version: 13.6  © 2173-9536 [employer]. Care instructions adapted under license by [employer] (which disclaims liability or warranty for this information). If you have questions about a medical condition or this instruction, always ask your healthcare professional. [place] [address] any warranty or liability for your use of this information.

## 2023-04-27 NOTE — PROGRESS NOTES
Chief Complaint   Patient presents with    Follow-up     Visit Vitals  /84 (BP 1 Location: Left upper arm, BP Patient Position: Sitting, BP Cuff Size: Large adult)   Pulse 79   Resp 16   Ht 5' 11\" (1.803 m)   Wt 210 lb 1.6 oz (95.3 kg)   SpO2 95%   BMI 29.30 kg/m²       1. \"Have you been to the ER, urgent care clinic since your last visit? Hospitalized since your last visit? \" No    2. \"Have you seen or consulted any other health care providers outside of the [address] [place] [address] since your last visit? \" No     3. For patients aged 39-70: Has the patient had a colonoscopy / FIT/ Cologuard? No      If the patient is female:    4. For patients aged 41-77: Has the patient had a mammogram within the past 2 years? No      5. For patients aged 21-65: Has the patient had a pap smear?  No

## 2023-04-27 NOTE — PROGRESS NOTES
ICD-10-CM ICD-9-CM    1. Routine adult health maintenance  Z00.00 V70.0       2. Mixed hyperlipidemia  E78.2 272.2 LIPID PANEL      METABOLIC PANEL, COMPREHENSIVE      METABOLIC PANEL, COMPREHENSIVE      LIPID PANEL      3. Prediabetes  R73.03 790.29 HEMOGLOBIN A1C WITH EAG      HEMOGLOBIN A1C WITH EAG      4. Ulcerative colitis without complications, unspecified location (HCC)  K51.90 556.9       5. Mild persistent asthma without complication  S65.00 206.77                Subjective:     Chief Complaint   Patient presents with    [facility]. is a 65 y.o. M.  he  has a past medical history of Allergic rhinitis, Asthma, mild persistent, Diverticulosis, GERD (gastroesophageal reflux disease), Hiatal hernia, History of colon polyps (11/13/2013), History of kidney stones (2016), History of pulmonary embolism (03/2019), History of umbilical hernia (41/87/0675), History of vascular access device (11/21/2018), Hypercholesterolemia, Overweight (BMI 25.0-29.9) (11/12/2018), Prediabetes, Primary osteoarthritis involving multiple joints, Prostate cancer ([facility] [address] (11/2022), Rheumatoid arthritis(714.0), and UC ([facility]ative [facility]) [facility] his last appointment in this clinic was 12/28/2022 . I reviewed and updated the medical record. This is a very pleasant 65year old white male with history of ulcerative colitis and asthma as well as hypercholesterolemia and rheumatoid arthritis who presents for routine followup. At his previous visit, I had increased his rosuvastatin given a mildly elevated LDL not quite at target. He was otherwise continued on his usual medication regimen. He reports feeling great today. He and his family have already bought a house in [place], [place], and will be living there full time in the next few months. He continues to followup regularly with his Urologist for his history of prostate cancer and has a pending appointment in a couple weeks.  He continues to follow with his Rheumatologist for his RA and continues on Orencia without any recent F/C or other constitutional or systemic complaints. With the recent increase in his Crestor dosing, he has had no significant side effects including any myalgias or GI symptoms. He continues to get yearly CSP done for his UC. No recent hematochezia, abdominal discomfort, or weight loss. He has been exercising regularly and lifting weights and has successfully lost about 10 lbs or so. His ROS is otherwise negative. Routine Healthcare Maintenance issues are reviewed and discussed with the patient as noted below. Orders to update gaps in healthcare maintenance were placed as noted below in the Assessment and Plan, where applicable.      Past Medical History:  Past Medical History:   Diagnosis Date    Allergic rhinitis     Asthma, mild persistent     allergy/URI related    Diverticulosis     GERD (gastroesophageal reflux disease)     Hiatal hernia     History of colon polyps 11/13/2013    History of kidney stones 2016    x2    History of pulmonary embolism 03/2019    related to orthopedic surgery    History of umbilical hernia 90/89/6554    Umbilical hernia without obstruction and without gangrene    History of vascular access device 11/21/2018    5 Fr double PICC 43 cm 2 cm out, long term abx, R basilic    Hypercholesterolemia     Overweight (BMI 25.0-29.9) 11/12/2018    Prediabetes     Primary osteoarthritis involving multiple joints     Prostate cancer ([facility] [address] 11/2022    Follows with Urology (Dr. Kel Pereira @ [facility])    Rheumatoid arthritis(714.0)     UC (ulcerative colitis) ([facility] [address]     In remission       Past Surgical Histor:  Past Surgical History:   Procedure Laterality Date    HX GI  2010 & 2016    COLONOSCOPY    HX KNEE ARTHROSCOPY Left 10/2017    HX KNEE REPLACEMENT Left 03/25/2019    revision Dr. Gilma Fuller LUMBAR DISKECTOMY N/A 2012    herniated disk    HX OPEN REDUCTION INTERNAL FIXATION Left 07/2017    Tibia fx    HX ROTATOR CUFF REPAIR Left 05/10/2022    HX TONSILLECTOMY      IA ABDOMEN SURGERY PROC UNLISTED Bilateral     Hernia Repair    IA ABDOMEN SURGERY PROC UNLISTED Left     Inquinal hernia    IA RMVL RUPTURED BREAST IMPLANT W/IMPLANT CONTENTS Left 2017    IND with plate removal of tibia       Allergies:  No Known Allergies    Medications:  Current Outpatient Medications   Medication Sig Dispense Refill    montelukast (SINGULAIR) 10 mg tablet Take 1 Tablet by mouth daily. 90 Tablet 3    rosuvastatin (CRESTOR) 20 mg tablet Take 1 Tablet by mouth nightly. 90 Tablet 3    esomeprazole (NEXIUM) 40 mg capsule TAKE 1 CAPSULE DAILY 90 Capsule 3    meloxicam (MOBIC) 7.5 mg tablet TAKE 1 TABLET BY MOUTH EVERY DAY IN THE MORNING WITH FOOD FOR 3-5 DAYS FOR SERIOUS ARTHRITIC FLARES  0    mesalamine (LIALDA) 1.2 gram delayed release tablet Take 2 Tablets by mouth daily. abatacept/maltose (ORENCIA, WITH MALTOSE, IV) 1 Dose by IntraVENous route every four (4) weeks. Social History:  Social History     Socioeconomic History    Marital status:    Tobacco Use    Smoking status: Never    Smokeless tobacco: Never   Vaping Use    Vaping Use: Never used   Substance and Sexual Activity    Alcohol use:  Yes     Alcohol/week: 2.0 standard drinks     Types: 2 Shots of liquor per week     Comment: occasional     Drug use: No    Sexual activity: Yes     Partners: Female     Birth control/protection: None       Family History:  Family History   Problem Relation Age of Onset    Diabetes Mother     Heart Attack Father         SEVERAL MIs    Arrhythmia Father         HAD PACEMAKER    Stroke Father     Other Father          OF MRSA    No Known Problems Sister        Immunizations:  Immunization History   Administered Date(s) Administered    COVID-19, MODERNA BLUE border, Primary or Immunocompromised, (age 18y+), IM, 100 mcg/0.5mL 2021, 2021    COVID-19, MODERNA Booster BLUE border, (age 18y+), IM, 50mcg/0.25mL 2022 Hep A Vaccine (Adult) 05/12/2014, 05/31/2016    Influenza Vaccine 11/01/2014, 11/15/2015, 11/10/2016, 10/01/2017, 10/23/2022    Influenza Vaccine Whole 11/01/2010    Influenza, FLUARIX, FLULAVAL, Yolie Arabia (age 10 mo+) AND AFLURIA, (age 1 y+), PF, 0.5mL 09/21/2018, 10/28/2020    Influenza, FLUCELVAX, (age 10 mo+), MDCK, PF 10/16/2019    Pneumococcal Conjugate (PCV-13) 12/13/2017, 12/28/2017    Pneumococcal Conjugate PCV20, PF (Prevnar 20) 12/28/2022    Tdap 05/12/2014    Typhoid Vi Capsular Polysaccharide Vaccine 05/12/2014, 05/31/2016    Zoster Recombinant 11/23/2022, 02/15/2023    Zoster Vaccine, Live 03/18/2014        Healthcare Maintenance:  Health Maintenance   Topic Date Due    COVID-19 Vaccine (4 - Booster for Moderna series) 08/08/2022    Depression Screen  12/23/2023    A1C test (Diabetic or Prediabetic)  12/28/2023    Lipid Screen  12/28/2023    DTaP/Tdap/Td series (2 - Td or Tdap) 05/12/2024    Colorectal Cancer Screening Combo  11/22/2024    Hepatitis C Screening  Completed    Shingles Vaccine  Completed    Flu Vaccine  Completed    Pneumococcal 0-64 years  Completed        Review of Systems:  ROS:  Review of Systems   Constitutional: Negative. HENT: Negative. Eyes: Negative. Respiratory: Negative. Cardiovascular: Negative. Gastrointestinal: Negative. Genitourinary: Negative. Musculoskeletal: Negative. Skin: Negative. Neurological: Negative. Endo/Heme/Allergies: Negative. Psychiatric/Behavioral: Negative. ROS otherwise negative      Objective:     Vital Signs:  Visit Vitals  /84 (BP 1 Location: Left upper arm, BP Patient Position: Sitting, BP Cuff Size: Large adult)   Pulse 79   Resp 16   Ht 5' 11\" (1.803 m)   Wt 210 lb 1.6 oz (95.3 kg)   SpO2 95%   BMI 29.30 kg/m²       BMI:  Body mass index is 29.3 kg/m². Physical Examination:  Physical Exam  Vitals reviewed. Constitutional:       Appearance: Normal appearance.    HENT:      Head: Normocephalic and atraumatic. Nose: Nose normal.      Mouth/Throat:      Mouth: Mucous membranes are moist.   Eyes:      Extraocular Movements: Extraocular movements intact. Conjunctiva/sclera: Conjunctivae normal.      Pupils: Pupils are equal, round, and reactive to light. Cardiovascular:      Rate and Rhythm: Normal rate and regular rhythm. Pulses: Normal pulses. Heart sounds: Normal heart sounds. No murmur heard. No friction rub. No gallop. Pulmonary:      Effort: Pulmonary effort is normal. No respiratory distress. Breath sounds: Normal breath sounds. No wheezing, rhonchi or rales. Abdominal:      General: Bowel sounds are normal. There is no distension. Palpations: Abdomen is soft. There is no mass. Tenderness: There is no abdominal tenderness. There is no guarding or rebound. Musculoskeletal:         General: No tenderness, deformity or signs of injury. Normal range of motion. Cervical back: Normal range of motion and neck supple. Right lower leg: No edema. Left lower leg: No edema. Skin:     General: Skin is warm and dry. Findings: No bruising, lesion or rash. Neurological:      General: No focal deficit present. Mental Status: He is alert and oriented to person, place, and time. Mental status is at baseline. Cranial Nerves: No cranial nerve deficit. Sensory: No sensory deficit. Motor: No weakness.       Coordination: Coordination normal.      Gait: Gait normal.      Deep Tendon Reflexes: Reflexes normal.   Psychiatric:         Mood and Affect: Mood normal.         Behavior: Behavior normal.        Physical exam otherwise negative    Diagnostic Testing:    Laboratory Studies:  Appointment on 12/28/2022   Component Date Value Ref Range Status    Cholesterol, total 12/28/2022 178  100 - 199 mg/dL Final    Triglyceride 12/28/2022 131  0 - 149 mg/dL Final    HDL Cholesterol 12/28/2022 44  >39 mg/dL Final    VLDL, calculated 12/28/2022 23  5 - 40 mg/dL Final    LDL, calculated 12/28/2022 111 (H)  0 - 99 mg/dL Final    Glucose 12/28/2022 113 (H)  70 - 99 mg/dL Final    BUN 12/28/2022 14  8 - 27 mg/dL Final    Creatinine 12/28/2022 1.14  0.76 - 1.27 mg/dL Final    eGFR 12/28/2022 73  >59 mL/min/1.73 Final    BUN/Creatinine ratio 12/28/2022 12  10 - 24 Final    Sodium 12/28/2022 141  134 - 144 mmol/L Final    Potassium 12/28/2022 4.6  3.5 - 5.2 mmol/L Final    Chloride 12/28/2022 104  96 - 106 mmol/L Final    CO2 12/28/2022 26  20 - 29 mmol/L Final    Calcium 12/28/2022 9.5  8.6 - 10.2 mg/dL Final    Protein, total 12/28/2022 7.1  6.0 - 8.5 g/dL Final    Albumin 12/28/2022 4.6  3.8 - 4.8 g/dL Final    GLOBULIN, TOTAL 12/28/2022 2.5  1.5 - 4.5 g/dL Final    A-G Ratio 12/28/2022 1.8  1.2 - 2.2 Final    Bilirubin, total 12/28/2022 0.4  0.0 - 1.2 mg/dL Final    Alk. phosphatase 12/28/2022 109  44 - 121 IU/L Final    AST (SGOT) 12/28/2022 25  0 - 40 IU/L Final    ALT (SGPT) 12/28/2022 29  0 - 44 IU/L Final    WBC 12/28/2022 8.5  3.4 - 10.8 x10E3/uL Final    RBC 12/28/2022 4.78  4.14 - 5.80 x10E6/uL Final    HGB 12/28/2022 15.3  13.0 - 17.7 g/dL Final    HCT 12/28/2022 45.0  37.5 - 51.0 % Final    MCV 12/28/2022 94  79 - 97 fL Final    MCH 12/28/2022 32.0  26.6 - 33.0 pg Final    MCHC 12/28/2022 34.0  31.5 - 35.7 g/dL Final    RDW 12/28/2022 12.4  11.6 - 15.4 % Final    PLATELET 67/20/8658 525  150 - 450 x10E3/uL Final    NEUTROPHILS 12/28/2022 63  Not Estab. % Final    Lymphocytes 12/28/2022 23  Not Estab. % Final    MONOCYTES 12/28/2022 10  Not Estab. % Final    EOSINOPHILS 12/28/2022 2  Not Estab. % Final    BASOPHILS 12/28/2022 1  Not Estab. % Final    ABS. NEUTROPHILS 12/28/2022 5.4  1.4 - 7.0 x10E3/uL Final    Abs Lymphocytes 12/28/2022 2.0  0.7 - 3.1 x10E3/uL Final    ABS. MONOCYTES 12/28/2022 0.8  0.1 - 0.9 x10E3/uL Final    ABS. EOSINOPHILS 12/28/2022 0.1  0.0 - 0.4 x10E3/uL Final    ABS.  BASOPHILS 12/28/2022 0.1  0.0 - 0.2 x10E3/uL Final    IMMATURE GRANULOCYTES 12/28/2022 1  Not Estab. % Final    ABS. IMM. GRANS. 12/28/2022 0.0  0.0 - 0.1 x10E3/uL Final    Hemoglobin A1c 12/28/2022 6.1 (H)  4.8 - 5.6 % Final    Comment:          Prediabetes: 5.7 - 6.4           Diabetes: >6.4           Glycemic control for adults with diabetes: <7.0      Estimated average glucose 12/28/2022 128  mg/dL Final    Creatinine, urine random 12/28/2022 167.2  Not Estab. mg/dL Final    Microalbumin, urine 12/28/2022 30.4  Not Estab. ug/mL Final    Microalb/Creat ratio (ug/mg creat.) 12/28/2022 18  0 - 29 mg/g creat Final    Comment:                        Normal:                0 -  29                         Moderately increased: 30 - 300                         Severely increased:       >300           Radiographic Studies:  XR Results (most recent):  Results from [place] encounter on 12/21/21    XR HIP RT W OR WO PELV 2-3 VWS    Narrative  EXAM: XR HIP RT W OR WO PELV 2-3 VWS    INDICATION: Right hip pain. COMPARISON: None. FINDINGS: AP view of the pelvis and a frogleg lateral view of the right hip  demonstrate no fracture, dislocation or other acute abnormality. There is mild  bilateral hip osteoarthritis, left greater than right. Surgical clips overlie  the lower pelvis. Impression  No acute abnormality. Mild bilateral hip osteoarthritis. RITA Results (most recent):  No results found for this or any previous visit. CT Results (most recent):  Results from Hospital Encounter encounter on 03/25/19    CTA CHEST W OR W WO CONT    Narrative  INDICATION: Tachycardia. Hypoxia. COMPARISON:August 7, 2017    TECHNIQUE:  Routine noncontrast imaging the chest was performed for localization purposes. Then, following the uneventful intravenous administration of 90 cc [id number],  thin helical axial images were obtained through the chest. 3D image  postprocessing was performed.  CT dose reduction was achieved through use of a  standardized protocol tailored for this examination and automatic exposure  control for dose modulation. FINDINGS:    THYROID: No nodule. MEDIASTINUM: No mass or lymphadenopathy. KUNAL: No mass or lymphadenopathy. THORACIC AORTA: No dissection or aneurysm. PULMONARY ARTERIES: Main pulmonary artery is normal in caliber. Multiple small  pulmonary emboli in the right upper, middle, and lower lobes. TRACHEA/BRONCHI: Patent. ESOPHAGUS: No wall thickening or dilatation. HEART: Normal in size. PLEURA: No effusion or pneumothorax. LUNGS: No nodule, mass, or airspace disease. INCIDENTALLY IMAGED UPPER ABDOMEN: No focal abnormality. BONES: No destructive bone lesion. ADDITIONAL COMMENTS: N/A    Impression  IMPRESSION:  Multiple small acute pulmonary emboli. The findings were called to the floor nurse, Elli Campo on March 27, 2019 at 3:46  AM by Dr. Trent Mojica. [address] [place] [address] Results (most recent):  No results found for this or any previous visit. MRI Results (most recent):  Results from [place] encounter on 03/16/22    MRI SHOULDER LT WO CONT    Narrative  EXAM: MRI SHOULDER LT WO CONT    INDICATION: Left shoulder pain    COMPARISON: None    TECHNIQUE: Axial proton density fat-saturated; oblique coronal T1, T2  fat-saturated, and proton density fat-saturated; and oblique sagittal T2  fat-saturated MRI of the left shoulder . CONTRAST: None. FINDINGS: A.C. joint: Intact. Anterior acromion process type: 1    Bone marrow: No acute fracture, dislocation, or marrow replacing process. Joint fluid: No significant joint effusion. Mild amount of fluid in the  subacromial and subdeltoid bursa. Rotator cuff tendons: There is a full-thickness tear of the supraspinatus and  infraspinatus tendons with retraction by 2.6 cm. The teres minor and  subscapularis tendons remain intact. Biceps tendon: Intact and located within the bicipital groove. Muscles: Mild edema is present in the supraspinatus and infraspinatus muscles.   No atrophy. Glenoid labrum: Degenerative changes are seen in the superior posterior labrum. Glenohumeral joint capsule: within normal limits. Glenohumeral articular cartilage: Intact. No focal osteochondral lesion. Soft tissue mass: None. Impression  1. Full-thickness retracted tears of the supraspinatus and infraspinatus tendons  with mild edema in the muscles. 2. Mild amount of fluid in the subacromial and subdeltoid bursa. Assessment/Plan:       ICD-10-CM ICD-9-CM    1. Routine adult health maintenance  Z00.00 V70.0       2. Mixed hyperlipidemia  E78.2 272.2 LIPID PANEL      METABOLIC PANEL, COMPREHENSIVE      METABOLIC PANEL, COMPREHENSIVE      LIPID PANEL      3. Prediabetes  R73.03 790.29 HEMOGLOBIN A1C WITH EAG      HEMOGLOBIN A1C WITH EAG      4. Ulcerative colitis without complications, unspecified location (HCC)  K51.90 556.9       5. Mild persistent asthma without complication  M43.59 950.76              Healthcare Maintenance:  - Preventive measures are reviewed as per above  - Up to date on routine interventions except as noted above  - Orders placed to update gaps as noted  - Notes: UTD on CSP, labs ordered      Hyperlipidemia/Dyslipidemia:   - Summary of Cardiovascular Risks and Goals:     LDL goal is under 100  hyperlipidemia  [place] 10-year risk 10-20%   -   Lab Results   Component Value Date/Time    LDL, calculated 111 (H) 12/28/2022 08:18 AM    LDL, calculated 97 06/11/2020 09:31 AM    HDL Cholesterol 44 12/28/2022 08:18 AM       - Relevant Cholesterol Meds:  Key Antihyperlipidemia Meds               rosuvastatin (CRESTOR) 20 mg tablet (Taking) Take 1 Tablet by mouth nightly.               - Cholesterol at target: yes   - Does patient meet USPSTF and ACC/AHA indications for pharmacotherapy (e.g., statin): yes   - GI symptoms with meds: NO   - Muscle aches with meds: NO   - Other Adverse effects with meds: NO   - Medication Plan: continue   - Notes: [facility]      Diabetes Mellitus:   - Type: prediabetes   - Current A1C:   Lab Results   Component Value Date/Time    Hemoglobin A1c 6.1 (H) 12/28/2022 08:18 AM       - Target R2Q: <3.6%   - Complications: none   - Relevant Diabetes Medications:  Key Antihyperglycemic Medications       Patient is on no antihyperglycemic meds. - General Recommendations discussed today: ---   - Notes: CCM, repeat A1C pending        RA:   - continue orencia; followup with Rheum    Prostate CA:   - Continue Urology followup, asymptomatic        I have reviewed the patient's medical history in detail and updated the computerized patient record. We had a prolonged discussion about these complex clinical issues and went over the various important aspects to consider. All questions were answered. Advised the patient to call back or return to office if symptoms do not improve, change in nature, or persist.     The patient was given an after visit summary or informed of [employer] which includes patient instructions, diagnoses, current medications, & vitals. he expressed understanding with the diagnosis and plan. Ellen Deleon MD    Please note that this dictation was completed with [employer], the computer voice recognition software. Quite often unanticipated grammatical, syntax, homophones, and other interpretive errors are inadvertently transcribed by the computer software. Please disregard these errors. Please excuse any errors that have escaped final proofreading.

## 2023-04-28 LAB
ALBUMIN SERPL-MCNC: 4.9 G/DL (ref 3.8–4.8)
ALBUMIN/GLOB SERPL: 1.4 {RATIO} (ref 1.2–2.2)
ALP SERPL-CCNC: 99 IU/L (ref 44–121)
ALT SERPL-CCNC: 27 IU/L (ref 0–44)
AST SERPL-CCNC: 32 IU/L (ref 0–40)
BILIRUB SERPL-MCNC: 0.4 MG/DL (ref 0–1.2)
BUN SERPL-MCNC: 19 MG/DL (ref 8–27)
BUN/CREAT SERPL: 16 (ref 10–24)
CALCIUM SERPL-MCNC: 10.2 MG/DL (ref 8.6–10.2)
CHLORIDE SERPL-SCNC: 105 MMOL/L (ref 96–106)
CHOLEST SERPL-MCNC: 148 MG/DL (ref 100–199)
CO2 SERPL-SCNC: 17 MMOL/L (ref 20–29)
CREAT SERPL-MCNC: 1.16 MG/DL (ref 0.76–1.27)
EGFRCR SERPLBLD CKD-EPI 2021: 72 ML/MIN/1.73
GLOBULIN SER CALC-MCNC: 3.4 G/DL (ref 1.5–4.5)
GLUCOSE SERPL-MCNC: 107 MG/DL (ref 70–99)
HDLC SERPL-MCNC: 39 MG/DL
LDLC SERPL CALC-MCNC: 87 MG/DL (ref 0–99)
POTASSIUM SERPL-SCNC: 4.9 MMOL/L (ref 3.5–5.2)
PROT SERPL-MCNC: 8.3 G/DL (ref 6–8.5)
SODIUM SERPL-SCNC: 143 MMOL/L (ref 134–144)
TRIGL SERPL-MCNC: 121 MG/DL (ref 0–149)
VLDLC SERPL CALC-MCNC: 22 MG/DL (ref 5–40)

## 2023-05-10 ENCOUNTER — NURSE ONLY (OUTPATIENT)
Facility: CLINIC | Age: 63
End: 2023-05-10

## 2023-05-10 DIAGNOSIS — E78.2 MIXED HYPERLIPIDEMIA: Primary | ICD-10-CM

## (undated) DEVICE — DEVON™ KNEE AND BODY STRAP 60" X 3" (1.5 M X 7.6 CM): Brand: DEVON

## (undated) DEVICE — SPONGE LAP 18X18IN STRL -- 5/PK

## (undated) DEVICE — INTENDED FOR TISSUE SEPARATION, AND OTHER PROCEDURES THAT REQUIRE A SHARP SURGICAL BLADE TO PUNCTURE OR CUT.: Brand: BARD-PARKER ® CARBON RIB-BACK BLADES

## (undated) DEVICE — YANKAUER OPEN TIP, NO VENT: Brand: ARGYLE

## (undated) DEVICE — DRAPE XR C ARM 41X74IN LF --

## (undated) DEVICE — ABDOMINAL PAD: Brand: DERMACEA

## (undated) DEVICE — ROCKER SWITCH PENCIL BLADE ELECTRODE, HOLSTER: Brand: EDGE

## (undated) DEVICE — STRYKER PERFORMANCE SERIES SAGITTAL BLADE: Brand: STRYKER PERFORMANCE SERIES

## (undated) DEVICE — HANDPIECE SET WITH COAXIAL HIGH FLOW TIP AND SUCTION TUBE: Brand: INTERPULSE

## (undated) DEVICE — DRAPE,EXTREMITY,89X128,STERILE: Brand: MEDLINE

## (undated) DEVICE — STERILE POLYISOPRENE POWDER-FREE SURGICAL GLOVES: Brand: PROTEXIS

## (undated) DEVICE — PADDING CST 4INX4YD --

## (undated) DEVICE — SWAB CULT DBL W/O CHAR RAYON TIP AMIES GEL CLMN FOR COLL

## (undated) DEVICE — DRAPE,REIN 53X77,STERILE: Brand: MEDLINE

## (undated) DEVICE — BASIC PACK: Brand: CONVERTORS

## (undated) DEVICE — Device

## (undated) DEVICE — ZIMMER® STERILE DISPOSABLE TOURNIQUET CUFF WITH PROTECTIVE SLEEVE AND PLC, DUAL PORT, SINGLE BLADDER, 34 IN. (86 CM)

## (undated) DEVICE — PADDING CST 6IN STERILE --

## (undated) DEVICE — SUTURE MCRYL SZ 3-0 L27IN ABSRB UD L19MM PS-2 3/8 CIR PRIM Y427H

## (undated) DEVICE — INFECTION CONTROL KIT SYS

## (undated) DEVICE — HOOK LOCK LATEX FREE ELASTIC BANDAGE 6INX5YD

## (undated) DEVICE — SUTURE VCRL SZ 2-0 L36IN ABSRB UD L36MM CT-1 1/2 CIR J945H

## (undated) DEVICE — STERILE POLYISOPRENE POWDER-FREE SURGICAL GLOVES WITH EMOLLIENT COATING: Brand: PROTEXIS

## (undated) DEVICE — BRUSH SCRB PCMX NL CLN 12ML --

## (undated) DEVICE — (D)PREP SKN CHLRAPRP APPL 26ML -- CONVERT TO ITEM 371833

## (undated) DEVICE — (D)BNDG COHESIVE 6X5YD TAN LTX -- DUPE USE ITEM 357348

## (undated) DEVICE — SUT PROL 2-0 30IN CT1 BLU --

## (undated) DEVICE — LIGHT HANDLE: Brand: DEVON

## (undated) DEVICE — CEMENT MIXING SYSTEM WITH FEMORAL BREAKWAY NOZZLE: Brand: REVOLUTION

## (undated) DEVICE — X-RAY SPONGES,16 PLY: Brand: DERMACEA

## (undated) DEVICE — SUT ETHLN 2-0 18IN FS BLK --

## (undated) DEVICE — GAUZE SPONGES,12 PLY: Brand: CURITY

## (undated) DEVICE — SMARTGOWN SURGICAL GOWN, 3XL, LONG: Brand: CONVERTORS

## (undated) DEVICE — 10K/24K ARTHROSCOPY INFLOW TUBE SET: Brand: 10K/24K

## (undated) DEVICE — HOOD: Brand: FLYTE

## (undated) DEVICE — SUTURE VCRL SZ 1 L36IN ABSRB UD L36MM CT-1 1/2 CIR J947H

## (undated) DEVICE — SUTURE PDS II SZ 2-0 L27IN ABSRB UD CT-1 L36MM 1/2 CIR Z259H

## (undated) DEVICE — SUTURE PDS II SZ 0 L27IN ABSRB VLT L26MM CT-2 1/2 CIR Z334H

## (undated) DEVICE — DRAIN KT WND 10FR RND 400ML --

## (undated) DEVICE — REM POLYHESIVE ADULT PATIENT RETURN ELECTRODE: Brand: VALLEYLAB

## (undated) DEVICE — TRAY CATH OD16FR SIL URIN M STATLOK STBL DEV SURSTP

## (undated) DEVICE — SUTURE STRATAFIX SPRL SZ 1 L14IN ABSRB VLT L48CM CTX 1/2 SXPD2B405

## (undated) DEVICE — NEEDLE HYPO 22GA L1.5IN BLK S STL HUB POLYPR SHLD REG BVL

## (undated) DEVICE — SOLUTION IV 1000ML 0.9% SOD CHL

## (undated) DEVICE — 3M™ STERI-DRAPE™ U-DRAPE 1015: Brand: STERI-DRAPE™

## (undated) DEVICE — 3000CC GUARDIAN II: Brand: GUARDIAN

## (undated) DEVICE — T4 HOOD

## (undated) DEVICE — SYR LR LCK 1ML GRAD NSAF 30ML --

## (undated) DEVICE — TOWEL SURG W17XL27IN STD BLU COT NONFENESTRATED PREWASHED

## (undated) DEVICE — 3M™ IOBAN™ 2 ANTIMICROBIAL INCISE DRAPE 6648EZ: Brand: IOBAN™ 2

## (undated) DEVICE — NON-ADHERENT STRIPS,OIL EMULSION: Brand: CURITY

## (undated) DEVICE — SLIM BODY SKIN STAPLER: Brand: APPOSE ULC

## (undated) DEVICE — OCCLUSIVE GAUZE STRIP,3% BISMUTH TRIBROMOPHENATE IN PETROLATUM BLEND: Brand: XEROFORM

## (undated) DEVICE — DRESSING PETRO GZ XRFRM CURAD ST OVERWRAP 5 X 9 IN

## (undated) DEVICE — SOLUTION IRRIG 3000ML 0.9% SOD CHL FLX CONT 0797208] ICU MEDICAL INC]

## (undated) DEVICE — 4-PORT MANIFOLD: Brand: NEPTUNE 2

## (undated) DEVICE — DRSG AQUACEL SURG 3.5 X 10IN -- CONVERT TO ITEM 370183

## (undated) DEVICE — SUTURE ABSORBABLE BRAIDED 2-0 CT-1 27 IN UD VICRYL J259H

## (undated) DEVICE — CONTAINER,SPECIMEN,3OZ,OR STRL: Brand: MEDLINE

## (undated) DEVICE — KIT INSTR W/ 2.4MM GUIDEPIN SUT PASS WIRE NO2 FIBERWIRE

## (undated) DEVICE — SURGICAL PROCEDURE PACK BASIN MAJ SET CUST NO CAUT

## (undated) DEVICE — SUT ETHLN 3-0 18IN PS2 BLK --

## (undated) DEVICE — CONVERTORS STOCKINETTE: Brand: CONVERTORS

## (undated) DEVICE — 4.5 MM FULL RADIUS STRAIGHT                                    BLADES, POWER/EP-1, YELLOW, PACKAGED                                    6 PER BOX, STERILE: Brand: DYONICS

## (undated) DEVICE — 3M™ TEGADERM™ TRANSPARENT FILM DRESSING FRAME STYLE, 1628, 6 IN X 8 IN (15 CM X 20 CM), 10/CT 8CT/CASE: Brand: 3M™ TEGADERM™

## (undated) DEVICE — SUTURE ETHLN SZ 2-0 L18IN NONABSORBABLE BLK L19MM PS-2 PRIM 593H

## (undated) DEVICE — DRAPE,ARTHRO,W/POUCH,STERILE: Brand: MEDLINE

## (undated) DEVICE — MEDI-VAC NON-CONDUCTIVE SUCTION TUBING: Brand: CARDINAL HEALTH

## (undated) DEVICE — SUTURE PDS II SZ 0 L36IN ABSRB VLT L40MM CT 1/2 CIR Z358T

## (undated) DEVICE — DUAL IRRIGATION ADAPTOR

## (undated) DEVICE — STAPLER SKIN 35CT WD STRL DISP -- MULTIFIRE PREMIUM

## (undated) DEVICE — SOLUTION IRRIG 3000ML LAC R FLX CONT

## (undated) DEVICE — HOOK LOCK LATEX FREE ELASTIC BANDAGE D/L 6INX10YD

## (undated) DEVICE — Z DISCONTINUED USE 2744636  DRESSING AQUACEL 14 IN ALG W3.5XL14IN POLYUR FLM CVR W/ HYDRCOLL

## (undated) DEVICE — DRAPE,U/ SHT,SPLIT,PLAS,STERIL: Brand: MEDLINE

## (undated) DEVICE — SUT ETHLN 3-0 18IN PS1 BLK --